# Patient Record
Sex: MALE | Race: WHITE | Employment: FULL TIME | URBAN - NONMETROPOLITAN AREA
[De-identification: names, ages, dates, MRNs, and addresses within clinical notes are randomized per-mention and may not be internally consistent; named-entity substitution may affect disease eponyms.]

---

## 2017-07-24 ENCOUNTER — HOSPITAL ENCOUNTER (OUTPATIENT)
Dept: GENERAL RADIOLOGY | Age: 47
Discharge: HOME | End: 2017-07-24
Payer: COMMERCIAL

## 2017-07-24 ENCOUNTER — HOSPITAL ENCOUNTER (OUTPATIENT)
Age: 47
End: 2017-07-24
Payer: COMMERCIAL

## 2017-07-24 DIAGNOSIS — M25.552 BILATERAL HIP PAIN: ICD-10-CM

## 2017-07-24 DIAGNOSIS — M25.551 BILATERAL HIP PAIN: ICD-10-CM

## 2017-07-24 PROCEDURE — 73521 X-RAY EXAM HIPS BI 2 VIEWS: CPT

## 2017-09-26 RX ORDER — POTASSIUM CITRATE 10 MEQ/1
TABLET, EXTENDED RELEASE ORAL
Qty: 90 TABLET | Refills: 3 | Status: SHIPPED | OUTPATIENT
Start: 2017-09-26 | End: 2018-07-27 | Stop reason: SDUPTHER

## 2018-01-24 ENCOUNTER — OFFICE VISIT (OUTPATIENT)
Dept: PHYSICAL MEDICINE AND REHAB | Age: 48
End: 2018-01-24
Payer: COMMERCIAL

## 2018-01-24 VITALS
HEART RATE: 72 BPM | HEIGHT: 70 IN | DIASTOLIC BLOOD PRESSURE: 82 MMHG | SYSTOLIC BLOOD PRESSURE: 116 MMHG | BODY MASS INDEX: 33.13 KG/M2 | WEIGHT: 231.4 LBS

## 2018-01-24 DIAGNOSIS — G89.4 CHRONIC PAIN SYNDROME: ICD-10-CM

## 2018-01-24 DIAGNOSIS — M47.814 SPONDYLOSIS OF THORACIC REGION WITHOUT MYELOPATHY OR RADICULOPATHY: ICD-10-CM

## 2018-01-24 DIAGNOSIS — M47.816 LUMBAR SPONDYLOSIS: Primary | ICD-10-CM

## 2018-01-24 DIAGNOSIS — M51.24 PROTRUSION OF INTERVERTEBRAL DISC OF THORACIC REGION: ICD-10-CM

## 2018-01-24 DIAGNOSIS — R07.81 RIB PAIN ON LEFT SIDE: ICD-10-CM

## 2018-01-24 PROCEDURE — 99244 OFF/OP CNSLTJ NEW/EST MOD 40: CPT | Performed by: PAIN MEDICINE

## 2018-01-24 RX ORDER — TRAMADOL HYDROCHLORIDE 50 MG/1
TABLET ORAL
Qty: 180 TABLET | Refills: 0 | Status: SHIPPED | OUTPATIENT
Start: 2018-01-24 | End: 2018-02-19 | Stop reason: SDUPTHER

## 2018-01-24 ASSESSMENT — ENCOUNTER SYMPTOMS
PHOTOPHOBIA: 0
SINUS PRESSURE: 0
EYE PAIN: 0
CONSTIPATION: 0
DIARRHEA: 0
CHEST TIGHTNESS: 0
SHORTNESS OF BREATH: 0
VOMITING: 0
RHINORRHEA: 0
SORE THROAT: 0
WHEEZING: 0
BACK PAIN: 1
ABDOMINAL PAIN: 0
COLOR CHANGE: 0
COUGH: 0
NAUSEA: 0

## 2018-01-24 NOTE — PROGRESS NOTES
[facility]  [address]  Dept: [phone]  Dept Fax: [phone]: [phone]    Visit Date: 1/24/2018    Russ Leon is a 49 y.o. male who is referred for pain management evaluation and treatment per Dr. Jayjay Blanc. CAGE and CAGE-AID Questions   1. In the last three months, have you felt you should cut down or stop drinking or using drugs? Yes []        No [x]     2. In the last three months, has anyone annoyed you or gotten on your nerves by telling you to cut down or stop drinking or using drugs? Yes []        No [x]     3. In the last three months, have you felt guilty or bad about how much you drink or use drugs? Yes []        No [x]     4. In the last three months, have you been waking up wanting to have an alcoholic drink or use drugs? Yes []        No [x]        Opioid Risk Tool:  Clinician Form       1. Family History of Substance Abuse: Female Male    Alcohol   []1   []3    Illegal drugs   []2   []3    Prescription drugs     []4   []4   2. Personal History of Substance Abuse:          Alcohol   []3   []3    Illegal drugs   []4   []4    Prescription drugs     []5   []5   3. Age (madelyn box if between 12 and 39):     []1   []1   4. History of Preadolescent Sexual Abuse:     []3   []0   5. Psychological Disease:      Attention deficit disorder, obsessive-compulsive disorder, bipolar, schizophrenia   []2   []2      Depression     []1   [x]1    Scoring Totals  1     Total Score  Low Risk  Moderate Risk  High Risk   Risk Category   0 - 3   4 - 7   8 or Above      Patient states symptoms interfere with:  A.  General Activity:  yes   B. Mood: yes    C. Walking Ability:   yes   D. Normal Work (Includes both work outside the home and housework):   yes    E.  Relations with Other People:  yes   F. Sleep:   yes   G.  Enjoyment of Life:  yes       HPI:     Chief Complaint:    Back pain  Family present    HPI    Patient is a 48 y/o male with back pain for last 8 years. Patient denies any injuries in the past. Patient works at [employer]. Patient states has some left side rib cage pain describes as tightness and pressure. Patient denies any numbness or burning bilateral upper extremities. States lower back area pain stabbing,achy and stabbing. Patient denies any radicular symptoms. Patient denies any bladder or bowel dysfunction. Patient states pain scale at worse is a 8/10 and at best is a 3/10. Patient states pain is aggravated by change of positions. States better with rest.  States prior treatments have been steroid injection, tramadol helps. States had physical therapy 2 X weeks for 6 weeks minimal benefit. MRI THORACIC SPINE from 10/11/2017:  1- Mild anterior wedge superior endplates of E4,Y1,N1,P01,Y34 and L1 vertebral bodies. 2- Small posterior mildline disc protrusion at T9-10.  3- Mild disc height narrowing at T10-11 and T11-12. MRI LUMBAR SPINE from 10/11/2017:  1- L4-5 annular bulging and tears superimposed upon facet arthrosis. 2- L5-S1 mild facet arthrosis    The patient is allergic to latex; nickel; tylenol with codeine #3 [acetaminophen-codeine]; and oxybutynin chloride [oxybutynin chloride]. Past Medical History  Meryle Promise  has a past medical history of GERD (gastroesophageal reflux disease); History of kidney stones; Hx of blood clots; and Kidney stone. Past Surgical History  The patient  has a past surgical history that includes Refractive surgery (2004); Ureter stent placement (2006); Lithotripsy (2006); hernia repair (2007); Cystocopy (6/14/2013); eye surgery; Cystocopy (07/29/2013); and Cystoscopy (12/23/13). Family History  This patient's family history includes Cancer in his mother; Heart Disease in his father. Social History  Meryle Promise  reports that he quit smoking about 12 years ago.  He has never used smokeless tobacco. He reports that he does not drink light-headedness, numbness and headaches. Hematological: Does not bruise/bleed easily. Psychiatric/Behavioral: Positive for sleep disturbance. Negative for agitation, behavioral problems, confusion, decreased concentration, dysphoric mood, hallucinations, self-injury and suicidal ideas. The patient is not nervous/anxious and is not hyperactive. Objective:     Vitals:    01/24/18 0910   BP: 116/82   Site: Left Arm   Position: Sitting   Cuff Size: Large Adult   Pulse: 72   Weight: 231 lb 6.4 oz (105 kg)   Height: 5' 10\" (1.778 m)       Physical Exam   Constitutional: He is oriented to person, place, and time. He appears well-developed and well-nourished. No distress. HENT:   Head: Normocephalic and atraumatic. Right Ear: External ear normal.   Left Ear: External ear normal.   Nose: Nose normal.   Mouth/Throat: Oropharynx is clear and moist. No oropharyngeal exudate. Eyes: Conjunctivae and EOM are normal. Pupils are equal, round, and reactive to light. Right eye exhibits no discharge. Left eye exhibits no discharge. No scleral icterus. Neck: Normal range of motion and full passive range of motion without pain. Neck supple. No muscular tenderness present. No neck rigidity. No edema, no erythema and normal range of motion present. No thyromegaly present. Cardiovascular: Normal rate, regular rhythm, normal heart sounds and intact distal pulses. Exam reveals no gallop and no friction rub. No murmur heard. Pulmonary/Chest: Effort normal and breath sounds normal. No respiratory distress. He has no wheezes. He has no rales. He exhibits no tenderness. Abdominal: Soft. Bowel sounds are normal. He exhibits no distension. There is no tenderness. There is no rebound and no guarding. Musculoskeletal:        Right hip: He exhibits normal range of motion and no tenderness. Left hip: He exhibits normal range of motion and no tenderness. Right knee: He exhibits normal range of motion.  No 4. Protrusion of intervertebral disc of thoracic region    5. Chronic pain syndrome            Plan:      · Patient read and signed orientation and opioid agreement. · OARRS reviewed. · Discussed long term side effects of medications. · Discussed tolerance, dependency and addiction. .UDS preformed today. · Patient told can not receive any pain medications from any other source. · Discussed with pt.may not be pain free. · No evidence of abuse, diversion or aberrant behavior. · Medications and/or procedures improve function and quality of life. · Needs clearance off coumadin weeks prior to procedure. · Reviewed MRI Thoracic and Lumbar spine in detail with patient. Model used to discuss pathology. · Discussed L-facet MBB for diagnostic and therapeutic purpose. Risks and procedure reviewed. Model used to discuss procedure. Questions answered. · Told patient will need clearance, on coumadin. Needs stopped 5-7 days and draw INR day of procedure. Verbalizes understanding. · In good forrest will continue Tramadol until procedures preformed. Goal is to decrease Tramadol needs. Previous Treatments tried:  · PT: Yes,  any benefit? No, how many weeks? 6, last date done: last yr  · NSAIDs: Yes, On coumadin  · Chiropractic: Yes,  any benefit? No  · Muscle relaxants: Yes,  any benefit? No  · Narcotics: Yes,  any benefit? Yes  · Spine surgeon consult: No  · Any Implants: No    Meds. Prescribed:   Orders Placed This Encounter   Medications    traMADol (ULTRAM) 50 MG tablet     Sig: Take one-two every 8hrs PRN. Dispense:  180 tablet     Refill:  0       Return for BILATERAL L-FACET MBB @ L4-5 and L5-S1, F/U after procedure. .     Time spent with patient was 60 minutes more than 50% was spent  Counseling/coordinated the patient's care.     Electronically signed by Romulo Obregon MD on 2/10/2018 at 2:05 PM

## 2018-02-19 DIAGNOSIS — R07.81 RIB PAIN ON LEFT SIDE: ICD-10-CM

## 2018-02-19 DIAGNOSIS — M47.816 LUMBAR SPONDYLOSIS: ICD-10-CM

## 2018-02-19 DIAGNOSIS — G89.4 CHRONIC PAIN SYNDROME: ICD-10-CM

## 2018-02-19 RX ORDER — TRAMADOL HYDROCHLORIDE 50 MG/1
TABLET ORAL
Qty: 180 TABLET | Refills: 0 | Status: SHIPPED | OUTPATIENT
Start: 2018-02-19 | End: 2018-03-19 | Stop reason: SDUPTHER

## 2018-02-21 ENCOUNTER — TELEPHONE (OUTPATIENT)
Dept: PHYSICAL MEDICINE AND REHAB | Age: 48
End: 2018-02-21

## 2018-02-28 ENCOUNTER — TELEPHONE (OUTPATIENT)
Dept: PHYSICAL MEDICINE AND REHAB | Age: 48
End: 2018-02-28

## 2018-03-07 ENCOUNTER — TELEPHONE (OUTPATIENT)
Dept: PHYSICAL MEDICINE AND REHAB | Age: 48
End: 2018-03-07

## 2018-03-19 DIAGNOSIS — M47.816 LUMBAR SPONDYLOSIS: ICD-10-CM

## 2018-03-19 DIAGNOSIS — R07.81 RIB PAIN ON LEFT SIDE: ICD-10-CM

## 2018-03-19 DIAGNOSIS — G89.4 CHRONIC PAIN SYNDROME: ICD-10-CM

## 2018-03-19 RX ORDER — TRAMADOL HYDROCHLORIDE 50 MG/1
TABLET ORAL
Qty: 180 TABLET | Refills: 0 | Status: SHIPPED | OUTPATIENT
Start: 2018-03-22 | End: 2018-04-18 | Stop reason: SDUPTHER

## 2018-03-19 NOTE — TELEPHONE ENCOUNTER
OARRS reviewed. UDS: + tramadol, o-desmethyltramadol. Last seen: 1/24/2018.  Follow-up: injections 3/26/18

## 2018-03-26 ENCOUNTER — ANESTHESIA EVENT (OUTPATIENT)
Dept: OPERATING ROOM | Age: 48
End: 2018-03-26
Payer: COMMERCIAL

## 2018-03-26 ENCOUNTER — ANESTHESIA (OUTPATIENT)
Dept: OPERATING ROOM | Age: 48
End: 2018-03-26
Payer: COMMERCIAL

## 2018-03-26 ENCOUNTER — HOSPITAL ENCOUNTER (OUTPATIENT)
Age: 48
Setting detail: OUTPATIENT SURGERY
Discharge: HOME | End: 2018-03-26
Attending: PAIN MEDICINE | Admitting: PAIN MEDICINE
Payer: COMMERCIAL

## 2018-03-26 ENCOUNTER — APPOINTMENT (OUTPATIENT)
Dept: GENERAL RADIOLOGY | Age: 48
End: 2018-03-26
Attending: PAIN MEDICINE
Payer: COMMERCIAL

## 2018-03-26 VITALS
WEIGHT: 229 LBS | HEIGHT: 70 IN | DIASTOLIC BLOOD PRESSURE: 73 MMHG | BODY MASS INDEX: 32.78 KG/M2 | OXYGEN SATURATION: 94 % | TEMPERATURE: 98.1 F | RESPIRATION RATE: 12 BRPM | HEART RATE: 78 BPM | SYSTOLIC BLOOD PRESSURE: 109 MMHG

## 2018-03-26 VITALS
OXYGEN SATURATION: 92 % | SYSTOLIC BLOOD PRESSURE: 105 MMHG | RESPIRATION RATE: 19 BRPM | DIASTOLIC BLOOD PRESSURE: 71 MMHG

## 2018-03-26 LAB — INR BLD: 0.98 (ref 0.85–1.13)

## 2018-03-26 PROCEDURE — 3209999900 FLUORO FOR SURGICAL PROCEDURES

## 2018-03-26 PROCEDURE — 2500000003 HC RX 250 WO HCPCS: Performed by: NURSE ANESTHETIST, CERTIFIED REGISTERED

## 2018-03-26 PROCEDURE — 3700000000 HC ANESTHESIA ATTENDED CARE: Performed by: PAIN MEDICINE

## 2018-03-26 PROCEDURE — 7100000010 HC PHASE II RECOVERY - FIRST 15 MIN: Performed by: PAIN MEDICINE

## 2018-03-26 PROCEDURE — 7100000011 HC PHASE II RECOVERY - ADDTL 15 MIN: Performed by: PAIN MEDICINE

## 2018-03-26 PROCEDURE — 64494 INJ PARAVERT F JNT L/S 2 LEV: CPT | Performed by: PAIN MEDICINE

## 2018-03-26 PROCEDURE — 64493 INJ PARAVERT F JNT L/S 1 LEV: CPT | Performed by: PAIN MEDICINE

## 2018-03-26 PROCEDURE — 6360000002 HC RX W HCPCS: Performed by: PAIN MEDICINE

## 2018-03-26 PROCEDURE — 6360000002 HC RX W HCPCS: Performed by: NURSE ANESTHETIST, CERTIFIED REGISTERED

## 2018-03-26 PROCEDURE — 3600000056 HC PAIN LEVEL 4 BASE: Performed by: PAIN MEDICINE

## 2018-03-26 PROCEDURE — 2500000003 HC RX 250 WO HCPCS: Performed by: PAIN MEDICINE

## 2018-03-26 PROCEDURE — 85610 PROTHROMBIN TIME: CPT

## 2018-03-26 PROCEDURE — 36415 COLL VENOUS BLD VENIPUNCTURE: CPT

## 2018-03-26 RX ORDER — LIDOCAINE HYDROCHLORIDE 10 MG/ML
INJECTION, SOLUTION INFILTRATION; PERINEURAL PRN
Status: DISCONTINUED | OUTPATIENT
Start: 2018-03-26 | End: 2018-03-26 | Stop reason: HOSPADM

## 2018-03-26 RX ORDER — BUPIVACAINE HYDROCHLORIDE 2.5 MG/ML
INJECTION, SOLUTION EPIDURAL; INFILTRATION; INTRACAUDAL PRN
Status: DISCONTINUED | OUTPATIENT
Start: 2018-03-26 | End: 2018-03-26 | Stop reason: HOSPADM

## 2018-03-26 RX ORDER — BETAMETHASONE SODIUM PHOSPHATE AND BETAMETHASONE ACETATE 3; 3 MG/ML; MG/ML
INJECTION, SUSPENSION INTRA-ARTICULAR; INTRALESIONAL; INTRAMUSCULAR; SOFT TISSUE PRN
Status: DISCONTINUED | OUTPATIENT
Start: 2018-03-26 | End: 2018-03-26 | Stop reason: HOSPADM

## 2018-03-26 RX ORDER — PROPOFOL 10 MG/ML
INJECTION, EMULSION INTRAVENOUS PRN
Status: DISCONTINUED | OUTPATIENT
Start: 2018-03-26 | End: 2018-03-26 | Stop reason: SDUPTHER

## 2018-03-26 RX ORDER — LIDOCAINE HYDROCHLORIDE 20 MG/ML
INJECTION, SOLUTION INFILTRATION; PERINEURAL PRN
Status: DISCONTINUED | OUTPATIENT
Start: 2018-03-26 | End: 2018-03-26 | Stop reason: SDUPTHER

## 2018-03-26 RX ADMIN — LIDOCAINE HYDROCHLORIDE 50 MG: 20 INJECTION, SOLUTION INFILTRATION; PERINEURAL at 11:48

## 2018-03-26 RX ADMIN — PROPOFOL 150 MG: 10 INJECTION, EMULSION INTRAVENOUS at 11:49

## 2018-03-26 ASSESSMENT — PULMONARY FUNCTION TESTS
PIF_VALUE: 0

## 2018-03-26 ASSESSMENT — PAIN DESCRIPTION - DESCRIPTORS: DESCRIPTORS: CONSTANT

## 2018-03-26 ASSESSMENT — PAIN - FUNCTIONAL ASSESSMENT: PAIN_FUNCTIONAL_ASSESSMENT: 0-10

## 2018-03-26 ASSESSMENT — PAIN SCALES - GENERAL: PAINLEVEL_OUTOF10: 0

## 2018-03-26 NOTE — ANESTHESIA PRE PROCEDURE
disease) K21.9    Anxiety F41.9    Panic disorder F41.0    Obesity (BMI 30.0-34. 9) E66.9       Past Medical History:        Diagnosis Date    Chronic back pain     GERD (gastroesophageal reflux disease)     History of kidney stones     Hx of blood clots 3/13    MUNA LUNGS    Kidney stone        Past Surgical History:        Procedure Laterality Date    CYSTOSCOPY  6/14/2013    URETEROSCOPY STONE REMOVAL    CYSTOSCOPY  07/29/2013    Left ureteroscopy, Left ureteral stone removal and stent exchange    CYSTOSCOPY  12/23/13    Cystoscopy, Left Optical Internal Ureterotomy, Left Ureteroscopy and Dilated UPJ Oscar Ojeda  2007    LITHOTRIPSY  2006    [address]  2004    URETER STENT PLACEMENT  2006       Social History:    Social History   Substance Use Topics    Smoking status: Former Smoker     Quit date: 6/6/2005    Smokeless tobacco: Never Used    Alcohol use No                                Counseling given: Not Answered      Vital Signs (Current):   Vitals:    03/21/18 1018   Weight: 232 lb (105.2 kg)   Height: 5' 10\" (1.778 m)                                              BP Readings from Last 3 Encounters:   01/24/18 116/82   05/30/17 114/78   10/11/16 110/78       NPO Status:                                                                                 BMI:   Wt Readings from Last 3 Encounters:   03/21/18 232 lb (105.2 kg)   01/24/18 231 lb 6.4 oz (105 kg)   05/30/17 224 lb (101.6 kg)     Body mass index is 33.29 kg/m².     CBC:   Lab Results   Component Value Date    WBC 9.2 05/30/2017    RBC 5.41 05/30/2017    HGB 16.3 05/30/2017    HCT 48.8 05/30/2017    MCV 90.2 05/30/2017    RDW 13.6 05/30/2017     05/30/2017       CMP:   Lab Results   Component Value Date     05/30/2017    K 4.4 05/30/2017     05/30/2017    CO2 24 05/30/2017    BUN 11 05/30/2017    CREATININE 0.9 05/30/2017    LABGLOM >90 05/30/2017    GLUCOSE 96 05/30/2017    PROT 6.6 01/23/2015    CALCIUM 9.5 05/30/2017    BILITOT 0.3 01/23/2015    ALKPHOS 90 01/23/2015    AST 34 01/23/2015    ALT 44 01/23/2015       POC Tests: No results for input(s): POCGLU, POCNA, POCK, POCCL, POCBUN, POCHEMO, POCHCT in the last 72 hours. Coags:   Lab Results   Component Value Date    INR 2.26 05/30/2017    APTT 32.1 06/14/2013       HCG (If Applicable): No results found for: PREGTESTUR, PREGSERUM, HCG, HCGQUANT     ABGs: No results found for: PHART, PO2ART, KLI2THB, PNM9GJF, BEART, N7AMMMMA     Type & Screen (If Applicable):  No results found for: LABABO, LABRH    Anesthesia Evaluation   no history of anesthetic complications:   Airway: Mallampati: II  TM distance: >3 FB   Neck ROM: full  Mouth opening: > = 3 FB Dental:          Pulmonary:normal exam    (+) sleep apnea: on CPAP,            Patient did not smoke on day of surgery. Cardiovascular:  Exercise tolerance: good (>4 METS),                     Neuro/Psych:   (+) headaches:, psychiatric history:            GI/Hepatic/Renal:   (+) GERD:,           Endo/Other: Negative Endo/Other ROS             Pt had no PAT visit       Abdominal:   (+) obese,         Vascular:                                        Anesthesia Plan      MAC     ASA 2       Induction: intravenous. Anesthetic plan and risks discussed with patient. Plan discussed with CRNA.                   Dioni Manzano MD   3/26/2018

## 2018-04-18 ENCOUNTER — OFFICE VISIT (OUTPATIENT)
Dept: PHYSICAL MEDICINE AND REHAB | Age: 48
End: 2018-04-18
Payer: COMMERCIAL

## 2018-04-18 VITALS
WEIGHT: 273.2 LBS | HEIGHT: 70 IN | HEART RATE: 84 BPM | BODY MASS INDEX: 39.11 KG/M2 | DIASTOLIC BLOOD PRESSURE: 85 MMHG | SYSTOLIC BLOOD PRESSURE: 134 MMHG

## 2018-04-18 DIAGNOSIS — M47.816 LUMBAR SPONDYLOSIS: Primary | ICD-10-CM

## 2018-04-18 DIAGNOSIS — G89.4 CHRONIC PAIN SYNDROME: ICD-10-CM

## 2018-04-18 DIAGNOSIS — M51.24 PROTRUSION OF INTERVERTEBRAL DISC OF THORACIC REGION: ICD-10-CM

## 2018-04-18 DIAGNOSIS — M47.814 SPONDYLOSIS OF THORACIC REGION WITHOUT MYELOPATHY OR RADICULOPATHY: ICD-10-CM

## 2018-04-18 DIAGNOSIS — R07.81 RIB PAIN ON LEFT SIDE: ICD-10-CM

## 2018-04-18 PROCEDURE — 99213 OFFICE O/P EST LOW 20 MIN: CPT | Performed by: NURSE PRACTITIONER

## 2018-04-18 RX ORDER — TRAMADOL HYDROCHLORIDE 50 MG/1
TABLET ORAL
Qty: 180 TABLET | Refills: 0 | Status: SHIPPED | OUTPATIENT
Start: 2018-04-21 | End: 2018-05-14

## 2018-04-18 ASSESSMENT — ENCOUNTER SYMPTOMS: BACK PAIN: 1

## 2018-04-20 ENCOUNTER — TELEPHONE (OUTPATIENT)
Dept: PHYSICAL MEDICINE AND REHAB | Age: 48
End: 2018-04-20

## 2018-04-23 ENCOUNTER — TELEPHONE (OUTPATIENT)
Dept: PHYSICAL MEDICINE AND REHAB | Age: 48
End: 2018-04-23

## 2018-04-30 ENCOUNTER — TELEPHONE (OUTPATIENT)
Dept: OPERATING ROOM | Age: 48
End: 2018-04-30

## 2018-05-07 DIAGNOSIS — G89.4 CHRONIC PAIN SYNDROME: Primary | ICD-10-CM

## 2018-05-09 RX ORDER — HYDROCODONE BITARTRATE AND ACETAMINOPHEN 5; 325 MG/1; MG/1
1 TABLET ORAL 3 TIMES DAILY PRN
Qty: 42 TABLET | Refills: 0 | Status: SHIPPED | OUTPATIENT
Start: 2018-05-09 | End: 2018-05-17 | Stop reason: SDUPTHER

## 2018-05-17 DIAGNOSIS — G89.4 CHRONIC PAIN SYNDROME: ICD-10-CM

## 2018-05-17 RX ORDER — HYDROCODONE BITARTRATE AND ACETAMINOPHEN 5; 325 MG/1; MG/1
1 TABLET ORAL 3 TIMES DAILY PRN
Qty: 90 TABLET | Refills: 0 | Status: SHIPPED | OUTPATIENT
Start: 2018-05-23 | End: 2018-06-21 | Stop reason: SDUPTHER

## 2018-05-21 ENCOUNTER — APPOINTMENT (OUTPATIENT)
Dept: GENERAL RADIOLOGY | Age: 48
End: 2018-05-21
Attending: PAIN MEDICINE
Payer: COMMERCIAL

## 2018-05-21 ENCOUNTER — ANESTHESIA (OUTPATIENT)
Dept: OPERATING ROOM | Age: 48
End: 2018-05-21
Payer: COMMERCIAL

## 2018-05-21 ENCOUNTER — HOSPITAL ENCOUNTER (OUTPATIENT)
Age: 48
Setting detail: OUTPATIENT SURGERY
Discharge: HOME | End: 2018-05-21
Attending: PAIN MEDICINE | Admitting: PAIN MEDICINE
Payer: COMMERCIAL

## 2018-05-21 ENCOUNTER — ANESTHESIA EVENT (OUTPATIENT)
Dept: OPERATING ROOM | Age: 48
End: 2018-05-21
Payer: COMMERCIAL

## 2018-05-21 VITALS
RESPIRATION RATE: 16 BRPM | HEIGHT: 70 IN | DIASTOLIC BLOOD PRESSURE: 74 MMHG | OXYGEN SATURATION: 94 % | SYSTOLIC BLOOD PRESSURE: 118 MMHG | HEART RATE: 75 BPM | BODY MASS INDEX: 32.64 KG/M2 | WEIGHT: 228 LBS | TEMPERATURE: 97.7 F

## 2018-05-21 VITALS
SYSTOLIC BLOOD PRESSURE: 134 MMHG | RESPIRATION RATE: 10 BRPM | OXYGEN SATURATION: 95 % | DIASTOLIC BLOOD PRESSURE: 89 MMHG

## 2018-05-21 LAB — POC INR: 1.1 (ref 0.8–1.2)

## 2018-05-21 PROCEDURE — 2500000003 HC RX 250 WO HCPCS: Performed by: NURSE ANESTHETIST, CERTIFIED REGISTERED

## 2018-05-21 PROCEDURE — 6360000002 HC RX W HCPCS: Performed by: PAIN MEDICINE

## 2018-05-21 PROCEDURE — 64494 INJ PARAVERT F JNT L/S 2 LEV: CPT | Performed by: PAIN MEDICINE

## 2018-05-21 PROCEDURE — 3600000056 HC PAIN LEVEL 4 BASE: Performed by: PAIN MEDICINE

## 2018-05-21 PROCEDURE — 2500000003 HC RX 250 WO HCPCS: Performed by: PAIN MEDICINE

## 2018-05-21 PROCEDURE — 3209999900 FLUORO FOR SURGICAL PROCEDURES

## 2018-05-21 PROCEDURE — 64493 INJ PARAVERT F JNT L/S 1 LEV: CPT | Performed by: PAIN MEDICINE

## 2018-05-21 PROCEDURE — 6360000002 HC RX W HCPCS: Performed by: NURSE ANESTHETIST, CERTIFIED REGISTERED

## 2018-05-21 PROCEDURE — 7100000011 HC PHASE II RECOVERY - ADDTL 15 MIN: Performed by: PAIN MEDICINE

## 2018-05-21 PROCEDURE — 3700000000 HC ANESTHESIA ATTENDED CARE: Performed by: PAIN MEDICINE

## 2018-05-21 PROCEDURE — 7100000010 HC PHASE II RECOVERY - FIRST 15 MIN: Performed by: PAIN MEDICINE

## 2018-05-21 PROCEDURE — 2580000003 HC RX 258: Performed by: NURSE ANESTHETIST, CERTIFIED REGISTERED

## 2018-05-21 RX ORDER — LIDOCAINE HYDROCHLORIDE 10 MG/ML
INJECTION, SOLUTION INFILTRATION; PERINEURAL PRN
Status: DISCONTINUED | OUTPATIENT
Start: 2018-05-21 | End: 2018-05-21 | Stop reason: SDUPTHER

## 2018-05-21 RX ORDER — BETAMETHASONE SODIUM PHOSPHATE AND BETAMETHASONE ACETATE 3; 3 MG/ML; MG/ML
INJECTION, SUSPENSION INTRA-ARTICULAR; INTRALESIONAL; INTRAMUSCULAR; SOFT TISSUE PRN
Status: DISCONTINUED | OUTPATIENT
Start: 2018-05-21 | End: 2018-05-21 | Stop reason: HOSPADM

## 2018-05-21 RX ORDER — 0.9 % SODIUM CHLORIDE 0.9 %
VIAL (ML) INJECTION PRN
Status: DISCONTINUED | OUTPATIENT
Start: 2018-05-21 | End: 2018-05-21 | Stop reason: SDUPTHER

## 2018-05-21 RX ORDER — PROPOFOL 10 MG/ML
INJECTION, EMULSION INTRAVENOUS PRN
Status: DISCONTINUED | OUTPATIENT
Start: 2018-05-21 | End: 2018-05-21 | Stop reason: SDUPTHER

## 2018-05-21 RX ORDER — LIDOCAINE HYDROCHLORIDE 10 MG/ML
INJECTION, SOLUTION INFILTRATION; PERINEURAL PRN
Status: DISCONTINUED | OUTPATIENT
Start: 2018-05-21 | End: 2018-05-21 | Stop reason: HOSPADM

## 2018-05-21 RX ORDER — BUPIVACAINE HYDROCHLORIDE 2.5 MG/ML
INJECTION, SOLUTION EPIDURAL; INFILTRATION; INTRACAUDAL PRN
Status: DISCONTINUED | OUTPATIENT
Start: 2018-05-21 | End: 2018-05-21 | Stop reason: HOSPADM

## 2018-05-21 RX ADMIN — SODIUM CHLORIDE 5 ML: 9 INJECTION, SOLUTION INTRAMUSCULAR; INTRAVENOUS; SUBCUTANEOUS at 10:17

## 2018-05-21 RX ADMIN — SODIUM CHLORIDE 5 ML: 9 INJECTION, SOLUTION INTRAMUSCULAR; INTRAVENOUS; SUBCUTANEOUS at 10:15

## 2018-05-21 RX ADMIN — PROPOFOL 50 MG: 10 INJECTION, EMULSION INTRAVENOUS at 10:17

## 2018-05-21 RX ADMIN — PROPOFOL 50 MG: 10 INJECTION, EMULSION INTRAVENOUS at 10:15

## 2018-05-21 RX ADMIN — LIDOCAINE HYDROCHLORIDE 20 MG: 10 INJECTION, SOLUTION INFILTRATION; PERINEURAL at 10:15

## 2018-05-21 ASSESSMENT — PAIN - FUNCTIONAL ASSESSMENT: PAIN_FUNCTIONAL_ASSESSMENT: 0-10

## 2018-05-21 ASSESSMENT — PAIN SCALES - GENERAL: PAINLEVEL_OUTOF10: 0

## 2018-05-21 ASSESSMENT — PULMONARY FUNCTION TESTS
PIF_VALUE: 0

## 2018-06-18 ENCOUNTER — TELEPHONE (OUTPATIENT)
Dept: UROLOGY | Age: 48
End: 2018-06-18

## 2018-06-21 ENCOUNTER — OFFICE VISIT (OUTPATIENT)
Dept: PHYSICAL MEDICINE AND REHAB | Age: 48
End: 2018-06-21
Payer: COMMERCIAL

## 2018-06-21 VITALS
SYSTOLIC BLOOD PRESSURE: 123 MMHG | DIASTOLIC BLOOD PRESSURE: 85 MMHG | HEIGHT: 70 IN | HEART RATE: 82 BPM | BODY MASS INDEX: 32.63 KG/M2 | WEIGHT: 227.96 LBS

## 2018-06-21 DIAGNOSIS — M51.24 PROTRUSION OF INTERVERTEBRAL DISC OF THORACIC REGION: ICD-10-CM

## 2018-06-21 DIAGNOSIS — G89.4 CHRONIC PAIN SYNDROME: ICD-10-CM

## 2018-06-21 DIAGNOSIS — M47.814 SPONDYLOSIS OF THORACIC REGION WITHOUT MYELOPATHY OR RADICULOPATHY: ICD-10-CM

## 2018-06-21 DIAGNOSIS — M47.816 SPONDYLOSIS OF LUMBAR REGION WITHOUT MYELOPATHY OR RADICULOPATHY: Primary | ICD-10-CM

## 2018-06-21 PROCEDURE — 99213 OFFICE O/P EST LOW 20 MIN: CPT | Performed by: NURSE PRACTITIONER

## 2018-06-21 RX ORDER — HYDROCODONE BITARTRATE AND ACETAMINOPHEN 5; 325 MG/1; MG/1
1 TABLET ORAL 3 TIMES DAILY PRN
Qty: 90 TABLET | Refills: 0 | Status: SHIPPED | OUTPATIENT
Start: 2018-06-22 | End: 2018-07-17 | Stop reason: SDUPTHER

## 2018-06-21 ASSESSMENT — ENCOUNTER SYMPTOMS: BACK PAIN: 1

## 2018-07-03 ENCOUNTER — TELEPHONE (OUTPATIENT)
Dept: PHYSICAL MEDICINE AND REHAB | Age: 48
End: 2018-07-03

## 2018-07-07 LAB
ALBUMIN SERPL-MCNC: 4.7 G/DL
ALP BLD-CCNC: 100 U/L
ALT SERPL-CCNC: 41 U/L
ANION GAP SERPL CALCULATED.3IONS-SCNC: 13 MMOL/L
AST SERPL-CCNC: 30 U/L
AVERAGE GLUCOSE: 117
BASOPHILS ABSOLUTE: 0.1 /ΜL
BASOPHILS RELATIVE PERCENT: 1.3 %
BILIRUB SERPL-MCNC: 0.5 MG/DL (ref 0.1–1.4)
BUN BLDV-MCNC: 12 MG/DL
CALCIUM SERPL-MCNC: 9.2 MG/DL
CHLORIDE BLD-SCNC: 105 MMOL/L
CHOLESTEROL, TOTAL: 258 MG/DL
CHOLESTEROL/HDL RATIO: 8.9
CO2: 25 MMOL/L
CREAT SERPL-MCNC: 0.9 MG/DL
EOSINOPHILS ABSOLUTE: 0.3 /ΜL
EOSINOPHILS RELATIVE PERCENT: 5.3 %
GFR CALCULATED: NORMAL
GLUCOSE BLD-MCNC: 95 MG/DL
HBA1C MFR BLD: 5.7 %
HCT VFR BLD CALC: 47.6 % (ref 41–53)
HDLC SERPL-MCNC: 29 MG/DL (ref 35–70)
HEMOGLOBIN: 15.9 G/DL (ref 13.5–17.5)
LDL CHOLESTEROL CALCULATED: ABNORMAL MG/DL (ref 0–160)
LYMPHOCYTES ABSOLUTE: 1 /ΜL
LYMPHOCYTES RELATIVE PERCENT: 20.5 %
MCH RBC QN AUTO: 29.6 PG
MCHC RBC AUTO-ENTMCNC: 33.4 G/DL
MCV RBC AUTO: 88.6 FL
MONOCYTES ABSOLUTE: 0.6 /ΜL
MONOCYTES RELATIVE PERCENT: 12.9 %
NEUTROPHILS ABSOLUTE: 2.8 /ΜL
NEUTROPHILS RELATIVE PERCENT: 59.4 %
PDW BLD-RTO: 12.8 %
PLATELET # BLD: 167 K/ΜL
PMV BLD AUTO: NORMAL FL
POTASSIUM SERPL-SCNC: 4.3 MMOL/L
RBC # BLD: 5.37 10^6/ΜL
SODIUM BLD-SCNC: 143 MMOL/L
TOTAL PROTEIN: 6.8
TRIGL SERPL-MCNC: 436 MG/DL
VLDLC SERPL CALC-MCNC: ABNORMAL MG/DL
WBC # BLD: 4.7 10^3/ML

## 2018-07-13 ENCOUNTER — TELEPHONE (OUTPATIENT)
Dept: PHYSICAL MEDICINE AND REHAB | Age: 48
End: 2018-07-13

## 2018-07-13 NOTE — TELEPHONE ENCOUNTER
Patient called office, had tooth extractions. Got started on ATB and was prescribed Norco by the DDS. Spoke with Dr Dora Padron, advised patient to not fill script, ok to increase Norco 5-325 we have him on from TID to QID for 2 days, will be out early. Patient stated he was taking more than we prescribed, due to tooth pain. Advised patient we can not fill any earlier than the ok'd dose, and that he needs to take only as ordered.      Asking about procedures and authorization, ave name to Anjel Mitchell to check with insurance

## 2018-07-17 DIAGNOSIS — G89.4 CHRONIC PAIN SYNDROME: ICD-10-CM

## 2018-07-17 RX ORDER — HYDROCODONE BITARTRATE AND ACETAMINOPHEN 5; 325 MG/1; MG/1
1 TABLET ORAL 3 TIMES DAILY PRN
Qty: 90 TABLET | Refills: 0 | Status: SHIPPED | OUTPATIENT
Start: 2018-07-21 | End: 2018-08-16 | Stop reason: SDUPTHER

## 2018-07-17 NOTE — TELEPHONE ENCOUNTER
OARRS reviewed. UDS: consistent. Last seen: 6/21/2018. Follow-up: procedures    Increased dose for two days to QID. (see my last telephone note) patient is out a day early by one day.

## 2018-07-27 ENCOUNTER — OFFICE VISIT (OUTPATIENT)
Dept: UROLOGY | Age: 48
End: 2018-07-27
Payer: COMMERCIAL

## 2018-07-27 VITALS
WEIGHT: 237.4 LBS | BODY MASS INDEX: 33.99 KG/M2 | HEIGHT: 70 IN | DIASTOLIC BLOOD PRESSURE: 82 MMHG | SYSTOLIC BLOOD PRESSURE: 110 MMHG

## 2018-07-27 DIAGNOSIS — Z51.81 THERAPEUTIC DRUG MONITORING: ICD-10-CM

## 2018-07-27 DIAGNOSIS — N20.0 KIDNEY STONE: Primary | ICD-10-CM

## 2018-07-27 LAB
BILIRUBIN URINE: NEGATIVE
BLOOD URINE, POC: NEGATIVE
CHARACTER, URINE: CLEAR
COLOR, URINE: YELLOW
GLUCOSE URINE: NEGATIVE MG/DL
KETONES, URINE: NEGATIVE
LEUKOCYTE CLUMPS, URINE: NEGATIVE
NITRITE, URINE: NEGATIVE
PH, URINE: 6.5
PROTEIN, URINE: NEGATIVE MG/DL
SPECIFIC GRAVITY, URINE: 1.02 (ref 1–1.03)
UROBILINOGEN, URINE: 0.2 EU/DL

## 2018-07-27 PROCEDURE — 99213 OFFICE O/P EST LOW 20 MIN: CPT | Performed by: NURSE PRACTITIONER

## 2018-07-27 PROCEDURE — 81003 URINALYSIS AUTO W/O SCOPE: CPT | Performed by: NURSE PRACTITIONER

## 2018-07-27 RX ORDER — POTASSIUM CITRATE 10 MEQ/1
TABLET, EXTENDED RELEASE ORAL
Qty: 90 TABLET | Refills: 3 | Status: SHIPPED | OUTPATIENT
Start: 2018-07-27 | End: 2019-09-04 | Stop reason: SDUPTHER

## 2018-07-27 RX ORDER — ALLOPURINOL 300 MG/1
300 TABLET ORAL DAILY
Qty: 90 TABLET | Refills: 3 | Status: SHIPPED | OUTPATIENT
Start: 2018-07-27 | End: 2019-07-22 | Stop reason: SDUPTHER

## 2018-07-27 NOTE — PROGRESS NOTES
or edema present. Neurological:        Alert and oriented. No cranial nerve deficit. There are no focalizing motor or sensory deficits. CN II-XII are grossly intact. Psychiatric:        Normal mood and affect. Labs   Urine dip demonstrates   Results for POC orders placed in visit on 07/27/18   POCT Urinalysis No Micro (Auto)   Result Value Ref Range    Glucose, Ur Negative NEGATIVE mg/dl    Bilirubin Urine Negative     Ketones, Urine Negative NEGATIVE    Specific Gravity, Urine 1.025 1.002 - 1.03    Blood, UA POC Negative NEGATIVE    pH, Urine 6.50 5.0 - 9.0    Protein, Urine Negative NEGATIVE mg/dl    Urobilinogen, Urine 0.20 0.0 - 1.0 eu/dl    Nitrite, Urine Negative NEGATIVE    Leukocyte Clumps, Urine Negative NEGATIVE    Color, Urine Yellow YELLOW-STR    Character, Urine Clear [id number]       Patients recent PSA values are as follows  Lab Results   Component Value Date    PSA 0.30 08/17/2012        Recent BUN/Creatinine:  Lab Results   Component Value Date    BUN 11 05/30/2017    CREATININE 0.9 05/30/2017       Radiology  The patient has had a CT abd/pelvis 5/2017 which I have reviewed along with its accompanying report. The study demonstrates no renal calculi or acute urology findings. Assessment & Plan  Hx kidney stones    Pt denies symptoms. CT 5/2017 negative for stones. Check BMP--call results. Refills sent for potassium citrate and allopurinol. Pt would like to continue to follow yearly. F/u in 1 year with BMP prior.

## 2018-08-16 DIAGNOSIS — G89.4 CHRONIC PAIN SYNDROME: ICD-10-CM

## 2018-08-16 RX ORDER — HYDROCODONE BITARTRATE AND ACETAMINOPHEN 5; 325 MG/1; MG/1
1 TABLET ORAL 3 TIMES DAILY PRN
Qty: 90 TABLET | Refills: 0 | Status: SHIPPED | OUTPATIENT
Start: 2018-08-20 | End: 2018-09-12 | Stop reason: SDUPTHER

## 2018-08-16 NOTE — TELEPHONE ENCOUNTER
OARRS reviewed. UDS:Consisent. Last seen: 6/21/2018.  Follow-up: Future Appointments  Date Time Provider Saul Maria G   9/4/2019 8:00 AM [facility] - MARY SANTIAGO [address]

## 2018-09-12 DIAGNOSIS — G89.4 CHRONIC PAIN SYNDROME: ICD-10-CM

## 2018-09-12 RX ORDER — HYDROCODONE BITARTRATE AND ACETAMINOPHEN 5; 325 MG/1; MG/1
1 TABLET ORAL 3 TIMES DAILY PRN
Qty: 90 TABLET | Refills: 0 | Status: SHIPPED | OUTPATIENT
Start: 2018-09-19 | End: 2018-10-16 | Stop reason: SDUPTHER

## 2018-09-21 NOTE — PROGRESS NOTES
In preparation for their surgical procedure above patient was screened for Obstructive Sleep Apnea (SANDRA) using the STOP-Bang Questionnaire by the Pre-Admission Testing department. This is a pre-surgical screening tool for patient safety and serves as a recommendation, this WILL NOT cause cancellation of surgery. STOP-Bang Questionnaire  * Do you currently see a pulmonologist?  No     If yes STOP, do not complete. Patient follows with Dr. James Sessions (Optional):    1. Do you snore loudly (able to be heard in the next room)? No    2. Do you often feel tired or sleepy during the daytime? No       3. Has anyone ever told you that you stop breathing during your sleep? No    4. Do you have or are you being treated for high blood pressure? No      5. BMI more than 35? BMI (Calculated): 33.5        No    6. Age over 48 years? 49 y.o. No    7. Neck Circumference greater than 17 inches for male or 16 inches for female? Measured           (visits only)            Not Applicable    8. Gender Male? Yes      TOTAL SCORE: 1    SANDRA - Low Risk : Yes to 0 - 2 questions  SANDRA - Intermediate Risk : Yes to 3 - 4 questions  SANDRA - High Risk : Yes to 5 - 8 questions    Adapted from:   STOP Questionnaire: A Tool to Screen Patients for Obstructive Sleep Apnea   ANGELO GandaraC.P.C., WINIFRED Askew.B.B.S., Radha Correa M.D., [facility]. Paco Stubbs, Ph.D., WINIFRED Patricia.B.B.S., Elina Neri, M.Sc., Alpa Ojeda M.D., [facility]. GEOVANNY Bagley.R.C.P.C.    Anesthesiology 2008; [id number] Copyright 2008, the [address] [employer], [place].   ----------------------------------------------------------------------------------------------------------------

## 2018-09-28 ENCOUNTER — ANESTHESIA EVENT (OUTPATIENT)
Dept: OPERATING ROOM | Age: 48
End: 2018-09-28
Payer: COMMERCIAL

## 2018-09-28 ENCOUNTER — APPOINTMENT (OUTPATIENT)
Dept: GENERAL RADIOLOGY | Age: 48
End: 2018-09-28
Attending: PAIN MEDICINE
Payer: COMMERCIAL

## 2018-09-28 ENCOUNTER — HOSPITAL ENCOUNTER (OUTPATIENT)
Age: 48
Setting detail: OUTPATIENT SURGERY
Discharge: HOME | End: 2018-09-28
Attending: PAIN MEDICINE | Admitting: PAIN MEDICINE
Payer: COMMERCIAL

## 2018-09-28 ENCOUNTER — ANESTHESIA (OUTPATIENT)
Dept: OPERATING ROOM | Age: 48
End: 2018-09-28
Payer: COMMERCIAL

## 2018-09-28 VITALS
RESPIRATION RATE: 16 BRPM | HEIGHT: 70 IN | TEMPERATURE: 98.3 F | SYSTOLIC BLOOD PRESSURE: 108 MMHG | DIASTOLIC BLOOD PRESSURE: 57 MMHG | BODY MASS INDEX: 34.22 KG/M2 | WEIGHT: 239 LBS | HEART RATE: 71 BPM | OXYGEN SATURATION: 93 %

## 2018-09-28 VITALS
DIASTOLIC BLOOD PRESSURE: 76 MMHG | RESPIRATION RATE: 16 BRPM | SYSTOLIC BLOOD PRESSURE: 123 MMHG | OXYGEN SATURATION: 92 %

## 2018-09-28 LAB — POC INR: 1 (ref 0.8–1.2)

## 2018-09-28 PROCEDURE — 3700000001 HC ADD 15 MINUTES (ANESTHESIA): Performed by: PAIN MEDICINE

## 2018-09-28 PROCEDURE — 64635 DESTROY LUMB/SAC FACET JNT: CPT | Performed by: PAIN MEDICINE

## 2018-09-28 PROCEDURE — 6360000002 HC RX W HCPCS: Performed by: SPECIALIST

## 2018-09-28 PROCEDURE — 2500000003 HC RX 250 WO HCPCS: Performed by: SPECIALIST

## 2018-09-28 PROCEDURE — 7100000010 HC PHASE II RECOVERY - FIRST 15 MIN: Performed by: PAIN MEDICINE

## 2018-09-28 PROCEDURE — 3700000000 HC ANESTHESIA ATTENDED CARE: Performed by: PAIN MEDICINE

## 2018-09-28 PROCEDURE — 3209999900 FLUORO FOR SURGICAL PROCEDURES

## 2018-09-28 PROCEDURE — 6360000002 HC RX W HCPCS: Performed by: PAIN MEDICINE

## 2018-09-28 PROCEDURE — 3600000056 HC PAIN LEVEL 4 BASE: Performed by: PAIN MEDICINE

## 2018-09-28 PROCEDURE — 3600000057 HC PAIN LEVEL 4 ADDL 15 MIN: Performed by: PAIN MEDICINE

## 2018-09-28 PROCEDURE — 2709999900 HC NON-CHARGEABLE SUPPLY: Performed by: PAIN MEDICINE

## 2018-09-28 PROCEDURE — 64636 DESTROY L/S FACET JNT ADDL: CPT | Performed by: PAIN MEDICINE

## 2018-09-28 PROCEDURE — 2580000003 HC RX 258: Performed by: SPECIALIST

## 2018-09-28 PROCEDURE — 7100000011 HC PHASE II RECOVERY - ADDTL 15 MIN: Performed by: PAIN MEDICINE

## 2018-09-28 PROCEDURE — 2500000003 HC RX 250 WO HCPCS: Performed by: PAIN MEDICINE

## 2018-09-28 RX ORDER — BUPIVACAINE HYDROCHLORIDE 2.5 MG/ML
INJECTION, SOLUTION EPIDURAL; INFILTRATION; INTRACAUDAL PRN
Status: DISCONTINUED | OUTPATIENT
Start: 2018-09-28 | End: 2018-09-28 | Stop reason: HOSPADM

## 2018-09-28 RX ORDER — LIDOCAINE HYDROCHLORIDE 10 MG/ML
INJECTION, SOLUTION EPIDURAL; INFILTRATION; INTRACAUDAL; PERINEURAL PRN
Status: DISCONTINUED | OUTPATIENT
Start: 2018-09-28 | End: 2018-09-28 | Stop reason: HOSPADM

## 2018-09-28 RX ORDER — PROPOFOL 10 MG/ML
INJECTION, EMULSION INTRAVENOUS PRN
Status: DISCONTINUED | OUTPATIENT
Start: 2018-09-28 | End: 2018-09-28 | Stop reason: SDUPTHER

## 2018-09-28 RX ORDER — FENTANYL CITRATE 50 UG/ML
INJECTION, SOLUTION INTRAMUSCULAR; INTRAVENOUS PRN
Status: DISCONTINUED | OUTPATIENT
Start: 2018-09-28 | End: 2018-09-28 | Stop reason: SDUPTHER

## 2018-09-28 RX ORDER — SODIUM CHLORIDE 9 MG/ML
INJECTION, SOLUTION INTRAVENOUS CONTINUOUS PRN
Status: DISCONTINUED | OUTPATIENT
Start: 2018-09-28 | End: 2018-09-28 | Stop reason: SDUPTHER

## 2018-09-28 RX ORDER — BETAMETHASONE SODIUM PHOSPHATE AND BETAMETHASONE ACETATE 3; 3 MG/ML; MG/ML
INJECTION, SUSPENSION INTRA-ARTICULAR; INTRALESIONAL; INTRAMUSCULAR; SOFT TISSUE PRN
Status: DISCONTINUED | OUTPATIENT
Start: 2018-09-28 | End: 2018-09-28 | Stop reason: HOSPADM

## 2018-09-28 RX ORDER — LIDOCAINE HYDROCHLORIDE 10 MG/ML
INJECTION, SOLUTION INFILTRATION; PERINEURAL PRN
Status: DISCONTINUED | OUTPATIENT
Start: 2018-09-28 | End: 2018-09-28 | Stop reason: SDUPTHER

## 2018-09-28 RX ADMIN — FENTANYL CITRATE 100 MCG: 50 INJECTION INTRAMUSCULAR; INTRAVENOUS at 11:50

## 2018-09-28 RX ADMIN — LIDOCAINE HYDROCHLORIDE 50 MG: 10 INJECTION, SOLUTION INFILTRATION; PERINEURAL at 11:58

## 2018-09-28 RX ADMIN — PROPOFOL 100 MG: 10 INJECTION, EMULSION INTRAVENOUS at 11:58

## 2018-09-28 RX ADMIN — SODIUM CHLORIDE: 9 INJECTION, SOLUTION INTRAVENOUS at 11:50

## 2018-09-28 ASSESSMENT — PULMONARY FUNCTION TESTS
PIF_VALUE: 0

## 2018-09-28 ASSESSMENT — PAIN SCALES - GENERAL: PAINLEVEL_OUTOF10: 0

## 2018-09-28 ASSESSMENT — PAIN DESCRIPTION - DESCRIPTORS: DESCRIPTORS: CONSTANT

## 2018-09-28 ASSESSMENT — PAIN - FUNCTIONAL ASSESSMENT: PAIN_FUNCTIONAL_ASSESSMENT: 0-10

## 2018-09-28 NOTE — ANESTHESIA POSTPROCEDURE EVALUATION
Department of Anesthesiology  Postprocedure Note    Patient: Babak Rose  MRN: [id number]  YOB: 1970  Date of evaluation: 9/28/2018  Time:  12:21 PM     Procedure Summary     Date:  09/28/18 Room / Location:  [facility] OR 03 / [address] [facility]    Anesthesia Start:  9924 Anesthesia Stop:  5171    Procedure:  LUMBAR RFA @ L4-5, and L5-S1 LEFT SIDE FIRST. . (Left ) Diagnosis:  (lumbar spondylosis)    Surgeon:  Henny Poe MD Responsible Provider:  Maricarmen Cohen DO    Anesthesia Type:  MAC ASA Status:  3          Anesthesia Type: MAC    Angelina Phase I:      Angelina Phase II: Angelina Score: 10    Last vitals: Reviewed and per EMR flowsheets.        Anesthesia Post Evaluation    Patient location during evaluation: bedside  Patient participation: complete - patient participated  Level of consciousness: awake  Pain score: 0  Airway patency: patent  Nausea & Vomiting: no vomiting and no nausea  Complications: no  Cardiovascular status: hemodynamically stable  Respiratory status: spontaneous ventilation  Hydration status: stable

## 2018-09-28 NOTE — OP NOTE
median branches for long term pain releif. The procedure and risks  were discussed with the patient and an informed consent was obtained. Procedure:  Left side   A meaningful communication was kept up with the patient throughout the procedure. The patient is placed in prone position and skin over the back was prepped and draped in sterile manner. Then using fluoroscopy the junction of the transverse process of the vertebra with the superior process of the facet joint was observed and the view was optimized. The skin and deep tissues posterior were infiltrated with 6 ml of  1% xylocaine. The RF canula with the 15 mm active tip was introduced through the skin wheal under fluoroscopy guidance such that the tip of the needle lies in the groove of the transverse process with the superior processes of the facet joint. Then a lateral view of the lumbar spine was obtained to make sure the tip of needle is not in the neural foramen. Then electric impedence was checked to make sure it is acceptable. Then a sensory stimulus was applied at 50 Hz up to 1 volt and concordant pain symptoms were reproduced. Then a motor stimulus was applied at 2 Hz up to 2 volts and no motor stimulation was seen in lower extremities. Some multifidus stimulus was seen. Then after negative aspiration a mixture of Celestone 12 mg and 0.25%  Marcaine 1 cc  was injected through the needle. Then a initial lesion was done at 80 degrees centigrade for 90 seconds. For L5 median branch block the junction of the ala of  the sacrum with the superior articular process of the facet joint was taken as a reference point. For the L4 median branch the junction of the transverse process of L5 with the superolateral possible facet joint was taken as a reference point and for S1 median branch the most lateral and superior aspect of S1 foramina was taken as a reference point,.   For L3 median branch the junction of L4 transverse process and superior

## 2018-09-28 NOTE — H&P
[address] [address]  History and Physical Update    Pt Name: Conchis Sheldon  MRN: [id number]  YOB: 1970  Date of evaluation: 9/28/2018      I have examined the patient and reviewed the H&P/Consult and there are no changes to the patient or plans.         Electronically signed by Patrice Sadler MD on 9/28/2018 at 11:51 AM

## 2018-09-28 NOTE — ANESTHESIA PRE PROCEDURE
 Morning headache R51    Bruxism F45.8    Restless sleeper G47.9    Poor concentration R41.840    Claustrophobia F40.240    Vivid dream F51.8    GERD (gastroesophageal reflux disease) K21.9    Anxiety F41.9    Panic disorder F41.0    Obesity (BMI 30.0-34. 9) E66.9    Lumbar spondylosis M47.816       Past Medical History:        Diagnosis Date    Chronic back pain     GERD (gastroesophageal reflux disease)     History of kidney stones     Hx of blood clots early 2000's & 2013    MUNA LUNGS    Kidney stone     Lumbar spondylosis        Past Surgical History:        Procedure Laterality Date    COLONOSCOPY      last one 2007???    CYSTOSCOPY  6/14/2013    URETEROSCOPY STONE REMOVAL    CYSTOSCOPY  07/29/2013    Left ureteroscopy, Left ureteral stone removal and stent exchange    CYSTOSCOPY  12/23/13    Cystoscopy, Left Optical Internal Ureterotomy, Left Ureteroscopy and Dilated UPJ Oscar Lamar  2007    [facility]  2007    left groin    LITHOTRIPSY  2006    OTHER SURGICAL HISTORY Bilateral 03/26/2018    Lumbar Facet MBB at L4-5, L5-S1    HI INJ DX/THER AGNT PARAVERT FACET JOINT, LUMBAR/SAC, 2ND LEVEL Bilateral 3/26/2018    BILATERAL L-FACET MBB @ L4-5 and L5-S1, performed by Jessica Ramos MD at [address] [place] [address] DX/THER AGNT PARAVERT FACET JOINT, LUMBAR/SAC, 2ND LEVEL Bilateral 5/21/2018    Bilateral L-facet MBB @ L4-5, L5-S1. performed by Jessica Ramos MD at [address] [place] [address]  2006       Social History:    Social History   Substance Use Topics    Smoking status: Former Smoker     Packs/day: 1.00     Years: 12.00     Quit date: 6/6/2005    Smokeless tobacco: Never Used    Alcohol use No                                Counseling given: Not Answered      Vital Signs (Current):   Vitals:    09/21/18 1537 09/28/18 1124   BP:  134/87   Pulse:  76   Resp:  16   Temp:  98.1 °F (36.7 °C)   TempSrc: Temporal   SpO2:  96%   Weight: 233 lb (105.7 kg) 239 lb (108.4 kg)   Height: 5' 10\" (1.778 m) 5' 10\" (1.778 m)                                              BP Readings from Last 3 Encounters:   09/28/18 134/87   07/27/18 110/82   06/21/18 123/85       NPO Status: Time of last liquid consumption: 2200                        Time of last solid consumption: 1900                        Date of last liquid consumption: 09/27/18                        Date of last solid food consumption: 09/27/18    BMI:   Wt Readings from Last 3 Encounters:   09/28/18 239 lb (108.4 kg)   07/27/18 237 lb 6.4 oz (107.7 kg)   06/21/18 227 lb 15.3 oz (103.4 kg)     Body mass index is 34.29 kg/m². CBC:   Lab Results   Component Value Date    WBC 4.7 07/07/2018    RBC 5.37 07/07/2018    HGB 15.9 07/07/2018    HCT 47.6 07/07/2018    MCV 88.6 07/07/2018    RDW 12.8 07/07/2018     07/07/2018       CMP:   Lab Results   Component Value Date     07/07/2018    K 4.3 07/07/2018     07/07/2018    CO2 25 07/07/2018    BUN 12 07/07/2018    CREATININE 0.9 07/07/2018    LABGLOM >90 05/30/2017    GLUCOSE 95 07/07/2018    PROT 6.6 01/23/2015    CALCIUM 9.2 07/07/2018    BILITOT 0.5 07/07/2018    ALKPHOS 100 07/07/2018    AST 30 07/07/2018    ALT 41 07/07/2018       POC Tests: No results for input(s): POCGLU, POCNA, POCK, POCCL, POCBUN, POCHEMO, POCHCT in the last 72 hours.     Coags:   Lab Results   Component Value Date    INR 1.10 05/21/2018    INR 0.98 03/26/2018    APTT 32.1 06/14/2013       HCG (If Applicable): No results found for: PREGTESTUR, PREGSERUM, HCG, HCGQUANT     ABGs: No results found for: PHART, PO2ART, OYK9PQC, ZKO3TAZ, BEART, [id number]     Type & Screen (If Applicable):  No results found for: LABABO, [address]    Anesthesia Evaluation  Patient summary reviewed and Nursing notes reviewed no history of anesthetic complications:   Airway: Mallampati: II  TM distance: >3 FB   Neck ROM: full  Mouth opening: > = 3 FB Dental:

## 2018-10-16 DIAGNOSIS — G89.4 CHRONIC PAIN SYNDROME: ICD-10-CM

## 2018-10-17 RX ORDER — HYDROCODONE BITARTRATE AND ACETAMINOPHEN 5; 325 MG/1; MG/1
1 TABLET ORAL 3 TIMES DAILY PRN
Qty: 90 TABLET | Refills: 0 | Status: SHIPPED | OUTPATIENT
Start: 2018-10-19 | End: 2018-11-13 | Stop reason: SDUPTHER

## 2018-10-25 ENCOUNTER — TELEPHONE (OUTPATIENT)
Dept: PHYSICAL MEDICINE AND REHAB | Age: 48
End: 2018-10-25

## 2018-10-25 ENCOUNTER — OFFICE VISIT (OUTPATIENT)
Dept: PHYSICAL MEDICINE AND REHAB | Age: 48
End: 2018-10-25
Payer: COMMERCIAL

## 2018-10-25 VITALS
DIASTOLIC BLOOD PRESSURE: 80 MMHG | SYSTOLIC BLOOD PRESSURE: 120 MMHG | BODY MASS INDEX: 34.13 KG/M2 | WEIGHT: 238.41 LBS | HEIGHT: 70 IN | HEART RATE: 78 BPM

## 2018-10-25 DIAGNOSIS — M51.24 PROTRUSION OF INTERVERTEBRAL DISC OF THORACIC REGION: ICD-10-CM

## 2018-10-25 DIAGNOSIS — M47.816 SPONDYLOSIS OF LUMBAR REGION WITHOUT MYELOPATHY OR RADICULOPATHY: Primary | ICD-10-CM

## 2018-10-25 DIAGNOSIS — M47.814 SPONDYLOSIS OF THORACIC REGION WITHOUT MYELOPATHY OR RADICULOPATHY: ICD-10-CM

## 2018-10-25 DIAGNOSIS — G89.4 CHRONIC PAIN SYNDROME: ICD-10-CM

## 2018-10-25 PROCEDURE — 99213 OFFICE O/P EST LOW 20 MIN: CPT | Performed by: NURSE PRACTITIONER

## 2018-10-25 ASSESSMENT — ENCOUNTER SYMPTOMS: BACK PAIN: 1

## 2018-10-25 NOTE — PROGRESS NOTES
instructed to bring at next visit     The patientis allergic to latex; tylenol with codeine #3 [acetaminophen-codeine]; nickel; and oxybutynin chloride [oxybutynin chloride]. Past Medical History  Jess Bonds  has a past medical history of Chronic back pain; GERD (gastroesophageal reflux disease); History of kidney stones; Hx of blood clots; Kidney stone; and Lumbar spondylosis. Past Surgical History  The patient  has a past surgical history that includes Ureter stent placement (2006); Lithotripsy (2006); Cystocopy (6/14/2013); Cystocopy (07/29/2013); Cystoscopy (12/23/13); other surgical history (Bilateral, 03/26/2018); pr inj dx/ther agnt paravert facet joint, lumbar/sac, 2nd level (Bilateral, 3/26/2018); pr inj dx/ther agnt paravert facet joint, lumbar/sac, 2nd level (Bilateral, 5/21/2018); Colonoscopy; eye surgery (2007); hernia repair (2007); and pr inject rx other periph nerve (Left, 9/28/2018). Family History  This patient's family history includes Cancer in his mother; Heart Disease in his father. Social History  Jess Bonds  reports that he quit smoking about 13 years ago. He has a 12.00 pack-year smoking history. He has never used smokeless tobacco. He reports that he does not drink alcohol or use drugs. Medications    Current Outpatient Prescriptions:     HYDROcodone-acetaminophen (NORCO) 5-325 MG per tablet, Take 1 tablet by mouth 3 times daily as needed for Pain for up to 30 days. ., Disp: 90 tablet, Rfl: 0    allopurinol (ZYLOPRIM) 300 MG tablet, Take 1 tablet by mouth daily, Disp: 90 tablet, Rfl: 3    potassium citrate (UROCIT-K) 10 MEQ (1080 MG) extended release tablet, TAKE 1 TABLET BY MOUTH EVERY MORNING WITH BREAKFAST, Disp: 90 tablet, Rfl: 3    citalopram (CELEXA) 40 MG tablet, Take 40 mg by mouth daily. , Disp: , Rfl:     warfarin (COUMADIN) 2.5 MG tablet, Take 2.5 mg by mouth daily at 1800 Takes 5mg 3 days per week & 2.5mg 4 days per week, Disp: , Rfl:     busPIRone (BUSPAR) 10 MG tablet, Take 10 mg by mouth 2 times daily. , Disp: , Rfl:     omeprazole (PRILOSEC) 20 MG capsule, Take 20 mg by mouth daily. , Disp: , Rfl:     Subjective:      Review of Systems   Musculoskeletal: Positive for arthralgias, back pain and myalgias. Negative for gait problem. Neurological: Positive for weakness. Objective:     Vitals:    10/25/18 0904   BP: 120/80   Site: Left Upper Arm   Position: Sitting   Pulse: 78   Weight: 238 lb 6.5 oz (108.1 kg)   Height: 5' 10\" (1.778 m)       Physical Exam   Constitutional: He is oriented to person, place, and time. He appears well-developed and well-nourished. No distress. HENT:   Head: Normocephalic and atraumatic. Right Ear: External ear normal.   Left Ear: External ear normal.   Nose: Nose normal.   Mouth/Throat: Oropharynx is clear and moist. No oropharyngeal exudate. Eyes: Pupils are equal, round, and reactive to light. Conjunctivae and EOM are normal. Right eye exhibits no discharge. Left eye exhibits no discharge. No scleral icterus. Neck: Normal range of motion and full passive range of motion without pain. Neck supple. No muscular tenderness present. No neck rigidity. No edema, no erythema and normal range of motion present. No thyromegaly present. Cardiovascular: Normal rate, regular rhythm, normal heart sounds and intact distal pulses. Exam reveals no gallop and no friction rub. No murmur heard. Pulmonary/Chest: Effort normal and breath sounds normal. No respiratory distress. He has no wheezes. He has no rales. He exhibits no tenderness. Abdominal: Soft. Bowel sounds are normal. He exhibits no distension. There is no tenderness. There is no rebound and no guarding. Musculoskeletal:        Right hip: He exhibits normal range of motion and no tenderness. Left hip: He exhibits normal range of motion and no tenderness. Right knee: He exhibits normal range of motion. No tenderness found.         Left knee: He exhibits normal 4. Chronic pain syndrome            Plan:      · OARRSreviewed. · Discussed long term side effects of medications. · Discussed tolerance, dependency and addiction. · Previous UDS reviewed  · UDS preformed today for compliance. · Patient told can not receive any pain medications from any other source. · Discussed with patient that they may not be pain free. · No evidence of abuse, diversion or aberrant behavior. · Medications and/or procedures improve function and quality of life. · Procedure notes reviewed in detail. · Receiving relief in left lower back from L-RFA, still having pain from right side. · Plan Right L-RFA @ L4-5, and L5-S1. For longer term pain relief. Procedure and risks discussed in detail with patient. · Medications remain effective, patient is compliant. Continue Norco 5/325 TID prn. Filled 10/19/2018. · Will need to be off Coumadin 5-7 days prior to procedure. Meds. Prescribed:   No orders of the defined types were placed in this encounter. Return for  Right L-RFA @ L4-5, and L5-S1. , follow up after procedure.          Electronically signed by WALT Alvarado CNP on10/25/2018 at 9:19 AM

## 2018-11-13 DIAGNOSIS — G89.4 CHRONIC PAIN SYNDROME: ICD-10-CM

## 2018-11-13 RX ORDER — HYDROCODONE BITARTRATE AND ACETAMINOPHEN 5; 325 MG/1; MG/1
1 TABLET ORAL 3 TIMES DAILY PRN
Qty: 90 TABLET | Refills: 0 | Status: SHIPPED | OUTPATIENT
Start: 2018-11-18 | End: 2018-12-13 | Stop reason: SDUPTHER

## 2018-11-21 ENCOUNTER — PREP FOR PROCEDURE (OUTPATIENT)
Dept: PHYSICAL MEDICINE AND REHAB | Age: 48
End: 2018-11-21

## 2018-11-21 NOTE — H&P
Gio Naqvi is an 51 y.o. male. Chief Complaint: Lower back pain         The patient is allergic to latex; nickel; tylenol with codeine #3 [acetaminophen-codeine]; and oxybutynin chloride [oxybutynin chloride]. Past Medical History  Jess Bonds  has a past medical history of Chronic back pain; GERD (gastroesophageal reflux disease); History of kidney stones; Hx of blood clots; Kidney stone; and Lumbar spondylosis. Past Surgical History  The patient  has a past surgical history that includes Refractive surgery (2004); Ureter stent placement (2006); Lithotripsy (2006); hernia repair (2007); Cystocopy (6/14/2013); eye surgery; Cystocopy (07/29/2013); Cystoscopy (12/23/13); other surgical history (Bilateral, 03/26/2018); pr inj dx/ther agnt paravert facet joint, lumbar/sac, 2nd level (Bilateral, 3/26/2018); and pr inj dx/ther agnt paravert facet joint, lumbar/sac, 2nd level (Bilateral, 5/21/2018). Family History  This patient's family history includes Cancer in his mother; Heart Disease in his father. Social History  Jess Bonds  reports that he quit smoking about 13 years ago. He has never used smokeless tobacco. He reports that he does not drink alcohol or use drugs. Medications     Current Medication      Current Outpatient Prescriptions:     [START ON 6/22/2018] HYDROcodone-acetaminophen (NORCO) 5-325 MG per tablet, Take 1 tablet by mouth 3 times daily as needed for Pain for up to 30 days. ., Disp: 90 tablet, Rfl: 0    potassium citrate (UROCIT-K) 10 MEQ (1080 MG) extended release tablet, TAKE 1 TABLET BY MOUTH EVERY MORNING WITH BREAKFAST, Disp: 90 tablet, Rfl: 3    allopurinol (ZYLOPRIM) 300 MG tablet, Take 1 tablet by mouth 2 times daily, Disp: 60 tablet, Rfl: 0    citalopram (CELEXA) 40 MG tablet, Take 40 mg by mouth daily. , Disp: , Rfl:     warfarin (COUMADIN) 2.5 MG tablet, Take 2.5 mg by mouth Daily. , Disp: , Rfl:     busPIRone (BUSPAR) 10 MG tablet, Take 10 mg by mouth 2 times

## 2018-11-30 ENCOUNTER — ANESTHESIA (OUTPATIENT)
Dept: OPERATING ROOM | Age: 48
End: 2018-11-30
Payer: COMMERCIAL

## 2018-11-30 ENCOUNTER — HOSPITAL ENCOUNTER (OUTPATIENT)
Age: 48
Setting detail: OUTPATIENT SURGERY
Discharge: HOME | End: 2018-11-30
Attending: PAIN MEDICINE | Admitting: PAIN MEDICINE
Payer: COMMERCIAL

## 2018-11-30 ENCOUNTER — APPOINTMENT (OUTPATIENT)
Dept: GENERAL RADIOLOGY | Age: 48
End: 2018-11-30
Attending: PAIN MEDICINE
Payer: COMMERCIAL

## 2018-11-30 ENCOUNTER — ANESTHESIA EVENT (OUTPATIENT)
Dept: OPERATING ROOM | Age: 48
End: 2018-11-30
Payer: COMMERCIAL

## 2018-11-30 VITALS
TEMPERATURE: 98.3 F | RESPIRATION RATE: 12 BRPM | WEIGHT: 237 LBS | DIASTOLIC BLOOD PRESSURE: 62 MMHG | SYSTOLIC BLOOD PRESSURE: 115 MMHG | HEART RATE: 72 BPM | OXYGEN SATURATION: 94 % | BODY MASS INDEX: 33.93 KG/M2 | HEIGHT: 70 IN

## 2018-11-30 VITALS
RESPIRATION RATE: 17 BRPM | SYSTOLIC BLOOD PRESSURE: 129 MMHG | DIASTOLIC BLOOD PRESSURE: 84 MMHG | OXYGEN SATURATION: 91 %

## 2018-11-30 LAB — POC INR: 1 (ref 0.8–1.2)

## 2018-11-30 PROCEDURE — 3700000001 HC ADD 15 MINUTES (ANESTHESIA): Performed by: PAIN MEDICINE

## 2018-11-30 PROCEDURE — 3700000000 HC ANESTHESIA ATTENDED CARE: Performed by: PAIN MEDICINE

## 2018-11-30 PROCEDURE — 2500000003 HC RX 250 WO HCPCS: Performed by: PAIN MEDICINE

## 2018-11-30 PROCEDURE — 7100000010 HC PHASE II RECOVERY - FIRST 15 MIN: Performed by: PAIN MEDICINE

## 2018-11-30 PROCEDURE — 7100000011 HC PHASE II RECOVERY - ADDTL 15 MIN: Performed by: PAIN MEDICINE

## 2018-11-30 PROCEDURE — 64636 DESTROY L/S FACET JNT ADDL: CPT | Performed by: PAIN MEDICINE

## 2018-11-30 PROCEDURE — 3209999900 FLUORO FOR SURGICAL PROCEDURES

## 2018-11-30 PROCEDURE — 3600000057 HC PAIN LEVEL 4 ADDL 15 MIN: Performed by: PAIN MEDICINE

## 2018-11-30 PROCEDURE — 64635 DESTROY LUMB/SAC FACET JNT: CPT | Performed by: PAIN MEDICINE

## 2018-11-30 PROCEDURE — 2709999900 HC NON-CHARGEABLE SUPPLY: Performed by: PAIN MEDICINE

## 2018-11-30 PROCEDURE — 3600000056 HC PAIN LEVEL 4 BASE: Performed by: PAIN MEDICINE

## 2018-11-30 PROCEDURE — 6360000002 HC RX W HCPCS: Performed by: ANESTHESIOLOGY

## 2018-11-30 PROCEDURE — 2580000003 HC RX 258: Performed by: ANESTHESIOLOGY

## 2018-11-30 PROCEDURE — 6360000002 HC RX W HCPCS: Performed by: PAIN MEDICINE

## 2018-11-30 RX ORDER — ROPIVACAINE HYDROCHLORIDE 2 MG/ML
INJECTION, SOLUTION EPIDURAL; INFILTRATION; PERINEURAL PRN
Status: DISCONTINUED | OUTPATIENT
Start: 2018-11-30 | End: 2018-11-30 | Stop reason: HOSPADM

## 2018-11-30 RX ORDER — METHYLPREDNISOLONE ACETATE 80 MG/ML
INJECTION, SUSPENSION INTRA-ARTICULAR; INTRALESIONAL; INTRAMUSCULAR; SOFT TISSUE PRN
Status: DISCONTINUED | OUTPATIENT
Start: 2018-11-30 | End: 2018-11-30 | Stop reason: HOSPADM

## 2018-11-30 RX ORDER — 0.9 % SODIUM CHLORIDE 0.9 %
VIAL (ML) INJECTION PRN
Status: DISCONTINUED | OUTPATIENT
Start: 2018-11-30 | End: 2018-11-30 | Stop reason: SDUPTHER

## 2018-11-30 RX ORDER — PROPOFOL 10 MG/ML
INJECTION, EMULSION INTRAVENOUS PRN
Status: DISCONTINUED | OUTPATIENT
Start: 2018-11-30 | End: 2018-11-30 | Stop reason: SDUPTHER

## 2018-11-30 RX ORDER — LIDOCAINE HYDROCHLORIDE 10 MG/ML
INJECTION, SOLUTION EPIDURAL; INFILTRATION; INTRACAUDAL; PERINEURAL PRN
Status: DISCONTINUED | OUTPATIENT
Start: 2018-11-30 | End: 2018-11-30 | Stop reason: HOSPADM

## 2018-11-30 RX ORDER — FENTANYL CITRATE 50 UG/ML
INJECTION, SOLUTION INTRAMUSCULAR; INTRAVENOUS PRN
Status: DISCONTINUED | OUTPATIENT
Start: 2018-11-30 | End: 2018-11-30 | Stop reason: SDUPTHER

## 2018-11-30 RX ADMIN — PROPOFOL 50 MG: 10 INJECTION, EMULSION INTRAVENOUS at 11:06

## 2018-11-30 RX ADMIN — SODIUM CHLORIDE 10 ML: 9 INJECTION, SOLUTION INTRAMUSCULAR; INTRAVENOUS; SUBCUTANEOUS at 11:08

## 2018-11-30 RX ADMIN — FENTANYL CITRATE 100 MCG: 50 INJECTION INTRAMUSCULAR; INTRAVENOUS at 10:59

## 2018-11-30 RX ADMIN — PROPOFOL 50 MG: 10 INJECTION, EMULSION INTRAVENOUS at 11:05

## 2018-11-30 ASSESSMENT — PULMONARY FUNCTION TESTS
PIF_VALUE: 0

## 2018-11-30 ASSESSMENT — PAIN DESCRIPTION - DESCRIPTORS: DESCRIPTORS: ACHING

## 2018-11-30 ASSESSMENT — PAIN SCALES - GENERAL: PAINLEVEL_OUTOF10: 0

## 2018-11-30 ASSESSMENT — PAIN - FUNCTIONAL ASSESSMENT: PAIN_FUNCTIONAL_ASSESSMENT: 0-10

## 2018-11-30 NOTE — ANESTHESIA PRE PROCEDURE
Answered      Vital Signs (Current):   Vitals:    11/30/18 1008   BP: (!) 132/91   Pulse: 67   Resp: 15   Temp: 97.9 °F (36.6 °C)   TempSrc: Temporal   SpO2: 97%   Weight: 237 lb (107.5 kg)   Height: 5' 10\" (1.778 m)                                              BP Readings from Last 3 Encounters:   11/30/18 (!) 132/91   10/25/18 120/80   09/28/18 (!) 108/57       NPO Status: Time of last liquid consumption: 2200                        Time of last solid consumption: 1730                        Date of last liquid consumption: 11/29/18                        Date of last solid food consumption: 11/29/18    BMI:   Wt Readings from Last 3 Encounters:   11/30/18 237 lb (107.5 kg)   10/25/18 238 lb 6.5 oz (108.1 kg)   09/28/18 239 lb (108.4 kg)     Body mass index is 34.01 kg/m². CBC:   Lab Results   Component Value Date    WBC 4.7 07/07/2018    RBC 5.37 07/07/2018    HGB 15.9 07/07/2018    HCT 47.6 07/07/2018    MCV 88.6 07/07/2018    RDW 12.8 07/07/2018     07/07/2018       CMP:   Lab Results   Component Value Date     07/07/2018    K 4.3 07/07/2018     07/07/2018    CO2 25 07/07/2018    BUN 12 07/07/2018    CREATININE 0.9 07/07/2018    LABGLOM >90 05/30/2017    GLUCOSE 95 07/07/2018    PROT 6.6 01/23/2015    CALCIUM 9.2 07/07/2018    BILITOT 0.5 07/07/2018    ALKPHOS 100 07/07/2018    AST 30 07/07/2018    ALT 41 07/07/2018       POC Tests: No results for input(s): POCGLU, POCNA, POCK, POCCL, POCBUN, POCHEMO, POCHCT in the last 72 hours.     Coags:   Lab Results   Component Value Date    INR 1.00 09/28/2018    INR 0.98 03/26/2018    APTT 32.1 06/14/2013       HCG (If Applicable): No results found for: PREGTESTUR, PREGSERUM, HCG, HCGQUANT     ABGs: No results found for: PHART, PO2ART, JEU5SLB, HJE3PMO, BEART, Z2WUEOGC     Type & Screen (If Applicable):  No results found for: LABABO, LABRH    Anesthesia Evaluation    Airway: Mallampati: II  TM distance: >3 FB     Mouth opening: > = 3 FB Dental: Pulmonary: breath sounds clear to auscultation  (+) sleep apnea:                             Cardiovascular:            Rhythm: regular                      Neuro/Psych:   (+) psychiatric history:            GI/Hepatic/Renal:   (+) GERD:,           Endo/Other:                     Abdominal:           Vascular:                                        Anesthesia Plan      MAC     ASA 2       Induction: intravenous. Anesthetic plan and risks discussed with patient. Plan discussed with CRNA.                   Becca Wells MD   11/30/2018

## 2018-12-05 ENCOUNTER — TELEPHONE (OUTPATIENT)
Dept: PHYSICAL MEDICINE AND REHAB | Age: 48
End: 2018-12-05

## 2018-12-05 RX ORDER — METHYLPREDNISOLONE 4 MG/1
TABLET ORAL
Qty: 1 KIT | Refills: 0 | Status: SHIPPED | OUTPATIENT
Start: 2018-12-05 | End: 2018-12-11

## 2018-12-13 DIAGNOSIS — G89.4 CHRONIC PAIN SYNDROME: ICD-10-CM

## 2018-12-13 RX ORDER — HYDROCODONE BITARTRATE AND ACETAMINOPHEN 5; 325 MG/1; MG/1
1 TABLET ORAL 3 TIMES DAILY PRN
Qty: 90 TABLET | Refills: 0 | Status: SHIPPED | OUTPATIENT
Start: 2018-12-18 | End: 2019-01-15 | Stop reason: SDUPTHER

## 2018-12-21 ENCOUNTER — HOSPITAL ENCOUNTER (OUTPATIENT)
Dept: CT IMAGING | Age: 48
Discharge: HOME | End: 2018-12-21
Payer: COMMERCIAL

## 2018-12-21 DIAGNOSIS — R10.9 LEFT FLANK PAIN: ICD-10-CM

## 2018-12-21 PROCEDURE — 6360000004 HC RX CONTRAST MEDICATION: Performed by: FAMILY MEDICINE

## 2018-12-21 PROCEDURE — 74177 CT ABD & PELVIS W/CONTRAST: CPT

## 2018-12-21 RX ADMIN — IOPAMIDOL 85 ML: 755 INJECTION, SOLUTION INTRAVENOUS at 08:35

## 2018-12-31 ENCOUNTER — HOSPITAL ENCOUNTER (OUTPATIENT)
Age: 48
Discharge: HOME | End: 2018-12-31
Payer: COMMERCIAL

## 2018-12-31 ENCOUNTER — OFFICE VISIT (OUTPATIENT)
Dept: PHYSICAL MEDICINE AND REHAB | Age: 48
End: 2018-12-31
Payer: COMMERCIAL

## 2018-12-31 ENCOUNTER — HOSPITAL ENCOUNTER (OUTPATIENT)
Dept: GENERAL RADIOLOGY | Age: 48
Discharge: HOME | End: 2018-12-31
Payer: COMMERCIAL

## 2018-12-31 VITALS
HEIGHT: 70 IN | DIASTOLIC BLOOD PRESSURE: 88 MMHG | WEIGHT: 236.99 LBS | BODY MASS INDEX: 33.93 KG/M2 | HEART RATE: 77 BPM | SYSTOLIC BLOOD PRESSURE: 125 MMHG

## 2018-12-31 DIAGNOSIS — M47.814 SPONDYLOSIS OF THORACIC REGION WITHOUT MYELOPATHY OR RADICULOPATHY: ICD-10-CM

## 2018-12-31 DIAGNOSIS — M51.24 PROTRUSION OF INTERVERTEBRAL DISC OF THORACIC REGION: ICD-10-CM

## 2018-12-31 DIAGNOSIS — M47.816 LUMBAR SPONDYLOSIS: ICD-10-CM

## 2018-12-31 DIAGNOSIS — G89.4 CHRONIC PAIN SYNDROME: ICD-10-CM

## 2018-12-31 DIAGNOSIS — M47.816 SPONDYLOSIS OF LUMBAR REGION WITHOUT MYELOPATHY OR RADICULOPATHY: ICD-10-CM

## 2018-12-31 DIAGNOSIS — M47.816 SPONDYLOSIS OF LUMBAR REGION WITHOUT MYELOPATHY OR RADICULOPATHY: Primary | ICD-10-CM

## 2018-12-31 PROCEDURE — 99213 OFFICE O/P EST LOW 20 MIN: CPT | Performed by: NURSE PRACTITIONER

## 2018-12-31 PROCEDURE — 72100 X-RAY EXAM L-S SPINE 2/3 VWS: CPT

## 2018-12-31 NOTE — PROGRESS NOTES
yesterday  Drug screen reviewed from 10/25/2018 and was appropriate  Pill count was completed today and was appropriate      The patientis allergic to latex; tylenol with codeine #3 [acetaminophen-codeine]; nickel; and oxybutynin chloride [oxybutynin chloride]. Past Medical History  Serina Stark  has a past medical history of Chronic back pain; GERD (gastroesophageal reflux disease); History of kidney stones; Hx of blood clots; Kidney stone; and Lumbar spondylosis. Past Surgical History  The patient  has a past surgical history that includes Ureter stent placement (2006); Lithotripsy (2006); Cystocopy (6/14/2013); Cystocopy (07/29/2013); Cystoscopy (12/23/13); other surgical history (Bilateral, 03/26/2018); pr inj dx/ther agnt paravert facet joint, lumbar/sac, 2nd level (Bilateral, 3/26/2018); pr inj dx/ther agnt paravert facet joint, lumbar/sac, 2nd level (Bilateral, 5/21/2018); Colonoscopy; eye surgery (2007); hernia repair (2007); pr inject rx other periph nerve (Left, 9/28/2018); and pr inject rx other periph nerve (Right, 11/30/2018). Family History  This patient's family history includes Cancer in his mother; Heart Disease in his father. Social History  Serina Stark  reports that he quit smoking about 13 years ago. He has a 12.00 pack-year smoking history. He has never used smokeless tobacco. He reports that he does not drink alcohol or use drugs. Medications    Current Outpatient Prescriptions:     HYDROcodone-acetaminophen (NORCO) 5-325 MG per tablet, Take 1 tablet by mouth 3 times daily as needed for Pain for up to 30 days. ., Disp: 90 tablet, Rfl: 0    allopurinol (ZYLOPRIM) 300 MG tablet, Take 1 tablet by mouth daily, Disp: 90 tablet, Rfl: 3    potassium citrate (UROCIT-K) 10 MEQ (1080 MG) extended release tablet, TAKE 1 TABLET BY MOUTH EVERY MORNING WITH BREAKFAST, Disp: 90 tablet, Rfl: 3    citalopram (CELEXA) 40 MG tablet, Take 40 mg by mouth daily. , Disp: , Rfl:     warfarin (COUMADIN) 2.5

## 2019-01-15 DIAGNOSIS — G89.4 CHRONIC PAIN SYNDROME: ICD-10-CM

## 2019-01-15 RX ORDER — HYDROCODONE BITARTRATE AND ACETAMINOPHEN 5; 325 MG/1; MG/1
1 TABLET ORAL 3 TIMES DAILY PRN
Qty: 90 TABLET | Refills: 0 | Status: SHIPPED | OUTPATIENT
Start: 2019-01-17 | End: 2019-02-13 | Stop reason: SDUPTHER

## 2019-01-22 ENCOUNTER — OFFICE VISIT (OUTPATIENT)
Dept: PHYSICAL MEDICINE AND REHAB | Age: 49
End: 2019-01-22
Payer: COMMERCIAL

## 2019-01-22 VITALS
HEIGHT: 70 IN | HEART RATE: 73 BPM | DIASTOLIC BLOOD PRESSURE: 82 MMHG | BODY MASS INDEX: 33.79 KG/M2 | WEIGHT: 236 LBS | SYSTOLIC BLOOD PRESSURE: 121 MMHG

## 2019-01-22 DIAGNOSIS — G89.4 CHRONIC PAIN SYNDROME: ICD-10-CM

## 2019-01-22 DIAGNOSIS — M47.814 SPONDYLOSIS OF THORACIC REGION WITHOUT MYELOPATHY OR RADICULOPATHY: ICD-10-CM

## 2019-01-22 DIAGNOSIS — M47.816 SPONDYLOSIS OF LUMBAR REGION WITHOUT MYELOPATHY OR RADICULOPATHY: Primary | ICD-10-CM

## 2019-01-22 DIAGNOSIS — M51.24 PROTRUSION OF INTERVERTEBRAL DISC OF THORACIC REGION: ICD-10-CM

## 2019-01-22 PROCEDURE — 99213 OFFICE O/P EST LOW 20 MIN: CPT | Performed by: NURSE PRACTITIONER

## 2019-01-22 ASSESSMENT — ENCOUNTER SYMPTOMS: BACK PAIN: 1

## 2019-01-28 ENCOUNTER — TELEPHONE (OUTPATIENT)
Dept: PHYSICAL MEDICINE AND REHAB | Age: 49
End: 2019-01-28

## 2019-01-29 RX ORDER — METHYLPREDNISOLONE 4 MG/1
TABLET ORAL
Qty: 1 KIT | Refills: 0 | Status: SHIPPED | OUTPATIENT
Start: 2019-01-29 | End: 2019-02-04

## 2019-02-13 ENCOUNTER — OFFICE VISIT (OUTPATIENT)
Dept: PHYSICAL MEDICINE AND REHAB | Age: 49
End: 2019-02-13
Payer: COMMERCIAL

## 2019-02-13 VITALS
SYSTOLIC BLOOD PRESSURE: 132 MMHG | DIASTOLIC BLOOD PRESSURE: 88 MMHG | BODY MASS INDEX: 36.08 KG/M2 | WEIGHT: 252 LBS | HEART RATE: 78 BPM | HEIGHT: 70 IN

## 2019-02-13 DIAGNOSIS — M47.816 SPONDYLOSIS OF LUMBAR REGION WITHOUT MYELOPATHY OR RADICULOPATHY: ICD-10-CM

## 2019-02-13 DIAGNOSIS — G89.4 CHRONIC PAIN SYNDROME: ICD-10-CM

## 2019-02-13 DIAGNOSIS — R06.83 SNORING: ICD-10-CM

## 2019-02-13 DIAGNOSIS — M51.36 BULGE OF LUMBAR DISC WITHOUT MYELOPATHY: Primary | ICD-10-CM

## 2019-02-13 DIAGNOSIS — M47.814 SPONDYLOSIS OF THORACIC REGION WITHOUT MYELOPATHY OR RADICULOPATHY: ICD-10-CM

## 2019-02-13 DIAGNOSIS — M51.24 PROTRUSION OF INTERVERTEBRAL DISC OF THORACIC REGION: ICD-10-CM

## 2019-02-13 PROCEDURE — 99213 OFFICE O/P EST LOW 20 MIN: CPT | Performed by: NURSE PRACTITIONER

## 2019-02-13 RX ORDER — HYDROCODONE BITARTRATE AND ACETAMINOPHEN 5; 325 MG/1; MG/1
1 TABLET ORAL 3 TIMES DAILY PRN
Qty: 90 TABLET | Refills: 0 | Status: SHIPPED | OUTPATIENT
Start: 2019-02-16 | End: 2019-03-12 | Stop reason: SDUPTHER

## 2019-02-13 ASSESSMENT — ENCOUNTER SYMPTOMS: BACK PAIN: 1

## 2019-03-12 ENCOUNTER — TELEPHONE (OUTPATIENT)
Dept: PHYSICAL MEDICINE AND REHAB | Age: 49
End: 2019-03-12

## 2019-03-12 DIAGNOSIS — G89.4 CHRONIC PAIN SYNDROME: ICD-10-CM

## 2019-03-13 RX ORDER — HYDROCODONE BITARTRATE AND ACETAMINOPHEN 5; 325 MG/1; MG/1
1 TABLET ORAL 3 TIMES DAILY PRN
Qty: 90 TABLET | Refills: 0 | Status: SHIPPED | OUTPATIENT
Start: 2019-03-18 | End: 2019-04-17

## 2019-03-29 ENCOUNTER — PREP FOR PROCEDURE (OUTPATIENT)
Dept: PHYSICAL MEDICINE AND REHAB | Age: 49
End: 2019-03-29

## 2019-04-04 ENCOUNTER — HOSPITAL ENCOUNTER (EMERGENCY)
Age: 49
Discharge: HOME | End: 2019-04-04
Payer: COMMERCIAL

## 2019-04-04 ENCOUNTER — APPOINTMENT (OUTPATIENT)
Dept: CT IMAGING | Age: 49
End: 2019-04-04
Payer: COMMERCIAL

## 2019-04-04 ENCOUNTER — TELEPHONE (OUTPATIENT)
Dept: PHYSICAL MEDICINE AND REHAB | Age: 49
End: 2019-04-04

## 2019-04-04 VITALS
SYSTOLIC BLOOD PRESSURE: 135 MMHG | TEMPERATURE: 97.7 F | OXYGEN SATURATION: 95 % | HEART RATE: 80 BPM | WEIGHT: 251 LBS | RESPIRATION RATE: 16 BRPM | BODY MASS INDEX: 35.93 KG/M2 | DIASTOLIC BLOOD PRESSURE: 92 MMHG | HEIGHT: 70 IN

## 2019-04-04 DIAGNOSIS — R10.9 LEFT FLANK PAIN: Primary | ICD-10-CM

## 2019-04-04 LAB
ANION GAP SERPL CALCULATED.3IONS-SCNC: 13 MEQ/L (ref 8–16)
BASOPHILS # BLD: 0.7 %
BASOPHILS ABSOLUTE: 0 THOU/MM3 (ref 0–0.1)
BILIRUBIN URINE: NEGATIVE
BLOOD, URINE: NEGATIVE
BUN BLDV-MCNC: 9 MG/DL (ref 7–22)
CALCIUM SERPL-MCNC: 8.9 MG/DL (ref 8.5–10.5)
CHARACTER, URINE: CLEAR
CHLORIDE BLD-SCNC: 107 MEQ/L (ref 98–111)
CO2: 23 MEQ/L (ref 23–33)
COLOR: YELLOW
CREAT SERPL-MCNC: 0.8 MG/DL (ref 0.4–1.2)
EOSINOPHIL # BLD: 7 %
EOSINOPHILS ABSOLUTE: 0.4 THOU/MM3 (ref 0–0.4)
ERYTHROCYTE [DISTWIDTH] IN BLOOD BY AUTOMATED COUNT: 13 % (ref 11.5–14.5)
ERYTHROCYTE [DISTWIDTH] IN BLOOD BY AUTOMATED COUNT: 44.9 FL (ref 35–45)
GFR SERPL CREATININE-BSD FRML MDRD: > 90 ML/MIN/1.73M2
GLUCOSE BLD-MCNC: 108 MG/DL (ref 70–108)
GLUCOSE URINE: NEGATIVE MG/DL
HCT VFR BLD CALC: 46.1 % (ref 42–52)
HEMOGLOBIN: 14.8 GM/DL (ref 14–18)
IMMATURE GRANS (ABS): 0.01 THOU/MM3 (ref 0–0.07)
IMMATURE GRANULOCYTES: 0.2 %
KETONES, URINE: NEGATIVE
LEUKOCYTE ESTERASE, URINE: NEGATIVE
LYMPHOCYTES # BLD: 20.3 %
LYMPHOCYTES ABSOLUTE: 1.1 THOU/MM3 (ref 1–4.8)
MCH RBC QN AUTO: 30.4 PG (ref 26–33)
MCHC RBC AUTO-ENTMCNC: 32.1 GM/DL (ref 32.2–35.5)
MCV RBC AUTO: 94.7 FL (ref 80–94)
MONOCYTES # BLD: 9.6 %
MONOCYTES ABSOLUTE: 0.5 THOU/MM3 (ref 0.4–1.3)
NITRITE, URINE: NEGATIVE
NUCLEATED RED BLOOD CELLS: 0 /100 WBC
OSMOLALITY CALCULATION: 284.2 MOSMOL/KG (ref 275–300)
PH UA: 7 (ref 5–9)
PLATELET # BLD: 169 THOU/MM3 (ref 130–400)
PMV BLD AUTO: 8.7 FL (ref 9.4–12.4)
POTASSIUM SERPL-SCNC: 4.2 MEQ/L (ref 3.5–5.2)
PROTEIN UA: NEGATIVE
RBC # BLD: 4.87 MILL/MM3 (ref 4.7–6.1)
SEG NEUTROPHILS: 62.2 %
SEGMENTED NEUTROPHILS ABSOLUTE COUNT: 3.4 THOU/MM3 (ref 1.8–7.7)
SODIUM BLD-SCNC: 143 MEQ/L (ref 135–145)
SPECIFIC GRAVITY, URINE: 1.02 (ref 1–1.03)
UROBILINOGEN, URINE: 1 EU/DL (ref 0–1)
WBC # BLD: 5.4 THOU/MM3 (ref 4.8–10.8)

## 2019-04-04 PROCEDURE — 36415 COLL VENOUS BLD VENIPUNCTURE: CPT

## 2019-04-04 PROCEDURE — 85025 COMPLETE CBC W/AUTO DIFF WBC: CPT

## 2019-04-04 PROCEDURE — 74150 CT ABDOMEN W/O CONTRAST: CPT

## 2019-04-04 PROCEDURE — 80048 BASIC METABOLIC PNL TOTAL CA: CPT

## 2019-04-04 PROCEDURE — 99284 EMERGENCY DEPT VISIT MOD MDM: CPT

## 2019-04-04 PROCEDURE — 96375 TX/PRO/DX INJ NEW DRUG ADDON: CPT

## 2019-04-04 PROCEDURE — 81003 URINALYSIS AUTO W/O SCOPE: CPT

## 2019-04-04 PROCEDURE — 2580000003 HC RX 258: Performed by: PHYSICIAN ASSISTANT

## 2019-04-04 PROCEDURE — 6360000002 HC RX W HCPCS: Performed by: PHYSICIAN ASSISTANT

## 2019-04-04 PROCEDURE — 96374 THER/PROPH/DIAG INJ IV PUSH: CPT

## 2019-04-04 RX ORDER — ONDANSETRON 4 MG/1
4 TABLET, ORALLY DISINTEGRATING ORAL EVERY 8 HOURS PRN
Qty: 20 TABLET | Refills: 0 | Status: ON HOLD | OUTPATIENT
Start: 2019-04-04 | End: 2019-10-31

## 2019-04-04 RX ORDER — ONDANSETRON 2 MG/ML
4 INJECTION INTRAMUSCULAR; INTRAVENOUS ONCE
Status: COMPLETED | OUTPATIENT
Start: 2019-04-04 | End: 2019-04-04

## 2019-04-04 RX ORDER — 0.9 % SODIUM CHLORIDE 0.9 %
500 INTRAVENOUS SOLUTION INTRAVENOUS ONCE
Status: COMPLETED | OUTPATIENT
Start: 2019-04-04 | End: 2019-04-04

## 2019-04-04 RX ORDER — KETOROLAC TROMETHAMINE 30 MG/ML
30 INJECTION, SOLUTION INTRAMUSCULAR; INTRAVENOUS ONCE
Status: COMPLETED | OUTPATIENT
Start: 2019-04-04 | End: 2019-04-04

## 2019-04-04 RX ORDER — METHYLPREDNISOLONE 4 MG/1
TABLET ORAL
Qty: 1 KIT | Refills: 0 | Status: ON HOLD | OUTPATIENT
Start: 2019-04-04 | End: 2019-04-05 | Stop reason: ALTCHOICE

## 2019-04-04 RX ADMIN — KETOROLAC TROMETHAMINE 30 MG: 30 INJECTION, SOLUTION INTRAMUSCULAR; INTRAVENOUS at 10:00

## 2019-04-04 RX ADMIN — ONDANSETRON 4 MG: 2 INJECTION INTRAMUSCULAR; INTRAVENOUS at 10:00

## 2019-04-04 RX ADMIN — SODIUM CHLORIDE 500 ML: 9 INJECTION, SOLUTION INTRAVENOUS at 09:59

## 2019-04-04 ASSESSMENT — PAIN DESCRIPTION - DESCRIPTORS
DESCRIPTORS: SHARP;STABBING
DESCRIPTORS: STABBING;SHARP

## 2019-04-04 ASSESSMENT — PAIN DESCRIPTION - ORIENTATION
ORIENTATION: LEFT
ORIENTATION: LEFT

## 2019-04-04 ASSESSMENT — ENCOUNTER SYMPTOMS
ABDOMINAL PAIN: 0
SHORTNESS OF BREATH: 0
RHINORRHEA: 0
NAUSEA: 0
BACK PAIN: 0
EYE DISCHARGE: 0
VOMITING: 0
EYE REDNESS: 0
SORE THROAT: 0
COUGH: 0
WHEEZING: 0
DIARRHEA: 0

## 2019-04-04 ASSESSMENT — PAIN DESCRIPTION - PAIN TYPE
TYPE: ACUTE PAIN
TYPE: ACUTE PAIN

## 2019-04-04 ASSESSMENT — PAIN DESCRIPTION - PROGRESSION: CLINICAL_PROGRESSION: NOT CHANGED

## 2019-04-04 ASSESSMENT — PAIN DESCRIPTION - LOCATION
LOCATION: FLANK
LOCATION: FLANK

## 2019-04-04 ASSESSMENT — PAIN SCALES - GENERAL
PAINLEVEL_OUTOF10: 6

## 2019-04-04 ASSESSMENT — PAIN DESCRIPTION - FREQUENCY
FREQUENCY: CONTINUOUS
FREQUENCY: CONTINUOUS

## 2019-04-04 NOTE — TELEPHONE ENCOUNTER
Pt stopped in today. He is asking for a refill of his norco. Last filled 3/18/19, not due till 04/17/19. He says that hes been in a lot of pain, work has bothered him. He was taking additional mediations. he is scheduled for LESI tomorrow. Pt did not call in to discuss increased pain or asking about increase. Please advise.

## 2019-04-04 NOTE — ED TRIAGE NOTES
Patient presents to ER with complaints of left flank pain that radiates towards left lower abdomen that started approximately 6 to 7 days ago. Patient reports he has chronic back pain, but this feels different. Patient states he has had kidney stones before. Patient reports having slight nausea.

## 2019-04-04 NOTE — ED PROVIDER NOTES
[facility]  eMERGENCY dEPARTMENT eNCOUnter          [address] [facility]       Chief Complaint   Patient presents with    Flank Pain     left       Nurses Notes reviewed and I agree except as noted in the HPI. HISTORY OF PRESENT ILLNESS    Jesusita Ramírez is a 51 y.o. male who presents to the Emergency Department for the evaluation of left flank pain that has lasted for 6-7 days. The patient states it is constant and he has a history of kidney stones and this feels like his previous experiences. He also notes he has taken Norco (for his chronic back pain) and not experienced relief from his flank pain. He claims to experience intermittent nausea, dark urine, and chronic interrupted urine stream (approximately a year or so). He denies experienced fever and vomiting. He also denies a history of prostate enlargement or testicular pain. The patient did not state any other complaints or symptoms during my initial encounter. The HPI was provided by the patient. REVIEW OF SYSTEMS     Review of Systems   Constitutional: Negative for appetite change, chills, fatigue and fever. HENT: Negative for congestion, ear pain, rhinorrhea and sore throat. Eyes: Negative for discharge, redness and visual disturbance. Respiratory: Negative for cough, shortness of breath and wheezing. Cardiovascular: Negative for chest pain, palpitations and leg swelling. Gastrointestinal: Negative for abdominal pain, diarrhea, nausea and vomiting. Genitourinary: Positive for flank pain (left side, 6-7 days). Negative for decreased urine volume, difficulty urinating, dysuria and hematuria. Dark urine  Interrupted urine streams for a year   Musculoskeletal: Negative for arthralgias, back pain, joint swelling and neck pain. Skin: Negative for pallor and rash. Allergic/Immunologic: Negative for environmental allergies.    Neurological: Negative for dizziness, syncope, weakness, light-headedness, numbness and headaches. Hematological: Negative for adenopathy. Psychiatric/Behavioral: Negative for confusion and suicidal ideas. The patient is not nervous/anxious. PAST MEDICAL HISTORY    has a past medical history of Chronic back pain, GERD (gastroesophageal reflux disease), History of kidney stones, Hx of blood clots, Kidney stone, and Lumbar spondylosis. SURGICAL HISTORY      has a past surgical history that includes Ureter stent placement (2006); Lithotripsy (2006); Cystocopy (6/14/2013); Cystocopy (07/29/2013); Cystoscopy (12/23/13); other surgical history (Bilateral, 03/26/2018); pr inj dx/ther agnt paravert facet joint, lumbar/sac, 2nd level (Bilateral, 3/26/2018); pr inj dx/ther agnt paravert facet joint, lumbar/sac, 2nd level (Bilateral, 5/21/2018); Colonoscopy; eye surgery (2007); hernia repair (2007); pr inject rx other periph nerve (Left, 9/28/2018); and pr inject rx other periph nerve (Right, 11/30/2018). CURRENT MEDICATIONS       Discharge Medication List as of 4/4/2019 10:44 AM      CONTINUE these medications which have NOT CHANGED    Details   HYDROcodone-acetaminophen (NORCO) 5-325 MG per tablet Take 1 tablet by mouth 3 times daily as needed for Pain for up to 30 days. , Disp-90 tablet, R-0Normal      allopurinol (ZYLOPRIM) 300 MG tablet Take 1 tablet by mouth daily, Disp-90 tablet, R-3Normal      potassium citrate (UROCIT-K) 10 MEQ (1080 MG) extended release tablet TAKE 1 TABLET BY MOUTH EVERY MORNING WITH BREAKFAST, Disp-90 tablet, R-3Normal      citalopram (CELEXA) 40 MG tablet Take 40 mg by mouth daily. Historical Med      warfarin (COUMADIN) 2.5 MG tablet Take 2.5 mg by mouth daily at 1800 Takes 5mg 3 days per week & 2.5mg 4 days per weekHistorical Med      busPIRone (BUSPAR) 10 MG tablet Take 30 mg by mouth 2 times daily Historical Med      omeprazole (PRILOSEC) 20 MG capsule Take 20 mg by mouth daily.   Historical Med             ALLERGIES     is allergic to latex; tylenol with codeine #3 [acetaminophen-codeine]; nickel; and oxybutynin chloride [oxybutynin chloride]. FAMILY HISTORY     indicated that his mother is . He indicated that his father is alive. He indicated that his sister is alive. He indicated that his brother is alive. family history includes Cancer in his mother; Heart Disease in his father. SOCIAL HISTORY      reports that he quit smoking about 13 years ago. He has a 12.00 pack-year smoking history. He has never used smokeless tobacco. He reports that he does not drink alcohol or use drugs. PHYSICAL EXAM     INITIAL VITALS:  height is 5' 10\" (1.778 m) and weight is 251 lb (113.9 kg). His oral temperature is 97.7 °F (36.5 °C). His blood pressure is 135/92 (abnormal) and his pulse is 80. His respiration is 16 and oxygen saturation is 95%. Physical Exam   Constitutional: He is oriented to person, place, and time. He appears well-developed and well-nourished. HENT:   Head: Normocephalic and atraumatic. Right Ear: External ear normal.   Left Ear: External ear normal.   Eyes: Conjunctivae are normal. Right eye exhibits no discharge. Left eye exhibits no discharge. No scleral icterus. Neck: Normal range of motion. Neck supple. No JVD present. Pulmonary/Chest: Effort normal. No stridor. No respiratory distress. Abdominal: Soft. He exhibits no distension. There is tenderness (left flank). There is CVA tenderness (mild left). Genitourinary:   Genitourinary Comments: The patient denies testicular pain. Musculoskeletal: Normal range of motion. He exhibits no edema. Neurological: He is alert and oriented to person, place, and time. He exhibits normal muscle tone. GCS eye subscore is 4. GCS verbal subscore is 5. GCS motor subscore is 6. Skin: Skin is warm and dry. He is not diaphoretic. No erythema. Psychiatric: He has a normal mood and affect. His behavior is normal.   Nursing note and vitals reviewed.        DIFFERENTIAL DIAGNOSIS: Discussed with patient    DIAGNOSTIC RESULTS     EKG: All EKG's are interpreted by the Emergency Department Physician who either signs or Co-signs this chart in the absence of a cardiologist.    None    RADIOLOGY: non-plainfilm images(s) such as CT, Ultrasound and MRI are read by the radiologist.    Cathy 23   Final Result       1. No evidence of an acute process in the abdomen or pelvis. 2. There is diffuse low-attenuation throughout the liver which is suggestive of hepatic steatosis. **This report has been created using voice recognition software. It may contain minor errors which are inherent in voice recognition technology. **      Final report electronically signed by Dr. Judy Rodriges on 4/4/2019 10:12 AM          LABS:     Labs Reviewed   CBC WITH AUTO DIFFERENTIAL - Abnormal; Notable for the following components:       Result Value    MCV 94.7 (*)     MCHC 32.1 (*)     MPV 8.7 (*)     All other components within normal limits   URINE RT REFLEX TO CULTURE   BASIC METABOLIC PANEL   ANION GAP   GLOMERULAR FILTRATION RATE, ESTIMATED   OSMOLALITY       EMERGENCY DEPARTMENT COURSE:   Vitals:    Vitals:    04/04/19 0913 04/04/19 1022   BP: (!) 141/98 (!) 135/92   Pulse: 83 80   Resp: 16 16   Temp: 97.7 °F (36.5 °C)    TempSrc: Oral    SpO2: 98% 95%   Weight: 251 lb (113.9 kg)    Height: 5' 10\" (1.778 m)        9:29 AM: The patient was seen and evaluated. MDM:  The patient was seen within the ED today for the evaluation of left flank pain that has lasted for 6-7 days. The patient arrived in no acute distress and in stable condition. Within the department, I observed the patient's vital signs to be within acceptable range, with the exception of an elevated BP. On exam, I appreciated left flank tenderness and mild left CVA tenderness, and the patient denied testicular pain.  Radiological studies within the department revealed via CT Kidney WO Contrast: No evidence of an acute process in the abdomen or pelvis; There is diffuse low-attenuation throughout the liver which is suggestive of hepatic steatosis. Laboratory work was reassuring. Within the department, the patient was treated with Toradol, Zofran, and IV fluids. I observed the patient's condition to mild improve during the duration of his stay. I explained possibility of musculoskeletal source, will treat with prednisone rather than NSAIDs given upcoming procedure. Patient was amenable to my decision, and I answered all questions that were asked. He was discharged home in stable condition with prescriptions for Medrol and Zofran, and the patient will return to the ED if his symptoms become more severe in nature or otherwise change. I advised the patient to follow-up with Dr. Vivian Rivera as scheduled, and with his PCP. I also discussed return to ED precautions with the patient who verbalized understanding. CRITICAL CARE:   None     CONSULTS:  None    PROCEDURES:  None    FINAL IMPRESSION      1.  Left flank pain          DISPOSITION/PLAN   Discharge    PATIENT REFERRED TO:  Lisa Mendez MD  [address] [facility] [address] [place] [address]      as scheduled    Thomas Cotter MD  [address] [place] [address]  [place] [address] EMERGENCY DEPT  [address]  [phone]    If symptoms worsen      DISCHARGE MEDICATIONS:  Discharge Medication List as of 4/4/2019 10:44 AM      START taking these medications    Details   methylPREDNISolone (MEDROL, RACHEL,) 4 MG tablet Take by mouth., Disp-1 kit, R-0Print      ondansetron (ZOFRAN ODT) 4 MG disintegrating tablet Take 1 tablet by mouth every 8 hours as needed for Nausea or Vomiting, Disp-20 tablet, R-0Print             (Please note that portions of this note were completed with a voice recognition program.  Efforts were made to edit the dictations but occasionally words are mis-transcribed.)    The patient was given an opportunity to see the Emergency Attending. The patient voiced understanding that I was a Mid-LevelProvider and was in agreement with being seen independently by myself. Scribe:  Rosas Dominguez 4/4/19 9:29 AM Scribing for and in the presence of Jose Enrique Thapa PA-C. Signed by: Tanisha Cool, 04/04/19 11:36 AM    Provider:  I personally performed the services described in the documentation, reviewed and edited the documentation which was dictated to the scribe in my presence, and it accurately records my words and actions.     Jose Enrique Thapa PA-C 4/4/19 11:36 AM        Giovani Baker PA-C  04/05/19 0857

## 2019-04-05 ENCOUNTER — ANESTHESIA EVENT (OUTPATIENT)
Dept: OPERATING ROOM | Age: 49
End: 2019-04-05
Payer: COMMERCIAL

## 2019-04-05 ENCOUNTER — ANESTHESIA (OUTPATIENT)
Dept: OPERATING ROOM | Age: 49
End: 2019-04-05
Payer: COMMERCIAL

## 2019-04-05 ENCOUNTER — HOSPITAL ENCOUNTER (OUTPATIENT)
Age: 49
Setting detail: OUTPATIENT SURGERY
Discharge: HOME | End: 2019-04-05
Attending: PAIN MEDICINE | Admitting: PAIN MEDICINE
Payer: COMMERCIAL

## 2019-04-05 ENCOUNTER — APPOINTMENT (OUTPATIENT)
Dept: GENERAL RADIOLOGY | Age: 49
End: 2019-04-05
Attending: PAIN MEDICINE
Payer: COMMERCIAL

## 2019-04-05 VITALS
TEMPERATURE: 98.6 F | HEIGHT: 70 IN | DIASTOLIC BLOOD PRESSURE: 79 MMHG | OXYGEN SATURATION: 93 % | WEIGHT: 250.6 LBS | RESPIRATION RATE: 16 BRPM | SYSTOLIC BLOOD PRESSURE: 134 MMHG | BODY MASS INDEX: 35.88 KG/M2 | HEART RATE: 79 BPM

## 2019-04-05 VITALS
RESPIRATION RATE: 22 BRPM | OXYGEN SATURATION: 94 % | SYSTOLIC BLOOD PRESSURE: 139 MMHG | DIASTOLIC BLOOD PRESSURE: 76 MMHG

## 2019-04-05 LAB — POC INR: 1 (ref 0.8–1.2)

## 2019-04-05 PROCEDURE — 2500000003 HC RX 250 WO HCPCS: Performed by: NURSE ANESTHETIST, CERTIFIED REGISTERED

## 2019-04-05 PROCEDURE — 6360000002 HC RX W HCPCS: Performed by: NURSE ANESTHETIST, CERTIFIED REGISTERED

## 2019-04-05 PROCEDURE — 2709999900 HC NON-CHARGEABLE SUPPLY: Performed by: PAIN MEDICINE

## 2019-04-05 PROCEDURE — 3209999900 FLUORO FOR SURGICAL PROCEDURES

## 2019-04-05 PROCEDURE — 3600000054 HC PAIN LEVEL 3 BASE: Performed by: PAIN MEDICINE

## 2019-04-05 PROCEDURE — 6360000004 HC RX CONTRAST MEDICATION: Performed by: PAIN MEDICINE

## 2019-04-05 PROCEDURE — 6360000002 HC RX W HCPCS: Performed by: PAIN MEDICINE

## 2019-04-05 PROCEDURE — 7100000011 HC PHASE II RECOVERY - ADDTL 15 MIN: Performed by: PAIN MEDICINE

## 2019-04-05 PROCEDURE — 62323 NJX INTERLAMINAR LMBR/SAC: CPT | Performed by: PAIN MEDICINE

## 2019-04-05 PROCEDURE — 3700000000 HC ANESTHESIA ATTENDED CARE: Performed by: PAIN MEDICINE

## 2019-04-05 PROCEDURE — 2580000003 HC RX 258: Performed by: PAIN MEDICINE

## 2019-04-05 PROCEDURE — 2500000003 HC RX 250 WO HCPCS: Performed by: PAIN MEDICINE

## 2019-04-05 PROCEDURE — 7100000010 HC PHASE II RECOVERY - FIRST 15 MIN: Performed by: PAIN MEDICINE

## 2019-04-05 RX ORDER — LIDOCAINE HYDROCHLORIDE 10 MG/ML
INJECTION, SOLUTION INFILTRATION; PERINEURAL PRN
Status: DISCONTINUED | OUTPATIENT
Start: 2019-04-05 | End: 2019-04-05 | Stop reason: ALTCHOICE

## 2019-04-05 RX ORDER — PROPOFOL 10 MG/ML
INJECTION, EMULSION INTRAVENOUS PRN
Status: DISCONTINUED | OUTPATIENT
Start: 2019-04-05 | End: 2019-04-05 | Stop reason: SDUPTHER

## 2019-04-05 RX ORDER — LIDOCAINE HYDROCHLORIDE 10 MG/ML
INJECTION, SOLUTION INFILTRATION; PERINEURAL PRN
Status: DISCONTINUED | OUTPATIENT
Start: 2019-04-05 | End: 2019-04-05 | Stop reason: SDUPTHER

## 2019-04-05 RX ORDER — 0.9 % SODIUM CHLORIDE 0.9 %
VIAL (ML) INJECTION PRN
Status: DISCONTINUED | OUTPATIENT
Start: 2019-04-05 | End: 2019-04-05 | Stop reason: ALTCHOICE

## 2019-04-05 RX ORDER — DEXAMETHASONE SODIUM PHOSPHATE 4 MG/ML
INJECTION, SOLUTION INTRA-ARTICULAR; INTRALESIONAL; INTRAMUSCULAR; INTRAVENOUS; SOFT TISSUE PRN
Status: DISCONTINUED | OUTPATIENT
Start: 2019-04-05 | End: 2019-04-05 | Stop reason: ALTCHOICE

## 2019-04-05 RX ADMIN — PROPOFOL 50 MG: 10 INJECTION, EMULSION INTRAVENOUS at 10:28

## 2019-04-05 RX ADMIN — LIDOCAINE HYDROCHLORIDE 20 MG: 10 INJECTION, SOLUTION INFILTRATION; PERINEURAL at 10:26

## 2019-04-05 RX ADMIN — PROPOFOL 50 MG: 10 INJECTION, EMULSION INTRAVENOUS at 10:26

## 2019-04-05 ASSESSMENT — PULMONARY FUNCTION TESTS
PIF_VALUE: 0

## 2019-04-05 ASSESSMENT — PAIN SCALES - GENERAL: PAINLEVEL_OUTOF10: 0

## 2019-04-05 ASSESSMENT — PAIN - FUNCTIONAL ASSESSMENT: PAIN_FUNCTIONAL_ASSESSMENT: 0-10

## 2019-04-05 NOTE — PROGRESS NOTES
1036 To recovery via cart. Spont resp. VSS. Skin warm and dry. Oriented times three. Report received from Surgical rn. Pt denies pain, nausea, dizziness, numbness or tingling. Pedal push/pull equal and strong. Bilat  strength equal and strong. IV capped. Injection site covered by bandaid clean dry and intact. SR up times two. Call bell in reach. Snack and drink given. Family to room. [address] comfortable. 1055 Continues to deny pain or nausea. IV discontinued. Up to dress self with family at side  1100 Discharge instructions given to pt and family with each voicing understanding.    1110 Discharge to home in stable ambulatory condition with family

## 2019-04-05 NOTE — ANESTHESIA POSTPROCEDURE EVALUATION
Department of Anesthesiology  Postprocedure Note    Patient: Sean Contreras  MRN: [id number]  YOB: 1970  Date of evaluation: 4/5/2019  Time:  10:44 AM     Procedure Summary     Date:  04/05/19 Room / Location:  [facility] OR 03 [address]    Anesthesia Start:  1025 Anesthesia Stop:  1033    Procedure:  LUMBAR INTER LAMINAR GUNNAR @ L4 (N/A ) Diagnosis:  (lumbar spondylosis)    Surgeon:  Aubrie Ramirez MD Responsible Provider:  Ynes Freeman MD    Anesthesia Type:  MAC ASA Status:  2          Anesthesia Type: No value filed. Angelina Phase I:      Angelina Phase II: Angelina Score: 10    Last vitals: Reviewed and per EMR flowsheets. Anesthesia Post Evaluation    Patient location during evaluation: PACU  Patient participation: complete - patient participated  Level of consciousness: awake and alert  Airway patency: patent  Nausea & Vomiting: no nausea and no vomiting  Complications: no  Cardiovascular status: hemodynamically stable  Respiratory status: acceptable  Hydration status: euvolemic      [address]. [address]  POST-ANESTHESIA NOTE       Name:  Sean Contreras                                         Age:  51 y.o.   MRN:  [id number]      Last Vitals:  /79   Pulse 79   Temp 98.6 °F (37 °C) (Temporal)   Resp 16   Ht 5' 10\" (1.778 m)   Wt 250 lb 9.6 oz (113.7 kg)   SpO2 93%   BMI 35.96 kg/m²   Patient Vitals for the past 4 hrs:   BP Temp Temp src Pulse Resp SpO2 Height Weight   04/05/19 1036 134/79 98.6 °F (37 °C) Temporal 79 16 93 % -- --   04/05/19 0946 134/88 98.3 °F (36.8 °C) Temporal 74 16 97 % 5' 10\" (1.778 m) 250 lb 9.6 oz (113.7 kg)       Level of Consciousness:  Awake    Respiratory:  Stable    Oxygen Saturation:  Stable    Cardiovascular:  Stable    Hydration:  Adequate    PONV:  Stable    Post-op Pain:  Adequate analgesia    Post-op Assessment:  No apparent anesthetic complications    Additional Follow-Up / Treatment / Comment:  None    ROSI Emilie Granados MD  April 5, 2019   10:44 AM

## 2019-04-05 NOTE — OP NOTE
Pre-Procedure Note    Patient Name: Sean Contreras   YOB: 1970  Medical Record Number: [id number]  Date: 3/13/2019       Indication:  Lower back and leg pain  Consent: On file. Vital Signs:   Vitals:    04/05/19 0946   BP: 134/88   Pulse: 74   Resp: 16   Temp: 98.3 °F (36.8 °C)   SpO2: 97%       Past Medical History:   has a past medical history of Chronic back pain, GERD (gastroesophageal reflux disease), History of kidney stones, Hx of blood clots, Kidney stone, and Lumbar spondylosis. Past Surgical History:   has a past surgical history that includes Ureter stent placement (2006); Lithotripsy (2006); Cystocopy (6/14/2013); Cystocopy (07/29/2013); Cystoscopy (12/23/13); other surgical history (Bilateral, 03/26/2018); pr inj dx/ther agnt paravert facet joint, lumbar/sac, 2nd level (Bilateral, 3/26/2018); pr inj dx/ther agnt paravert facet joint, lumbar/sac, 2nd level (Bilateral, 5/21/2018); Colonoscopy; eye surgery (2007); hernia repair (2007); pr inject rx other periph nerve (Left, 9/28/2018); and pr inject rx other periph nerve (Right, 11/30/2018). Pre-Sedation Documentation and Exam:   Vital signs have been reviewed (see flow sheet for vitals). Sedation/Anesthesia Plan:   MAC    Medications Planned:   Per Anesthesia. Patient is an appropriate candidate for plan of sedation: yes    Preoperative Diagnosis:  Bulge of lumbar disc    Posrt-Op Dx: as above    Procedure Performed:  Lumbar epidural steroid injection under fluoroscopy guidance # 1. Indication for the Procedure: The patient failed conservative management  for pain in the low back radiating to lower extremities. The patient is undergoing lumbar epidural steroid injection. As the patient is not responding to conservative management and it is interfering with activities of daily living we decided to proceed with lumbar epidural steroid injection.  The procedure and risks were discussed with the patient and an informed consent was obtained    Procedure: The patient is placed in prone position. Skin over the back was prepped and draped in sterile manner. Then using fluoroscopy the L4 interspace was observed and the skin and deep tissues in the  paramedian area were infiltrated with 3 ml of 1% lidocaine. The #20-gauge, 3-1/2 inch Tuohy needle was inserted through the skin wheal and the epidural space was identified using loss of resistance technique . This was confirmed with AP and lateral views using fluoroscopy after injecting about 2 ml of Omnipaque-180 and observing the spread of the contrast in the epidural space. Then after negative aspiration a total of 6 mg of dexamethasone  with 3 ml of normal saline was injected into the epidural space. The needle is removed and a Band-Aid was placed over the needle insertion site. No paresthesia. Patient's vital signs remained stable and the patient tolerated the procedure well. The patient will be discharged home in stable condition and will be followed in the office in the next few weeks for further planning.     Electronically signed by Jasmyne Evans MD on 3/13/2019 at 10:33 AM

## 2019-04-05 NOTE — ANESTHESIA PRE PROCEDURE
Department of Anesthesiology  Preprocedure Note       Name:  Jacquelyn Kelly   Age:  51 y.o.  :  1970                                          MRN:  [id number]         Date:  2019      Surgeon: Vanessa Lowery):  Nighat Bowden MD    Procedure: LUMBAR INTER LAMINAR GUNNAR @ L4 (N/A )    Medications prior to admission:   Prior to Admission medications    Medication Sig Start Date End Date Taking? Authorizing Provider   ondansetron (ZOFRAN ODT) 4 MG disintegrating tablet Take 1 tablet by mouth every 8 hours as needed for Nausea or Vomiting 19   Anam Jarquin PA-C   HYDROcodone-acetaminophen (NORCO) 5-325 MG per tablet Take 1 tablet by mouth 3 times daily as needed for Pain for up to 30 days. 3/18/19 4/17/19  Abdoulaye Shelby, APRN - CNP   allopurinol (ZYLOPRIM) 300 MG tablet Take 1 tablet by mouth daily 18   [facility], APRN - CNP   potassium citrate (UROCIT-K) 10 MEQ (1080 MG) extended release tablet TAKE 1 TABLET BY MOUTH EVERY MORNING WITH BREAKFAST 18   [facility], APRN - CNP   citalopram (CELEXA) 40 MG tablet Take 40 mg by mouth daily. Historical Provider, MD   warfarin (COUMADIN) 2.5 MG tablet Take 2.5 mg by mouth daily at 1800 Takes 5mg 3 days per week & 2.5mg 4 days per week    Historical Provider, MD   busPIRone (BUSPAR) 10 MG tablet Take 30 mg by mouth 2 times daily     Historical Provider, MD   omeprazole (PRILOSEC) 20 MG capsule Take 20 mg by mouth daily. Historical Provider, MD       Current medications:    No current facility-administered medications for this visit. No current outpatient medications on file.      Facility-Administered Medications Ordered in Other Visits   Medication Dose Route Frequency Provider Last Rate Last Dose    sodium chloride (PF) 0.9 % injection    PRN Nighat Bowden MD   2 mL at 19 1035    iohexol (OMNIPAQUE) injection    PRN Nighat Bowden MD   2 mL at 19 1035    lidocaine 1 % injection    PRN Wendi Stevenson MD   3 mL at 04/05/19 1035    Dexamethasone Sodium Phosphate injection    PRN Wendi Stevenson MD   6 mg at 04/05/19 1036       Allergies: Allergies   Allergen Reactions    Latex Rash    Codeine Hives     TYLENOL WITH CODEINE    Nickel Rash    Oxybutynin Chloride [Oxybutynin Chloride] Other (See Comments)     Warm feeling and extreme dry mouth       Problem List:    Patient Active Problem List   Diagnosis Code    Weak urinary stream R39.12    Obstructive sleep apnea G47.33    Snoring R06.83    Sleep disturbance G47.9    Insomnia G47.00    Sleep talking G47.8    Morning headache R51    Bruxism F45.8    Restless sleeper G47.9    Poor concentration R41.840    Claustrophobia F40.240    Vivid dream R68.89    GERD (gastroesophageal reflux disease) K21.9    Anxiety F41.9    Panic disorder F41.0    Obesity (BMI 30.0-34. 9) E66.9    Lumbar spondylosis M47.816    Bulging of lumbar intervertebral disc without myelopathy M51.26       Past Medical History:        Diagnosis Date    Chronic back pain     GERD (gastroesophageal reflux disease)     History of kidney stones     Hx of blood clots early 2000's & 2013    MUNA LUNGS    Kidney stone     Lumbar spondylosis        Past Surgical History:        Procedure Laterality Date    COLONOSCOPY      last one 2007???    CYSTOSCOPY  6/14/2013    URETEROSCOPY STONE REMOVAL    CYSTOSCOPY  07/29/2013    Left ureteroscopy, Left ureteral stone removal and stent exchange    CYSTOSCOPY  12/23/13    Cystoscopy, Left Optical Internal Ureterotomy, Left Ureteroscopy and Dilated UPJ Oscar Estrella  2007    left groin    LITHOTRIPSY  2006    OTHER SURGICAL HISTORY Bilateral 03/26/2018    Lumbar Facet MBB at L4-5, L5-S1    OH INJ DX/THER AGNT PARAVERT FACET JOINT, LUMBAR/SAC, 2ND LEVEL Bilateral 3/26/2018    BILATERAL L-FACET MBB @ L4-5 and L5-S1, performed by Ann Lopez MD at [address] [place] [address] DX/THER AGNT PARAVERT FACET JOINT, LUMBAR/SAC, 2ND LEVEL Bilateral 2018    Bilateral L-facet MBB @ L4-5, L5-S1. performed by Ann Lopez MD at [address] Left 2018    LUMBAR RFA @ L4-5, and L5-S1 LEFT SIDE FIRST. Malcolm Anne performed by Ann Lopez MD at [address] Right 2018    Right L-RFA @ L4-5, and L5-S1 performed by Ann Lopez MD at [address] [place], [place] [address]         Social History:    Social History     Tobacco Use    Smoking status: Former Smoker     Packs/day: 1.00     Years: 12.00     Pack years: 12.00     Last attempt to quit: 2005     Years since quittin.8    Smokeless tobacco: Never Used   Substance Use Topics    Alcohol use: No                                Counseling given: Not Answered      Vital Signs (Current): There were no vitals filed for this visit.                                            BP Readings from Last 3 Encounters:   19 134/88   19 139/76   19 (!) 135/92       NPO Status:                                                                                 BMI:   Wt Readings from Last 3 Encounters:   19 250 lb 9.6 oz (113.7 kg)   19 251 lb (113.9 kg)   19 252 lb (114.3 kg)     There is no height or weight on file to calculate BMI.    CBC:   Lab Results   Component Value Date    WBC 5.4 2019    RBC 4.87 2019    HGB 14.8 2019    HCT 46.1 2019    MCV 94.7 2019    RDW 12.8 2018     2019       CMP:   Lab Results   Component Value Date     2019    K 4.2 2019     2019    CO2 23 2019    BUN 9 2019    CREATININE 0.8 2019    LABGLOM >90 2019    GLUCOSE 108 2019    PROT 6.6 2015    CALCIUM 8.9 2019 BILITOT 0.5 07/07/2018    ALKPHOS 100 07/07/2018    AST 30 07/07/2018    ALT 41 07/07/2018       POC Tests: No results for input(s): POCGLU, POCNA, POCK, POCCL, POCBUN, POCHEMO, POCHCT in the last 72 hours. Coags:   Lab Results   Component Value Date    INR 1.00 04/05/2019    INR 0.98 03/26/2018    APTT 32.1 06/14/2013       HCG (If Applicable): No results found for: PREGTESTUR, PREGSERUM, HCG, HCGQUANT     ABGs: No results found for: PHART, PO2ART, ROT8PBD, CHO4MVX, BEART, N8GBWCCS     Type & Screen (If Applicable):  No results found for: [facility]    Anesthesia Evaluation  Patient summary reviewed  Airway: Mallampati: II  TM distance: >3 FB     Mouth opening: > = 3 FB Dental:          Pulmonary: breath sounds clear to auscultation  (+) sleep apnea:                             Cardiovascular:            Rhythm: regular                      Neuro/Psych:   (+) psychiatric history:            GI/Hepatic/Renal:   (+) GERD:,           Endo/Other:                     Abdominal:           Vascular:                                          Anesthesia Plan      MAC     ASA 2       Induction: intravenous. Anesthetic plan and risks discussed with patient.       Plan discussed with CRNA and surgical team.                  Dickson Billy MD   4/5/2019

## 2019-04-15 DIAGNOSIS — M47.816 LUMBAR SPONDYLOSIS: ICD-10-CM

## 2019-04-15 DIAGNOSIS — R07.81 RIB PAIN ON LEFT SIDE: ICD-10-CM

## 2019-04-15 DIAGNOSIS — G89.4 CHRONIC PAIN SYNDROME: ICD-10-CM

## 2019-04-15 RX ORDER — TRAMADOL HYDROCHLORIDE 50 MG/1
TABLET ORAL
Qty: 180 TABLET | Refills: 0 | OUTPATIENT
Start: 2019-04-15 | End: 2019-05-08

## 2019-04-15 NOTE — TELEPHONE ENCOUNTER
Juan Tyler called requesting a refill on the following medications:  Requested Prescriptions     Pending Prescriptions Disp Refills    traMADol (ULTRAM) 50 MG tablet 180 tablet 0     Sig: Take one-two every 8hrs PRN     Pharmacy verified: [facility] [address] [facility]  . pv      Date of last visit: 2/13/19  Date of next visit (if applicable): 1/90/3463

## 2019-04-18 DIAGNOSIS — G89.4 CHRONIC PAIN SYNDROME: Primary | ICD-10-CM

## 2019-04-18 RX ORDER — HYDROCODONE BITARTRATE AND ACETAMINOPHEN 5; 325 MG/1; MG/1
1 TABLET ORAL EVERY 8 HOURS PRN
Qty: 90 TABLET | Refills: 0 | Status: SHIPPED | OUTPATIENT
Start: 2019-04-18 | End: 2019-05-15 | Stop reason: SDUPTHER

## 2019-04-18 RX ORDER — OXYCODONE HYDROCHLORIDE AND ACETAMINOPHEN 5; 325 MG/1; MG/1
1 TABLET ORAL EVERY 4 HOURS PRN
Qty: 10 TABLET | Refills: 0 | Status: CANCELLED | OUTPATIENT
Start: 2019-04-18

## 2019-04-23 ENCOUNTER — OFFICE VISIT (OUTPATIENT)
Dept: PHYSICAL MEDICINE AND REHAB | Age: 49
End: 2019-04-23
Payer: COMMERCIAL

## 2019-04-23 VITALS
HEART RATE: 83 BPM | SYSTOLIC BLOOD PRESSURE: 148 MMHG | HEIGHT: 70 IN | BODY MASS INDEX: 35.79 KG/M2 | WEIGHT: 250 LBS | DIASTOLIC BLOOD PRESSURE: 104 MMHG

## 2019-04-23 DIAGNOSIS — M47.816 SPONDYLOSIS OF LUMBAR REGION WITHOUT MYELOPATHY OR RADICULOPATHY: ICD-10-CM

## 2019-04-23 DIAGNOSIS — M47.814 SPONDYLOSIS OF THORACIC REGION WITHOUT MYELOPATHY OR RADICULOPATHY: ICD-10-CM

## 2019-04-23 DIAGNOSIS — G89.4 CHRONIC PAIN SYNDROME: ICD-10-CM

## 2019-04-23 DIAGNOSIS — M51.36 BULGE OF LUMBAR DISC WITHOUT MYELOPATHY: ICD-10-CM

## 2019-04-23 DIAGNOSIS — M53.3 SI (SACROILIAC) PAIN: Primary | ICD-10-CM

## 2019-04-23 PROCEDURE — 99213 OFFICE O/P EST LOW 20 MIN: CPT | Performed by: NURSE PRACTITIONER

## 2019-04-23 RX ORDER — ARIPIPRAZOLE 2 MG/1
2 TABLET ORAL 2 TIMES DAILY
COMMUNITY

## 2019-04-23 ASSESSMENT — ENCOUNTER SYMPTOMS: BACK PAIN: 1

## 2019-04-23 NOTE — PROGRESS NOTES
[facility]  [address]  Dept: [phone]  Dept Fax: [phone]: [phone]    Visit Date: 4/23/2019    Functionality Assessment/Goals Worksheet     On a scale of 0 (Does not Interfere) to 10 (Completely Interferes)     1. Which number describes how during the past week pain has interfered with       the following:  A. General Activity:  8  B. Mood: 9  C. Walking Ability:  3  D. Normal Work (Includes both work outside the home and housework):  9  E. Relations with Other People:   9  F. Sleep:   8  G. Enjoyment of Life:   8    2. Patient Prefers to Take their Pain Medications:     []  On a regular basis   [x]  Only when necessary    []  Does not take pain medications    3. What are the Patient's Goals/Expectations for Visiting Pain Management? [x]  Learn about my pain    []  Receive Medication   []  Physical Therapy     []  Treat Depression   []  Receive Injections    []  Treat Sleep   []  Deal with Anxiety and Stress   []  Treat Opoid Dependence/Addiction   []  Other:      HPI:   Consuelo Coyle is a 51 y.o. male is here today for    Chief Complaint: Lower back pain     HPI   FU LESI # 1 from 4/5/2019. Received 95% relief of lower of lower back pain for 3-4 days and then pain gradually increased to what it was prior after 1 week. Pain is mainly in left lower back and left SI area. Effects from left L-RFA from 9/28/2018 have worn off. Norco TID prn is helping     Medications reviewed. Patient denies side effects with medications. Patient states he is taking medications as prescribed. Hedenies receiving pain medications from other sources. He had 1 ER visit since last visit  any ER visits since last visit. Pain scale with out pain medications or at its worst is 8/10. Pain scale with pain medications or at its best is 4/10.   Last dose of Norco was yesterday  Drug screen reviewed from 1/22/2019 and was appropriate  Pill count was completed today and was appropriate  Patient does not have naloxone available at home. Patient has not required use of naloxone at home since last office visit. L-xray reviewed       1. There is redemonstration of degenerative changes within the lumbar spine which are most notable at L4-5 and L5-S1 with disc space narrowing and degenerative facet arthropathy. This is stable to slightly worse compared to the prior exam.       2. Chronic loss of height is noted of the T12 vertebral body. The patientis allergic to latex; codeine; nickel; and oxybutynin chloride [oxybutynin chloride]. Past Medical History  Rhoda Leon  has a past medical history of Chronic back pain, GERD (gastroesophageal reflux disease), History of kidney stones, Hx of blood clots, Kidney stone, and Lumbar spondylosis. Past Surgical History  The patient  has a past surgical history that includes Ureter stent placement (2006); Lithotripsy (2006); Cystocopy (6/14/2013); Cystocopy (07/29/2013); Cystoscopy (12/23/13); other surgical history (Bilateral, 03/26/2018); pr inj dx/ther agnt paravert facet joint, lumbar/sac, 2nd level (Bilateral, 3/26/2018); pr inj dx/ther agnt paravert facet joint, lumbar/sac, 2nd level (Bilateral, 5/21/2018); Colonoscopy; eye surgery (2007); hernia repair (2007); pr inject rx other periph nerve (Left, 9/28/2018); pr inject rx other periph nerve (Right, 11/30/2018); and lumbar nerve block (N/A, 4/5/2019). Family History  This patient's family history includes Cancer in his mother; Heart Disease in his father. Social History  Rhoda Leon  reports that he quit smoking about 13 years ago. He has a 12.00 pack-year smoking history. He has never used smokeless tobacco. He reports that he does not drink alcohol or use drugs.     Medications    Current Outpatient Medications:     ARIPiprazole (ABILIFY) 2 MG tablet, Take 2 mg by mouth 2 supple. No muscular tenderness present. No neck rigidity. No edema, no erythema and normal range of motion present. No thyromegaly present. Cardiovascular: Normal rate, regular rhythm, normal heart sounds and intact distal pulses. Exam reveals no gallop and no friction rub. No murmur heard. Pulmonary/Chest: Effort normal and breath sounds normal. No respiratory distress. He has no wheezes. He has no rales. He exhibits no tenderness. Abdominal: Soft. Bowel sounds are normal. He exhibits no distension. There is no tenderness. There is no rebound and no guarding. Musculoskeletal:        Right hip: He exhibits normal range of motion and no tenderness. Left hip: He exhibits normal range of motion and no tenderness. Right knee: He exhibits normal range of motion. No tenderness found. Left knee: He exhibits normal range of motion. No tenderness found. Right ankle: He exhibits normal range of motion and no swelling. Left ankle: He exhibits normal range of motion and no swelling. Cervical back: He exhibits normal range of motion and no tenderness. Lumbar back: He exhibits decreased range of motion, tenderness and pain. Back:         Right foot: There is normal range of motion and no tenderness. Left foot: There is normal range of motion and no tenderness. Neurological: He is alert and oriented to person, place, and time. He has normal strength and normal reflexes. He is not disoriented. He displays no atrophy. No cranial nerve deficit or sensory deficit. He exhibits normal muscle tone. He displays a negative Romberg sign. Coordination and gait normal.   slr NEG   Skin: Skin is warm. No rash noted. He is not diaphoretic. No erythema. No pallor. Psychiatric: His mood appears not anxious. His affect is not angry, not blunt, not labile and not inappropriate. His speech is not rapid and/or pressured, not delayed, not tangential and not slurred.  He is not agitated, not aggressive, not hyperactive, not slowed, not withdrawn, not actively hallucinating and not combative. Thought content is not paranoid and not delusional. Cognition and memory are not impaired. He does not express impulsivity or inappropriate judgment. He does not exhibit a depressed mood. He expresses no homicidal and no suicidal ideation. He expresses no suicidal plans and no homicidal plans. He is communicative. He exhibits normal recent memory and normal remote memory. He is attentive. Nursing note and vitals reviewed. CORETTA test: + left side   Yeomans test: + left side   Gaenslen test: + left side      Assessment:     1. SI (sacroiliac) pain    2. Spondylosis of lumbar region without myelopathy or radiculopathy    3. Spondylosis of thoracic region without myelopathy or radiculopathy    4. Bulge of lumbar disc without myelopathy    5. Chronic pain syndrome            Plan:      · OARRS reviewed. Current MED: 15.00  · Patient was not offered naloxone for home. · Discussed long term side effects of medications, tolerance, dependency and addiction. · Previous UDS reviewed  · UDS preformed today for compliance. · Patient told can not receive any pain medications from any other source. · No evidence of abuse, diversion or aberrant behavior.  Medications and/or procedures to improve function and quality of life- patient understanding with this and that may not be pain free   Discussed with patient about safe storage of medications at home   Discussed possible weaning of medication dosing dependent on treatment/procedure results.  Discussed with patient about risks with procedure including infection, reaction to medication, increased pain, or bleeding.  Procedure notes reviewed in detail.  Received 95% relief of pain from LESI for 1 full week. Discussed repeating in future    Discussed repeating L-facet RFA in future.     Patient evaluated by Ortho today Dr. David King- recommended Left SI MBB    Plan Left SI MBB # 1 for diagnostic and therapeutic relief. Procedure and risks discussed in detail with patient. · Medications remain effective, patient is compliant. Continue Norco 5/325 TID prn- recently filled 4/18/2109  · Will need to be cleared to be off Coumadin for 5 days        Meds. Prescribed:   No orders of the defined types were placed in this encounter. Return for  Left SI MBB # 1. , follow up after procedure.          Electronically signed by WALT Hou CNP on4/23/2019 at 11:28 AM

## 2019-05-03 ENCOUNTER — TELEPHONE (OUTPATIENT)
Dept: PHYSICAL MEDICINE AND REHAB | Age: 49
End: 2019-05-03

## 2019-05-13 ENCOUNTER — PREP FOR PROCEDURE (OUTPATIENT)
Dept: PHYSICAL MEDICINE AND REHAB | Age: 49
End: 2019-05-13

## 2019-05-13 NOTE — H&P (VIEW-ONLY)
Cuba Raman is an 51 y.o.  male. Chief Complaint: Lower back pain        L-xray reviewed       1. There is redemonstration of degenerative changes within the lumbar spine which are most notable at L4-5 and L5-S1 with disc space narrowing and degenerative facet arthropathy. This is stable to slightly worse compared to the prior exam.       2. Chronic loss of height is noted of the T12 vertebral body.            The patientis allergic to latex; codeine; nickel; and oxybutynin chloride [oxybutynin chloride].     Past Medical History  Alla Smith  has a past medical history of Chronic back pain, GERD (gastroesophageal reflux disease), History of kidney stones, Hx of blood clots, Kidney stone, and Lumbar spondylosis.     Past Surgical History  The patient  has a past surgical history that includes Ureter stent placement (2006); Lithotripsy (2006); Cystocopy (6/14/2013); Cystocopy (07/29/2013); Cystoscopy (12/23/13); other surgical history (Bilateral, 03/26/2018); pr inj dx/ther agnt paravert facet joint, lumbar/sac, 2nd level (Bilateral, 3/26/2018); pr inj dx/ther agnt paravert facet joint, lumbar/sac, 2nd level (Bilateral, 5/21/2018); Colonoscopy; eye surgery (2007); hernia repair (2007); pr inject rx other periph nerve (Left, 9/28/2018); pr inject rx other periph nerve (Right, 11/30/2018); and lumbar nerve block (N/A, 4/5/2019).    Family History  This patient's family history includes Cancer in his mother; Heart Disease in his father.  Domonique Nunez  reports that he quit smoking about 13 years ago. He has a 12.00 pack-year smoking history.  He has never used smokeless tobacco. He reports that he does not drink alcohol or use drugs.     Medications    Current Medication      Current Outpatient Medications:     ARIPiprazole (ABILIFY) 2 MG tablet, Take 2 mg by mouth 2 times daily, Disp: , Rfl:     HYDROcodone-acetaminophen (NORCO) 5-325 MG per tablet, Take 1 tablet by mouth every 8 hours as pulses. Exam reveals no gallop and no friction rub. No murmur heard. Pulmonary/Chest: Effort normal and breath sounds normal. No respiratory distress. He has no wheezes. He has no rales. He exhibits no tenderness. Abdominal: Soft. Bowel sounds are normal. He exhibits no distension. There is no tenderness. There is no rebound and no guarding. Musculoskeletal:        Right hip: He exhibits normal range of motion and no tenderness. Left hip: He exhibits normal range of motion and no tenderness. Right knee: He exhibits normal range of motion. No tenderness found. Left knee: He exhibits normal range of motion. No tenderness found. Right ankle: He exhibits normal range of motion and no swelling. Left ankle: He exhibits normal range of motion and no swelling. Cervical back: He exhibits normal range of motion and no tenderness. Lumbar back: He exhibits decreased range of motion, tenderness and pain. Back:         Right foot: There is normal range of motion and no tenderness. Left foot: There is normal range of motion and no tenderness. Neurological: He is alert and oriented to person, place, and time. He has normal strength and normal reflexes. He is not disoriented. He displays no atrophy. No cranial nerve deficit or sensory deficit. He exhibits normal muscle tone. He displays a negative Romberg sign. Coordination and gait normal.   slr NEG   Skin: Skin is warm. No rash noted. He is not diaphoretic. No erythema. No pallor. Psychiatric: His mood appears not anxious. His affect is not angry, not blunt, not labile and not inappropriate. His speech is not rapid and/or pressured, not delayed, not tangential and not slurred. He is not agitated, not aggressive, not hyperactive, not slowed, not withdrawn, not actively hallucinating and not combative. Thought content is not paranoid and not delusional. Cognition and memory are not impaired.  He does not express impulsivity or inappropriate judgment. He does not exhibit a depressed mood. He expresses no homicidal and no suicidal ideation. He expresses no suicidal plans and no homicidal plans. He is communicative. He exhibits normal recent memory and normal remote memory. He is attentive. Nursing note and vitals reviewed.     CORETTA test: + left side   Yeomans test: + left side   Gaenslen test: + left side   Assessment:      1. SI (sacroiliac) pain    2. Spondylosis of lumbar region without myelopathy or radiculopathy    3. Spondylosis of thoracic region without myelopathy or radiculopathy    4. Bulge of lumbar disc without myelopathy    5. Chronic pain syndrome            Review of Systems    Physical Exam        Plan:  Left SI MBB # 1.          WALT Lindsey - CNP  5/13/2019

## 2019-05-15 DIAGNOSIS — G89.4 CHRONIC PAIN SYNDROME: ICD-10-CM

## 2019-05-15 RX ORDER — HYDROCODONE BITARTRATE AND ACETAMINOPHEN 5; 325 MG/1; MG/1
1 TABLET ORAL EVERY 8 HOURS PRN
Qty: 90 TABLET | Refills: 0 | Status: SHIPPED | OUTPATIENT
Start: 2019-05-18 | End: 2019-06-12 | Stop reason: SDUPTHER

## 2019-05-15 NOTE — TELEPHONE ENCOUNTER
05/15/2019   [facility] called requesting a refill on the following medications:  Requested Prescriptions     Pending Prescriptions Disp Refills    HYDROcodone-acetaminophen (NORCO) 5-325 MG per tablet 90 tablet 0     Sig: Take 1 tablet by mouth every 8 hours as needed for Pain for up to 30 days.      Pharmacy verified:  [email or web address]      Date of last visit: 04/23/2019  Date of next visit (if applicable): 5/56/1991

## 2019-05-15 NOTE — TELEPHONE ENCOUNTER
OARRS reviewed. UDS: + for  Hydrocodone POSITIVE CONSISTENT  Hydromorphone POSITIVE CONSISTENT  Norhydrocodone POSITIVE CONSISTENT. Narcan offered: not offered   Last seen: 4/23/2019.  Follow-up:   Future Appointments   Date Time Provider Saul Maria G   5/30/2019  9:50 AM WALT Londono - CNP [facility] - [address] [place] [address]   9/4/2019  8:00 AM [facility] - [address] [place] [address]

## 2019-05-17 ENCOUNTER — HOSPITAL ENCOUNTER (OUTPATIENT)
Age: 49
Setting detail: OUTPATIENT SURGERY
Discharge: HOME | End: 2019-05-17
Attending: PAIN MEDICINE | Admitting: PAIN MEDICINE
Payer: COMMERCIAL

## 2019-05-17 ENCOUNTER — APPOINTMENT (OUTPATIENT)
Dept: GENERAL RADIOLOGY | Age: 49
End: 2019-05-17
Attending: PAIN MEDICINE
Payer: COMMERCIAL

## 2019-05-17 ENCOUNTER — ANESTHESIA EVENT (OUTPATIENT)
Dept: OPERATING ROOM | Age: 49
End: 2019-05-17
Payer: COMMERCIAL

## 2019-05-17 ENCOUNTER — ANESTHESIA (OUTPATIENT)
Dept: OPERATING ROOM | Age: 49
End: 2019-05-17
Payer: COMMERCIAL

## 2019-05-17 VITALS
HEIGHT: 70 IN | DIASTOLIC BLOOD PRESSURE: 75 MMHG | RESPIRATION RATE: 16 BRPM | OXYGEN SATURATION: 96 % | TEMPERATURE: 98.2 F | BODY MASS INDEX: 36.08 KG/M2 | WEIGHT: 252 LBS | HEART RATE: 79 BPM | SYSTOLIC BLOOD PRESSURE: 125 MMHG

## 2019-05-17 VITALS
RESPIRATION RATE: 18 BRPM | DIASTOLIC BLOOD PRESSURE: 92 MMHG | OXYGEN SATURATION: 95 % | SYSTOLIC BLOOD PRESSURE: 143 MMHG

## 2019-05-17 LAB — POC INR: 1 (ref 0.8–1.2)

## 2019-05-17 PROCEDURE — 6360000002 HC RX W HCPCS: Performed by: PAIN MEDICINE

## 2019-05-17 PROCEDURE — 6360000002 HC RX W HCPCS: Performed by: NURSE ANESTHETIST, CERTIFIED REGISTERED

## 2019-05-17 PROCEDURE — 3600000054 HC PAIN LEVEL 3 BASE: Performed by: PAIN MEDICINE

## 2019-05-17 PROCEDURE — 7100000011 HC PHASE II RECOVERY - ADDTL 15 MIN: Performed by: PAIN MEDICINE

## 2019-05-17 PROCEDURE — 27096 INJECT SACROILIAC JOINT: CPT | Performed by: PAIN MEDICINE

## 2019-05-17 PROCEDURE — 3209999900 FLUORO FOR SURGICAL PROCEDURES

## 2019-05-17 PROCEDURE — 7100000010 HC PHASE II RECOVERY - FIRST 15 MIN: Performed by: PAIN MEDICINE

## 2019-05-17 PROCEDURE — 2500000003 HC RX 250 WO HCPCS: Performed by: NURSE ANESTHETIST, CERTIFIED REGISTERED

## 2019-05-17 PROCEDURE — 3700000000 HC ANESTHESIA ATTENDED CARE: Performed by: PAIN MEDICINE

## 2019-05-17 PROCEDURE — 6360000004 HC RX CONTRAST MEDICATION: Performed by: PAIN MEDICINE

## 2019-05-17 PROCEDURE — 2500000003 HC RX 250 WO HCPCS: Performed by: PAIN MEDICINE

## 2019-05-17 RX ORDER — ROPIVACAINE HYDROCHLORIDE 2 MG/ML
INJECTION, SOLUTION EPIDURAL; INFILTRATION; PERINEURAL PRN
Status: DISCONTINUED | OUTPATIENT
Start: 2019-05-17 | End: 2019-05-17 | Stop reason: ALTCHOICE

## 2019-05-17 RX ORDER — LIDOCAINE HYDROCHLORIDE 20 MG/ML
INJECTION, SOLUTION INFILTRATION; PERINEURAL PRN
Status: DISCONTINUED | OUTPATIENT
Start: 2019-05-17 | End: 2019-05-17 | Stop reason: SDUPTHER

## 2019-05-17 RX ORDER — LIDOCAINE HYDROCHLORIDE 10 MG/ML
INJECTION, SOLUTION INFILTRATION; PERINEURAL PRN
Status: DISCONTINUED | OUTPATIENT
Start: 2019-05-17 | End: 2019-05-17 | Stop reason: ALTCHOICE

## 2019-05-17 RX ORDER — METHYLPREDNISOLONE ACETATE 80 MG/ML
INJECTION, SUSPENSION INTRA-ARTICULAR; INTRALESIONAL; INTRAMUSCULAR; SOFT TISSUE PRN
Status: DISCONTINUED | OUTPATIENT
Start: 2019-05-17 | End: 2019-05-17 | Stop reason: ALTCHOICE

## 2019-05-17 RX ADMIN — PROPOFOL 80 MG: 10 INJECTION, EMULSION INTRAVENOUS at 13:40

## 2019-05-17 RX ADMIN — LIDOCAINE HYDROCHLORIDE 40 MG: 20 INJECTION, SOLUTION INFILTRATION; PERINEURAL at 13:40

## 2019-05-17 ASSESSMENT — PAIN SCALES - GENERAL
PAINLEVEL_OUTOF10: 0
PAINLEVEL_OUTOF10: 0

## 2019-05-17 ASSESSMENT — PAIN - FUNCTIONAL ASSESSMENT: PAIN_FUNCTIONAL_ASSESSMENT: 0-10

## 2019-05-17 ASSESSMENT — PAIN DESCRIPTION - DESCRIPTORS: DESCRIPTORS: CONSTANT

## 2019-05-17 NOTE — ANESTHESIA POSTPROCEDURE EVALUATION
Department of Anesthesiology  Postprocedure Note    Patient: Sean Contreras  MRN: [id number]  YOB: 1970  Date of evaluation: 5/17/2019  Time:  1:55 PM     Procedure Summary     Date:  05/17/19 Room / Location:  [facility] 03 / [address]    Anesthesia Start:  1335 Anesthesia Stop:  1344    Procedure:  SI MBB #1 left (Left ) Diagnosis:  (SI pain)    Surgeon:  Aubrie Ramirez MD Responsible Provider:  Phil Gibbons DO    Anesthesia Type:  MAC ASA Status:  3          Anesthesia Type: MAC    Angelina Phase I:      Angelina Phase II:      Last vitals: Reviewed and per EMR flowsheets. Anesthesia Post Evaluation    Comments: Gabriellehaussusan Mo 60  POST-ANESTHESIA NOTE       Name:  Sean Contreras                                         Age:  51 y.o. MRN:  [id number]      Last Vitals:  /73   Pulse 79   Temp 98.2 °F (36.8 °C) (Temporal)   Resp 16   Ht 5' 10\" (1.778 m)   Wt 252 lb (114.3 kg)   SpO2 94%   BMI 36.16 kg/m²   Patient Vitals in the past 4 hrs:  05/17/19 1347, BP:121/73, Temp:98.2 °F (36.8 °C), Temp src:Temporal, Pulse:79, Resp:16, SpO2:94 %  05/17/19 1307, BP:(!) 131/91, Temp:98 °F (36.7 °C), Temp src:Temporal, Pulse:76, Resp:16, SpO2:96 %, Height:5' 10\" (1.778 m), Weight:252 lb (114.3 kg)    Level of Consciousness:  Awake    Respiratory:  Stable    Oxygen Saturation:  Stable    Cardiovascular:  Stable    Hydration:  Adequate    PONV:  Stable    Post-op Pain:  Adequate analgesia    Post-op Assessment:  No apparent anesthetic complications    Additional Follow-Up / Treatment / Comment:  None    Vlad Crocker DO  May 17, 2019   1:55 PM

## 2019-05-17 NOTE — OP NOTE
Pre-Procedure Note    Patient Name: Millie Wilder   YOB: 1970  Medical Record Number: [id number]  Date: 05/17/2019    Indication:  Left SI pain  Consent: On file. Vital Signs:   Vitals:    05/17/19 1307   BP: (!) 131/91   Pulse: 76   Resp: 16   Temp: 98 °F (36.7 °C)   SpO2: 96%       Past Medical History:   has a past medical history of Chronic back pain, GERD (gastroesophageal reflux disease), History of kidney stones, Hx of blood clots, Kidney stone, and Lumbar spondylosis. Past Surgical History:   has a past surgical history that includes Ureter stent placement (2006); Lithotripsy (2006); Cystocopy (6/14/2013); Cystocopy (07/29/2013); Cystoscopy (12/23/13); other surgical history (Bilateral, 03/26/2018); pr inj dx/ther agnt paravert facet joint, lumbar/sac, 2nd level (Bilateral, 3/26/2018); pr inj dx/ther agnt paravert facet joint, lumbar/sac, 2nd level (Bilateral, 5/21/2018); Colonoscopy; eye surgery (2007); hernia repair (2007); pr inject rx other periph nerve (Left, 9/28/2018); pr inject rx other periph nerve (Right, 11/30/2018); and lumbar nerve block (N/A, 4/5/2019). Pre-Sedation Documentation and Exam:   Vital signs have been reviewed (see flow sheet for vitals). Sedation/ Anesthesia Plan: MAC    Patient is an appropriate candidate for plan of sedation: yes    Preoperative Diagnosis:  Left sacroiliitis. Postoperative Diagnosis: Left  Sacroiliitis. Procedure Performed:  Left sacroiliac joint injection under fluoroscopy guidance # 1. Indication for the Procedure: The patient failed conservative management  for pain in low back. The patient is tender over the Left SI joint. Venkatesh's test is positive on the Left side. As patient is not responding to conservative management and pain is interfering with activities of daily living we decided to proceed with SI joint injection.  The procedure and risks were discussed with the patient and an informed consent was obtained. Procedure: Left    A meaningful communication was kept up with the patient throughout the procedure. The patient is placed in prone position. Skin over the back was prepped and draped in sterile manner. Then using fluoroscopy the Left sacroiliac joint was identified. Then the angle of the fluoroscopy was adjusted such that the view of the caudal aspect of the joint space was optimized. Then skin and deep tissues over the caudal aspect of the joint were infiltrated with 2 ml of 1% lidocaine. The #22-gauge, 3-1/2 inch spinal needle was introduced through the skin wheal and directed such that the tip of the needle lies in the joint space. This was confirmed by injecting 0.5 ml of Omnipaque-180 through the needle and observing the spread of the contrast along the joint space. Then after negative aspiration a total of 80 mg of depomedrol  with 1 ml of  0.25% Marcaine was injected through the needle. The needle is removed and a Band-Aid was placed over the needle insertion site. The patient tolerated the procedure well and vital signs remained stable. The patient was discharged home in stable condition and will be followed in the pain clinic in the next few weeks or further planning.     Electronically signed by Trell Salcido MD

## 2019-05-17 NOTE — ANESTHESIA PRE PROCEDURE
Department of Anesthesiology  Preprocedure Note       Name:  Juan Tyler   Age:  51 y.o.  :  1970                                          MRN:  [id number]         Date:  2019      Surgeon: Cher George):  Anderson Miller MD    Procedure: SI MBB #1 left (Left )    Medications prior to admission:   Prior to Admission medications    Medication Sig Start Date End Date Taking? Authorizing Provider   HYDROcodone-acetaminophen (NORCO) 5-325 MG per tablet Take 1 tablet by mouth every 8 hours as needed for Pain for up to 30 days. 19 Yes Dallas Hernandez APRN - CNP   ARIPiprazole (ABILIFY) 2 MG tablet Take 2 mg by mouth 2 times daily   Yes Historical Provider, MD   allopurinol (ZYLOPRIM) 300 MG tablet Take 1 tablet by mouth daily 18  Yes Tejinder Wall APRN - CNP   potassium citrate (UROCIT-K) 10 MEQ (1080 MG) extended release tablet TAKE 1 TABLET BY MOUTH EVERY MORNING WITH BREAKFAST 18  Yes Diana Boyd APRN - CNP   citalopram (CELEXA) 40 MG tablet Take 40 mg by mouth daily. Yes Historical Provider, MD   busPIRone (BUSPAR) 10 MG tablet Take 30 mg by mouth 2 times daily    Yes Historical Provider, MD   omeprazole (PRILOSEC) 20 MG capsule Take 20 mg by mouth daily. Yes Historical Provider, MD   ondansetron (ZOFRAN ODT) 4 MG disintegrating tablet Take 1 tablet by mouth every 8 hours as needed for Nausea or Vomiting 19   Agustin Loredo PA-C   warfarin (COUMADIN) 2.5 MG tablet Take 2.5 mg by mouth daily at 1800 Takes 5mg 3 days per week & 2.5mg 4 days per week    Historical Provider, MD       Current medications:    No current facility-administered medications for this encounter. Allergies:     Allergies   Allergen Reactions    Latex Rash    Codeine Hives     TYLENOL WITH CODEINE    Nickel Rash    Oxybutynin Chloride [Oxybutynin Chloride] Other (See Comments)     Warm feeling and extreme dry mouth       Problem List:    Patient Active Problem List   Diagnosis Code    Weak urinary stream R39.12    Obstructive sleep apnea G47.33    Snoring R06.83    Sleep disturbance G47.9    Insomnia G47.00    Sleep talking G47.8    Morning headache R51    Bruxism F45.8    Restless sleeper G47.9    Poor concentration R41.840    Claustrophobia F40.240    Vivid dream R68.89    GERD (gastroesophageal reflux disease) K21.9    Anxiety F41.9    Panic disorder F41.0    Obesity (BMI 30.0-34. 9) E66.9    Lumbar spondylosis M47.816    Bulging of lumbar intervertebral disc without myelopathy M51.26       Past Medical History:        Diagnosis Date    Chronic back pain     GERD (gastroesophageal reflux disease)     History of kidney stones     Hx of blood clots early 2000's & 2013    MUNA LUNGS    Kidney stone     Lumbar spondylosis        Past Surgical History:        Procedure Laterality Date    COLONOSCOPY      last one 2007???    CYSTOSCOPY  6/14/2013    URETEROSCOPY STONE REMOVAL    CYSTOSCOPY  07/29/2013    Left ureteroscopy, Left ureteral stone removal and stent exchange    CYSTOSCOPY  12/23/13    Cystoscopy, Left Optical Internal Ureterotomy, Left Ureteroscopy and Dilated [facility]ccauley  2007    [facility]  2007    left groin    LITHOTRIPSY  2006    LUMBAR NERVE BLOCK N/A 4/5/2019    LUMBAR INTER LAMINAR GUNNAR @ L4 performed by Marion Giordano MD at [place] [address] Bilateral 03/26/2018    Lumbar Facet MBB at L4-5, L5-S1    OR INJ DX/THER AGNT PARAVERT FACET JOINT, LUMBAR/SAC, 2ND LEVEL Bilateral 3/26/2018    BILATERAL L-FACET MBB @ L4-5 and L5-S1, performed by Marion Giordano MD at [address] [place] [address] DX/THER AGNT PARAVERT FACET JOINT, LUMBAR/SAC, 2ND LEVEL Bilateral 5/21/2018    Bilateral L-facet MBB @ L4-5, L5-S1. performed by Marion Giordano MD at [address] [place] [address] Left 9/28/2018 07/07/2018    AST 30 07/07/2018    ALT 41 07/07/2018       POC Tests: No results for input(s): POCGLU, POCNA, POCK, POCCL, POCBUN, POCHEMO, POCHCT in the last 72 hours. Coags:   Lab Results   Component Value Date    INR 1.00 04/05/2019    INR 0.98 03/26/2018    APTT 32.1 06/14/2013       HCG (If Applicable): No results found for: PREGTESTUR, PREGSERUM, HCG, HCGQUANT     ABGs: No results found for: PHART, PO2ART, TXV5BUB, DKO7NZA, BEART, I3BZXAXH     Type & Screen (If Applicable):  No results found for: LABABO, [address]    Anesthesia Evaluation  Patient summary reviewed  Airway: Mallampati: III  TM distance: >3 FB   Neck ROM: full  Mouth opening: > = 3 FB Dental:          Pulmonary:   (+) sleep apnea:                             Cardiovascular:          ECG reviewed                        Neuro/Psych:   (+) headaches:, psychiatric history:            GI/Hepatic/Renal:             Endo/Other:                     Abdominal:           Vascular:   + PE. Anesthesia Plan      MAC     ASA 3       Induction: intravenous. Anesthetic plan and risks discussed with patient and spouse. Plan discussed with CASA. Octavio Asher.  DO Obi   5/17/2019

## 2019-05-17 NOTE — PROGRESS NOTES
1347 ARRIVED TO PHASE 2 FROM  O R. SEE FLOW SHEET .  1352 TAKING SODA AND CRACKERS . WIFE AT BEDSIDE.  1400 TOLERATED PO WELL. [address] VERBALIZE UNDERSTANDING OF.   1430 RELEASED TO HOME WITH WIFE BY FAMILY CAR IN GOOD CONDITION .

## 2019-05-17 NOTE — INTERVAL H&P NOTE
H&P Update    Patient's History and Physical from May 13, 2019 was reviewed. Patient examined. There has been no change.     Siddharth Shirley

## 2019-05-21 ENCOUNTER — TELEPHONE (OUTPATIENT)
Dept: PHYSICAL MEDICINE AND REHAB | Age: 49
End: 2019-05-21

## 2019-05-21 RX ORDER — IBUPROFEN 800 MG/1
800 TABLET ORAL EVERY 8 HOURS PRN
Qty: 90 TABLET | Refills: 0 | Status: SHIPPED | OUTPATIENT
Start: 2019-05-21 | End: 2019-05-30 | Stop reason: ALTCHOICE

## 2019-05-21 NOTE — TELEPHONE ENCOUNTER
The procedure was for diagnostic purpose only. Will help short term only.   May take Motrin 800 mg one tid PRN  No change in dose

## 2019-05-21 NOTE — TELEPHONE ENCOUNTER
The procedure was for diagnostic purpose, short term benefit. Possible RFA for longer term benefit.     No change of Norco

## 2019-05-21 NOTE — TELEPHONE ENCOUNTER
Patient had a SI MBB on Friday 5/17/2019. He resting and felt fine over the weekend. Pt. Returned to work yesterday and started having increased pain in his lower back. He is currently taking  Norco 5-325 mg q8h PRN. Please advise.

## 2019-05-30 ENCOUNTER — OFFICE VISIT (OUTPATIENT)
Dept: PHYSICAL MEDICINE AND REHAB | Age: 49
End: 2019-05-30
Payer: COMMERCIAL

## 2019-05-30 VITALS
BODY MASS INDEX: 36.08 KG/M2 | HEIGHT: 70 IN | DIASTOLIC BLOOD PRESSURE: 70 MMHG | SYSTOLIC BLOOD PRESSURE: 130 MMHG | HEART RATE: 70 BPM | WEIGHT: 252 LBS

## 2019-05-30 DIAGNOSIS — M47.816 SPONDYLOSIS OF LUMBAR REGION WITHOUT MYELOPATHY OR RADICULOPATHY: ICD-10-CM

## 2019-05-30 DIAGNOSIS — M47.816 LUMBAR SPONDYLOSIS: ICD-10-CM

## 2019-05-30 DIAGNOSIS — M47.814 SPONDYLOSIS OF THORACIC REGION WITHOUT MYELOPATHY OR RADICULOPATHY: ICD-10-CM

## 2019-05-30 DIAGNOSIS — G89.4 CHRONIC PAIN SYNDROME: ICD-10-CM

## 2019-05-30 DIAGNOSIS — M53.3 SI (SACROILIAC) PAIN: Primary | ICD-10-CM

## 2019-05-30 DIAGNOSIS — M51.36 BULGE OF LUMBAR DISC WITHOUT MYELOPATHY: ICD-10-CM

## 2019-05-30 PROCEDURE — 99213 OFFICE O/P EST LOW 20 MIN: CPT | Performed by: NURSE PRACTITIONER

## 2019-05-30 ASSESSMENT — ENCOUNTER SYMPTOMS: BACK PAIN: 1

## 2019-05-30 NOTE — PROGRESS NOTES
[facility]  [address] [facility] [address]  Dept: [phone]  Dept Fax: [phone]  Loc: [phone]    Visit Date: 5/30/2019    Functionality Assessment/Goals Worksheet     On a scale of 0 (Does not Interfere) to 10 (Completely Interferes)     1. Which number describes how during the past week pain has interfered with       the following:  A. General Activity:  6  B. Mood: 7  C. Walking Ability:  4  D. Normal Work (Includes both work outside the home and housework):  9  E. Relations with Other People:   5  F. Sleep:   4  G. Enjoyment of Life:   7    2. Patient Prefers to Take their Pain Medications:     []  On a regular basis   [x]  Only when necessary    []  Does not take pain medications    3. What are the Patient's Goals/Expectations for Visiting Pain Management? [x]  Learn about my pain    [x]  Receive Medication   []  Physical Therapy     []  Treat Depression   []  Receive Injections    []  Treat Sleep   []  Deal with Anxiety and Stress   []  Treat Opoid Dependence/Addiction   []  Other:      HPI:   Nanette De La Cruz is a 51 y.o. male is here today for    Chief Complaint: Lower back and left Si pain    HPI   FU left SI MBB # 1 from 5/17/2019. Received 80%-90% relief of left lower back pain and left SI pain for 3-4 days. Activity was increased \"I dd not want to sit around at all\" was able to bend over, pick things up, get work done around the house, walk longer. Since then pain has returned to what it was prior. Norco TID prn is helping       Medications reviewed. Patient denies side effects with medications. Patient states he is taking medications as prescribed. Hedenies receiving pain medications from other sources. He denies any ER visits since last visit. Pain scale with out pain medications or at its worst is 8-9/10.   Pain scale with pain medications or at its best is disintegrating tablet, Take 1 tablet by mouth every 8 hours as needed for Nausea or Vomiting, Disp: 20 tablet, Rfl: 0    allopurinol (ZYLOPRIM) 300 MG tablet, Take 1 tablet by mouth daily, Disp: 90 tablet, Rfl: 3    potassium citrate (UROCIT-K) 10 MEQ (1080 MG) extended release tablet, TAKE 1 TABLET BY MOUTH EVERY MORNING WITH BREAKFAST, Disp: 90 tablet, Rfl: 3    citalopram (CELEXA) 40 MG tablet, Take 40 mg by mouth daily. , Disp: , Rfl:     warfarin (COUMADIN) 2.5 MG tablet, Take 2.5 mg by mouth daily at 1800 Takes 5mg 3 days per week & 2.5mg 4 days per week, Disp: , Rfl:     busPIRone (BUSPAR) 10 MG tablet, Take 30 mg by mouth 2 times daily , Disp: , Rfl:     omeprazole (PRILOSEC) 20 MG capsule, Take 20 mg by mouth daily. , Disp: , Rfl:     Subjective:      Review of Systems   Musculoskeletal: Positive for arthralgias, back pain and gait problem. Neurological: Negative for weakness and numbness. Objective:     Vitals:    05/30/19 0941   BP: 130/70   Site: Right Upper Arm   Position: Sitting   Cuff Size: Large Adult   Pulse: 70   Weight: 252 lb (114.3 kg)   Height: 5' 10\" (1.778 m)       Physical Exam   Constitutional: He is oriented to person, place, and time. He appears well-developed and well-nourished. No distress. HENT:   Head: Normocephalic and atraumatic. Right Ear: External ear normal.   Left Ear: External ear normal.   Nose: Nose normal.   Mouth/Throat: Oropharynx is clear and moist. No oropharyngeal exudate. Eyes: Pupils are equal, round, and reactive to light. Conjunctivae and EOM are normal. Right eye exhibits no discharge. Left eye exhibits no discharge. No scleral icterus. Neck: Normal range of motion and full passive range of motion without pain. Neck supple. No muscular tenderness present. No neck rigidity. No edema, no erythema and normal range of motion present. No thyromegaly present.    Cardiovascular: Normal rate, regular rhythm, normal heart sounds and intact distal pulses. Exam reveals no gallop and no friction rub. No murmur heard. Pulmonary/Chest: Effort normal and breath sounds normal. No respiratory distress. He has no wheezes. He has no rales. He exhibits no tenderness. Abdominal: Soft. Bowel sounds are normal. He exhibits no distension. There is no tenderness. There is no rebound and no guarding. Musculoskeletal:        Right hip: He exhibits normal range of motion and no tenderness. Left hip: He exhibits normal range of motion and no tenderness. Right knee: He exhibits normal range of motion. No tenderness found. Left knee: He exhibits normal range of motion. No tenderness found. Right ankle: He exhibits normal range of motion and no swelling. Left ankle: He exhibits normal range of motion and no swelling. Cervical back: He exhibits normal range of motion and no tenderness. Lumbar back: He exhibits decreased range of motion, tenderness and pain. Back:         Right foot: There is normal range of motion and no tenderness. Left foot: There is normal range of motion and no tenderness. Neurological: He is alert and oriented to person, place, and time. He has normal strength and normal reflexes. He is not disoriented. He displays no atrophy. No cranial nerve deficit or sensory deficit. He exhibits normal muscle tone. He displays a negative Romberg sign. Coordination and gait normal.   slr NEG   Skin: Skin is warm. No rash noted. He is not diaphoretic. No erythema. No pallor. Psychiatric: His mood appears not anxious. His affect is not angry, not blunt, not labile and not inappropriate. His speech is not rapid and/or pressured, not delayed, not tangential and not slurred. He is not agitated, not aggressive, not hyperactive, not slowed, not withdrawn, not actively hallucinating and not combative. Thought content is not paranoid and not delusional. Cognition and memory are not impaired.  He does not express impulsivity or inappropriate judgment. He does not exhibit a depressed mood. He expresses no homicidal and no suicidal ideation. He expresses no suicidal plans and no homicidal plans. He is communicative. He exhibits normal recent memory and normal remote memory. He is attentive. Nursing note and vitals reviewed. CORETTA test: + left side   Yeomans test: + left side   Gaenslen test: + left side      Assessment:     1. SI (sacroiliac) pain    2. Spondylosis of lumbar region without myelopathy or radiculopathy    3. Spondylosis of thoracic region without myelopathy or radiculopathy    4. Bulge of lumbar disc without myelopathy    5. Lumbar spondylosis    6. Chronic pain syndrome            Plan:      · OARRS reviewed. Current MED: 15.00  · Patient was offered naloxone for home. Prescribed. · Discussed long term side effects of medications, tolerance, dependency and addiction. · Previous UDS reviewed  · UDS preformed today for compliance. · Patient told can not receive any pain medications from any other source. · No evidence of abuse, diversion or aberrant behavior.  Medications and/or procedures to improve function and quality of life- patient understanding with this and that may not be pain free   Discussed with patient about safe storage of medications at home   Discussed possible weaning of medication dosing dependent on treatment/procedure results.  Discussed with patient about risks with procedure including infection, reaction to medication, increased pain, or bleeding.  Procedure notes reviewed in detail    Received 80%-90% relief of left lower back pain and left SI pain from left SI MBB for 3-4 days. Activity was increased \"I dd not want to sit around at all\" was able to bend over, pick things up, get work done around the house, walk longer. · Plan to repeat Left SI MBB # 2 for diagnostic and therapeutic relief. Procedure and risks discussed in detail with patient.   · Received 95% relief of pain from LESI for 1 full week. Discussed repeating in future   · Discussed repeating L-facet RFA in future. · Medications remain effective, patient is compliant. Continue Norco 5/325 TID prn- filled 5/18/2019  · Will need to be cleared to be off Coumadin for 5 days    Meds. Prescribed:   No orders of the defined types were placed in this encounter. Return for Left SI MBB # 2. , follow up after procedure.          Electronically signed by WALT Barnes CNP on5/30/2019 at 10:01 AM

## 2019-06-12 DIAGNOSIS — G89.4 CHRONIC PAIN SYNDROME: ICD-10-CM

## 2019-06-12 RX ORDER — HYDROCODONE BITARTRATE AND ACETAMINOPHEN 5; 325 MG/1; MG/1
1 TABLET ORAL EVERY 8 HOURS PRN
Qty: 90 TABLET | Refills: 0 | OUTPATIENT
Start: 2019-06-17 | End: 2019-07-17

## 2019-06-12 NOTE — TELEPHONE ENCOUNTER
Jesusita Ramírez called requesting a refill on the following medications:  Requested Prescriptions     Pending Prescriptions Disp Refills    HYDROcodone-acetaminophen (NORCO) 5-325 MG per tablet 90 tablet 0     Sig: Take 1 tablet by mouth every 8 hours as needed for Pain for up to 30 days.      Pharmacy verified:  .pv    [facility] [address] [place] [address]    Date of last visit: 5/30/19  Date of next visit (if applicable): 0/85/2892

## 2019-06-13 RX ORDER — HYDROCODONE BITARTRATE AND ACETAMINOPHEN 5; 325 MG/1; MG/1
1 TABLET ORAL EVERY 8 HOURS PRN
Qty: 90 TABLET | Refills: 0 | Status: SHIPPED | OUTPATIENT
Start: 2019-06-17 | End: 2019-07-15 | Stop reason: SDUPTHER

## 2019-06-17 RX ORDER — IBUPROFEN 800 MG/1
800 TABLET ORAL EVERY 8 HOURS PRN
Qty: 90 TABLET | Refills: 0 | Status: SHIPPED | OUTPATIENT
Start: 2019-06-17 | End: 2019-07-23 | Stop reason: SDUPTHER

## 2019-06-20 ENCOUNTER — PREP FOR PROCEDURE (OUTPATIENT)
Dept: PHYSICAL MEDICINE AND REHAB | Age: 49
End: 2019-06-20

## 2019-06-20 NOTE — H&P (VIEW-ONLY)
Sheri Banda is an 51 y.o.  male. Chief Complaint: Lower back and left Si pain          The patientis allergic to latex; codeine; nickel; and oxybutynin chloride [oxybutynin chloride].     Past Medical History  Sumi Lind  has a past medical history of Chronic back pain, GERD (gastroesophageal reflux disease), History of kidney stones, Hx of blood clots, Kidney stone, and Lumbar spondylosis.     Past Surgical History  The patient  has a past surgical history that includes Ureter stent placement (2006); Lithotripsy (2006); Cystocopy (6/14/2013); Cystocopy (07/29/2013); Cystoscopy (12/23/13); other surgical history (Bilateral, 03/26/2018); pr inj dx/ther agnt paravert facet joint, lumbar/sac, 2nd level (Bilateral, 3/26/2018); pr inj dx/ther agnt paravert facet joint, lumbar/sac, 2nd level (Bilateral, 5/21/2018); Colonoscopy; eye surgery (2007); hernia repair (2007); pr inject rx other periph nerve (Left, 9/28/2018); pr inject rx other periph nerve (Right, 11/30/2018); lumbar nerve block (N/A, 4/5/2019); and Injection Procedure For Sacroiliac Joint (Left, 5/17/2019).    Family History  This patient's family history includes Cancer in his mother; Heart Disease in his father.  Svetlana Quiros  reports that he quit smoking about 13 years ago. He has a 12.00 pack-year smoking history. He has never used smokeless tobacco. He reports that he does not drink alcohol or use drugs.     Medications    Current Medication      Current Outpatient Medications:     HYDROcodone-acetaminophen (NORCO) 5-325 MG per tablet, Take 1 tablet by mouth every 8 hours as needed for Pain for up to 30 days. , Disp: 90 tablet, Rfl: 0    ARIPiprazole (ABILIFY) 2 MG tablet, Take 2 mg by mouth 2 times daily, Disp: , Rfl:     ondansetron (ZOFRAN ODT) 4 MG disintegrating tablet, Take 1 tablet by mouth every 8 hours as needed for Nausea or Vomiting, Disp: 20 tablet, Rfl: 0    allopurinol (ZYLOPRIM) 300 MG tablet, Take 1 tablet by distension. There is no tenderness. There is no rebound and no guarding. Musculoskeletal:        Right hip: He exhibits normal range of motion and no tenderness. Left hip: He exhibits normal range of motion and no tenderness. Right knee: He exhibits normal range of motion. No tenderness found. Left knee: He exhibits normal range of motion. No tenderness found. Right ankle: He exhibits normal range of motion and no swelling. Left ankle: He exhibits normal range of motion and no swelling. Cervical back: He exhibits normal range of motion and no tenderness. Lumbar back: He exhibits decreased range of motion, tenderness and pain. Back:         Right foot: There is normal range of motion and no tenderness. Left foot: There is normal range of motion and no tenderness. Neurological: He is alert and oriented to person, place, and time. He has normal strength and normal reflexes. He is not disoriented. He displays no atrophy. No cranial nerve deficit or sensory deficit. He exhibits normal muscle tone. He displays a negative Romberg sign. Coordination and gait normal.   slr NEG   Skin: Skin is warm. No rash noted. He is not diaphoretic. No erythema. No pallor. Psychiatric: His mood appears not anxious. His affect is not angry, not blunt, not labile and not inappropriate. His speech is not rapid and/or pressured, not delayed, not tangential and not slurred. He is not agitated, not aggressive, not hyperactive, not slowed, not withdrawn, not actively hallucinating and not combative. Thought content is not paranoid and not delusional. Cognition and memory are not impaired. He does not express impulsivity or inappropriate judgment. He does not exhibit a depressed mood. He expresses no homicidal and no suicidal ideation. He expresses no suicidal plans and no homicidal plans. He is communicative. He exhibits normal recent memory and normal remote memory.  He is attentive. Nursing note and vitals reviewed.     CORETTA test: + left side   Yeomans test: + left side   Gaenslen test: + left side   Assessment:      1. SI (sacroiliac) pain    2. Spondylosis of lumbar region without myelopathy or radiculopathy    3. Spondylosis of thoracic region without myelopathy or radiculopathy    4. Bulge of lumbar disc without myelopathy    5. Lumbar spondylosis    6. Chronic pain syndrome          Review of Systems    Physical Exam        Plan:  Left SI MBB # 2.         WALT Garnica - CNP  6/20/2019

## 2019-06-27 ENCOUNTER — ANESTHESIA (OUTPATIENT)
Dept: OPERATING ROOM | Age: 49
End: 2019-06-27
Payer: COMMERCIAL

## 2019-06-27 ENCOUNTER — HOSPITAL ENCOUNTER (OUTPATIENT)
Age: 49
Setting detail: OUTPATIENT SURGERY
Discharge: HOME | End: 2019-06-27
Attending: PAIN MEDICINE | Admitting: PAIN MEDICINE
Payer: COMMERCIAL

## 2019-06-27 ENCOUNTER — ANESTHESIA EVENT (OUTPATIENT)
Dept: OPERATING ROOM | Age: 49
End: 2019-06-27
Payer: COMMERCIAL

## 2019-06-27 ENCOUNTER — APPOINTMENT (OUTPATIENT)
Dept: GENERAL RADIOLOGY | Age: 49
End: 2019-06-27
Attending: PAIN MEDICINE
Payer: COMMERCIAL

## 2019-06-27 VITALS
DIASTOLIC BLOOD PRESSURE: 71 MMHG | SYSTOLIC BLOOD PRESSURE: 128 MMHG | TEMPERATURE: 98 F | OXYGEN SATURATION: 93 % | HEIGHT: 70 IN | BODY MASS INDEX: 36.79 KG/M2 | WEIGHT: 257 LBS | RESPIRATION RATE: 16 BRPM | HEART RATE: 86 BPM

## 2019-06-27 VITALS
DIASTOLIC BLOOD PRESSURE: 80 MMHG | RESPIRATION RATE: 13 BRPM | OXYGEN SATURATION: 94 % | SYSTOLIC BLOOD PRESSURE: 138 MMHG

## 2019-06-27 LAB — POC INR: 1 (ref 0.8–1.2)

## 2019-06-27 PROCEDURE — 3209999900 FLUORO FOR SURGICAL PROCEDURES

## 2019-06-27 PROCEDURE — 2500000003 HC RX 250 WO HCPCS: Performed by: PAIN MEDICINE

## 2019-06-27 PROCEDURE — 2500000003 HC RX 250 WO HCPCS: Performed by: NURSE ANESTHETIST, CERTIFIED REGISTERED

## 2019-06-27 PROCEDURE — 6360000004 HC RX CONTRAST MEDICATION: Performed by: PAIN MEDICINE

## 2019-06-27 PROCEDURE — 6360000002 HC RX W HCPCS: Performed by: NURSE ANESTHETIST, CERTIFIED REGISTERED

## 2019-06-27 PROCEDURE — 3700000000 HC ANESTHESIA ATTENDED CARE: Performed by: PAIN MEDICINE

## 2019-06-27 PROCEDURE — 27096 INJECT SACROILIAC JOINT: CPT | Performed by: PAIN MEDICINE

## 2019-06-27 PROCEDURE — 7100000010 HC PHASE II RECOVERY - FIRST 15 MIN: Performed by: PAIN MEDICINE

## 2019-06-27 PROCEDURE — 3600000054 HC PAIN LEVEL 3 BASE: Performed by: PAIN MEDICINE

## 2019-06-27 PROCEDURE — 7100000011 HC PHASE II RECOVERY - ADDTL 15 MIN: Performed by: PAIN MEDICINE

## 2019-06-27 PROCEDURE — 2709999900 HC NON-CHARGEABLE SUPPLY: Performed by: PAIN MEDICINE

## 2019-06-27 PROCEDURE — 6360000002 HC RX W HCPCS: Performed by: PAIN MEDICINE

## 2019-06-27 RX ORDER — METHYLPREDNISOLONE ACETATE 80 MG/ML
INJECTION, SUSPENSION INTRA-ARTICULAR; INTRALESIONAL; INTRAMUSCULAR; SOFT TISSUE PRN
Status: DISCONTINUED | OUTPATIENT
Start: 2019-06-27 | End: 2019-06-27 | Stop reason: ALTCHOICE

## 2019-06-27 RX ORDER — ROPIVACAINE HYDROCHLORIDE 2 MG/ML
INJECTION, SOLUTION EPIDURAL; INFILTRATION; PERINEURAL PRN
Status: DISCONTINUED | OUTPATIENT
Start: 2019-06-27 | End: 2019-06-27 | Stop reason: ALTCHOICE

## 2019-06-27 RX ORDER — LIDOCAINE HYDROCHLORIDE 10 MG/ML
INJECTION, SOLUTION INFILTRATION; PERINEURAL PRN
Status: DISCONTINUED | OUTPATIENT
Start: 2019-06-27 | End: 2019-06-27 | Stop reason: ALTCHOICE

## 2019-06-27 RX ORDER — LIDOCAINE HYDROCHLORIDE 10 MG/ML
INJECTION, SOLUTION INFILTRATION; PERINEURAL PRN
Status: DISCONTINUED | OUTPATIENT
Start: 2019-06-27 | End: 2019-06-27 | Stop reason: SDUPTHER

## 2019-06-27 RX ORDER — PROPOFOL 10 MG/ML
INJECTION, EMULSION INTRAVENOUS PRN
Status: DISCONTINUED | OUTPATIENT
Start: 2019-06-27 | End: 2019-06-27 | Stop reason: SDUPTHER

## 2019-06-27 RX ADMIN — PROPOFOL 80 MG: 10 INJECTION, EMULSION INTRAVENOUS at 08:46

## 2019-06-27 RX ADMIN — PROPOFOL 50 MG: 10 INJECTION, EMULSION INTRAVENOUS at 08:49

## 2019-06-27 RX ADMIN — LIDOCAINE HYDROCHLORIDE 3 ML: 10 INJECTION, SOLUTION INFILTRATION; PERINEURAL at 08:46

## 2019-06-27 ASSESSMENT — PULMONARY FUNCTION TESTS
PIF_VALUE: 0
PIF_VALUE: 1

## 2019-06-27 ASSESSMENT — PAIN DESCRIPTION - DESCRIPTORS: DESCRIPTORS: ACHING;CONSTANT

## 2019-06-27 ASSESSMENT — PAIN SCALES - GENERAL: PAINLEVEL_OUTOF10: 0

## 2019-06-27 ASSESSMENT — PAIN - FUNCTIONAL ASSESSMENT: PAIN_FUNCTIONAL_ASSESSMENT: 0-10

## 2019-06-27 NOTE — PROGRESS NOTES
5538: Patient to phase 2 recovery room via cart. Patient is awake and alert. Report received from surgical RN, Yuniel Fall. Patient's vitals obtained, see charting. 4500: Patient is denying pain and nausea at this time. Patient is also denying numbness/tingling in lower legs. 9420: Offered drink and snack provided to the patient. Bed is in the lowest position, call light is within reach and patient's wife brought to the room. 8387: Patient is more awake at this time and is resting in bed.   0915: Discharge instructions given and explained to the patient and the patient's wife, both verbalized understanding. 4577: IV removed at this time, no complications and dressing applied. 8397: Patient ambulated to the car and discharged home in stable condition with his wife.

## 2019-06-27 NOTE — ANESTHESIA PRE PROCEDURE
TYLENOL WITH CODEINE    Nickel Rash    Oxybutynin Chloride [Oxybutynin Chloride] Other (See Comments)     Warm feeling and extreme dry mouth       Problem List:    Patient Active Problem List   Diagnosis Code    Weak urinary stream R39.12    Obstructive sleep apnea G47.33    Snoring R06.83    Sleep disturbance G47.9    Insomnia G47.00    Sleep talking G47.8    Morning headache R51    Bruxism F45.8    Restless sleeper G47.9    Poor concentration R41.840    Claustrophobia F40.240    Vivid dream R68.89    GERD (gastroesophageal reflux disease) K21.9    Anxiety F41.9    Panic disorder F41.0    Obesity (BMI 30.0-34. 9) E66.9    Lumbar spondylosis M47.816    Bulging of lumbar intervertebral disc without myelopathy M51.26    Sacroiliac inflammation (HCC) M46.1       Past Medical History:        Diagnosis Date    Chronic back pain     GERD (gastroesophageal reflux disease)     History of kidney stones     Hx of blood clots early 2000's & 2013    MUNA LUNGS    Kidney stone     Lumbar spondylosis        Past Surgical History:        Procedure Laterality Date    COLONOSCOPY      last one 2007???    CYSTOSCOPY  6/14/2013    URETEROSCOPY STONE REMOVAL    CYSTOSCOPY  07/29/2013    Left ureteroscopy, Left ureteral stone removal and stent exchange    CYSTOSCOPY  12/23/13    Cystoscopy, Left Optical Internal Ureterotomy, Left Ureteroscopy and Dilated [facility] [place]  2007    [facility] INJECTION PROCEDURE FOR SACROILIAC JOINT Left 5/17/2019    SI MBB #1 left performed by Isabela Correia MD at [address]   2007    left groin    LITHOTRIPSY  2006    LUMBAR NERVE BLOCK N/A 4/5/2019    LUMBAR INTER LAMINAR GUNNAR @ L4 performed by Isabela Correia MD at [facility] Bilateral 03/26/2018    Lumbar Facet MBB at L4-5, L5-S1    LA INJ DX/THER AGNT PARAVERT FACET JOINT, LUMBAR/SAC, 2ND LEVEL Bilateral 3/26/2018    BILATERAL L-FACET MBB @ L4-5 and L5-S1, performed by Trell Salcido MD at [facility] DX/THER AGNT PARAVERT FACET JOINT, LUMBAR/SAC, 2ND LEVEL Bilateral 2018    Bilateral L-facet MBB @ L4-5, L5-S1. performed by Trell Salcido MD at [address] Left 2018    LUMBAR RFA @ L4-5, and L5-S1 LEFT SIDE FIRST. Tasha Hoffmann performed by Trell Salcido MD at [address] Right 2018    Right L-RFA @ L4-5, and L5-S1 performed by Trell Salcido MD at [address] [facility]         Social History:    Social History     Tobacco Use    Smoking status: Former Smoker     Packs/day: 1.00     Years: 12.00     Pack years: 12.00     Last attempt to quit: 2005     Years since quittin.0    Smokeless tobacco: Never Used   Substance Use Topics    Alcohol use: No                                Counseling given: Not Answered      Vital Signs (Current): There were no vitals filed for this visit.                                            BP Readings from Last 3 Encounters:   19 (!) 141/95   19 130/70   19 125/75       NPO Status:                                                                                 BMI:   Wt Readings from Last 3 Encounters:   19 257 lb (116.6 kg)   19 252 lb (114.3 kg)   19 252 lb (114.3 kg)     There is no height or weight on file to calculate BMI.    CBC:   Lab Results   Component Value Date    WBC 5.4 2019    RBC 4.87 2019    HGB 14.8 2019    HCT 46.1 2019    MCV 94.7 2019    RDW 12.8 2018     2019       CMP:   Lab Results   Component Value Date     2019    K 4.2 2019     2019    CO2 23 2019    BUN 9 2019    CREATININE 0.8 2019    LABGLOM >90 2019    GLUCOSE 108 2019 PROT 6.6 01/23/2015    CALCIUM 8.9 04/04/2019    BILITOT 0.5 07/07/2018    ALKPHOS 100 07/07/2018    AST 30 07/07/2018    ALT 41 07/07/2018       POC Tests: No results for input(s): POCGLU, POCNA, POCK, POCCL, POCBUN, POCHEMO, POCHCT in the last 72 hours. Coags:   Lab Results   Component Value Date    PROTIME 31.0 05/30/2019    INR 1.00 06/27/2019    INR 2.60 05/30/2019    APTT 32.1 06/14/2013       HCG (If Applicable): No results found for: PREGTESTUR, PREGSERUM, HCG, HCGQUANT     ABGs: No results found for: PHART, PO2ART, XBN8RAU, IID2MLM, BEART, Y7MZYFEJ     Type & Screen (If Applicable):  No results found for: [facility]    Anesthesia Evaluation  Patient summary reviewed  Airway: Mallampati: II  TM distance: >3 FB     Mouth opening: > = 3 FB Dental:          Pulmonary: breath sounds clear to auscultation  (+) sleep apnea:                             Cardiovascular:            Rhythm: regular                      Neuro/Psych:   (+) psychiatric history:            GI/Hepatic/Renal:   (+) GERD:,           Endo/Other:                     Abdominal:           Vascular:                                          Anesthesia Plan      MAC     ASA 2       Induction: intravenous. Anesthetic plan and risks discussed with patient.       Plan discussed with CRNA and surgical team.                  Nikolai Daniels MD   6/27/2019

## 2019-06-27 NOTE — INTERVAL H&P NOTE
H&P Update    Patient's History and Physical from June 20, 2019 was reviewed. Patient examined. There has been no change.     Ivonne Martinez

## 2019-06-27 NOTE — ANESTHESIA POSTPROCEDURE EVALUATION
Department of Anesthesiology  Postprocedure Note    Patient: Devera Moritz  MRN: [id number]  YOB: 1970  Date of evaluation: 6/27/2019  Time:  9:38 AM     Procedure Summary     Date:  06/27/19 Room / Location:  [address] [place]    Anesthesia Start:  0844 Anesthesia Stop:  1668    Procedure:  Left SI MBB # 2. (Left Back) Diagnosis:  (SI pain)    Surgeon:  Wendi Stevenson MD Responsible Provider:  Scar Keith MD    Anesthesia Type:  MAC ASA Status:  2          Anesthesia Type: MAC    Angelina Phase I:      Angelina Phase II: Angelina Score: 10    Last vitals: Reviewed and per EMR flowsheets. Anesthesia Post Evaluation    Patient location during evaluation: PACU  Patient participation: complete - patient participated  Level of consciousness: awake and alert  Airway patency: patent  Nausea & Vomiting: no nausea and no vomiting  Complications: no  Cardiovascular status: hemodynamically stable  Respiratory status: acceptable  Hydration status: euvolemic      [address]. [address]  POST-ANESTHESIA NOTE       Name:  Devera Moritz                                         Age:  51 y.o.   MRN:  [id number]      Last Vitals:  /71   Pulse 86   Temp 98 °F (36.7 °C) (Temporal)   Resp 16   Ht 5' 10\" (1.778 m)   Wt 257 lb (116.6 kg)   SpO2 93%   BMI 36.88 kg/m²   Patient Vitals for the past 4 hrs:   BP Temp Temp src Pulse Resp SpO2 Height Weight   06/27/19 0854 128/71 98 °F (36.7 °C) Temporal 86 16 93 % -- --   06/27/19 0809 (!) 141/95 98.1 °F (36.7 °C) Temporal 83 16 95 % 5' 10\" (1.778 m) 257 lb (116.6 kg)       Level of Consciousness:  Awake    Respiratory:  Stable    Oxygen Saturation:  Stable    Cardiovascular:  Stable    Hydration:  Adequate    PONV:  Stable    Post-op Pain:  Adequate analgesia    Post-op Assessment:  No apparent anesthetic complications    Additional Follow-Up / Treatment / Comment:  None    Sylvia Taylor MD  June 27, 2019   9:38 AM

## 2019-06-27 NOTE — OP NOTE
Pre-Procedure Note    Patient Name: Millie Wilder   YOB: 1970  Medical Record Number: [id number]  Date: 6/27/19     Indication:  Left SI pain  Consent: On file. Vital Signs:   Vitals:    06/27/19 0809   BP: (!) 141/95   Pulse: 83   Resp: 16   Temp: 98.1 °F (36.7 °C)   SpO2: 95%       Past Medical History:   has a past medical history of Chronic back pain, GERD (gastroesophageal reflux disease), History of kidney stones, Hx of blood clots, Kidney stone, and Lumbar spondylosis. Past Surgical History:   has a past surgical history that includes Ureter stent placement (2006); Lithotripsy (2006); Cystocopy (6/14/2013); Cystocopy (07/29/2013); Cystoscopy (12/23/13); other surgical history (Bilateral, 03/26/2018); pr inj dx/ther agnt paravert facet joint, lumbar/sac, 2nd level (Bilateral, 3/26/2018); pr inj dx/ther agnt paravert facet joint, lumbar/sac, 2nd level (Bilateral, 5/21/2018); Colonoscopy; eye surgery (2007); hernia repair (2007); pr inject rx other periph nerve (Left, 9/28/2018); pr inject rx other periph nerve (Right, 11/30/2018); lumbar nerve block (N/A, 4/5/2019); and Injection Procedure For Sacroiliac Joint (Left, 5/17/2019). Pre-Sedation Documentation and Exam:   Vital signs have been reviewed (see flow sheet for vitals). Sedation/ Anesthesia Plan: MAC    Patient is an appropriate candidate for plan of sedation: yes    Preoperative Diagnosis:  Left sacroiliitis. Postoperative Diagnosis: Left  Sacroiliitis. Procedure Performed:  Left sacroiliac joint injection under fluoroscopy guidance  # 2. Indication for the Procedure: The patient failed conservative management  for pain in low back. The patient is tender over the Left SI joint. Venkatesh's test is positive on the Left side. As patient is not responding to conservative management and pain is interfering with activities of daily living we decided to proceed with SI joint injection.  The procedure and risks were

## 2019-07-15 DIAGNOSIS — G89.4 CHRONIC PAIN SYNDROME: ICD-10-CM

## 2019-07-15 RX ORDER — HYDROCODONE BITARTRATE AND ACETAMINOPHEN 5; 325 MG/1; MG/1
1 TABLET ORAL EVERY 8 HOURS PRN
Qty: 90 TABLET | Refills: 0 | Status: SHIPPED | OUTPATIENT
Start: 2019-07-16 | End: 2019-08-14 | Stop reason: SDUPTHER

## 2019-07-15 NOTE — TELEPHONE ENCOUNTER
OARRS reviewed. UDS: + for  Hydrocodone consistent and tramadol inconsistent. Tramadol has been previously addressed. Narcan offered: yes  Last seen: 5/30/2019.  Follow-up:   Future Appointments   Date Time Provider Saul Cummins   7/16/2019  8:50 AM WALT Torres - CNP [facility] - [place]   9/4/2019  8:00 AM Brown [facility] - [place]

## 2019-07-16 ENCOUNTER — OFFICE VISIT (OUTPATIENT)
Dept: PHYSICAL MEDICINE AND REHAB | Age: 49
End: 2019-07-16
Payer: COMMERCIAL

## 2019-07-16 VITALS
WEIGHT: 255.73 LBS | HEIGHT: 70 IN | DIASTOLIC BLOOD PRESSURE: 78 MMHG | SYSTOLIC BLOOD PRESSURE: 138 MMHG | HEART RATE: 70 BPM | BODY MASS INDEX: 36.61 KG/M2

## 2019-07-16 DIAGNOSIS — M47.816 SPONDYLOSIS OF LUMBAR REGION WITHOUT MYELOPATHY OR RADICULOPATHY: ICD-10-CM

## 2019-07-16 DIAGNOSIS — M51.36 BULGE OF LUMBAR DISC WITHOUT MYELOPATHY: ICD-10-CM

## 2019-07-16 DIAGNOSIS — M47.816 LUMBAR SPONDYLOSIS: ICD-10-CM

## 2019-07-16 DIAGNOSIS — G89.4 CHRONIC PAIN SYNDROME: ICD-10-CM

## 2019-07-16 DIAGNOSIS — M53.3 SI (SACROILIAC) PAIN: Primary | ICD-10-CM

## 2019-07-16 DIAGNOSIS — M47.814 SPONDYLOSIS OF THORACIC REGION WITHOUT MYELOPATHY OR RADICULOPATHY: ICD-10-CM

## 2019-07-16 PROCEDURE — 99213 OFFICE O/P EST LOW 20 MIN: CPT | Performed by: NURSE PRACTITIONER

## 2019-07-16 ASSESSMENT — ENCOUNTER SYMPTOMS: BACK PAIN: 1

## 2019-07-16 NOTE — PROGRESS NOTES
(ABILIFY) 2 MG tablet, Take 2 mg by mouth 2 times daily, Disp: , Rfl:     ondansetron (ZOFRAN ODT) 4 MG disintegrating tablet, Take 1 tablet by mouth every 8 hours as needed for Nausea or Vomiting, Disp: 20 tablet, Rfl: 0    allopurinol (ZYLOPRIM) 300 MG tablet, Take 1 tablet by mouth daily, Disp: 90 tablet, Rfl: 3    potassium citrate (UROCIT-K) 10 MEQ (1080 MG) extended release tablet, TAKE 1 TABLET BY MOUTH EVERY MORNING WITH BREAKFAST, Disp: 90 tablet, Rfl: 3    citalopram (CELEXA) 40 MG tablet, Take 40 mg by mouth daily. , Disp: , Rfl:     warfarin (COUMADIN) 2.5 MG tablet, Take 2.5 mg by mouth daily at 1800 Takes 5mg 3 days per week & 2.5mg 4 days per week, Disp: , Rfl:     busPIRone (BUSPAR) 10 MG tablet, Take 30 mg by mouth 2 times daily , Disp: , Rfl:     omeprazole (PRILOSEC) 20 MG capsule, Take 20 mg by mouth daily. , Disp: , Rfl:     Subjective:      Review of Systems   Musculoskeletal: Positive for arthralgias, back pain and myalgias. Neurological: Negative for weakness and numbness. Objective:     Vitals:    07/16/19 0830   BP: 138/78   Site: Left Upper Arm   Position: Sitting   Cuff Size: Medium Adult   Pulse: 70   Weight: 255 lb 11.7 oz (116 kg)   Height: 5' 10\" (1.778 m)       Physical Exam   Constitutional: He is oriented to person, place, and time. He appears well-developed and well-nourished. No distress. HENT:   Head: Normocephalic and atraumatic. Right Ear: External ear normal.   Left Ear: External ear normal.   Nose: Nose normal.   Mouth/Throat: Oropharynx is clear and moist. No oropharyngeal exudate. Eyes: Pupils are equal, round, and reactive to light. Conjunctivae and EOM are normal. Right eye exhibits no discharge. Left eye exhibits no discharge. No scleral icterus. Neck: Normal range of motion and full passive range of motion without pain. Neck supple. No muscular tenderness present. No neck rigidity. No edema, no erythema and normal range of motion present.  No

## 2019-07-22 RX ORDER — ALLOPURINOL 300 MG/1
TABLET ORAL
Qty: 90 TABLET | Refills: 3 | Status: SHIPPED | OUTPATIENT
Start: 2019-07-22 | End: 2019-09-04 | Stop reason: SDUPTHER

## 2019-07-22 NOTE — TELEPHONE ENCOUNTER
Requested Prescriptions     Pending Prescriptions Disp Refills    allopurinol (ZYLOPRIM) 300 MG tablet [Pharmacy Med Name: ALLOPURINOL 300 MG TABLET] 90 tablet 3     Sig: TAKE 1 TABLET BY MOUTH EVERY DAY     Last refilled:  07/27/18 #90 with 3 refills  Provider last seen:   Minerva Ko CNP  Date of last visit:   07/27/18  Date of follow up:   09/04/19 with [address]:   [id number] [facility]

## 2019-07-23 RX ORDER — IBUPROFEN 800 MG/1
800 TABLET ORAL EVERY 8 HOURS PRN
Qty: 90 TABLET | Refills: 0 | Status: SHIPPED | OUTPATIENT
Start: 2019-07-23 | End: 2019-09-04 | Stop reason: ALTCHOICE

## 2019-07-24 ENCOUNTER — HOSPITAL ENCOUNTER (OUTPATIENT)
Dept: GENERAL RADIOLOGY | Age: 49
Discharge: HOME | End: 2019-07-24
Payer: COMMERCIAL

## 2019-07-24 ENCOUNTER — HOSPITAL ENCOUNTER (OUTPATIENT)
Age: 49
Discharge: HOME | End: 2019-07-24
Payer: COMMERCIAL

## 2019-07-24 DIAGNOSIS — R06.2 WHEEZING: ICD-10-CM

## 2019-07-24 PROCEDURE — 71046 X-RAY EXAM CHEST 2 VIEWS: CPT

## 2019-08-02 ENCOUNTER — PREP FOR PROCEDURE (OUTPATIENT)
Dept: PHYSICAL MEDICINE AND REHAB | Age: 49
End: 2019-08-02

## 2019-08-02 NOTE — H&P (VIEW-ONLY)
does not exhibit a depressed mood. He expresses no homicidal and no suicidal ideation. He expresses no suicidal plans and no homicidal plans. He is communicative. He exhibits normal recent memory and normal remote memory. He is attentive. Nursing note and vitals reviewed. CORETTA test: + left Side    Yeomans test:+ left side   Gaenslen test: + left side   Assessment:      1. SI (sacroiliac) pain    2. Spondylosis of lumbar region without myelopathy or radiculopathy    3. Spondylosis of thoracic region without myelopathy or radiculopathy    4. Bulge of lumbar disc without myelopathy    5. Lumbar spondylosis    6.  Chronic pain syndrome           Plan:  Left SI [facility], APRN - CNP  8/2/2019

## 2019-08-13 DIAGNOSIS — G89.4 CHRONIC PAIN SYNDROME: ICD-10-CM

## 2019-08-13 NOTE — TELEPHONE ENCOUNTER
Mohan Templeton called requesting a refill on the following medications:  Requested Prescriptions     Pending Prescriptions Disp Refills    HYDROcodone-acetaminophen (NORCO) 5-325 MG per tablet 90 tablet 0     Sig: Take 1 tablet by mouth every 8 hours as needed for Pain for up to 30 days.      Pharmacy verified:  [facility] [id number] [facility]       Date of last visit: 07-16-19  Date of next visit (if applicable): 9/20-16

## 2019-08-14 RX ORDER — HYDROCODONE BITARTRATE AND ACETAMINOPHEN 5; 325 MG/1; MG/1
1 TABLET ORAL EVERY 8 HOURS PRN
Qty: 90 TABLET | Refills: 0 | Status: SHIPPED | OUTPATIENT
Start: 2019-08-15 | End: 2019-09-11 | Stop reason: SDUPTHER

## 2019-08-15 ENCOUNTER — APPOINTMENT (OUTPATIENT)
Dept: GENERAL RADIOLOGY | Age: 49
End: 2019-08-15
Attending: PAIN MEDICINE
Payer: COMMERCIAL

## 2019-08-15 ENCOUNTER — ANESTHESIA EVENT (OUTPATIENT)
Dept: OPERATING ROOM | Age: 49
End: 2019-08-15
Payer: COMMERCIAL

## 2019-08-15 ENCOUNTER — ANESTHESIA (OUTPATIENT)
Dept: OPERATING ROOM | Age: 49
End: 2019-08-15
Payer: COMMERCIAL

## 2019-08-15 ENCOUNTER — HOSPITAL ENCOUNTER (OUTPATIENT)
Age: 49
Setting detail: OUTPATIENT SURGERY
Discharge: HOME | End: 2019-08-15
Attending: PAIN MEDICINE | Admitting: PAIN MEDICINE
Payer: COMMERCIAL

## 2019-08-15 VITALS
OXYGEN SATURATION: 96 % | RESPIRATION RATE: 19 BRPM | DIASTOLIC BLOOD PRESSURE: 60 MMHG | SYSTOLIC BLOOD PRESSURE: 131 MMHG

## 2019-08-15 VITALS
SYSTOLIC BLOOD PRESSURE: 113 MMHG | WEIGHT: 258.2 LBS | TEMPERATURE: 98.1 F | HEIGHT: 70 IN | HEART RATE: 76 BPM | BODY MASS INDEX: 36.97 KG/M2 | RESPIRATION RATE: 16 BRPM | DIASTOLIC BLOOD PRESSURE: 59 MMHG | OXYGEN SATURATION: 92 %

## 2019-08-15 LAB — POC INR: 0.9 (ref 0.8–1.2)

## 2019-08-15 PROCEDURE — 3600000057 HC PAIN LEVEL 4 ADDL 15 MIN: Performed by: PAIN MEDICINE

## 2019-08-15 PROCEDURE — 3209999900 FLUORO FOR SURGICAL PROCEDURES

## 2019-08-15 PROCEDURE — 3700000000 HC ANESTHESIA ATTENDED CARE: Performed by: PAIN MEDICINE

## 2019-08-15 PROCEDURE — 6360000002 HC RX W HCPCS: Performed by: PAIN MEDICINE

## 2019-08-15 PROCEDURE — 2709999900 HC NON-CHARGEABLE SUPPLY: Performed by: PAIN MEDICINE

## 2019-08-15 PROCEDURE — 6360000002 HC RX W HCPCS: Performed by: NURSE ANESTHETIST, CERTIFIED REGISTERED

## 2019-08-15 PROCEDURE — 2500000003 HC RX 250 WO HCPCS: Performed by: PAIN MEDICINE

## 2019-08-15 PROCEDURE — 2720000010 HC SURG SUPPLY STERILE: Performed by: PAIN MEDICINE

## 2019-08-15 PROCEDURE — 64640 INJECTION TREATMENT OF NERVE: CPT | Performed by: PAIN MEDICINE

## 2019-08-15 PROCEDURE — 7100000010 HC PHASE II RECOVERY - FIRST 15 MIN: Performed by: PAIN MEDICINE

## 2019-08-15 PROCEDURE — 7100000011 HC PHASE II RECOVERY - ADDTL 15 MIN: Performed by: PAIN MEDICINE

## 2019-08-15 PROCEDURE — 3700000001 HC ADD 15 MINUTES (ANESTHESIA): Performed by: PAIN MEDICINE

## 2019-08-15 PROCEDURE — 3600000056 HC PAIN LEVEL 4 BASE: Performed by: PAIN MEDICINE

## 2019-08-15 RX ORDER — PROPOFOL 10 MG/ML
INJECTION, EMULSION INTRAVENOUS PRN
Status: DISCONTINUED | OUTPATIENT
Start: 2019-08-15 | End: 2019-08-15 | Stop reason: SDUPTHER

## 2019-08-15 RX ORDER — KETOROLAC TROMETHAMINE 30 MG/ML
INJECTION, SOLUTION INTRAMUSCULAR; INTRAVENOUS PRN
Status: DISCONTINUED | OUTPATIENT
Start: 2019-08-15 | End: 2019-08-15 | Stop reason: SDUPTHER

## 2019-08-15 RX ORDER — LIDOCAINE HYDROCHLORIDE 10 MG/ML
INJECTION, SOLUTION EPIDURAL; INFILTRATION; INTRACAUDAL; PERINEURAL PRN
Status: DISCONTINUED | OUTPATIENT
Start: 2019-08-15 | End: 2019-08-15 | Stop reason: ALTCHOICE

## 2019-08-15 RX ORDER — ROPIVACAINE HYDROCHLORIDE 2 MG/ML
INJECTION, SOLUTION EPIDURAL; INFILTRATION; PERINEURAL PRN
Status: DISCONTINUED | OUTPATIENT
Start: 2019-08-15 | End: 2019-08-15 | Stop reason: ALTCHOICE

## 2019-08-15 RX ORDER — IBUPROFEN 800 MG/1
800 TABLET ORAL EVERY 8 HOURS PRN
Qty: 90 TABLET | Refills: 0 | OUTPATIENT
Start: 2019-08-15 | End: 2019-09-14

## 2019-08-15 RX ADMIN — KETOROLAC TROMETHAMINE 30 MG: 30 INJECTION, SOLUTION INTRAMUSCULAR; INTRAVENOUS at 13:18

## 2019-08-15 RX ADMIN — PROPOFOL 420 MG: 10 INJECTION, EMULSION INTRAVENOUS at 12:55

## 2019-08-15 ASSESSMENT — PAIN - FUNCTIONAL ASSESSMENT: PAIN_FUNCTIONAL_ASSESSMENT: 0-10

## 2019-08-15 ASSESSMENT — PAIN DESCRIPTION - DESCRIPTORS: DESCRIPTORS: STABBING

## 2019-08-15 NOTE — ANESTHESIA PRE PROCEDURE
Component Value Date    WBC 4.8 07/27/2019    RBC 4.73 07/27/2019    HGB 14.4 07/27/2019    HCT 44.2 07/27/2019    MCV 93.4 07/27/2019    RDW 14.7 07/27/2019     07/27/2019       CMP:   Lab Results   Component Value Date     07/27/2019    K 3.9 07/27/2019     07/27/2019    CO2 24 07/27/2019    BUN 13 07/27/2019    CREATININE 0.94 07/27/2019    AGRATIO 2.1 07/27/2019    LABGLOM >90 04/04/2019    GLUCOSE 78 07/27/2019    PROT 6.1 07/27/2019    CALCIUM 8.80 07/27/2019    BILITOT 0.6 07/27/2019    ALKPHOS 75 07/27/2019    AST 38 07/27/2019    ALT 47 07/27/2019       POC Tests: No results for input(s): POCGLU, POCNA, POCK, POCCL, POCBUN, POCHEMO, POCHCT in the last 72 hours. Coags:   Lab Results   Component Value Date    PROTIME 21.4 07/27/2019    INR 0.90 08/15/2019    INR 1.84 07/27/2019    APTT 32.1 06/14/2013       HCG (If Applicable): No results found for: PREGTESTUR, PREGSERUM, HCG, HCGQUANT     ABGs: No results found for: PHART, PO2ART, EMM4ZUU, AXH7POU, BEART, J7JRGNOT     Type & Screen (If Applicable):  No results found for: LABABO, LABRH    Anesthesia Evaluation    Airway: Mallampati: II       Mouth opening: > = 3 FB Dental:          Pulmonary:   (+) sleep apnea:                             Cardiovascular:            Rhythm: regular                      Neuro/Psych:   (+) headaches:,             GI/Hepatic/Renal:   (+) GERD:,           Endo/Other:    (+) : arthritis:., .                 Abdominal:   (+) obese,         Vascular:                                        Anesthesia Plan      MAC     ASA 2             Anesthetic plan and risks discussed with patient. Plan discussed with CRNA.                   Quiana Jessica MD   8/15/2019

## 2019-08-30 ENCOUNTER — TELEPHONE (OUTPATIENT)
Dept: UROLOGY | Age: 49
End: 2019-08-30

## 2019-09-04 ENCOUNTER — OFFICE VISIT (OUTPATIENT)
Dept: UROLOGY | Age: 49
End: 2019-09-04
Payer: COMMERCIAL

## 2019-09-04 VITALS
WEIGHT: 266 LBS | HEIGHT: 70 IN | BODY MASS INDEX: 38.08 KG/M2 | DIASTOLIC BLOOD PRESSURE: 78 MMHG | SYSTOLIC BLOOD PRESSURE: 112 MMHG

## 2019-09-04 DIAGNOSIS — N20.0 KIDNEY STONE: Primary | ICD-10-CM

## 2019-09-04 LAB
BILIRUBIN URINE: NEGATIVE
BLOOD URINE, POC: NEGATIVE
CHARACTER, URINE: CLEAR
COLOR, URINE: YELLOW
GLUCOSE URINE: NEGATIVE MG/DL
KETONES, URINE: NEGATIVE
LEUKOCYTE CLUMPS, URINE: NEGATIVE
NITRITE, URINE: NEGATIVE
PH, URINE: 5.5 (ref 5–9)
PROTEIN, URINE: NEGATIVE MG/DL
SPECIFIC GRAVITY, URINE: >= 1.03 (ref 1–1.03)
UROBILINOGEN, URINE: 0.2 EU/DL (ref 0–1)

## 2019-09-04 PROCEDURE — 99213 OFFICE O/P EST LOW 20 MIN: CPT | Performed by: NURSE PRACTITIONER

## 2019-09-04 PROCEDURE — 81003 URINALYSIS AUTO W/O SCOPE: CPT | Performed by: NURSE PRACTITIONER

## 2019-09-04 RX ORDER — FLUTICASONE PROPIONATE 220 UG/1
AEROSOL, METERED RESPIRATORY (INHALATION)
COMMUNITY
Start: 2019-08-21

## 2019-09-04 RX ORDER — ALLOPURINOL 300 MG/1
TABLET ORAL
Qty: 90 TABLET | Refills: 3 | Status: SHIPPED | OUTPATIENT
Start: 2019-09-04 | End: 2020-06-30 | Stop reason: SDUPTHER

## 2019-09-04 RX ORDER — POTASSIUM CITRATE 10 MEQ/1
TABLET, EXTENDED RELEASE ORAL
Qty: 90 TABLET | Refills: 3 | Status: SHIPPED | OUTPATIENT
Start: 2019-09-04 | End: 2020-06-30 | Stop reason: SDUPTHER

## 2019-09-11 ENCOUNTER — OFFICE VISIT (OUTPATIENT)
Dept: PHYSICAL MEDICINE AND REHAB | Age: 49
End: 2019-09-11
Payer: COMMERCIAL

## 2019-09-11 VITALS
BODY MASS INDEX: 38.09 KG/M2 | DIASTOLIC BLOOD PRESSURE: 80 MMHG | WEIGHT: 266.1 LBS | HEIGHT: 70 IN | SYSTOLIC BLOOD PRESSURE: 124 MMHG | HEART RATE: 78 BPM

## 2019-09-11 DIAGNOSIS — M53.3 SI (SACROILIAC) PAIN: ICD-10-CM

## 2019-09-11 DIAGNOSIS — M47.816 SPONDYLOSIS OF LUMBAR REGION WITHOUT MYELOPATHY OR RADICULOPATHY: ICD-10-CM

## 2019-09-11 DIAGNOSIS — M51.36 BULGE OF LUMBAR DISC WITHOUT MYELOPATHY: ICD-10-CM

## 2019-09-11 DIAGNOSIS — G89.4 CHRONIC PAIN SYNDROME: ICD-10-CM

## 2019-09-11 DIAGNOSIS — M54.17 LUMBOSACRAL RADICULITIS: Primary | ICD-10-CM

## 2019-09-11 PROCEDURE — 99213 OFFICE O/P EST LOW 20 MIN: CPT | Performed by: NURSE PRACTITIONER

## 2019-09-11 RX ORDER — TRIAMCINOLONE ACETONIDE 1 MG/G
CREAM TOPICAL
COMMUNITY
Start: 2019-09-09

## 2019-09-11 RX ORDER — HYDROCODONE BITARTRATE AND ACETAMINOPHEN 5; 325 MG/1; MG/1
1 TABLET ORAL EVERY 8 HOURS PRN
Qty: 90 TABLET | Refills: 0 | Status: SHIPPED | OUTPATIENT
Start: 2019-09-14 | End: 2019-10-10 | Stop reason: SDUPTHER

## 2019-09-11 ASSESSMENT — ENCOUNTER SYMPTOMS: BACK PAIN: 1

## 2019-09-11 NOTE — PROGRESS NOTES
[facility]  [address]  Dept: [phone]  Dept Fax: [phone]: [phone]    Visit Date: 9/11/2019    Functionality Assessment/Goals Worksheet     On a scale of 0 (Does not Interfere) to 10 (Completely Interferes)     1. Which number describes how during the past week pain has interfered with       the following:  A. General Activity:  4  B. Mood: 5  C. Walking Ability:  4  D. Normal Work (Includes both work outside the home and housework):  3  E. Relations with Other People:   6  F. Sleep:   3  G. Enjoyment of Life:   4    2. Patient Prefers to Take their Pain Medications:     []  On a regular basis   [x]  Only when necessary    []  Does not take pain medications    3. What are the Patient's Goals/Expectations for Visiting Pain Management? [x]  Learn about my pain    []  Receive Medication   []  Physical Therapy     []  Treat Depression   []  Receive Injections    []  Treat Sleep   []  Deal with Anxiety and Stress   []  Treat Opoid Dependence/Addiction   []  Other:      HPI:   Johanna Younger is a 51 y.o. male is here today for    Chief Complaint: Lower back pain, SI pain, right leg pain    HPI   FU Left SI RFA from 8/15/2019. Receiving 90% relief of left SI pain From left SI RFA. Pain is now mainly noticeable in lower back on right side as he feels effects from Right L-facet RFA have worn off. Sharp and aching pain with tingling in right thigh. Patient feels burning and tingling in right thigh is his main complaint. Norco prn remains effective   Medications reviewed. Patient denies side effects with medications. Patient states he is taking medications as prescribed. Hedenies receiving pain medications from other sources. He denies any ER visits since last visit. Pain scale with out pain medications or at its worst is 7/10.   Pain scale with pain tablet by mouth every 8 hours as needed for Pain for up to 30 days. , Disp: 90 tablet, Rfl: 0    FLOVENT  MCG/ACT inhaler, , Disp: , Rfl:     potassium citrate (UROCIT-K) 10 MEQ (1080 MG) extended release tablet, TAKE 1 TABLET BY MOUTH EVERY MORNING WITH BREAKFAST, Disp: 90 tablet, Rfl: 3    allopurinol (ZYLOPRIM) 300 MG tablet, TAKE 1 TABLET BY MOUTH EVERY DAY, Disp: 90 tablet, Rfl: 3    ARIPiprazole (ABILIFY) 2 MG tablet, Take 2 mg by mouth 2 times daily, Disp: , Rfl:     ondansetron (ZOFRAN ODT) 4 MG disintegrating tablet, Take 1 tablet by mouth every 8 hours as needed for Nausea or Vomiting, Disp: 20 tablet, Rfl: 0    citalopram (CELEXA) 40 MG tablet, Take 40 mg by mouth daily. , Disp: , Rfl:     warfarin (COUMADIN) 2.5 MG tablet, Take 2.5 mg by mouth daily at 1800 Takes 5mg 3 days per week & 2.5mg 4 days per week, Disp: , Rfl:     busPIRone (BUSPAR) 10 MG tablet, Take 30 mg by mouth 2 times daily , Disp: , Rfl:     omeprazole (PRILOSEC) 20 MG capsule, Take 20 mg by mouth daily. , Disp: , Rfl:     Subjective:      Review of Systems   Musculoskeletal: Positive for arthralgias, back pain and myalgias. Neurological: Positive for weakness and numbness. Objective:     Vitals:    09/11/19 0847   BP: 124/80   Site: Left Upper Arm   Position: Sitting   Cuff Size: Medium Adult   Pulse: 78   Weight: 266 lb 1.5 oz (120.7 kg)   Height: 5' 10\" (1.778 m)       Physical Exam   Constitutional: He is oriented to person, place, and time. He appears well-developed and well-nourished. No distress. HENT:   Head: Normocephalic and atraumatic. Right Ear: External ear normal.   Left Ear: External ear normal.   Nose: Nose normal.   Mouth/Throat: Oropharynx is clear and moist. No oropharyngeal exudate. Eyes: Pupils are equal, round, and reactive to light. Conjunctivae and EOM are normal. Right eye exhibits no discharge. Left eye exhibits no discharge. No scleral icterus.    Neck: Normal range of motion and

## 2019-10-03 ENCOUNTER — PREP FOR PROCEDURE (OUTPATIENT)
Dept: PHYSICAL MEDICINE AND REHAB | Age: 49
End: 2019-10-03

## 2019-10-10 DIAGNOSIS — G89.4 CHRONIC PAIN SYNDROME: ICD-10-CM

## 2019-10-10 RX ORDER — HYDROCODONE BITARTRATE AND ACETAMINOPHEN 5; 325 MG/1; MG/1
1 TABLET ORAL EVERY 8 HOURS PRN
Qty: 90 TABLET | Refills: 0 | Status: SHIPPED | OUTPATIENT
Start: 2019-10-14 | End: 2019-11-12 | Stop reason: SDUPTHER

## 2019-10-15 ENCOUNTER — TELEPHONE (OUTPATIENT)
Dept: PHYSICAL MEDICINE AND REHAB | Age: 49
End: 2019-10-15

## 2019-10-31 ENCOUNTER — APPOINTMENT (OUTPATIENT)
Dept: GENERAL RADIOLOGY | Age: 49
End: 2019-10-31
Attending: PAIN MEDICINE
Payer: COMMERCIAL

## 2019-10-31 ENCOUNTER — ANESTHESIA EVENT (OUTPATIENT)
Dept: OPERATING ROOM | Age: 49
End: 2019-10-31
Payer: COMMERCIAL

## 2019-10-31 ENCOUNTER — HOSPITAL ENCOUNTER (OUTPATIENT)
Age: 49
Setting detail: OUTPATIENT SURGERY
Discharge: HOME | End: 2019-10-31
Attending: PAIN MEDICINE | Admitting: PAIN MEDICINE
Payer: COMMERCIAL

## 2019-10-31 ENCOUNTER — ANESTHESIA (OUTPATIENT)
Dept: OPERATING ROOM | Age: 49
End: 2019-10-31
Payer: COMMERCIAL

## 2019-10-31 VITALS
BODY MASS INDEX: 37.45 KG/M2 | HEIGHT: 70 IN | HEART RATE: 77 BPM | OXYGEN SATURATION: 93 % | TEMPERATURE: 98.2 F | WEIGHT: 261.6 LBS | SYSTOLIC BLOOD PRESSURE: 112 MMHG | DIASTOLIC BLOOD PRESSURE: 78 MMHG | RESPIRATION RATE: 20 BRPM

## 2019-10-31 VITALS
OXYGEN SATURATION: 94 % | SYSTOLIC BLOOD PRESSURE: 132 MMHG | RESPIRATION RATE: 25 BRPM | DIASTOLIC BLOOD PRESSURE: 84 MMHG

## 2019-10-31 LAB — POC INR: 1 (ref 0.8–1.2)

## 2019-10-31 PROCEDURE — 2709999900 HC NON-CHARGEABLE SUPPLY: Performed by: PAIN MEDICINE

## 2019-10-31 PROCEDURE — 2500000003 HC RX 250 WO HCPCS: Performed by: PAIN MEDICINE

## 2019-10-31 PROCEDURE — 3700000000 HC ANESTHESIA ATTENDED CARE: Performed by: PAIN MEDICINE

## 2019-10-31 PROCEDURE — 6360000002 HC RX W HCPCS: Performed by: REGISTERED NURSE

## 2019-10-31 PROCEDURE — 7100000011 HC PHASE II RECOVERY - ADDTL 15 MIN: Performed by: PAIN MEDICINE

## 2019-10-31 PROCEDURE — 6360000004 HC RX CONTRAST MEDICATION: Performed by: PAIN MEDICINE

## 2019-10-31 PROCEDURE — 62323 NJX INTERLAMINAR LMBR/SAC: CPT | Performed by: PAIN MEDICINE

## 2019-10-31 PROCEDURE — 7100000010 HC PHASE II RECOVERY - FIRST 15 MIN: Performed by: PAIN MEDICINE

## 2019-10-31 PROCEDURE — 6360000002 HC RX W HCPCS: Performed by: PAIN MEDICINE

## 2019-10-31 PROCEDURE — 3209999900 FLUORO FOR SURGICAL PROCEDURES

## 2019-10-31 PROCEDURE — 3600000054 HC PAIN LEVEL 3 BASE: Performed by: PAIN MEDICINE

## 2019-10-31 RX ORDER — DEXAMETHASONE SODIUM PHOSPHATE 4 MG/ML
INJECTION, SOLUTION INTRA-ARTICULAR; INTRALESIONAL; INTRAMUSCULAR; INTRAVENOUS; SOFT TISSUE PRN
Status: DISCONTINUED | OUTPATIENT
Start: 2019-10-31 | End: 2019-10-31 | Stop reason: ALTCHOICE

## 2019-10-31 RX ORDER — 0.9 % SODIUM CHLORIDE 0.9 %
VIAL (ML) INJECTION PRN
Status: DISCONTINUED | OUTPATIENT
Start: 2019-10-31 | End: 2019-10-31 | Stop reason: ALTCHOICE

## 2019-10-31 RX ORDER — PROPOFOL 10 MG/ML
INJECTION, EMULSION INTRAVENOUS PRN
Status: DISCONTINUED | OUTPATIENT
Start: 2019-10-31 | End: 2019-10-31 | Stop reason: SDUPTHER

## 2019-10-31 RX ORDER — LIDOCAINE HYDROCHLORIDE 10 MG/ML
INJECTION, SOLUTION INFILTRATION; PERINEURAL PRN
Status: DISCONTINUED | OUTPATIENT
Start: 2019-10-31 | End: 2019-10-31 | Stop reason: ALTCHOICE

## 2019-10-31 RX ADMIN — PROPOFOL 60 MG: 10 INJECTION, EMULSION INTRAVENOUS at 13:46

## 2019-10-31 RX ADMIN — PROPOFOL 30 MG: 10 INJECTION, EMULSION INTRAVENOUS at 13:47

## 2019-10-31 ASSESSMENT — PULMONARY FUNCTION TESTS
PIF_VALUE: 0

## 2019-10-31 ASSESSMENT — PAIN - FUNCTIONAL ASSESSMENT: PAIN_FUNCTIONAL_ASSESSMENT: 0-10

## 2019-11-12 DIAGNOSIS — G89.4 CHRONIC PAIN SYNDROME: ICD-10-CM

## 2019-11-12 RX ORDER — HYDROCODONE BITARTRATE AND ACETAMINOPHEN 5; 325 MG/1; MG/1
1 TABLET ORAL EVERY 8 HOURS PRN
Qty: 90 TABLET | Refills: 0 | Status: SHIPPED | OUTPATIENT
Start: 2019-11-13 | End: 2019-12-12 | Stop reason: SDUPTHER

## 2019-11-19 ENCOUNTER — OFFICE VISIT (OUTPATIENT)
Dept: PHYSICAL MEDICINE AND REHAB | Age: 49
End: 2019-11-19
Payer: COMMERCIAL

## 2019-11-19 VITALS
HEART RATE: 64 BPM | DIASTOLIC BLOOD PRESSURE: 82 MMHG | HEIGHT: 70 IN | SYSTOLIC BLOOD PRESSURE: 134 MMHG | WEIGHT: 260.14 LBS | BODY MASS INDEX: 37.24 KG/M2

## 2019-11-19 DIAGNOSIS — M54.17 LUMBOSACRAL RADICULITIS: ICD-10-CM

## 2019-11-19 DIAGNOSIS — M53.3 SI (SACROILIAC) PAIN: ICD-10-CM

## 2019-11-19 DIAGNOSIS — M47.814 SPONDYLOSIS OF THORACIC REGION WITHOUT MYELOPATHY OR RADICULOPATHY: ICD-10-CM

## 2019-11-19 DIAGNOSIS — M47.816 SPONDYLOSIS OF LUMBAR REGION WITHOUT MYELOPATHY OR RADICULOPATHY: Primary | ICD-10-CM

## 2019-11-19 DIAGNOSIS — M47.816 LUMBAR SPONDYLOSIS: ICD-10-CM

## 2019-11-19 PROCEDURE — 99213 OFFICE O/P EST LOW 20 MIN: CPT | Performed by: NURSE PRACTITIONER

## 2019-11-19 RX ORDER — NALOXONE HYDROCHLORIDE 4 MG/.1ML
1 SPRAY NASAL PRN
Qty: 1 EACH | Refills: 0 | Status: SHIPPED | OUTPATIENT
Start: 2019-11-19 | End: 2019-11-20

## 2019-11-19 ASSESSMENT — ENCOUNTER SYMPTOMS
WHEEZING: 0
COLOR CHANGE: 0
ABDOMINAL PAIN: 0
CONSTIPATION: 0
NAUSEA: 0
CHEST TIGHTNESS: 0
VOMITING: 0
COUGH: 0
SINUS PRESSURE: 0
BACK PAIN: 1
EYE PAIN: 0
SHORTNESS OF BREATH: 0
DIARRHEA: 0
PHOTOPHOBIA: 0
SORE THROAT: 0
RHINORRHEA: 0

## 2019-12-11 ENCOUNTER — TELEPHONE (OUTPATIENT)
Dept: PHYSICAL MEDICINE AND REHAB | Age: 49
End: 2019-12-11

## 2019-12-11 DIAGNOSIS — G89.4 CHRONIC PAIN SYNDROME: ICD-10-CM

## 2019-12-12 RX ORDER — HYDROCODONE BITARTRATE AND ACETAMINOPHEN 5; 325 MG/1; MG/1
1 TABLET ORAL EVERY 8 HOURS PRN
Qty: 90 TABLET | Refills: 0 | Status: SHIPPED | OUTPATIENT
Start: 2019-12-13 | End: 2020-01-13 | Stop reason: SDUPTHER

## 2020-01-06 ENCOUNTER — PREP FOR PROCEDURE (OUTPATIENT)
Dept: PHYSICAL MEDICINE AND REHAB | Age: 50
End: 2020-01-06

## 2020-01-06 NOTE — H&P (VIEW-ONLY)
iCndy Cherry is a 47 y.o. male is here today for     Chief Complaint: Low back pain          The patientis allergic to latex; codeine; nickel; and oxybutynin chloride [oxybutynin chloride].     Past Medical History  Hermilo Belle  has a past medical history of Chronic back pain, GERD (gastroesophageal reflux disease), History of kidney stones, Hx of blood clots, Kidney stone, and Lumbar spondylosis.     Past Surgical History  The patient  has a past surgical history that includes Ureter stent placement (2006); Lithotripsy (2006); Cystocopy (6/14/2013); Cystocopy (07/29/2013); Cystoscopy (12/23/13); other surgical history (Bilateral, 03/26/2018); pr inj dx/ther agnt paravert facet joint, lumbar/sac, 2nd level (Bilateral, 3/26/2018); pr inj dx/ther agnt paravert facet joint, lumbar/sac, 2nd level (Bilateral, 5/21/2018); Colonoscopy; eye surgery (2007); hernia repair (2007); pr inject rx other periph nerve (Left, 9/28/2018); pr inject rx other periph nerve (Right, 11/30/2018); lumbar nerve block (N/A, 4/5/2019); Injection Procedure For Sacroiliac Joint (Left, 5/17/2019); Injection Procedure For Sacroiliac Joint (Left, 6/27/2019); Lumbar spine surgery (Left, 8/15/2019); and Nerve Surgery (Right, 10/31/2019).    Family History  This patient's family history includes Cancer in his mother; Heart Disease in his father.  Garett Jefferson  reports that he quit smoking about 14 years ago. He has a 12.00 pack-year smoking history. He has never used smokeless tobacco. He reports that he does not drink alcohol or use drugs.     Medications    Current Medication      Current Outpatient Medications:     naloxone 4 MG/0.1ML LIQD nasal spray, 1 spray by Nasal route as needed for Opioid Reversal, Disp: 1 each, Rfl: 0    HYDROcodone-acetaminophen (NORCO) 5-325 MG per tablet, Take 1 tablet by mouth every 8 hours as needed for Pain for up to 30 days. , Disp: 90 tablet, Rfl: 0    triamcinolone (KENALOG) 0.1 % cream, , dizziness, tremors, seizures, syncope, facial asymmetry, speech difficulty, light-headedness and headaches. Hematological: Does not bruise/bleed easily. Psychiatric/Behavioral: Negative for agitation, behavioral problems, confusion, decreased concentration, dysphoric mood, hallucinations, self-injury, sleep disturbance and suicidal ideas. The patient is not nervous/anxious and is not hyperactive.          Objective:      Vitals                                  Weight: 260 lb 2.3 oz (118 kg)   Height: 5' 10\" (1.778 m)            Physical Exam  Vitals signs and nursing note reviewed. Constitutional:       General: He is not in acute distress. Appearance: He is well-developed. He is not diaphoretic. HENT:      Head: Normocephalic and atraumatic. Right Ear: External ear normal.      Left Ear: External ear normal.      Nose: Nose normal.      Mouth/Throat:      Pharynx: No oropharyngeal exudate. Eyes:      General: No scleral icterus. Right eye: No discharge. Left eye: No discharge. Conjunctiva/sclera: Conjunctivae normal.      Pupils: Pupils are equal, round, and reactive to light. Neck:      Musculoskeletal: Full passive range of motion without pain, normal range of motion and neck supple. Normal range of motion. No edema, erythema, neck rigidity or muscular tenderness. Thyroid: No thyromegaly. Cardiovascular:      Rate and Rhythm: Normal rate and regular rhythm. Heart sounds: Normal heart sounds. No murmur. No friction rub. No gallop. Pulmonary:      Effort: Pulmonary effort is normal. No respiratory distress. Breath sounds: Normal breath sounds. No wheezing or rales. Chest:      Chest wall: No tenderness. Abdominal:      General: Bowel sounds are normal. There is no distension. Palpations: Abdomen is soft. Tenderness: There is no tenderness. There is no guarding or rebound. Musculoskeletal:         General: Tenderness present.       Right shoulder: Normal.      Left shoulder: Normal.      Right hip: He exhibits tenderness and bony tenderness. Left hip: He exhibits tenderness and bony tenderness. Right knee: Normal.      Left knee: Normal.      Cervical back: Normal.      Lumbar back: He exhibits decreased range of motion, tenderness, bony tenderness, pain and spasm. Back:       Right upper leg: He exhibits tenderness. Left upper leg: He exhibits tenderness. Skin:     General: Skin is warm. Coloration: Skin is not pale. Findings: No erythema or rash. Neurological:      Mental Status: He is alert and oriented to person, place, and time. He is not disoriented. Cranial Nerves: No cranial nerve deficit. Sensory: No sensory deficit. Motor: No atrophy or abnormal muscle tone. Coordination: Coordination normal.      Gait: Gait normal.      Deep Tendon Reflexes: Reflexes are normal and symmetric. Babinski sign absent on the right side. Reflex Scores:       Tricep reflexes are 2+ on the right side and 2+ on the left side. Bicep reflexes are 2+ on the right side and 2+ on the left side. Brachioradialis reflexes are 2+ on the right side and 2+ on the left side. Patellar reflexes are 2+ on the right side and 2+ on the left side. Achilles reflexes are 2+ on the right side and 2+ on the left side. Psychiatric:         Attention and Perception: Attention normal. He is attentive. Mood and Affect: Mood normal. Mood is not anxious or depressed. Affect is not labile, blunt, angry or inappropriate. Speech: Speech normal. He is communicative. Speech is not rapid and pressured, delayed, slurred or tangential.         Behavior: Behavior normal. Behavior is not agitated, slowed, aggressive, withdrawn, hyperactive or combative. Thought Content:  Thought content normal. Thought content is not paranoid or delusional. Thought content does not include homicidal or suicidal

## 2020-01-06 NOTE — H&P
Janise Canavan is a 47 y.o. male is here today for     Chief Complaint: Low back pain          The patientis allergic to latex; codeine; nickel; and oxybutynin chloride [oxybutynin chloride].     Past Medical History  Mannie Gomez  has a past medical history of Chronic back pain, GERD (gastroesophageal reflux disease), History of kidney stones, Hx of blood clots, Kidney stone, and Lumbar spondylosis.     Past Surgical History  The patient  has a past surgical history that includes Ureter stent placement (2006); Lithotripsy (2006); Cystocopy (6/14/2013); Cystocopy (07/29/2013); Cystoscopy (12/23/13); other surgical history (Bilateral, 03/26/2018); pr inj dx/ther agnt paravert facet joint, lumbar/sac, 2nd level (Bilateral, 3/26/2018); pr inj dx/ther agnt paravert facet joint, lumbar/sac, 2nd level (Bilateral, 5/21/2018); Colonoscopy; eye surgery (2007); hernia repair (2007); pr inject rx other periph nerve (Left, 9/28/2018); pr inject rx other periph nerve (Right, 11/30/2018); lumbar nerve block (N/A, 4/5/2019); Injection Procedure For Sacroiliac Joint (Left, 5/17/2019); Injection Procedure For Sacroiliac Joint (Left, 6/27/2019); Lumbar spine surgery (Left, 8/15/2019); and Nerve Surgery (Right, 10/31/2019).    Family History  This patient's family history includes Cancer in his mother; Heart Disease in his father.  Blaine Coleman  reports that he quit smoking about 14 years ago. He has a 12.00 pack-year smoking history. He has never used smokeless tobacco. He reports that he does not drink alcohol or use drugs.     Medications    Current Medication      Current Outpatient Medications:     naloxone 4 MG/0.1ML LIQD nasal spray, 1 spray by Nasal route as needed for Opioid Reversal, Disp: 1 each, Rfl: 0    HYDROcodone-acetaminophen (NORCO) 5-325 MG per tablet, Take 1 tablet by mouth every 8 hours as needed for Pain for up to 30 days. , Disp: 90 tablet, Rfl: 0    triamcinolone (KENALOG) 0.1 % cream, , dizziness, tremors, seizures, syncope, facial asymmetry, speech difficulty, light-headedness and headaches. Hematological: Does not bruise/bleed easily. Psychiatric/Behavioral: Negative for agitation, behavioral problems, confusion, decreased concentration, dysphoric mood, hallucinations, self-injury, sleep disturbance and suicidal ideas. The patient is not nervous/anxious and is not hyperactive.          Objective:      Vitals                                  Weight: 260 lb 2.3 oz (118 kg)   Height: 5' 10\" (1.778 m)            Physical Exam  Vitals signs and nursing note reviewed. Constitutional:       General: He is not in acute distress. Appearance: He is well-developed. He is not diaphoretic. HENT:      Head: Normocephalic and atraumatic. Right Ear: External ear normal.      Left Ear: External ear normal.      Nose: Nose normal.      Mouth/Throat:      Pharynx: No oropharyngeal exudate. Eyes:      General: No scleral icterus. Right eye: No discharge. Left eye: No discharge. Conjunctiva/sclera: Conjunctivae normal.      Pupils: Pupils are equal, round, and reactive to light. Neck:      Musculoskeletal: Full passive range of motion without pain, normal range of motion and neck supple. Normal range of motion. No edema, erythema, neck rigidity or muscular tenderness. Thyroid: No thyromegaly. Cardiovascular:      Rate and Rhythm: Normal rate and regular rhythm. Heart sounds: Normal heart sounds. No murmur. No friction rub. No gallop. Pulmonary:      Effort: Pulmonary effort is normal. No respiratory distress. Breath sounds: Normal breath sounds. No wheezing or rales. Chest:      Chest wall: No tenderness. Abdominal:      General: Bowel sounds are normal. There is no distension. Palpations: Abdomen is soft. Tenderness: There is no tenderness. There is no guarding or rebound. Musculoskeletal:         General: Tenderness present.       Right

## 2020-01-10 NOTE — TELEPHONE ENCOUNTER
Pee Jansen called requesting a refill on the following medications:  Requested Prescriptions     Pending Prescriptions Disp Refills    HYDROcodone-acetaminophen (NORCO) 5-325 MG per tablet 90 tablet 0     Sig: Take 1 tablet by mouth every 8 hours as needed for Pain for up to 30 days.      Pharmacy verified: [facility]    Date of last visit: 11/19/19  Date of next visit (if applicable): 5/83/4730

## 2020-01-13 RX ORDER — HYDROCODONE BITARTRATE AND ACETAMINOPHEN 5; 325 MG/1; MG/1
1 TABLET ORAL EVERY 8 HOURS PRN
Qty: 90 TABLET | Refills: 0 | Status: SHIPPED | OUTPATIENT
Start: 2020-01-13 | End: 2020-02-04 | Stop reason: SDUPTHER

## 2020-01-16 ENCOUNTER — HOSPITAL ENCOUNTER (OUTPATIENT)
Age: 50
Setting detail: OUTPATIENT SURGERY
Discharge: HOME | End: 2020-01-16
Attending: PAIN MEDICINE | Admitting: PAIN MEDICINE
Payer: COMMERCIAL

## 2020-01-16 ENCOUNTER — ANESTHESIA (OUTPATIENT)
Dept: OPERATING ROOM | Age: 50
End: 2020-01-16
Payer: COMMERCIAL

## 2020-01-16 ENCOUNTER — APPOINTMENT (OUTPATIENT)
Dept: GENERAL RADIOLOGY | Age: 50
End: 2020-01-16
Attending: PAIN MEDICINE
Payer: COMMERCIAL

## 2020-01-16 ENCOUNTER — ANESTHESIA EVENT (OUTPATIENT)
Dept: OPERATING ROOM | Age: 50
End: 2020-01-16
Payer: COMMERCIAL

## 2020-01-16 VITALS
OXYGEN SATURATION: 93 % | HEART RATE: 70 BPM | TEMPERATURE: 97.8 F | BODY MASS INDEX: 37.8 KG/M2 | WEIGHT: 264 LBS | SYSTOLIC BLOOD PRESSURE: 126 MMHG | HEIGHT: 70 IN | DIASTOLIC BLOOD PRESSURE: 64 MMHG | RESPIRATION RATE: 16 BRPM

## 2020-01-16 VITALS
SYSTOLIC BLOOD PRESSURE: 150 MMHG | RESPIRATION RATE: 15 BRPM | OXYGEN SATURATION: 89 % | DIASTOLIC BLOOD PRESSURE: 88 MMHG

## 2020-01-16 LAB — POC INR: 1.1 (ref 0.8–1.2)

## 2020-01-16 PROCEDURE — 2500000003 HC RX 250 WO HCPCS: Performed by: NURSE ANESTHETIST, CERTIFIED REGISTERED

## 2020-01-16 PROCEDURE — 3209999900 FLUORO FOR SURGICAL PROCEDURES

## 2020-01-16 PROCEDURE — 3600000056 HC PAIN LEVEL 4 BASE: Performed by: PAIN MEDICINE

## 2020-01-16 PROCEDURE — 3700000000 HC ANESTHESIA ATTENDED CARE: Performed by: PAIN MEDICINE

## 2020-01-16 PROCEDURE — 6360000002 HC RX W HCPCS: Performed by: NURSE ANESTHETIST, CERTIFIED REGISTERED

## 2020-01-16 PROCEDURE — 3700000001 HC ADD 15 MINUTES (ANESTHESIA): Performed by: PAIN MEDICINE

## 2020-01-16 PROCEDURE — 6360000002 HC RX W HCPCS: Performed by: PAIN MEDICINE

## 2020-01-16 PROCEDURE — 64635 DESTROY LUMB/SAC FACET JNT: CPT | Performed by: PAIN MEDICINE

## 2020-01-16 PROCEDURE — 7100000011 HC PHASE II RECOVERY - ADDTL 15 MIN: Performed by: PAIN MEDICINE

## 2020-01-16 PROCEDURE — 2500000003 HC RX 250 WO HCPCS: Performed by: PAIN MEDICINE

## 2020-01-16 PROCEDURE — 2709999900 HC NON-CHARGEABLE SUPPLY: Performed by: PAIN MEDICINE

## 2020-01-16 PROCEDURE — 7100000010 HC PHASE II RECOVERY - FIRST 15 MIN: Performed by: PAIN MEDICINE

## 2020-01-16 PROCEDURE — 3600000057 HC PAIN LEVEL 4 ADDL 15 MIN: Performed by: PAIN MEDICINE

## 2020-01-16 PROCEDURE — 64636 DESTROY L/S FACET JNT ADDL: CPT | Performed by: PAIN MEDICINE

## 2020-01-16 RX ORDER — PROPOFOL 10 MG/ML
INJECTION, EMULSION INTRAVENOUS PRN
Status: DISCONTINUED | OUTPATIENT
Start: 2020-01-16 | End: 2020-01-16 | Stop reason: SDUPTHER

## 2020-01-16 RX ORDER — ROPIVACAINE HYDROCHLORIDE 2 MG/ML
INJECTION, SOLUTION EPIDURAL; INFILTRATION; PERINEURAL PRN
Status: DISCONTINUED | OUTPATIENT
Start: 2020-01-16 | End: 2020-01-16 | Stop reason: ALTCHOICE

## 2020-01-16 RX ORDER — METHYLPREDNISOLONE ACETATE 80 MG/ML
INJECTION, SUSPENSION INTRA-ARTICULAR; INTRALESIONAL; INTRAMUSCULAR; SOFT TISSUE PRN
Status: DISCONTINUED | OUTPATIENT
Start: 2020-01-16 | End: 2020-01-16 | Stop reason: ALTCHOICE

## 2020-01-16 RX ORDER — LIDOCAINE HYDROCHLORIDE 10 MG/ML
INJECTION, SOLUTION EPIDURAL; INFILTRATION; INTRACAUDAL; PERINEURAL PRN
Status: DISCONTINUED | OUTPATIENT
Start: 2020-01-16 | End: 2020-01-16 | Stop reason: ALTCHOICE

## 2020-01-16 RX ORDER — FENTANYL CITRATE 50 UG/ML
INJECTION, SOLUTION INTRAMUSCULAR; INTRAVENOUS PRN
Status: DISCONTINUED | OUTPATIENT
Start: 2020-01-16 | End: 2020-01-16 | Stop reason: SDUPTHER

## 2020-01-16 RX ORDER — LIDOCAINE HYDROCHLORIDE 20 MG/ML
INJECTION, SOLUTION INFILTRATION; PERINEURAL PRN
Status: DISCONTINUED | OUTPATIENT
Start: 2020-01-16 | End: 2020-01-16 | Stop reason: SDUPTHER

## 2020-01-16 RX ADMIN — PROPOFOL 100 MG: 10 INJECTION, EMULSION INTRAVENOUS at 08:04

## 2020-01-16 RX ADMIN — LIDOCAINE HYDROCHLORIDE 50 MG: 20 INJECTION, SOLUTION INFILTRATION; PERINEURAL at 07:56

## 2020-01-16 RX ADMIN — FENTANYL CITRATE 100 MCG: 50 INJECTION, SOLUTION INTRAMUSCULAR; INTRAVENOUS at 07:56

## 2020-01-16 ASSESSMENT — PAIN DESCRIPTION - DESCRIPTORS: DESCRIPTORS: CONSTANT

## 2020-01-16 ASSESSMENT — PULMONARY FUNCTION TESTS
PIF_VALUE: 0

## 2020-01-16 ASSESSMENT — PAIN SCALES - GENERAL: PAINLEVEL_OUTOF10: 0

## 2020-01-16 ASSESSMENT — PAIN - FUNCTIONAL ASSESSMENT: PAIN_FUNCTIONAL_ASSESSMENT: 0-10

## 2020-01-16 NOTE — OP NOTE
of daily living. Physical examination revealed facet tenderness and facet loading is positive. Patient had undergone lumbar facet joint injections with pain relief that lasted for only a short period of time and had greater than 70% pain relief with confirmatory median branch blocks. Hence we decided to do radiofrequency abalation of median branches for long term pain releif. The procedure and risks  were discussed with the patient and an informed consent was obtained.     Procedure:  Left side   A meaningful communication was kept up with the patient throughout the procedure. The patient is placed in prone position and skin over the back was prepped and draped in sterile manner. Then using fluoroscopy the junction of the transverse process of the vertebra with the superior process of the facet joint was observed and the view was optimized. The skin and deep tissues posterior were infiltrated with 6 ml of  1% xylocaine. The RF canula with the 15 mm active tip was introduced through the skin wheal under fluoroscopy guidance such that the tip of the needle lies in the groove of the transverse process with the superior processes of the facet joint. Then a lateral view of the lumbar spine was obtained to make sure the tip of needle is not in the neural foramen. Then electric impedence was checked to make sure it is acceptable. Then a sensory stimulus was applied at 50 Hz up to 1 volt and concordant pain symptoms were reproduced. Then a motor stimulus was applied at 2 Hz up to 2 volts and no motor stimulation was seen in lower extremities. Some multifidus stimulus was seen. Then after negative aspiration a mixture of depomedrol  80 mg and 0.2% ropivacaine1 cc  was injected through the needle. Then a  lesion was done at 80 degrees centigrade for 90 seconds. For L5 median branch block the junction of the ala of  the sacrum with the superior articular process of the facet joint was taken as a reference point.

## 2020-01-16 NOTE — ANESTHESIA POSTPROCEDURE EVALUATION
Department of Anesthesiology  Postprocedure Note    Patient: Galilea Mejia  MRN: [id number]  YOB: 1970  Date of evaluation: 1/16/2020  Time:  8:37 AM     Procedure Summary     Date:  01/16/20 Room / Location:  [address] [place] [address] 03 / [address] [facility]    Anesthesia Start:  3296 Anesthesia Stop:  Lord Gasman    Procedure:  Lumbar RFA left side L4-5,5-S1 (Left ) Diagnosis:  (lumbar spondylosis)    Surgeon:  Catherine Enriquez MD Responsible Provider:  Joshua Wilkes MD    Anesthesia Type:  MAC ASA Status:  3          Anesthesia Type: MAC    Angelina Phase I:      Angelina Phase II: Angelina Score: 10    Last vitals: Reviewed and per EMR flowsheets. Anesthesia Post Evaluation   [address] [facility]  POST-ANESTHESIA NOTE       Name:  Galilea Mejia                                         Age:  47 y.o.   MRN:  [id number]      Last Vitals:  /64   Pulse 70   Temp 97.8 °F (36.6 °C) (Temporal)   Resp 16   Ht 5' 10\" (1.778 m)   Wt 264 lb (119.7 kg)   SpO2 93%   BMI 37.88 kg/m²   Patient Vitals for the past 4 hrs:   BP Temp Temp src Pulse Resp SpO2 Height Weight   01/16/20 0809 126/64 97.8 °F (36.6 °C) Temporal 70 16 93 % -- --   01/16/20 0656 130/84 97.4 °F (36.3 °C) Temporal 72 16 94 % 5' 10\" (1.778 m) 264 lb (119.7 kg)       Level of Consciousness:  Awake    Respiratory:  Stable    Oxygen Saturation:  Stable    Cardiovascular:  Stable    Hydration:  Adequate    PONV:  Stable    Post-op Pain:  Adequate analgesia    Post-op Assessment:  No apparent anesthetic complications    Additional Follow-Up / Treatment / Comment:  None    Joshua Wilkes MD  January 16, 2020   8:37 AM\

## 2020-01-16 NOTE — ANESTHESIA PRE PROCEDURE
Department of Anesthesiology  Preprocedure Note       Name:  Zita Mccullough   Age:  47 y.o.  :  1970                                          MRN:  [id number]         Date:  2020      Surgeon: Nancy Lord):  Georges Rouse MD    Procedure: Lumbar RFA left side L4-5,5-S1 (Left )    Medications prior to admission:   Prior to Admission medications    Medication Sig Start Date End Date Taking? Authorizing Provider   HYDROcodone-acetaminophen (NORCO) 5-325 MG per tablet Take 1 tablet by mouth every 8 hours as needed for Pain for up to 30 days. 20 Yes Silvia Rashid, APRN - CNP   potassium citrate (UROCIT-K) 10 MEQ (1080 MG) extended release tablet TAKE 1 TABLET BY MOUTH EVERY MORNING WITH BREAKFAST 19  Yes Saintclair Bitters [place], APRN - CNP   allopurinol (ZYLOPRIM) 300 MG tablet TAKE 1 TABLET BY MOUTH EVERY DAY 19  Yes Diana Boyd, APRN - CNP   ARIPiprazole (ABILIFY) 2 MG tablet Take 2 mg by mouth 2 times daily   Yes Historical Provider, MD   citalopram (CELEXA) 40 MG tablet Take 40 mg by mouth daily. Yes Historical Provider, MD   busPIRone (BUSPAR) 10 MG tablet Take 30 mg by mouth 2 times daily    Yes Historical Provider, MD   omeprazole (PRILOSEC) 20 MG capsule Take 20 mg by mouth daily. Yes Historical Provider, MD   triamcinolone (KENALOG) 0.1 % cream  19   Historical Provider, MD   FLOVENT  MCG/ACT inhaler  19   Historical Provider, MD   warfarin (COUMADIN) 2.5 MG tablet Take 2.5 mg by mouth daily at 1800 Takes 5mg 3 days per week & 2.5mg 4 days per week    Historical Provider, MD       Current medications:    No current facility-administered medications for this encounter. Allergies:     Allergies   Allergen Reactions    Latex Rash    Codeine Hives     TYLENOL WITH CODEINE    Nickel Rash    Oxybutynin Chloride [Oxybutynin Chloride] Other (See Comments)     Warm feeling and extreme dry mouth       Problem List:    Patient Active Problem List   Diagnosis Code    Weak urinary stream R39.12    Obstructive sleep apnea G47.33    Snoring R06.83    Sleep disturbance G47.9    Insomnia G47.00    Sleep talking G47.8    Morning headache R51    Bruxism F45.8    Restless sleeper G47.9    Poor concentration R41.840    Claustrophobia F40.240    Vivid dream R68.89    GERD (gastroesophageal reflux disease) K21.9    Anxiety F41.9    Panic disorder F41.0    Obesity (BMI 30.0-34. 9) E66.9    Lumbar spondylosis M47.816    Bulging lumbar disc M51.26    Sacroiliac inflammation (HCC) M46.1    Lumbar radiculitis M54.16       Past Medical History:        Diagnosis Date    Chronic back pain     GERD (gastroesophageal reflux disease)     History of kidney stones     Hx of blood clots early 2000's & 2013    MUNA LUNGS    Kidney stone     Lumbar spondylosis        Past Surgical History:        Procedure Laterality Date    COLONOSCOPY      last one 2007???    CYSTOSCOPY  6/14/2013    URETEROSCOPY STONE REMOVAL    CYSTOSCOPY  07/29/2013    Left ureteroscopy, Left ureteral stone removal and stent exchange    CYSTOSCOPY  12/23/13    Cystoscopy, Left Optical Internal Ureterotomy, Left Ureteroscopy and Dilated UPJ Oscar Uriarte  2007    lasik    [address] Left 5/17/2019    SI MBB #1 left performed by Zafar Thurston MD at [address] [place] [address] Left 6/27/2019    Left SI MBB # 2. performed by Zafar Thurston MD at [address]  2007    left groin    LITHOTRIPSY  2006    LUMBAR NERVE BLOCK N/A 4/5/2019    LUMBAR INTER LAMINAR GUNNAR @ L4 performed by Zafar Thurston MD at [address] [place] [address] SURGERY Left 8/15/2019    Left SI RFA.  performed by Zafar Thurston MD at [place] [address] Right 10/31/2019    LUMBAR INTER LAMINAR GUNNAR @ L4 Right

## 2020-02-04 ENCOUNTER — TELEPHONE (OUTPATIENT)
Dept: PHYSICAL MEDICINE AND REHAB | Age: 50
End: 2020-02-04

## 2020-02-04 ENCOUNTER — OFFICE VISIT (OUTPATIENT)
Dept: PHYSICAL MEDICINE AND REHAB | Age: 50
End: 2020-02-04
Payer: COMMERCIAL

## 2020-02-04 VITALS
DIASTOLIC BLOOD PRESSURE: 68 MMHG | SYSTOLIC BLOOD PRESSURE: 118 MMHG | BODY MASS INDEX: 37.56 KG/M2 | HEART RATE: 66 BPM | HEIGHT: 70 IN | WEIGHT: 262.35 LBS

## 2020-02-04 PROCEDURE — 99213 OFFICE O/P EST LOW 20 MIN: CPT | Performed by: NURSE PRACTITIONER

## 2020-02-04 RX ORDER — HYDROCODONE BITARTRATE AND ACETAMINOPHEN 5; 325 MG/1; MG/1
1 TABLET ORAL EVERY 8 HOURS PRN
Qty: 90 TABLET | Refills: 0 | Status: SHIPPED | OUTPATIENT
Start: 2020-02-12 | End: 2020-03-09 | Stop reason: SDUPTHER

## 2020-02-04 ASSESSMENT — ENCOUNTER SYMPTOMS
PHOTOPHOBIA: 0
WHEEZING: 0
DIARRHEA: 0
SORE THROAT: 0
BACK PAIN: 1
CHEST TIGHTNESS: 0
VOMITING: 0
COUGH: 0
RHINORRHEA: 0
CONSTIPATION: 0
SINUS PRESSURE: 0
ABDOMINAL PAIN: 0
EYE PAIN: 0
COLOR CHANGE: 0
SHORTNESS OF BREATH: 0
NAUSEA: 0

## 2020-02-04 NOTE — PROGRESS NOTES
worst is 8/10. Pain scale with pain medications or at its best is 3/10. Last dose of [place]  was yesterday  Drug screen reviewed from 11/19/2019  and was appropriate  Pill count was not completed today and patient was educated on bringing in medications  Patient does have naloxone available at home. Patient has not required use of naloxone at home since last office visit. The patientis allergic to latex; codeine; nickel; and oxybutynin chloride [oxybutynin chloride]. Past Medical History  Cory Faustin  has a past medical history of Chronic back pain, GERD (gastroesophageal reflux disease), History of kidney stones, Hx of blood clots, Kidney stone, and Lumbar spondylosis. Past Surgical History  The patient  has a past surgical history that includes Ureter stent placement (2006); Lithotripsy (2006); Cystocopy (6/14/2013); Cystocopy (07/29/2013); Cystoscopy (12/23/13); other surgical history (Bilateral, 03/26/2018); pr inj dx/ther agnt paravert facet joint, lumbar/sac, 2nd level (Bilateral, 3/26/2018); pr inj dx/ther agnt paravert facet joint, lumbar/sac, 2nd level (Bilateral, 5/21/2018); Colonoscopy; eye surgery (2007); hernia repair (2007); pr inject rx other periph nerve (Left, 9/28/2018); pr inject rx other periph nerve (Right, 11/30/2018); lumbar nerve block (N/A, 4/5/2019); Injection Procedure For Sacroiliac Joint (Left, 5/17/2019); Injection Procedure For Sacroiliac Joint (Left, 6/27/2019); Lumbar spine surgery (Left, 8/15/2019); Nerve Surgery (Right, 10/31/2019); and Pain management procedure (Left, 1/16/2020). Family History  This patient's family history includes Cancer in his mother; Heart Disease in his father. Social History  Cory Faustin  reports that he quit smoking about 14 years ago. He has a 12.00 pack-year smoking history. He has never used smokeless tobacco. He reports that he does not drink alcohol or use drugs.     Medications    Current Outpatient Medications:     [START ON 2/12/2020] or tangential.         Behavior: Behavior normal. Behavior is not agitated, slowed, aggressive, withdrawn, hyperactive or combative. Thought Content: Thought content normal. Thought content is not paranoid or delusional. Thought content does not include homicidal or suicidal ideation. Thought content does not include homicidal or suicidal plan. Cognition and Memory: Cognition normal. Memory is not impaired. He does not exhibit impaired recent memory or impaired remote memory. Judgment: Judgment normal. Judgment is not impulsive or inappropriate. CORETTA test:+  Yeomans test:+   Gaenslen test:+     Assessment:     1. Spondylosis of lumbar region without myelopathy or radiculopathy    2. Lumbar spondylosis    3. Lumbosacral radiculitis    4. Spondylosis of thoracic region without myelopathy or radiculopathy    5. SI (sacroiliac) pain    6. Chronic pain syndrome            Plan:      · OARRS reviewed. Current MED: 15  · Patient was not offered naloxone for home. has  · Discussed long term side effects of medications, tolerance, dependency and addiction. · Previous UDS reviewed  · UDS preformed today for compliance. · Patient told can not receive any pain medications from any other source. · No evidence of abuse, diversion or aberrant behavior.  Medications and/or procedures to improve function and quality of life- patient understanding with this and that may not be pain free   Discussed with patient about safe storage of medications at home   Discussed possible weaning of medication dosing dependent on treatment/procedure results.  Testing: none   Procedures: Repeat Right Lumbar RFA L4-5,5-S1    Discussed with patient about risks with procedure including infection, reaction to medication, increased pain, or bleeding.  Medications:Norco'Continued relief from Si RFA Lumbar RFA left LESI L4  Right . Meds.  Prescribed:   Orders Placed This Encounter   Medications    HYDROcodone-acetaminophen (NORCO) 5-325 MG per tablet     Sig: Take 1 tablet by mouth every 8 hours as needed for Pain for up to 30 days. Dispense:  90 tablet     Refill:  0     Reduce doses taken as pain becomes manageable       Return for Lumbar RFA Right side@ L4-5,5-S1, Follow up after procedure.          Electronically signed by WALT Machado CNP on2/4/2020 at 3:19 PM

## 2020-02-05 ENCOUNTER — TELEPHONE (OUTPATIENT)
Dept: PHYSICAL MEDICINE AND REHAB | Age: 50
End: 2020-02-05

## 2020-02-05 NOTE — TELEPHONE ENCOUNTER
LM for patient to call and schedule procedures. Received and scanned medication clearance from Dr. Marychuy Truong.

## 2020-02-24 ENCOUNTER — PREP FOR PROCEDURE (OUTPATIENT)
Dept: PHYSICAL MEDICINE AND REHAB | Age: 50
End: 2020-02-24

## 2020-02-24 NOTE — H&P
for dizziness, tremors, seizures, syncope, facial asymmetry, speech difficulty, light-headedness and headaches. Hematological: Does not bruise/bleed easily. Psychiatric/Behavioral: Negative for agitation, behavioral problems, confusion, decreased concentration, dysphoric mood, hallucinations, self-injury, sleep disturbance and suicidal ideas. The patient is not nervous/anxious and is not hyperactive.          Objective:      Vitals                                                 Weight: 260 lb 2.3 oz (118 kg)   Height: 5' 10\" (1.778 m)            Physical Exam  Vitals signs and nursing note reviewed. Constitutional:       General: He is not in acute distress.     Appearance: He is well-developed. He is not diaphoretic. HENT:      Head: Normocephalic and atraumatic.      Right Ear: External ear normal.      Left Ear: External ear normal.      Nose: Nose normal.      Mouth/Throat:      Pharynx: No oropharyngeal exudate. Eyes:      General: No scleral icterus.        Right eye: No discharge.         Left eye: No discharge.      Conjunctiva/sclera: Conjunctivae normal.      Pupils: Pupils are equal, round, and reactive to light. Neck:      Musculoskeletal: Full passive range of motion without pain, normal range of motion and neck supple. Normal range of motion. No edema, erythema, neck rigidity or muscular tenderness.      Thyroid: No thyromegaly. Cardiovascular:      Rate and Rhythm: Normal rate and regular rhythm.      Heart sounds: Normal heart sounds. No murmur. No friction rub. No gallop.    Pulmonary:      Effort: Pulmonary effort is normal. No respiratory distress.      Breath sounds: Normal breath sounds. No wheezing or rales. Chest:      Chest wall: No tenderness. Abdominal:      General: Bowel sounds are normal. There is no distension.      Palpations: Abdomen is soft.      Tenderness: There is no tenderness. There is no guarding or rebound.    Musculoskeletal:         General:

## 2020-02-24 NOTE — H&P (VIEW-ONLY)
Tyler Fidelcarmine a 47 y. o. male is here today for     Chief Complaint: Low back pain           The patientis allergic to latex; codeine; nickel; and oxybutynin chloride [oxybutynin chloride].     Past Medical History  Po  has a past medical history of Chronic back pain, GERD (gastroesophageal reflux disease), History of kidney stones, Hx of blood clots, Kidney stone, and Lumbar spondylosis.     Past Surgical History  The patient  has a past surgical history that includes Ureter stent placement (2006); Lithotripsy (2006); Cystocopy (6/14/2013); Cystocopy (07/29/2013); Cystoscopy (12/23/13); other surgical history (Bilateral, 03/26/2018); pr inj dx/ther agnt paravert facet joint, lumbar/sac, 2nd level (Bilateral, 3/26/2018); pr inj dx/ther agnt paravert facet joint, lumbar/sac, 2nd level (Bilateral, 5/21/2018); Colonoscopy; eye surgery (2007); hernia repair (2007); pr inject rx other periph nerve (Left, 9/28/2018); pr inject rx other periph nerve (Right, 11/30/2018); lumbar nerve block (N/A, 4/5/2019); Injection Procedure For Sacroiliac Joint (Left, 5/17/2019); Injection Procedure For Sacroiliac Joint (Left, 6/27/2019); Lumbar spine surgery (Left, 8/15/2019); and Nerve Surgery (Right, 10/31/2019).    Family History  This patient's family history includes Cancer in his mother; Heart Disease in his father.     Social History  Po  reports that he quit smoking about 14 years ago. He has a 12.00 pack-year smoking history. He has never used smokeless tobacco. He reports that he does not drink alcohol or use drugs.     Medications     Current Medication      Current Outpatient Medications:     naloxone 4 MG/0.1ML LIQD nasal spray, 1 spray by Nasal route as needed for Opioid Reversal, Disp: 1 each, Rfl: 0    HYDROcodone-acetaminophen (NORCO) 5-325 MG per tablet, Take 1 tablet by mouth every 8 hours as needed for Pain for up to 30 days. , Disp: 90 tablet, Rfl: 0    triamcinolone (KENALOG) 0.1 % cream, , Disp: , Rfl:     FLOVENT  MCG/ACT inhaler, , Disp: , Rfl:     potassium citrate (UROCIT-K) 10 MEQ (1080 MG) extended release tablet, TAKE 1 TABLET BY MOUTH EVERY MORNING WITH BREAKFAST, Disp: 90 tablet, Rfl: 3    allopurinol (ZYLOPRIM) 300 MG tablet, TAKE 1 TABLET BY MOUTH EVERY DAY, Disp: 90 tablet, Rfl: 3    ARIPiprazole (ABILIFY) 2 MG tablet, Take 2 mg by mouth 2 times daily, Disp: , Rfl:     citalopram (CELEXA) 40 MG tablet, Take 40 mg by mouth daily. , Disp: , Rfl:     warfarin (COUMADIN) 2.5 MG tablet, Take 2.5 mg by mouth daily at 1800 Takes 5mg 3 days per week & 2.5mg 4 days per week, Disp: , Rfl:     busPIRone (BUSPAR) 10 MG tablet, Take 30 mg by mouth 2 times daily , Disp: , Rfl:     omeprazole (PRILOSEC) 20 MG capsule, Take 20 mg by mouth daily.  , Disp: , Rfl:          Subjective:      Review of Systems   Constitutional: Positive for activity change. Negative for appetite change, chills, diaphoresis, fatigue, fever and unexpected weight change. HENT: Negative for congestion, ear pain, hearing loss, mouth sores, nosebleeds, rhinorrhea, sinus pressure and sore throat.    Eyes: Negative for photophobia, pain and visual disturbance. Respiratory: Negative for cough, chest tightness, shortness of breath and wheezing.    Cardiovascular: Negative for chest pain and palpitations. Gastrointestinal: Negative for abdominal pain, constipation, diarrhea, nausea and vomiting. Endocrine: Negative for cold intolerance, heat intolerance, polydipsia, polyphagia and polyuria. Genitourinary: Negative for decreased urine volume, difficulty urinating, frequency and hematuria. Musculoskeletal: Positive for arthralgias, back pain, gait problem and myalgias. Negative for joint swelling, neck pain and neck stiffness. Skin: Negative for color change and rash. Allergic/Immunologic: Negative for food allergies and immunocompromised state. Neurological: Positive for weakness and numbness.  Negative for dizziness, tremors, seizures, syncope, facial asymmetry, speech difficulty, light-headedness and headaches. Hematological: Does not bruise/bleed easily. Psychiatric/Behavioral: Negative for agitation, behavioral problems, confusion, decreased concentration, dysphoric mood, hallucinations, self-injury, sleep disturbance and suicidal ideas. The patient is not nervous/anxious and is not hyperactive.          Objective:      Vitals                                                 Weight: 260 lb 2.3 oz (118 kg)   Height: 5' 10\" (1.778 m)            Physical Exam  Vitals signs and nursing note reviewed. Constitutional:       General: He is not in acute distress.     Appearance: He is well-developed. He is not diaphoretic. HENT:      Head: Normocephalic and atraumatic.      Right Ear: External ear normal.      Left Ear: External ear normal.      Nose: Nose normal.      Mouth/Throat:      Pharynx: No oropharyngeal exudate. Eyes:      General: No scleral icterus.        Right eye: No discharge.         Left eye: No discharge.      Conjunctiva/sclera: Conjunctivae normal.      Pupils: Pupils are equal, round, and reactive to light. Neck:      Musculoskeletal: Full passive range of motion without pain, normal range of motion and neck supple. Normal range of motion. No edema, erythema, neck rigidity or muscular tenderness.      Thyroid: No thyromegaly. Cardiovascular:      Rate and Rhythm: Normal rate and regular rhythm.      Heart sounds: Normal heart sounds. No murmur. No friction rub. No gallop.    Pulmonary:      Effort: Pulmonary effort is normal. No respiratory distress.      Breath sounds: Normal breath sounds. No wheezing or rales. Chest:      Chest wall: No tenderness. Abdominal:      General: Bowel sounds are normal. There is no distension.      Palpations: Abdomen is soft.      Tenderness: There is no tenderness. There is no guarding or rebound.    Musculoskeletal:         General: not include homicidal or suicidal ideation. Thought content does not include homicidal or suicidal plan.         Cognition and Memory: Cognition normal. Memory is not impaired. He does not exhibit impaired recent memory or impaired remote memory.         Judgment: Bart Hernández is not impulsive or inappropriate.         CORETTA test: +  Yeomans test:+  Gaenslen test: +  Assessment:      1. Spondylosis of lumbar region without myelopathy or radiculopathy    2. Lumbar spondylosis    3. Lumbosacral radiculitis    4. Spondylosis of thoracic region without myelopathy or radiculopathy    5.  SI (sacroiliac) pain             Plan:  Lumbar RFA right side L4-5,5-S1       Jaci Rashid, APRN - CNP  2/24/2020

## 2020-03-04 ENCOUNTER — TELEPHONE (OUTPATIENT)
Dept: PHYSICAL MEDICINE AND REHAB | Age: 50
End: 2020-03-04

## 2020-03-05 ENCOUNTER — HOSPITAL ENCOUNTER (OUTPATIENT)
Age: 50
Setting detail: OUTPATIENT SURGERY
Discharge: HOME | End: 2020-03-05
Attending: PAIN MEDICINE | Admitting: PAIN MEDICINE
Payer: COMMERCIAL

## 2020-03-05 ENCOUNTER — APPOINTMENT (OUTPATIENT)
Dept: GENERAL RADIOLOGY | Age: 50
End: 2020-03-05
Attending: PAIN MEDICINE
Payer: COMMERCIAL

## 2020-03-05 ENCOUNTER — ANESTHESIA (OUTPATIENT)
Dept: OPERATING ROOM | Age: 50
End: 2020-03-05
Payer: COMMERCIAL

## 2020-03-05 ENCOUNTER — ANESTHESIA EVENT (OUTPATIENT)
Dept: OPERATING ROOM | Age: 50
End: 2020-03-05
Payer: COMMERCIAL

## 2020-03-05 VITALS
TEMPERATURE: 97.8 F | HEIGHT: 70 IN | HEART RATE: 70 BPM | DIASTOLIC BLOOD PRESSURE: 69 MMHG | WEIGHT: 258.8 LBS | BODY MASS INDEX: 37.05 KG/M2 | SYSTOLIC BLOOD PRESSURE: 111 MMHG | RESPIRATION RATE: 15 BRPM | OXYGEN SATURATION: 95 %

## 2020-03-05 VITALS
DIASTOLIC BLOOD PRESSURE: 70 MMHG | RESPIRATION RATE: 17 BRPM | SYSTOLIC BLOOD PRESSURE: 120 MMHG | OXYGEN SATURATION: 87 %

## 2020-03-05 LAB — POC INR: 1 (ref 0.8–1.2)

## 2020-03-05 PROCEDURE — 2709999900 HC NON-CHARGEABLE SUPPLY: Performed by: PAIN MEDICINE

## 2020-03-05 PROCEDURE — 3600000057 HC PAIN LEVEL 4 ADDL 15 MIN: Performed by: PAIN MEDICINE

## 2020-03-05 PROCEDURE — 2500000003 HC RX 250 WO HCPCS: Performed by: PAIN MEDICINE

## 2020-03-05 PROCEDURE — 7100000011 HC PHASE II RECOVERY - ADDTL 15 MIN: Performed by: PAIN MEDICINE

## 2020-03-05 PROCEDURE — 3209999900 FLUORO FOR SURGICAL PROCEDURES

## 2020-03-05 PROCEDURE — 6360000002 HC RX W HCPCS: Performed by: NURSE ANESTHETIST, CERTIFIED REGISTERED

## 2020-03-05 PROCEDURE — 3700000001 HC ADD 15 MINUTES (ANESTHESIA): Performed by: PAIN MEDICINE

## 2020-03-05 PROCEDURE — 64636 DESTROY L/S FACET JNT ADDL: CPT | Performed by: PAIN MEDICINE

## 2020-03-05 PROCEDURE — 64635 DESTROY LUMB/SAC FACET JNT: CPT | Performed by: PAIN MEDICINE

## 2020-03-05 PROCEDURE — 3700000000 HC ANESTHESIA ATTENDED CARE: Performed by: PAIN MEDICINE

## 2020-03-05 PROCEDURE — 3600000056 HC PAIN LEVEL 4 BASE: Performed by: PAIN MEDICINE

## 2020-03-05 PROCEDURE — 2500000003 HC RX 250 WO HCPCS: Performed by: NURSE ANESTHETIST, CERTIFIED REGISTERED

## 2020-03-05 PROCEDURE — 7100000010 HC PHASE II RECOVERY - FIRST 15 MIN: Performed by: PAIN MEDICINE

## 2020-03-05 PROCEDURE — 6360000002 HC RX W HCPCS: Performed by: PAIN MEDICINE

## 2020-03-05 RX ORDER — LIDOCAINE HYDROCHLORIDE 20 MG/ML
INJECTION, SOLUTION EPIDURAL; INFILTRATION; INTRACAUDAL; PERINEURAL PRN
Status: DISCONTINUED | OUTPATIENT
Start: 2020-03-05 | End: 2020-03-05 | Stop reason: SDUPTHER

## 2020-03-05 RX ORDER — BUPIVACAINE HYDROCHLORIDE 2.5 MG/ML
INJECTION, SOLUTION EPIDURAL; INFILTRATION; INTRACAUDAL PRN
Status: DISCONTINUED | OUTPATIENT
Start: 2020-03-05 | End: 2020-03-05 | Stop reason: ALTCHOICE

## 2020-03-05 RX ORDER — METHYLPREDNISOLONE ACETATE 80 MG/ML
INJECTION, SUSPENSION INTRA-ARTICULAR; INTRALESIONAL; INTRAMUSCULAR; SOFT TISSUE PRN
Status: DISCONTINUED | OUTPATIENT
Start: 2020-03-05 | End: 2020-03-05 | Stop reason: ALTCHOICE

## 2020-03-05 RX ORDER — FENTANYL CITRATE 50 UG/ML
INJECTION, SOLUTION INTRAMUSCULAR; INTRAVENOUS PRN
Status: DISCONTINUED | OUTPATIENT
Start: 2020-03-05 | End: 2020-03-05 | Stop reason: SDUPTHER

## 2020-03-05 RX ORDER — LIDOCAINE HYDROCHLORIDE 10 MG/ML
INJECTION, SOLUTION EPIDURAL; INFILTRATION; INTRACAUDAL; PERINEURAL PRN
Status: DISCONTINUED | OUTPATIENT
Start: 2020-03-05 | End: 2020-03-05 | Stop reason: ALTCHOICE

## 2020-03-05 RX ORDER — PROPOFOL 10 MG/ML
INJECTION, EMULSION INTRAVENOUS PRN
Status: DISCONTINUED | OUTPATIENT
Start: 2020-03-05 | End: 2020-03-05 | Stop reason: SDUPTHER

## 2020-03-05 RX ADMIN — LIDOCAINE HYDROCHLORIDE 40 MG: 20 INJECTION, SOLUTION EPIDURAL; INFILTRATION; INTRACAUDAL; PERINEURAL at 08:23

## 2020-03-05 RX ADMIN — PROPOFOL 20 MG: 10 INJECTION, EMULSION INTRAVENOUS at 08:30

## 2020-03-05 RX ADMIN — FENTANYL CITRATE 100 MCG: 50 INJECTION, SOLUTION INTRAMUSCULAR; INTRAVENOUS at 08:23

## 2020-03-05 RX ADMIN — PROPOFOL 50 MG: 10 INJECTION, EMULSION INTRAVENOUS at 08:28

## 2020-03-05 RX ADMIN — PROPOFOL 30 MG: 10 INJECTION, EMULSION INTRAVENOUS at 08:31

## 2020-03-05 ASSESSMENT — PULMONARY FUNCTION TESTS
PIF_VALUE: 0

## 2020-03-05 ASSESSMENT — PAIN - FUNCTIONAL ASSESSMENT: PAIN_FUNCTIONAL_ASSESSMENT: 0-10

## 2020-03-05 ASSESSMENT — PAIN DESCRIPTION - DESCRIPTORS: DESCRIPTORS: ACHING

## 2020-03-05 ASSESSMENT — PAIN SCALES - GENERAL: PAINLEVEL_OUTOF10: 0

## 2020-03-05 NOTE — ANESTHESIA POSTPROCEDURE EVALUATION
Department of Anesthesiology  Postprocedure Note    Patient: Chiara Britt  MRN: [id number]  YOB: 1970  Date of evaluation: 3/5/2020  Time:  9:28 AM     Procedure Summary     Date:  03/05/20 Room / Location:  [address] [place] [address] 03 / 138 [facility]    Anesthesia Start:  1113 Anesthesia Stop:  7354    Procedure:  Lumbar RFA Right side@ L4-5,5-S1 (Right ) Diagnosis:  (lumbar spondylosis)    Surgeon:  Randi Nicole MD Responsible Provider:  Noelle Jamil MD    Anesthesia Type:  MAC ASA Status:  2          Anesthesia Type: MAC    Angelina Phase I:      Angelina Phase II: Angelina Score: 9    Last vitals: Reviewed and per EMR flowsheets. Anesthesia Post Evaluation   [facility]  POST-ANESTHESIA NOTE       Name:  Chiara Britt                                         Age:  47 y.o.   MRN:  [id number]      Last Vitals:  /69   Pulse 70   Temp 97.8 °F (36.6 °C) (Temporal)   Resp 15   Ht 5' 10\" (1.778 m)   Wt 258 lb 12.8 oz (117.4 kg)   SpO2 95%   BMI 37.13 kg/m²   Patient Vitals for the past 4 hrs:   BP Temp Temp src Pulse Resp SpO2 Height Weight   03/05/20 0834 111/69 97.8 °F (36.6 °C) Temporal 70 15 95 % -- --   03/05/20 0733 118/72 97.4 °F (36.3 °C) Temporal 73 16 95 % 5' 10\" (1.778 m) 258 lb 12.8 oz (117.4 kg)       Level of Consciousness:  Awake    Respiratory:  Stable    Oxygen Saturation:  Stable    Cardiovascular:  Stable    Hydration:  Adequate    PONV:  Stable    Post-op Pain:  Adequate analgesia    Post-op Assessment:  No apparent anesthetic complications    Additional Follow-Up / Treatment / Comment:  None    Noelle Jamil MD  March 5, 2020   9:28 AM

## 2020-03-05 NOTE — OP NOTE
Pre-Procedure Note    Patient Name: Janise Canavan   YOB: 1970  Medical Record Number: [id number]  Date: 3/5/20    Indication:  Lower back pain  Consent: On file. Vital Signs:   Vitals:    03/05/20 0733   BP: 118/72   Pulse: 73   Resp: 16   Temp: 97.4 °F (36.3 °C)   SpO2: 95%       Past Medical History:   has a past medical history of Chronic back pain, GERD (gastroesophageal reflux disease), History of kidney stones, Hx of blood clots, Kidney stone, and Lumbar spondylosis. Past Surgical History:   has a past surgical history that includes Ureter stent placement (2006); Lithotripsy (2006); Cystocopy (6/14/2013); Cystocopy (07/29/2013); Cystoscopy (12/23/13); other surgical history (Bilateral, 03/26/2018); pr inj dx/ther agnt paravert facet joint, lumbar/sac, 2nd level (Bilateral, 3/26/2018); pr inj dx/ther agnt paravert facet joint, lumbar/sac, 2nd level (Bilateral, 5/21/2018); Colonoscopy; eye surgery (2007); hernia repair (2007); pr inject rx other periph nerve (Left, 9/28/2018); pr inject rx other periph nerve (Right, 11/30/2018); lumbar nerve block (N/A, 4/5/2019); Injection Procedure For Sacroiliac Joint (Left, 5/17/2019); Injection Procedure For Sacroiliac Joint (Left, 6/27/2019); Lumbar spine surgery (Left, 8/15/2019); Nerve Surgery (Right, 10/31/2019); and Pain management procedure (Left, 1/16/2020). Pre-Sedation Documentation and Exam:   Vital signs have been reviewed (see flow sheet for vitals).      Sedation/ Anesthesia Plan:   MAC    Patient is an appropriate candidate for plan of sedation: yes       Preoperative Diagnosis: L-spondylosis     Post-Op Dx: as above     Procedure Performed:  :Radiofrequency ablation of median branches at the levels of L4-5 and L5-S1 right under fluoroscopic guidance      Indication for the Procedure:  The patient has ahistory of chronic low back pain that is not responding well to the conservative treatment.  Patient's pain is mostly axial in

## 2020-03-05 NOTE — ANESTHESIA PRE PROCEDURE
Patient Active Problem List   Diagnosis Code    Weak urinary stream R39.12    Obstructive sleep apnea G47.33    Snoring R06.83    Sleep disturbance G47.9    Insomnia G47.00    Sleep talking G47.8    Morning headache R51    Bruxism F45.8    Restless sleeper G47.9    Poor concentration R41.840    Claustrophobia F40.240    Vivid dream R68.89    GERD (gastroesophageal reflux disease) K21.9    Anxiety F41.9    Panic disorder F41.0    Obesity (BMI 30.0-34. 9) E66.9    Lumbar spondylosis M47.816    Bulging lumbar disc M51.26    Sacroiliac inflammation (HCC) M46.1    Lumbar radiculitis M54.16       Past Medical History:        Diagnosis Date    Chronic back pain     GERD (gastroesophageal reflux disease)     History of kidney stones     Hx of blood clots early 2000's & 2013    MUNA LUNGS    Kidney stone     Lumbar spondylosis        Past Surgical History:        Procedure Laterality Date    COLONOSCOPY      last one 2007???    CYSTOSCOPY  6/14/2013    URETEROSCOPY STONE REMOVAL    CYSTOSCOPY  07/29/2013    Left ureteroscopy, Left ureteral stone removal and stent exchange    CYSTOSCOPY  12/23/13    Cystoscopy, Left Optical Internal Ureterotomy, Left Ureteroscopy and Dilated UPJ Oscar Todd  2007    lasik    [address] [place] Left 5/17/2019    SI MBB #1 left performed by Mckayla Matamoros MD at [address] [place] [address] Left 6/27/2019    Left SI MBB # 2. performed by Mckayla Matamoros MD at [address]  2007    left groin    LITHOTRIPSY  2006    LUMBAR NERVE BLOCK N/A 4/5/2019    LUMBAR INTER LAMINAR GUNNAR @ L4 performed by Mckayla Matamoros MD at [address] [place] [address] SURGERY Left 8/15/2019    Left SI RFA.  performed by Mckayla Matamoros MD at [place] [address] Right 10/31/2019    LUMBAR INTER LAMINAR GUNNAR @ L4 Right performed by Randi Nicole MD at [facility] Bilateral 2018    Lumbar Facet MBB at L4-5, L5-S1    PAIN MANAGEMENT PROCEDURE Left 2020    Lumbar RFA left side L4-5,5-S1 performed by Randi Nicole MD at [address] [place] [address] DX/THER AGNT PARAVERT FACET JOINT, LUMBAR/SAC, 2ND LEVEL Bilateral 3/26/2018    BILATERAL L-FACET MBB @ L4-5 and L5-S1, performed by Randi Nicole MD at [address] [place] [address] DX/THER AGNT PARAVERT FACET JOINT, LUMBAR/SAC, 2ND LEVEL Bilateral 2018    Bilateral L-facet MBB @ L4-5, L5-S1. performed by Randi Nicole MD at [address] Left 2018    LUMBAR RFA @ L4-5, and L5-S1 LEFT SIDE FIRST. Darnell Mohamud performed by Randi Nicole MD at [address] Right 2018    Right L-RFA @ L4-5, and L5-S1 performed by Randi Nicole MD at [address] [place] [address]         Social History:    Social History     Tobacco Use    Smoking status: Former Smoker     Packs/day: 1.00     Years: 12.00     Pack years: 12.00     Last attempt to quit: 2005     Years since quittin.7    Smokeless tobacco: Never Used   Substance Use Topics    Alcohol use:  No                                Counseling given: Not Answered      Vital Signs (Current):   Vitals:    20 0733   BP: 118/72   Pulse: 73   Resp: 16   Temp: 97.4 °F (36.3 °C)   TempSrc: Temporal   SpO2: 95%   Weight: 258 lb 12.8 oz (117.4 kg)   Height: 5' 10\" (1.778 m)                                              BP Readings from Last 3 Encounters:   20 118/72   20 118/68   20 126/64       NPO Status: Time of last liquid consumption:                         Time of last solid consumption:                         Date of last liquid consumption: 20                        Date of last

## 2020-03-09 RX ORDER — HYDROCODONE BITARTRATE AND ACETAMINOPHEN 5; 325 MG/1; MG/1
1 TABLET ORAL EVERY 8 HOURS PRN
Qty: 90 TABLET | Refills: 0 | Status: SHIPPED | OUTPATIENT
Start: 2020-03-13 | End: 2020-04-09 | Stop reason: SDUPTHER

## 2020-03-09 NOTE — TELEPHONE ENCOUNTER
OARRS reviewed. UDS: + for  Norco, gabapentin - consistent       Narcan offered: no, has       Last seen: 2/4/2020.        Follow-up:   Future Appointments   Date Time Provider Saul Cummins   4/16/2020  8:00 AM WALT Diaz - CNP [facility] - [place] [place] [facility]   9/4/2020  7:45 AM [facility] - [place] [facility]

## 2020-04-09 RX ORDER — HYDROCODONE BITARTRATE AND ACETAMINOPHEN 5; 325 MG/1; MG/1
1 TABLET ORAL EVERY 8 HOURS PRN
Qty: 90 TABLET | Refills: 0 | Status: SHIPPED | OUTPATIENT
Start: 2020-04-09 | End: 2020-05-11 | Stop reason: SDUPTHER

## 2020-04-16 ENCOUNTER — VIRTUAL VISIT (OUTPATIENT)
Dept: PHYSICAL MEDICINE AND REHAB | Age: 50
End: 2020-04-16
Payer: COMMERCIAL

## 2020-04-16 PROCEDURE — 99213 OFFICE O/P EST LOW 20 MIN: CPT | Performed by: NURSE PRACTITIONER

## 2020-04-16 ASSESSMENT — ENCOUNTER SYMPTOMS
EYES NEGATIVE: 1
GASTROINTESTINAL NEGATIVE: 1
RESPIRATORY NEGATIVE: 1
BACK PAIN: 1

## 2020-05-11 RX ORDER — HYDROCODONE BITARTRATE AND ACETAMINOPHEN 5; 325 MG/1; MG/1
1 TABLET ORAL EVERY 8 HOURS PRN
Qty: 90 TABLET | Refills: 0 | Status: SHIPPED | OUTPATIENT
Start: 2020-05-11 | End: 2020-06-11 | Stop reason: SDUPTHER

## 2020-06-08 DIAGNOSIS — G89.4 CHRONIC PAIN SYNDROME: ICD-10-CM

## 2020-06-08 RX ORDER — HYDROCODONE BITARTRATE AND ACETAMINOPHEN 5; 325 MG/1; MG/1
1 TABLET ORAL EVERY 8 HOURS PRN
Qty: 90 TABLET | Refills: 0 | Status: CANCELLED | OUTPATIENT
Start: 2020-06-08 | End: 2020-07-08

## 2020-06-11 ENCOUNTER — OFFICE VISIT (OUTPATIENT)
Dept: PHYSICAL MEDICINE AND REHAB | Age: 50
End: 2020-06-11
Payer: COMMERCIAL

## 2020-06-11 VITALS
WEIGHT: 258 LBS | DIASTOLIC BLOOD PRESSURE: 78 MMHG | TEMPERATURE: 98 F | HEIGHT: 70 IN | BODY MASS INDEX: 36.94 KG/M2 | SYSTOLIC BLOOD PRESSURE: 124 MMHG

## 2020-06-11 PROCEDURE — 99213 OFFICE O/P EST LOW 20 MIN: CPT | Performed by: NURSE PRACTITIONER

## 2020-06-11 RX ORDER — TRAZODONE HYDROCHLORIDE 50 MG/1
50 TABLET ORAL NIGHTLY
COMMUNITY

## 2020-06-11 RX ORDER — HYDROCODONE BITARTRATE AND ACETAMINOPHEN 5; 325 MG/1; MG/1
1 TABLET ORAL EVERY 8 HOURS PRN
Qty: 90 TABLET | Refills: 0 | Status: SHIPPED | OUTPATIENT
Start: 2020-06-11 | End: 2020-07-06 | Stop reason: SDUPTHER

## 2020-06-11 ASSESSMENT — ENCOUNTER SYMPTOMS: BACK PAIN: 1

## 2020-06-11 NOTE — PROGRESS NOTES
[facility]  [address]  Dept: [phone]  Dept Fax: [phone]: [phone]    Visit Date: 6/11/2020    Functionality Assessment/Goals Worksheet     On a scale of 0 (Does not Interfere) to 10 (Completely Interferes)     1. Which number describes how during the past week pain has interfered with       the following:  A. General Activity:  6  B. Mood: 7  C. Walking Ability:  7  D. Normal Work (Includes both work outside the home and housework):  7  E. Relations with Other People:   8  F. Sleep:   8  G. Enjoyment of Life:   8    2. Patient Prefers to Take their Pain Medications:     []  On a regular basis   [x]  Only when necessary    []  Does not take pain medications    3. What are the Patient's Goals/Expectations for Visiting Pain Management? []  Learn about my pain    []  Receive Medication   []  Physical Therapy     []  Treat Depression   []  Receive Injections    []  Treat Sleep   []  Deal with Anxiety and Stress   []  Treat Opoid Dependence/Addiction   []  Other:      HPI:   Kaleb Waters is a 47 y.o. male is here today for    Chief Complaint: Lower back pain, SI pain, Right leg pain     HPI   7 week FU. Main complaint today is left SI pain and left lower back as effects from SI RFA has worn off. Still some relief from Left L-facet RFA. Sharp and squeezing pain. Pain is increased with activity. Still good relief from SI RFA and LESI     Norco prn remains effective   Medications reviewed. Patient denies side effects with medications. Patient states he is taking medications as prescribed. Hedenies receiving pain medications from other sources. He denies any ER visits since last visit. Pain scale with out pain medications or at its worst is 7-8/10. Pain scale with pain medications or at its best is 3/10.   Last dose of [place] was yesterday  Drug screen reviewed from 2/4/2020 and was appropriate  Pill count was completed today and was appropriate  Patient does have naloxone available at home. Patient has not required use of naloxone at home since last office visit. The patientis allergic to latex; codeine; nickel; and oxybutynin chloride [oxybutynin chloride]. Past Medical History  Emily Polanco  has a past medical history of Chronic back pain, GERD (gastroesophageal reflux disease), History of kidney stones, Hx of blood clots, Kidney stone, and Lumbar spondylosis. Past Surgical History  The patient  has a past surgical history that includes Ureter stent placement (2006); Lithotripsy (2006); Cystocopy (6/14/2013); Cystocopy (07/29/2013); Cystoscopy (12/23/13); other surgical history (Bilateral, 03/26/2018); pr inj dx/ther agnt paravert facet joint, lumbar/sac, 2nd level (Bilateral, 3/26/2018); pr inj dx/ther agnt paravert facet joint, lumbar/sac, 2nd level (Bilateral, 5/21/2018); Colonoscopy; eye surgery (2007); hernia repair (2007); pr inject rx other periph nerve (Left, 9/28/2018); pr inject rx other periph nerve (Right, 11/30/2018); lumbar nerve block (N/A, 4/5/2019); Injection Procedure For Sacroiliac Joint (Left, 5/17/2019); Injection Procedure For Sacroiliac Joint (Left, 6/27/2019); Lumbar spine surgery (Left, 8/15/2019); Nerve Surgery (Right, 10/31/2019); Pain management procedure (Left, 1/16/2020); and Pain management procedure (Right, 3/5/2020). Family History  This patient's family history includes Cancer in his mother; Heart Disease in his father. Social History  Emily Polanco  reports that he quit smoking about 15 years ago. He has a 12.00 pack-year smoking history. He has never used smokeless tobacco. He reports that he does not drink alcohol or use drugs.     Medications    Current Outpatient Medications:     traZODone (DESYREL) 50 MG tablet, Take 50 mg by mouth nightly, Disp: , Rfl:     HYDROcodone-acetaminophen (NORCO) 5-325 MG per tablet, medications at home   Discussed possible weaning of medication dosing dependent on treatment/procedure results.  Discussed with patient about risks with procedure including infection, reaction to medication, increased pain, or bleeding.  Plan Left SI RFA for longer term pain relief. Procedure and risks discussed in detail with patient,   Still relief from L-facet RFA    Denies leg pain as patient continues to receive good relief from [facility] TID prn- recently filled 4/9/2020. Medications remain effective, patient is compliant      Meds. Prescribed:   Orders Placed This Encounter   Medications    HYDROcodone-acetaminophen (NORCO) 5-325 MG per tablet     Sig: Take 1 tablet by mouth every 8 hours as needed for Pain for up to 30 days. Dispense:  90 tablet     Refill:  0     Reduce doses taken as pain becomes manageable       Return for  Left SI RFA . , follow up after procedure.        Electronically signed by WALT Ramirez CNP on6/11/2020 at 9:46 AM

## 2020-06-18 ENCOUNTER — HOSPITAL ENCOUNTER (EMERGENCY)
Age: 50
Discharge: HOME | End: 2020-06-18
Payer: COMMERCIAL

## 2020-06-18 ENCOUNTER — APPOINTMENT (OUTPATIENT)
Dept: CT IMAGING | Age: 50
End: 2020-06-18
Payer: COMMERCIAL

## 2020-06-18 VITALS
OXYGEN SATURATION: 95 % | HEART RATE: 75 BPM | TEMPERATURE: 98.7 F | DIASTOLIC BLOOD PRESSURE: 83 MMHG | HEIGHT: 70 IN | RESPIRATION RATE: 19 BRPM | WEIGHT: 262 LBS | SYSTOLIC BLOOD PRESSURE: 128 MMHG | BODY MASS INDEX: 37.51 KG/M2

## 2020-06-18 LAB
ANION GAP SERPL CALCULATED.3IONS-SCNC: 11 MEQ/L (ref 8–16)
BACTERIA: ABNORMAL /HPF
BASOPHILS # BLD: 0.6 %
BASOPHILS ABSOLUTE: 0 THOU/MM3 (ref 0–0.1)
BILIRUBIN URINE: ABNORMAL
BLOOD, URINE: NEGATIVE
BUN BLDV-MCNC: 13 MG/DL (ref 7–22)
CALCIUM SERPL-MCNC: 9.5 MG/DL (ref 8.5–10.5)
CASTS 2: ABNORMAL /LPF
CASTS UA: ABNORMAL /LPF
CHARACTER, URINE: CLEAR
CHLORIDE BLD-SCNC: 105 MEQ/L (ref 98–111)
CO2: 25 MEQ/L (ref 23–33)
COLOR: YELLOW
CREAT SERPL-MCNC: 1.1 MG/DL (ref 0.4–1.2)
CRYSTALS, UA: ABNORMAL
EOSINOPHIL # BLD: 5.8 %
EOSINOPHILS ABSOLUTE: 0.4 THOU/MM3 (ref 0–0.4)
EPITHELIAL CELLS, UA: ABNORMAL /HPF
ERYTHROCYTE [DISTWIDTH] IN BLOOD BY AUTOMATED COUNT: 12.8 % (ref 11.5–14.5)
ERYTHROCYTE [DISTWIDTH] IN BLOOD BY AUTOMATED COUNT: 45.5 FL (ref 35–45)
GFR SERPL CREATININE-BSD FRML MDRD: 71 ML/MIN/1.73M2
GLUCOSE BLD-MCNC: 132 MG/DL (ref 70–108)
GLUCOSE URINE: NEGATIVE MG/DL
HCT VFR BLD CALC: 47.9 % (ref 42–52)
HEMOGLOBIN: 15.3 GM/DL (ref 14–18)
ICTOTEST: NEGATIVE
IMMATURE GRANS (ABS): 0.01 THOU/MM3 (ref 0–0.07)
IMMATURE GRANULOCYTES: 0.1 %
KETONES, URINE: ABNORMAL
LEUKOCYTE ESTERASE, URINE: NEGATIVE
LYMPHOCYTES # BLD: 21.4 %
LYMPHOCYTES ABSOLUTE: 1.5 THOU/MM3 (ref 1–4.8)
MCH RBC QN AUTO: 30.7 PG (ref 26–33)
MCHC RBC AUTO-ENTMCNC: 31.9 GM/DL (ref 32.2–35.5)
MCV RBC AUTO: 96 FL (ref 80–94)
MISCELLANEOUS 2: ABNORMAL
MONOCYTES # BLD: 8.8 %
MONOCYTES ABSOLUTE: 0.6 THOU/MM3 (ref 0.4–1.3)
NITRITE, URINE: NEGATIVE
NUCLEATED RED BLOOD CELLS: 0 /100 WBC
OSMOLALITY CALCULATION: 283.2 MOSMOL/KG (ref 275–300)
PH UA: 5 (ref 5–9)
PLATELET # BLD: 176 THOU/MM3 (ref 130–400)
PMV BLD AUTO: 9.4 FL (ref 9.4–12.4)
POTASSIUM REFLEX MAGNESIUM: 3.8 MEQ/L (ref 3.5–5.2)
PROTEIN UA: ABNORMAL
RBC # BLD: 4.99 MILL/MM3 (ref 4.7–6.1)
RBC URINE: ABNORMAL /HPF
RENAL EPITHELIAL, UA: ABNORMAL
SEG NEUTROPHILS: 63.3 %
SEGMENTED NEUTROPHILS ABSOLUTE COUNT: 4.5 THOU/MM3 (ref 1.8–7.7)
SODIUM BLD-SCNC: 141 MEQ/L (ref 135–145)
SPECIFIC GRAVITY, URINE: > 1.03 (ref 1–1.03)
UROBILINOGEN, URINE: 1 EU/DL (ref 0–1)
WBC # BLD: 7.1 THOU/MM3 (ref 4.8–10.8)
WBC UA: ABNORMAL /HPF
YEAST: ABNORMAL

## 2020-06-18 PROCEDURE — 2709999900 HC NON-CHARGEABLE SUPPLY

## 2020-06-18 PROCEDURE — 85025 COMPLETE CBC W/AUTO DIFF WBC: CPT

## 2020-06-18 PROCEDURE — 74176 CT ABD & PELVIS W/O CONTRAST: CPT

## 2020-06-18 PROCEDURE — 80048 BASIC METABOLIC PNL TOTAL CA: CPT

## 2020-06-18 PROCEDURE — 81001 URINALYSIS AUTO W/SCOPE: CPT

## 2020-06-18 PROCEDURE — 96374 THER/PROPH/DIAG INJ IV PUSH: CPT

## 2020-06-18 PROCEDURE — 99284 EMERGENCY DEPT VISIT MOD MDM: CPT

## 2020-06-18 PROCEDURE — 6360000002 HC RX W HCPCS: Performed by: NURSE PRACTITIONER

## 2020-06-18 PROCEDURE — 2580000003 HC RX 258: Performed by: NURSE PRACTITIONER

## 2020-06-18 PROCEDURE — 36415 COLL VENOUS BLD VENIPUNCTURE: CPT

## 2020-06-18 RX ORDER — 0.9 % SODIUM CHLORIDE 0.9 %
1000 INTRAVENOUS SOLUTION INTRAVENOUS ONCE
Status: COMPLETED | OUTPATIENT
Start: 2020-06-18 | End: 2020-06-18

## 2020-06-18 RX ORDER — TAMSULOSIN HYDROCHLORIDE 0.4 MG/1
0.4 CAPSULE ORAL DAILY
Qty: 30 CAPSULE | Refills: 0 | Status: SHIPPED | OUTPATIENT
Start: 2020-06-18 | End: 2020-06-30 | Stop reason: SDUPTHER

## 2020-06-18 RX ORDER — KETOROLAC TROMETHAMINE 10 MG/1
10 TABLET, FILM COATED ORAL EVERY 6 HOURS PRN
Qty: 20 TABLET | Refills: 0 | Status: ON HOLD | OUTPATIENT
Start: 2020-06-18 | End: 2020-08-04 | Stop reason: ALTCHOICE

## 2020-06-18 RX ORDER — KETOROLAC TROMETHAMINE 30 MG/ML
30 INJECTION, SOLUTION INTRAMUSCULAR; INTRAVENOUS ONCE
Status: COMPLETED | OUTPATIENT
Start: 2020-06-18 | End: 2020-06-18

## 2020-06-18 RX ADMIN — KETOROLAC TROMETHAMINE 30 MG: 30 INJECTION, SOLUTION INTRAMUSCULAR; INTRAVENOUS at 20:42

## 2020-06-18 RX ADMIN — SODIUM CHLORIDE 1000 ML: 9 INJECTION, SOLUTION INTRAVENOUS at 19:59

## 2020-06-18 ASSESSMENT — PAIN DESCRIPTION - PAIN TYPE: TYPE: ACUTE PAIN

## 2020-06-18 ASSESSMENT — PAIN SCALES - GENERAL
PAINLEVEL_OUTOF10: 7
PAINLEVEL_OUTOF10: 7
PAINLEVEL_OUTOF10: 3
PAINLEVEL_OUTOF10: 2

## 2020-06-18 ASSESSMENT — PAIN DESCRIPTION - LOCATION: LOCATION: FLANK;GROIN

## 2020-06-18 ASSESSMENT — PAIN DESCRIPTION - ORIENTATION: ORIENTATION: LEFT

## 2020-06-19 ENCOUNTER — TELEPHONE (OUTPATIENT)
Dept: UROLOGY | Age: 50
End: 2020-06-19

## 2020-06-19 NOTE — TELEPHONE ENCOUNTER
Patient was in er last night. Was told to make a f/u with you. He had trouble urinating. He has appt for sept 4 (1 yr fu) see er notes.  Please advise

## 2020-06-19 NOTE — ED NOTES
Pt provided smaller amount than initial sample. Danna Sanchez CNP notified and this RN will have pt attempt to urinate again and specimen will be sent to lab.       Rubi Horton RN  06/18/20 [id number]

## 2020-06-23 ASSESSMENT — ENCOUNTER SYMPTOMS
WHEEZING: 0
FACIAL SWELLING: 0
ABDOMINAL PAIN: 0
CHEST TIGHTNESS: 0
SORE THROAT: 0
BACK PAIN: 1
CONSTIPATION: 0
VOMITING: 0
SINUS PAIN: 0
SINUS PRESSURE: 0
COLOR CHANGE: 0
COUGH: 0
NAUSEA: 0
RHINORRHEA: 0
SHORTNESS OF BREATH: 0
DIARRHEA: 0
APNEA: 0
TROUBLE SWALLOWING: 0
PHOTOPHOBIA: 0
ABDOMINAL DISTENTION: 0

## 2020-06-24 NOTE — ED PROVIDER NOTES
numbness and headaches. PAST MEDICAL HISTORY    has a past medical history of Chronic back pain, GERD (gastroesophageal reflux disease), History of kidney stones, Hx of blood clots, Kidney stone, and Lumbar spondylosis. SURGICAL HISTORY      has a past surgical history that includes Ureter stent placement (2006); Lithotripsy (2006); Cystocopy (6/14/2013); Cystocopy (07/29/2013); Cystoscopy (12/23/13); other surgical history (Bilateral, 03/26/2018); pr inj dx/ther agnt paravert facet joint, lumbar/sac, 2nd level (Bilateral, 3/26/2018); pr inj dx/ther agnt paravert facet joint, lumbar/sac, 2nd level (Bilateral, 5/21/2018); Colonoscopy; eye surgery (2007); hernia repair (2007); pr inject rx other periph nerve (Left, 9/28/2018); pr inject rx other periph nerve (Right, 11/30/2018); lumbar nerve block (N/A, 4/5/2019); Injection Procedure For Sacroiliac Joint (Left, 5/17/2019); Injection Procedure For Sacroiliac Joint (Left, 6/27/2019); Lumbar spine surgery (Left, 8/15/2019); Nerve Surgery (Right, 10/31/2019); Pain management procedure (Left, 1/16/2020); and Pain management procedure (Right, 3/5/2020). CURRENT MEDICATIONS       Discharge Medication List as of 6/18/2020 11:40 PM      CONTINUE these medications which have NOT CHANGED    Details   traZODone (DESYREL) 50 MG tablet Take 50 mg by mouth nightlyHistorical Med      HYDROcodone-acetaminophen (NORCO) 5-325 MG per tablet Take 1 tablet by mouth every 8 hours as needed for Pain for up to 30 days. , Disp-90 tablet, R-0Normal      potassium citrate (UROCIT-K) 10 MEQ (1080 MG) extended release tablet TAKE 1 TABLET BY MOUTH EVERY MORNING WITH BREAKFAST, Disp-90 tablet, R-3Normal      allopurinol (ZYLOPRIM) 300 MG tablet TAKE 1 TABLET BY MOUTH EVERY DAY, Disp-90 tablet, R-3Normal      ARIPiprazole (ABILIFY) 2 MG tablet Take 2 mg by mouth 2 times dailyHistorical Med      citalopram (CELEXA) 40 MG tablet Take 40 mg by mouth daily. Historical Med      warfarin regular rhythm. No extrasystoles are present. Pulses: Normal pulses. Heart sounds: Normal heart sounds, S1 normal and S2 normal. Heart sounds not distant. No murmur. No friction rub. No gallop. Pulmonary:      Effort: Pulmonary effort is normal. No tachypnea, bradypnea, accessory muscle usage, prolonged expiration, respiratory distress or retractions. Breath sounds: Normal breath sounds. No stridor. No wheezing, rhonchi or rales. Chest:      Chest wall: No tenderness. Abdominal:      General: Abdomen is flat. Bowel sounds are normal. There is no distension. Palpations: Abdomen is soft. There is no shifting dullness, hepatomegaly, splenomegaly or mass. Tenderness: There is no abdominal tenderness. There is right CVA tenderness and left CVA tenderness. Hernia: No hernia is present. Musculoskeletal: Normal range of motion. General: No swelling, tenderness, deformity or signs of injury. Right lower leg: No edema. Left lower leg: No edema. Skin:     General: Skin is warm and dry. Capillary Refill: Capillary refill takes less than 2 seconds. Coloration: Skin is not jaundiced or pale. Neurological:      General: No focal deficit present. Mental Status: He is alert and oriented to person, place, and time. Mental status is at baseline. GCS: GCS eye subscore is 4. GCS verbal subscore is 5. GCS motor subscore is 6. Cranial Nerves: Cranial nerves are intact. Sensory: Sensation is intact. Psychiatric:         Mood and Affect: Mood normal.         Behavior: Behavior normal. Behavior is cooperative.           DIFFERENTIAL DIAGNOSIS:   Chronic low back pain, obstructing stone, renal calculi, renal colic    DIAGNOSTIC RESULTS     EKG: All EKG's are interpreted by the Emergency Department Physician who either signs or Co-signs this chart in the absence of a cardiologist.    None    RADIOLOGY: non-plainfilm images(s) such as CT, Ultrasound and

## 2020-06-25 ENCOUNTER — TELEPHONE (OUTPATIENT)
Dept: PHYSICAL MEDICINE AND REHAB | Age: 50
End: 2020-06-25

## 2020-06-25 NOTE — TELEPHONE ENCOUNTER
Patient called stating that he went to the ED for back pain and they found a kidney stone but the ED says that the kidney stone should not be causing the patient all that pain. So patient is calling to see what we can do for the back pain. ED visit 06/18/2020. Please advise.

## 2020-06-26 ENCOUNTER — HOSPITAL ENCOUNTER (OUTPATIENT)
Age: 50
Discharge: HOME | End: 2020-06-26
Payer: COMMERCIAL

## 2020-06-26 PROCEDURE — U0002 COVID-19 LAB TEST NON-CDC: HCPCS

## 2020-06-28 LAB
PERFORMING LAB: NORMAL
REPORT: NORMAL
SARS-COV-2: NOT DETECTED

## 2020-06-30 ENCOUNTER — OFFICE VISIT (OUTPATIENT)
Dept: UROLOGY | Age: 50
End: 2020-06-30
Payer: COMMERCIAL

## 2020-06-30 ENCOUNTER — TELEPHONE (OUTPATIENT)
Dept: PHYSICAL MEDICINE AND REHAB | Age: 50
End: 2020-06-30

## 2020-06-30 VITALS — TEMPERATURE: 97.1 F | HEIGHT: 70 IN | BODY MASS INDEX: 37.22 KG/M2 | WEIGHT: 260 LBS

## 2020-06-30 LAB
BILIRUBIN URINE: NEGATIVE
BLOOD URINE, POC: NEGATIVE
CHARACTER, URINE: CLEAR
COLOR, URINE: YELLOW
GLUCOSE URINE: NEGATIVE MG/DL
KETONES, URINE: NEGATIVE
LEUKOCYTE CLUMPS, URINE: NEGATIVE
NITRITE, URINE: NEGATIVE
PH, URINE: 6 (ref 5–9)
PROTEIN, URINE: NEGATIVE MG/DL
SPECIFIC GRAVITY, URINE: 1.02 (ref 1–1.03)
UROBILINOGEN, URINE: 1 EU/DL (ref 0–1)

## 2020-06-30 PROCEDURE — 99213 OFFICE O/P EST LOW 20 MIN: CPT | Performed by: NURSE PRACTITIONER

## 2020-06-30 PROCEDURE — 81003 URINALYSIS AUTO W/O SCOPE: CPT | Performed by: NURSE PRACTITIONER

## 2020-06-30 RX ORDER — POTASSIUM CITRATE 10 MEQ/1
TABLET, EXTENDED RELEASE ORAL
Qty: 90 TABLET | Refills: 3 | Status: SHIPPED | OUTPATIENT
Start: 2020-06-30 | End: 2021-09-08 | Stop reason: SDUPTHER

## 2020-06-30 RX ORDER — TAMSULOSIN HYDROCHLORIDE 0.4 MG/1
0.4 CAPSULE ORAL DAILY
Qty: 90 CAPSULE | Refills: 3 | Status: SHIPPED | OUTPATIENT
Start: 2020-06-30 | End: 2021-07-06

## 2020-06-30 RX ORDER — ALLOPURINOL 300 MG/1
TABLET ORAL
Qty: 90 TABLET | Refills: 3 | Status: SHIPPED | OUTPATIENT
Start: 2020-06-30 | End: 2021-06-30

## 2020-06-30 ASSESSMENT — ENCOUNTER SYMPTOMS
BACK PAIN: 0
ABDOMINAL PAIN: 0

## 2020-06-30 NOTE — PROGRESS NOTES
tablet Take 2.5 mg by mouth daily at 1800 Takes 5mg 3 days per week & 2.5mg 4 days per week      busPIRone (BUSPAR) 10 MG tablet Take 30 mg by mouth 2 times daily       omeprazole (PRILOSEC) 20 MG capsule Take 20 mg by mouth daily. No current facility-administered medications for this visit. Past Medical History  Austyn Rosas  has a past medical history of Chronic back pain, GERD (gastroesophageal reflux disease), History of kidney stones, Hx of blood clots, Kidney stone, and Lumbar spondylosis. Past Surgical History  The patient  has a past surgical history that includes Ureter stent placement (2006); Lithotripsy (2006); Cystocopy (6/14/2013); Cystocopy (07/29/2013); Cystoscopy (12/23/13); other surgical history (Bilateral, 03/26/2018); pr inj dx/ther agnt paravert facet joint, lumbar/sac, 2nd level (Bilateral, 3/26/2018); pr inj dx/ther agnt paravert facet joint, lumbar/sac, 2nd level (Bilateral, 5/21/2018); Colonoscopy; eye surgery (2007); hernia repair (2007); pr inject rx other periph nerve (Left, 9/28/2018); pr inject rx other periph nerve (Right, 11/30/2018); lumbar nerve block (N/A, 4/5/2019); Injection Procedure For Sacroiliac Joint (Left, 5/17/2019); Injection Procedure For Sacroiliac Joint (Left, 6/27/2019); Lumbar spine surgery (Left, 8/15/2019); Nerve Surgery (Right, 10/31/2019); Pain management procedure (Left, 1/16/2020); and Pain management procedure (Right, 3/5/2020). Family History  This patient's family history includes Cancer in his mother; Heart Disease in his father. Social History  Austyn Rosas  reports that he quit smoking about 15 years ago. He has a 12.00 pack-year smoking history. He has never used smokeless tobacco. He reports that he does not drink alcohol or use drugs. Subjective:      Review of Systems   Constitutional: Negative for activity change, appetite change, chills, diaphoresis, fatigue, fever and unexpected weight change.    Gastrointestinal: Negative for

## 2020-06-30 NOTE — PATIENT INSTRUCTIONS
You may receive a survey regarding the care you received during your visit. Your input is valuable to us. We encourage you to complete and return your survey. We hope you will choose us in the future for your healthcare needs.
patient

## 2020-07-02 ENCOUNTER — ANESTHESIA EVENT (OUTPATIENT)
Dept: OPERATING ROOM | Age: 50
End: 2020-07-02
Payer: COMMERCIAL

## 2020-07-02 ENCOUNTER — APPOINTMENT (OUTPATIENT)
Dept: GENERAL RADIOLOGY | Age: 50
End: 2020-07-02
Attending: PAIN MEDICINE
Payer: COMMERCIAL

## 2020-07-02 ENCOUNTER — ANESTHESIA (OUTPATIENT)
Dept: OPERATING ROOM | Age: 50
End: 2020-07-02
Payer: COMMERCIAL

## 2020-07-02 ENCOUNTER — HOSPITAL ENCOUNTER (OUTPATIENT)
Age: 50
Setting detail: OUTPATIENT SURGERY
Discharge: HOME | End: 2020-07-02
Attending: PAIN MEDICINE | Admitting: PAIN MEDICINE
Payer: COMMERCIAL

## 2020-07-02 VITALS
TEMPERATURE: 97.3 F | OXYGEN SATURATION: 93 % | SYSTOLIC BLOOD PRESSURE: 113 MMHG | DIASTOLIC BLOOD PRESSURE: 72 MMHG | RESPIRATION RATE: 16 BRPM | WEIGHT: 259.6 LBS | HEART RATE: 66 BPM | BODY MASS INDEX: 37.16 KG/M2 | HEIGHT: 70 IN

## 2020-07-02 VITALS
RESPIRATION RATE: 20 BRPM | SYSTOLIC BLOOD PRESSURE: 102 MMHG | DIASTOLIC BLOOD PRESSURE: 65 MMHG | OXYGEN SATURATION: 92 %

## 2020-07-02 LAB — POC INR: 1 (ref 0.8–1.2)

## 2020-07-02 PROCEDURE — 2500000003 HC RX 250 WO HCPCS: Performed by: PAIN MEDICINE

## 2020-07-02 PROCEDURE — 2709999900 HC NON-CHARGEABLE SUPPLY: Performed by: PAIN MEDICINE

## 2020-07-02 PROCEDURE — 6360000002 HC RX W HCPCS: Performed by: NURSE ANESTHETIST, CERTIFIED REGISTERED

## 2020-07-02 PROCEDURE — 64640 INJECTION TREATMENT OF NERVE: CPT | Performed by: PAIN MEDICINE

## 2020-07-02 PROCEDURE — 64635 DESTROY LUMB/SAC FACET JNT: CPT | Performed by: PAIN MEDICINE

## 2020-07-02 PROCEDURE — 3600000057 HC PAIN LEVEL 4 ADDL 15 MIN: Performed by: PAIN MEDICINE

## 2020-07-02 PROCEDURE — 7100000011 HC PHASE II RECOVERY - ADDTL 15 MIN: Performed by: PAIN MEDICINE

## 2020-07-02 PROCEDURE — 7100000010 HC PHASE II RECOVERY - FIRST 15 MIN: Performed by: PAIN MEDICINE

## 2020-07-02 PROCEDURE — 3600000056 HC PAIN LEVEL 4 BASE: Performed by: PAIN MEDICINE

## 2020-07-02 PROCEDURE — 2720000010 HC SURG SUPPLY STERILE: Performed by: PAIN MEDICINE

## 2020-07-02 PROCEDURE — 3700000000 HC ANESTHESIA ATTENDED CARE: Performed by: PAIN MEDICINE

## 2020-07-02 PROCEDURE — 3700000001 HC ADD 15 MINUTES (ANESTHESIA): Performed by: PAIN MEDICINE

## 2020-07-02 PROCEDURE — 3209999900 FLUORO FOR SURGICAL PROCEDURES

## 2020-07-02 RX ORDER — KETOROLAC TROMETHAMINE 30 MG/ML
INJECTION, SOLUTION INTRAMUSCULAR; INTRAVENOUS PRN
Status: DISCONTINUED | OUTPATIENT
Start: 2020-07-02 | End: 2020-07-02 | Stop reason: SDUPTHER

## 2020-07-02 RX ORDER — LIDOCAINE HYDROCHLORIDE 10 MG/ML
INJECTION, SOLUTION EPIDURAL; INFILTRATION; INTRACAUDAL; PERINEURAL PRN
Status: DISCONTINUED | OUTPATIENT
Start: 2020-07-02 | End: 2020-07-02 | Stop reason: ALTCHOICE

## 2020-07-02 RX ORDER — PROPOFOL 10 MG/ML
INJECTION, EMULSION INTRAVENOUS PRN
Status: DISCONTINUED | OUTPATIENT
Start: 2020-07-02 | End: 2020-07-02 | Stop reason: SDUPTHER

## 2020-07-02 RX ORDER — BUPIVACAINE HYDROCHLORIDE 2.5 MG/ML
INJECTION, SOLUTION EPIDURAL; INFILTRATION; INTRACAUDAL PRN
Status: DISCONTINUED | OUTPATIENT
Start: 2020-07-02 | End: 2020-07-02 | Stop reason: ALTCHOICE

## 2020-07-02 RX ORDER — FENTANYL CITRATE 50 UG/ML
INJECTION, SOLUTION INTRAMUSCULAR; INTRAVENOUS PRN
Status: DISCONTINUED | OUTPATIENT
Start: 2020-07-02 | End: 2020-07-02 | Stop reason: SDUPTHER

## 2020-07-02 RX ADMIN — FENTANYL CITRATE 100 MCG: 50 INJECTION, SOLUTION INTRAMUSCULAR; INTRAVENOUS at 11:43

## 2020-07-02 RX ADMIN — PROPOFOL 10 MG: 10 INJECTION, EMULSION INTRAVENOUS at 11:55

## 2020-07-02 RX ADMIN — PROPOFOL 40 MG: 10 INJECTION, EMULSION INTRAVENOUS at 11:51

## 2020-07-02 RX ADMIN — PROPOFOL 10 MG: 10 INJECTION, EMULSION INTRAVENOUS at 11:53

## 2020-07-02 RX ADMIN — PROPOFOL 20 MG: 10 INJECTION, EMULSION INTRAVENOUS at 11:54

## 2020-07-02 RX ADMIN — PROPOFOL 10 MG: 10 INJECTION, EMULSION INTRAVENOUS at 11:57

## 2020-07-02 RX ADMIN — PROPOFOL 10 MG: 10 INJECTION, EMULSION INTRAVENOUS at 11:56

## 2020-07-02 RX ADMIN — KETOROLAC TROMETHAMINE 30 MG: 30 INJECTION, SOLUTION INTRAMUSCULAR at 12:01

## 2020-07-02 ASSESSMENT — PULMONARY FUNCTION TESTS
PIF_VALUE: 1
PIF_VALUE: 0

## 2020-07-02 ASSESSMENT — PAIN DESCRIPTION - LOCATION: LOCATION: BACK

## 2020-07-02 ASSESSMENT — PAIN DESCRIPTION - PAIN TYPE: TYPE: CHRONIC PAIN

## 2020-07-02 ASSESSMENT — PAIN DESCRIPTION - ORIENTATION: ORIENTATION: LOWER

## 2020-07-02 ASSESSMENT — PAIN SCALES - GENERAL: PAINLEVEL_OUTOF10: 6

## 2020-07-02 ASSESSMENT — PAIN - FUNCTIONAL ASSESSMENT: PAIN_FUNCTIONAL_ASSESSMENT: 0-10

## 2020-07-02 NOTE — H&P
[address] [address]  History and Physical Update    Pt Name: Johnny Dimas  MRN: [id number]  YOB: 1970  Date of evaluation: 7/2/2020      I have examined the patient and reviewed the H&P/Consult and there are no changes to the patient or plans.         Electronically signed by Tanvir Patton MD on 7/2/2020 at 10:46 AM

## 2020-07-02 NOTE — H&P
H & P    Main complaint today is left SI pain and left lower back as effects from SI RFA has worn off. Still some relief from Left L-facet RFA. Sharp and squeezing pain. Pain is increased with activity.     Still good relief from SI RFA and LESI      Norco prn remains effective   Medications reviewed. Patient denies side effects with medications. Patient states he is taking medications as prescribed. Hedenies receiving pain medications from other sources. He denies any ER visits since last visit.     Pain scale with out pain medications or at its worst is 7-8/10. Pain scale with pain medications or at its best is 3/10. Last dose of [place] was yesterday  Drug screen reviewed from 2/4/2020 and was appropriate  Pill count was completed today and was appropriate  Patient does have naloxone available at home. Patient has not required use of naloxone at home since last office visit.         The patientis allergic to latex; codeine; nickel; and oxybutynin chloride [oxybutynin chloride].     Past Medical History  Laverne Ward  has a past medical history of Chronic back pain, GERD (gastroesophageal reflux disease), History of kidney stones, Hx of blood clots, Kidney stone, and Lumbar spondylosis.     Past Surgical History  The patient  has a past surgical history that includes Ureter stent placement (2006); Lithotripsy (2006); Cystocopy (6/14/2013); Cystocopy (07/29/2013); Cystoscopy (12/23/13); other surgical history (Bilateral, 03/26/2018); pr inj dx/ther agnt paravert facet joint, lumbar/sac, 2nd level (Bilateral, 3/26/2018); pr inj dx/ther agnt paravert facet joint, lumbar/sac, 2nd level (Bilateral, 5/21/2018); Colonoscopy; eye surgery (2007); hernia repair (2007); pr inject rx other periph nerve (Left, 9/28/2018); pr inject rx other periph nerve (Right, 11/30/2018); lumbar nerve block (N/A, 4/5/2019); Injection Procedure For Sacroiliac Joint (Left, 5/17/2019);  Injection Procedure For Sacroiliac Joint (Left, 6/27/2019); Height: 5' 10\" (1.778 m)            Physical Exam  Vitals signs and nursing note reviewed. Constitutional:       General: He is not in acute distress. Appearance: He is well-developed. He is not diaphoretic. HENT:      Head: Normocephalic and atraumatic. Right Ear: External ear normal.      Left Ear: External ear normal.      Nose: Nose normal.      Mouth/Throat:      Pharynx: No oropharyngeal exudate. Eyes:      General: No scleral icterus. Right eye: No discharge. Left eye: No discharge. Conjunctiva/sclera: Conjunctivae normal.      Pupils: Pupils are equal, round, and reactive to light. Neck:      Musculoskeletal: Full passive range of motion without pain, normal range of motion and neck supple. Normal range of motion. No edema, erythema, neck rigidity or muscular tenderness. Thyroid: No thyromegaly. Cardiovascular:      Rate and Rhythm: Normal rate and regular rhythm. Heart sounds: Normal heart sounds. No murmur. No friction rub. No gallop. Pulmonary:      Effort: Pulmonary effort is normal. No respiratory distress. Breath sounds: Normal breath sounds. No wheezing or rales. Chest:      Chest wall: No tenderness. Abdominal:      General: Bowel sounds are normal. There is no distension. Palpations: Abdomen is soft. Tenderness: There is no abdominal tenderness. There is no guarding or rebound. Musculoskeletal:         General: Tenderness present. Right shoulder: Normal.      Left shoulder: Normal.      Right hip: He exhibits tenderness and bony tenderness. Left hip: He exhibits tenderness and bony tenderness. Right knee: Normal.      Left knee: Normal.      Cervical back: Normal.      Lumbar back: He exhibits decreased range of motion, tenderness, bony tenderness, pain and spasm. Back:       Right upper leg: He exhibits tenderness. Left upper leg: He exhibits tenderness. Skin:     General: Skin is warm. Coloration: Skin is not pale. Findings: No erythema or rash. Neurological:      Mental Status: He is alert and oriented to person, place, and time. He is not disoriented. Cranial Nerves: No cranial nerve deficit. Sensory: No sensory deficit. Motor: No atrophy or abnormal muscle tone. Coordination: Coordination normal.      Gait: Gait normal.      Deep Tendon Reflexes: Reflexes are normal and symmetric. Babinski sign absent on the right side. Reflex Scores:       Tricep reflexes are 2+ on the right side and 2+ on the left side. Bicep reflexes are 2+ on the right side and 2+ on the left side. Brachioradialis reflexes are 2+ on the right side and 2+ on the left side. Patellar reflexes are 2+ on the right side and 2+ on the left side. Achilles reflexes are 2+ on the right side and 2+ on the left side. Psychiatric:         Attention and Perception: Attention normal. He is attentive. Mood and Affect: Mood normal. Mood is not anxious or depressed. Affect is not labile, blunt, angry or inappropriate. Speech: Speech normal. He is communicative. Speech is not rapid and pressured, delayed, slurred or tangential.         Behavior: Behavior normal. Behavior is not agitated, slowed, aggressive, withdrawn, hyperactive or combative. Thought Content: Thought content normal. Thought content is not paranoid or delusional. Thought content does not include homicidal or suicidal ideation. Thought content does not include homicidal or suicidal plan. Cognition and Memory: Cognition normal. Memory is not impaired. He does not exhibit impaired recent memory or impaired remote memory. Judgment: Judgment normal. Judgment is not impulsive or inappropriate.         [place]ETTA test: + bilaterally   [place] test: + bilaterally   [place] test: + bilaterally   Assessment:      1. SI (sacroiliac) pain    2.  Spondylosis of lumbar region without myelopathy or radiculopathy    3. Lumbar spondylosis    4. Bulge of lumbar disc without myelopathy    5. Lumbosacral radiculitis    6. Chronic pain syndrome       Plan:      · OARRS reviewed. Current MED: 15.00  · Patient was offered naloxone for home. Already has   · Discussed long term side effects of medications, tolerance, dependency and addiction. · Previous UDS reviewed  · UDS preformed today for compliance. · Patient told can not receive any pain medications from any other source. · No evidence of abuse, diversion or aberrant behavior. · Medications and/or procedures to improve function and quality of life- patient understanding with this and that may not be pain free  · Discussed with patient about safe storage of medications at home  · Discussed possible weaning of medication dosing dependent on treatment/procedure results. · Discussed with patient about risks with procedure including infection, reaction to medication, increased pain, or bleeding. · Plan Left SI RFA for longer term pain relief. Procedure and risks discussed in detail with patient,  · Still relief from L-facet RFA   · Denies leg pain as patient continues to receive good relief from LESI   · Continue Norco 5/325 TID prn- recently filled 4/9/2020. Medications remain effective, patient is compliant             Return for  Left SI RFA . , follow up after procedure.

## 2020-07-02 NOTE — ANESTHESIA PRE PROCEDURE
Department of Anesthesiology  Preprocedure Note       Name:  David Cope   Age:  47 y.o.  :  1970                                          MRN:  [id number]         Date:  2020      Surgeon: Jose J Brush):  Concepcion Matthews MD    Procedure: Procedure(s):  Left SI RFA    Medications prior to admission:   Prior to Admission medications    Medication Sig Start Date End Date Taking? Authorizing Provider   tamsulosin (FLOMAX) 0.4 MG capsule Take 1 capsule by mouth daily 20  Yes WALT Jaquez CNP   potassium citrate (UROCIT-K) 10 MEQ (1080 MG) extended release tablet TAKE 1 TABLET BY MOUTH EVERY MORNING WITH BREAKFAST 20  Yes WALT Gross CNP   allopurinol (ZYLOPRIM) 300 MG tablet TAKE 1 TABLET BY MOUTH EVERY DAY 20  Yes WALT Jaquez CNP   ketorolac (TORADOL) 10 MG tablet Take 1 tablet by mouth every 6 hours as needed for Pain 20  Yes WALT Malik CNP   traZODone (DESYREL) 50 MG tablet Take 50 mg by mouth nightly   Yes Historical Provider, MD   HYDROcodone-acetaminophen (NORCO) 5-325 MG per tablet Take 1 tablet by mouth every 8 hours as needed for Pain for up to 30 days. 20 Yes WALT Hanna CNP   triamcinolone (KENALOG) 0.1 % cream  19  Yes Historical Provider, MD YOUSIFVENT  MCG/ACT inhaler  19  Yes Historical Provider, MD   ARIPiprazole (ABILIFY) 2 MG tablet Take 2 mg by mouth 2 times daily   Yes Historical Provider, MD   citalopram (CELEXA) 40 MG tablet Take 40 mg by mouth daily. Yes Historical Provider, MD   warfarin (COUMADIN) 2.5 MG tablet Take 2.5 mg by mouth daily at 1800 Takes 5mg 3 days per week & 2.5mg 4 days per week   Yes Historical Provider, MD   busPIRone (BUSPAR) 10 MG tablet Take 30 mg by mouth 2 times daily    Yes Historical Provider, MD   omeprazole (PRILOSEC) 20 MG capsule Take 20 mg by mouth daily.      Yes Historical Provider, MD Current medications:    No current facility-administered medications for this encounter. Allergies: Allergies   Allergen Reactions    Latex Rash    Codeine Hives     TYLENOL WITH CODEINE    Nickel Rash    Oxybutynin Chloride [Oxybutynin Chloride] Other (See Comments)     Warm feeling and extreme dry mouth       Problem List:    Patient Active Problem List   Diagnosis Code    Weak urinary stream R39.12    Obstructive sleep apnea G47.33    Snoring R06.83    Sleep disturbance G47.9    Insomnia G47.00    Sleep talking G47.8    Morning headache R51    Bruxism F45.8    Restless sleeper G47.9    Poor concentration R41.840    Claustrophobia F40.240    Vivid dream R68.89    GERD (gastroesophageal reflux disease) K21.9    Anxiety F41.9    Panic disorder F41.0    Obesity (BMI 30.0-34. 9) E66.9    Lumbar spondylosis M47.816    Bulging lumbar disc M51.26    Sacroiliac inflammation (HCC) M46.1    Lumbar radiculitis M54.16       Past Medical History:        Diagnosis Date    Chronic back pain     GERD (gastroesophageal reflux disease)     History of kidney stones     Hx of blood clots early 2000's & 2013    MUNA LUNGS    Kidney stone     Lumbar spondylosis        Past Surgical History:        Procedure Laterality Date    COLONOSCOPY      last one 2007???    CYSTOSCOPY  6/14/2013    URETEROSCOPY STONE REMOVAL    CYSTOSCOPY  07/29/2013    Left ureteroscopy, Left ureteral stone removal and stent exchange    CYSTOSCOPY  12/23/13    Cystoscopy, Left Optical Internal Ureterotomy, Left Ureteroscopy and Dilated [facility]  2007    lasik    [address] [facility] Left 5/17/2019    SI MBB #1 left performed by Concepcion Hayes MD at [address] [facility] Left 6/27/2019    Left SI MBB # 2. performed by Concepcion Hayes MD at [address]  2007    left groin    LITHOTRIPSY  2006    LUMBAR NERVE BLOCK N/A 4/5/2019    LUMBAR INTER LAMINAR GUNNAR @ L4 performed by John Allan MD at [address] Left 8/15/2019    Left SI RFA. performed by John Allan MD at [facility] 98 Right 10/31/2019    LUMBAR INTER LAMINAR GUNNAR @ L4 Right performed by John Allan MD at [facility] 104 Bilateral 03/26/2018    Lumbar Facet MBB at L4-5, L5-S1    PAIN MANAGEMENT PROCEDURE Left 1/16/2020    Lumbar RFA left side L4-5,5-S1 performed by John Allan MD at [facility] 113 Right 3/5/2020    Lumbar RFA Right side@ L4-5,5-S1 performed by John Allan MD at [address] [place] [address] DX/THER AGNT PARAVERT FACET JOINT, LUMBAR/SAC, 2ND LEVEL Bilateral 3/26/2018    BILATERAL L-FACET MBB @ L4-5 and L5-S1, performed by John Allan MD at [address] [place] [address] DX/THER AGNT PARAVERT FACET JOINT, LUMBAR/SAC, 2ND LEVEL Bilateral 5/21/2018    Bilateral L-facet MBB @ L4-5, L5-S1. performed by John Allan MD at [address] Left 9/28/2018    LUMBAR RFA @ L4-5, and L5-S1 LEFT SIDE FIRST. Leia Distad performed by John Allan MD at [address] Right 11/30/2018    Right L-RFA @ L4-5, and L5-S1 performed by John Allan MD at [address] [place] [address]  2006       Social History:    Social History     Tobacco Use    Smoking status: Former Smoker     Packs/day: 1.00     Years: 12.00     Pack years: 12.00     Last attempt to quit: 6/6/2005     Years since quitting: 15.0    Smokeless tobacco: Never Used   Substance Use Topics    Alcohol use:  No                                Counseling given: Not Answered      Vital Signs (Current):   Vitals:    07/02/20 1055   BP: 122/74 Pulse: 63   Resp: 16   Temp: 97.9 °F (36.6 °C)   TempSrc: Infrared   SpO2: 95%   Weight: 259 lb 9.6 oz (117.8 kg)   Height: 5' 10\" (1.778 m)                                              BP Readings from Last 3 Encounters:   07/02/20 122/74   06/18/20 128/83   06/11/20 124/78       NPO Status: Time of last liquid consumption: 2200                        Time of last solid consumption: 1700                        Date of last liquid consumption: 07/01/20                        Date of last solid food consumption: 07/01/20    BMI:   Wt Readings from Last 3 Encounters:   07/02/20 259 lb 9.6 oz (117.8 kg)   06/30/20 260 lb (117.9 kg)   06/18/20 262 lb (118.8 kg)     Body mass index is 37.25 kg/m². CBC:   Lab Results   Component Value Date    WBC 5.7 06/24/2020    RBC 4.53 06/24/2020    HGB 13.7 06/24/2020    HCT 41.2 06/24/2020    MCV 91.1 06/24/2020    RDW 13.2 06/24/2020     06/24/2020       CMP:   Lab Results   Component Value Date     06/24/2020    K 3.6 06/24/2020    K 3.8 06/18/2020     06/24/2020    CO2 25 06/24/2020    BUN 12 06/24/2020    CREATININE 0.79 06/24/2020    AGRATIO 2.1 07/27/2019    LABGLOM 71 06/18/2020    GLUCOSE 93 06/24/2020    PROT 6.1 07/27/2019    CALCIUM 8.90 06/24/2020    BILITOT 0.6 07/27/2019    ALKPHOS 75 07/27/2019    AST 38 07/27/2019    ALT 47 07/27/2019       POC Tests: No results for input(s): POCGLU, POCNA, POCK, POCCL, POCBUN, POCHEMO, POCHCT in the last 72 hours.     Coags:   Lab Results   Component Value Date    PROTIME 34.6 06/11/2020    INR 1.00 07/02/2020    INR 2.90 06/11/2020    APTT 32.1 06/14/2013       HCG (If Applicable): No results found for: PREGTESTUR, PREGSERUM, HCG, HCGQUANT     ABGs: No results found for: PHART, PO2ART, UDO0MZD, AGS6EJJ, BEART, R9ZVVKTJ     Type & Screen (If Applicable):  No results found for: LABABO, LABRH    Drug/Infectious Status (If Applicable):  No results found for: HIV, HEPCAB    COVID-19 Screening (If Applicable): Lab Results   Component Value Date    COVID19 NOT DETECTED 06/26/2020         Anesthesia Evaluation    Airway: Mallampati: II       Mouth opening: > = 3 FB Dental:          Pulmonary:   (+) sleep apnea:                             Cardiovascular:                      Neuro/Psych:   (+) neuromuscular disease:, headaches:, psychiatric history:            GI/Hepatic/Renal:   (+) GERD:,           Endo/Other:                     Abdominal:           Vascular:                                        Anesthesia Plan      MAC     ASA 2             Anesthetic plan and risks discussed with patient. Plan discussed with CRNA.                   Daniella Burns MD   7/2/2020

## 2020-07-02 NOTE — OP NOTE
Operative Note     Patient Name: David Cope  YOB: 1970  Medical Record Number: [id number]  Date: 7/2/20     Indication: Lower back and Left SI pain   Consent: On file. Vital Signs:   @     Past Medical History:    has a past medical history of Chronic back pain, GERD (gastroesophageal reflux disease), History of kidney stones, Hx of blood clots, Kidney stone, and Lumbar spondylosis. Past Surgical History:   has a past surgical history that includes Ureter stent placement (2006); Lithotripsy (2006); Cystocopy (6/14/2013); Cystocopy (07/29/2013); Cystoscopy (12/23/13); other surgical history (Bilateral, 03/26/2018); pr inj dx/ther agnt paravert facet joint, lumbar/sac, 2nd level (Bilateral, 3/26/2018); pr inj dx/ther agnt paravert facet joint, lumbar/sac, 2nd level (Bilateral, 5/21/2018); Colonoscopy; eye surgery (2007); hernia repair (2007); pr inject rx other periph nerve (Left, 9/28/2018); pr inject rx other periph nerve (Right, 11/30/2018); lumbar nerve block (N/A, 4/5/2019); Injection Procedure For Sacroiliac Joint (Left, 5/17/2019); Injection Procedure For Sacroiliac Joint (Left, 6/27/2019); Lumbar spine surgery (Left, 8/15/2019); Nerve Surgery (Right, 10/31/2019); Pain management procedure (Left, 1/16/2020); and Pain management procedure (Right, 3/5/2020). Pre-Sedation Documentation and Exam:   Vital signs have been reviewed (see flow sheet for vitals). Sedation/ Anesthesia Plan:   MAC     Patient is an appropriate candidate for plan of sedation: yes     Preoperative Diagnosis: L-spondylosis. Left Sacroiliac Inflammation    Post-Op Dx: as above     Procedure Performed: Left Radiofrequency ablation Dorsal Ramus of L5. median branch of L4 and S1,S2,S3 lateral branches  at the levels of SI  under fluoroscopic guidance      Indication for the Procedure: The patient has ahistory of chronic left SI pain that is not responding well to the conservative treatment.  Pain is interfering with the activities of daily living. Patient had undergone SI joint injections with pain relief that lasted for only a short period of time and had greater than 70% pain relief with confirmatory . Hence we decided to do radiofrequency abalation for long term pain relief. The procedure and risks were discussed with the patient and an informed consent was obtained. Procedure: Left  The patient was brought into the OR and carefully assisted into the prone position on the table and allowed to adjust to a position of comfort. A grounding pad was placed on the right thigh. The low back and buttocks were widely prepped with chloroprep solution, allowed to air dry and draped in the standard sterile surgical fashion. Local anesthesia was provided by 8 ml of 1% Xylocaine delivered with a 25 g needle. PROCEDURE #1: Radiofrequency Ablation of [place] Ramus of L5, medical branch of L4. A 17g 75 mm radiofrequency introducer needle was placed to the planned anatomic target, guided with intermittent fluoroscopy with a perpendicular approach, to terminally place at the left sacral ala and the superior articular process at the transverse process for the left L4 medial branch nerve. The stylets were removed and the radiofrequency probes with a 4 mm active tip were then inserted. Needle tip position of the probes were verified in the AP, oblique and lateral views. At each site, the medical branch nerve was [facility] at [facility] to a maximum of 1-2 volts determined to finalize safe needle and electrode placement. The patient was awake and responsive during this portion of the procedure. Each target was anesthetized with 1ml of 0.25% marcaine anesthesia for lesioning and then each target was lesioned at 80 degrees Celsius for 2 minutes and 30 seconds. Tissue impedance were noted to be between 250 and 500 Ohms. PROCEDURE #2: Radiofrequency Ablation of S1, S2, S3 Lateral Branches.   Using the AP fluoroscopic view for visualization of the lateral PSFA as defined by the pre-placed 27-gauge Quincke needles, appropriate skin starting positions were defined. Using the PSFA as a \"clock-face\", the positions were:     S1:left = 1 o'clock, 3 o'clock, and 5 o'clock. S2:left = 1 o'clock, 3 o'clock, and 5 o'clock. S3: left = 1 o'clock and 5 o'clock. Using fluoroscopic guidance, a 17g introducer needle was inserted Sequentially into the target positions described above until the introducer tip touched the bony surface of the sacrum. The stylet was withdrawn from the introducer and the radiofrequency probe with a 4 mm active tip was fully inserted into the introducer. A lateral view was obtained for standard reference. At each of the targets, needle placement was verified with the use of the multi-planar fluoroscopy. The needle tip position was approximately 7-10 mm lateral to the PSFA as determined by using the Epsilon ruler. At each site, the lateral branch nerve was stimulated at 2Hz to a maximum of 1-2 volts determined to finalize safe needle and electrode placement. The patient was awake and responsive during this portion of the procedure. Each electrode was anesthetized with 1-2 ml of 1% Xylocaine anesthesia for lesioning and then each target was lesioned at 80 degrees Celsius for 2 minutes and 30 seconds. Tissues impedance were noted to be between 250-500 Ohms. At the conclusion of the lesioning the needles were removed and bandages placed over the needle placement sites and the patient returned to the supine position on a stretcher and transported to the recovery room without hemodynamic, neurologic, or allergic reactions. EBL-0  Patient's vital signs and neurological status remained stable throughout the procedure and post procedural period. The patient tolerated the procedure well and was discharged home in stable condition.       Electronically signed by Mary Ann Sargent MD on 7/2/20 at 11:44 AM EDT

## 2020-07-06 RX ORDER — HYDROCODONE BITARTRATE AND ACETAMINOPHEN 5; 325 MG/1; MG/1
1 TABLET ORAL EVERY 8 HOURS PRN
Qty: 90 TABLET | Refills: 0 | Status: SHIPPED | OUTPATIENT
Start: 2020-07-11 | End: 2020-08-10 | Stop reason: SDUPTHER

## 2020-07-06 NOTE — TELEPHONE ENCOUNTER
Geovani Orellana called requesting a refill on the following medications:  Requested Prescriptions     Pending Prescriptions Disp Refills    HYDROcodone-acetaminophen (NORCO) 5-325 MG per tablet 90 tablet 0     Sig: Take 1 tablet by mouth every 8 hours as needed for Pain for up to 30 days. Pharmacy verified: [facility] [id number] [facility]  . pv      Date of last visit:  6/11/2020  Date of next visit (if applicable): 0/53/3323

## 2020-07-16 ENCOUNTER — OFFICE VISIT (OUTPATIENT)
Dept: PHYSICAL MEDICINE AND REHAB | Age: 50
End: 2020-07-16
Payer: COMMERCIAL

## 2020-07-16 VITALS
SYSTOLIC BLOOD PRESSURE: 126 MMHG | HEIGHT: 70 IN | TEMPERATURE: 97.5 F | BODY MASS INDEX: 37.08 KG/M2 | DIASTOLIC BLOOD PRESSURE: 70 MMHG | WEIGHT: 259 LBS | HEART RATE: 72 BPM

## 2020-07-16 PROCEDURE — 99213 OFFICE O/P EST LOW 20 MIN: CPT | Performed by: NURSE PRACTITIONER

## 2020-07-16 ASSESSMENT — ENCOUNTER SYMPTOMS: BACK PAIN: 1

## 2020-07-16 NOTE — PROGRESS NOTES
medications or at its best is 3/10. Last dose of [place] was yesterday  Drug screen reviewed from 6/11/2020 and was appropriate  Pill count was completed today and was appropriate  Patient does have naloxone available at home. Patient has not required use of naloxone at home since last office visit. The patientis allergic to latex; codeine; nickel; and oxybutynin chloride [oxybutynin chloride]. Past Medical History  Martina Ngo  has a past medical history of Chronic back pain, GERD (gastroesophageal reflux disease), History of kidney stones, Hx of blood clots, Kidney stone, and Lumbar spondylosis. Past Surgical History  The patient  has a past surgical history that includes Ureter stent placement (2006); Lithotripsy (2006); Cystocopy (6/14/2013); Cystocopy (07/29/2013); Cystoscopy (12/23/13); other surgical history (Bilateral, 03/26/2018); pr inj dx/ther agnt paravert facet joint, lumbar/sac, 2nd level (Bilateral, 3/26/2018); pr inj dx/ther agnt paravert facet joint, lumbar/sac, 2nd level (Bilateral, 5/21/2018); Colonoscopy; eye surgery (2007); hernia repair (2007); pr inject rx other periph nerve (Left, 9/28/2018); pr inject rx other periph nerve (Right, 11/30/2018); lumbar nerve block (N/A, 4/5/2019); Injection Procedure For Sacroiliac Joint (Left, 5/17/2019); Injection Procedure For Sacroiliac Joint (Left, 6/27/2019); Lumbar spine surgery (Left, 8/15/2019); Nerve Surgery (Right, 10/31/2019); Pain management procedure (Left, 1/16/2020); Pain management procedure (Right, 3/5/2020); and Pain management procedure (Left, 7/2/2020). Family History  This patient's family history includes Cancer in his mother; Heart Disease in his father. Social History  Martina Ngo  reports that he quit smoking about 15 years ago. He has a 12.00 pack-year smoking history. He has never used smokeless tobacco. He reports that he does not drink alcohol or use drugs.     Medications    Current Outpatient Medications:    HYDROcodone-acetaminophen (NORCO) 5-325 MG per tablet, Take 1 tablet by mouth every 8 hours as needed for Pain for up to 30 days. , Disp: 90 tablet, Rfl: 0    tamsulosin (FLOMAX) 0.4 MG capsule, Take 1 capsule by mouth daily, Disp: 90 capsule, Rfl: 3    potassium citrate (UROCIT-K) 10 MEQ (1080 MG) extended release tablet, TAKE 1 TABLET BY MOUTH EVERY MORNING WITH BREAKFAST, Disp: 90 tablet, Rfl: 3    allopurinol (ZYLOPRIM) 300 MG tablet, TAKE 1 TABLET BY MOUTH EVERY DAY, Disp: 90 tablet, Rfl: 3    ketorolac (TORADOL) 10 MG tablet, Take 1 tablet by mouth every 6 hours as needed for Pain, Disp: 20 tablet, Rfl: 0    traZODone (DESYREL) 50 MG tablet, Take 50 mg by mouth nightly, Disp: , Rfl:     triamcinolone (KENALOG) 0.1 % cream, , Disp: , Rfl:     FLOVENT  MCG/ACT inhaler, , Disp: , Rfl:     ARIPiprazole (ABILIFY) 2 MG tablet, Take 2 mg by mouth 2 times daily, Disp: , Rfl:     citalopram (CELEXA) 40 MG tablet, Take 40 mg by mouth daily. , Disp: , Rfl:     warfarin (COUMADIN) 2.5 MG tablet, Take 2.5 mg by mouth daily at 1800 Takes 5mg 3 days per week & 2.5mg 4 days per week, Disp: , Rfl:     busPIRone (BUSPAR) 10 MG tablet, Take 30 mg by mouth 2 times daily , Disp: , Rfl:     omeprazole (PRILOSEC) 20 MG capsule, Take 20 mg by mouth daily. , Disp: , Rfl:     Subjective:      Review of Systems   Musculoskeletal: Positive for arthralgias, back pain, joint swelling and myalgias. Negative for gait problem. Neurological: Negative for weakness and numbness. Objective:     Vitals:    07/16/20 0801   BP: 126/70   Site: Left Upper Arm   Position: Sitting   Cuff Size: Large Adult   Pulse: 72   Temp: 97.5 °F (36.4 °C)   TempSrc: Temporal   Weight: 259 lb (117.5 kg)   Height: 5' 10\" (1.778 m)       Physical Exam  Vitals signs and nursing note reviewed. Constitutional:       General: He is not in acute distress. Appearance: He is well-developed. He is not diaphoretic.    HENT:      Head: Normocephalic and atraumatic. Right Ear: External ear normal.      Left Ear: External ear normal.      Nose: Nose normal.      Mouth/Throat:      Pharynx: No oropharyngeal exudate. Eyes:      General: No scleral icterus. Right eye: No discharge. Left eye: No discharge. Conjunctiva/sclera: Conjunctivae normal.      Pupils: Pupils are equal, round, and reactive to light. Neck:      Musculoskeletal: Full passive range of motion without pain, normal range of motion and neck supple. Normal range of motion. No edema, erythema, neck rigidity or muscular tenderness. Thyroid: No thyromegaly. Cardiovascular:      Rate and Rhythm: Normal rate and regular rhythm. Heart sounds: Normal heart sounds. No murmur. No friction rub. No gallop. Pulmonary:      Effort: Pulmonary effort is normal. No respiratory distress. Breath sounds: Normal breath sounds. No wheezing or rales. Chest:      Chest wall: No tenderness. Abdominal:      General: Bowel sounds are normal. There is no distension. Palpations: Abdomen is soft. Tenderness: There is no abdominal tenderness. There is no guarding or rebound. Musculoskeletal:         General: Tenderness present. Right shoulder: Normal.      Left shoulder: Normal.      Right hip: He exhibits tenderness and bony tenderness. Left hip: He exhibits tenderness and bony tenderness. Right knee: Normal.      Left knee: Normal.      Cervical back: Normal.      Lumbar back: He exhibits decreased range of motion, tenderness, bony tenderness, pain and spasm. Back:       Right upper leg: He exhibits tenderness. Left upper leg: He exhibits tenderness. Skin:     General: Skin is warm. Coloration: Skin is not pale. Findings: No erythema or rash. Neurological:      Mental Status: He is alert and oriented to person, place, and time. He is not disoriented. Cranial Nerves: No cranial nerve deficit.       Sensory: No sensory deficit. Motor: No atrophy or abnormal muscle tone. Coordination: Coordination normal.      Gait: Gait normal.      Deep Tendon Reflexes: Reflexes are normal and symmetric. Babinski sign absent on the right side. Reflex Scores:       Tricep reflexes are 2+ on the right side and 2+ on the left side. Bicep reflexes are 2+ on the right side and 2+ on the left side. Brachioradialis reflexes are 2+ on the right side and 2+ on the left side. Patellar reflexes are 2+ on the right side and 2+ on the left side. Achilles reflexes are 2+ on the right side and 2+ on the left side. Psychiatric:         Attention and Perception: Attention normal. He is attentive. Mood and Affect: Mood normal. Mood is not anxious or depressed. Affect is not labile, blunt, angry or inappropriate. Speech: Speech normal. He is communicative. Speech is not rapid and pressured, delayed, slurred or tangential.         Behavior: Behavior normal. Behavior is not agitated, slowed, aggressive, withdrawn, hyperactive or combative. Thought Content: Thought content normal. Thought content is not paranoid or delusional. Thought content does not include homicidal or suicidal ideation. Thought content does not include homicidal or suicidal plan. Cognition and Memory: Cognition normal. Memory is not impaired. He does not exhibit impaired recent memory or impaired remote memory. Judgment: Judgment normal. Judgment is not impulsive or inappropriate. CORETTA test: + bilaterally   Yeomans test: + bilaterally   Gaenslen test: + bilaterally      Assessment:     1. Spondylosis of lumbar region without myelopathy or radiculopathy    2. Lumbar spondylosis    3. Bulge of lumbar disc without myelopathy    4. SI (sacroiliac) pain    5. Lumbosacral radiculitis    6. Chronic pain syndrome            Plan:      · OARRS reviewed.  Current MED: 15.00  · Patient was offered naloxone for home. Already has   · Discussed long term side effects of medications, tolerance, dependency and addiction. · Previous UDS reviewed  · UDS preformed today for compliance. · Patient told can not receive any pain medications from any other source. · No evidence of abuse, diversion or aberrant behavior.  Medications and/or procedures to improve function and quality of life- patient understanding with this and that may not be pain free   Discussed with patient about safe storage of medications at home   Discussed possible weaning of medication dosing dependent on treatment/procedure results.  Discussed with patient about risks with procedure including infection, reaction to medication, increased pain, or bleeding.  Procedure notes reviewed in detail.  Receiving 70% relief of left SI pain, main complaint now is lower back pain as effects from Left L-facet RFA has worn off.  Plan Left L-facet RFA @ L4-5 and L5-S1 for longer term pain relief. Procedure and risks discussed in detail with patient. · Denies leg pain as patient continues to receive good relief from LESI   · Continue Norco 5/325 TID prn- recently filled 7/11/2020. Medications remain effective, patient is compliant  · Needs to be off blood thinners 5 days prior to procedure. Meds. Prescribed:   No orders of the defined types were placed in this encounter. Return for Left L-facet RFA @ L4-5 and L5-S1. , follow up after procedure.          Electronically signed by WALT Dukes CNP on7/16/2020 at 8:20 AM

## 2020-07-29 ENCOUNTER — HOSPITAL ENCOUNTER (OUTPATIENT)
Age: 50
Discharge: HOME | End: 2020-07-29
Payer: COMMERCIAL

## 2020-07-29 PROCEDURE — U0003 INFECTIOUS AGENT DETECTION BY NUCLEIC ACID (DNA OR RNA); SEVERE ACUTE RESPIRATORY SYNDROME CORONAVIRUS 2 (SARS-COV-2) (CORONAVIRUS DISEASE [COVID-19]), AMPLIFIED PROBE TECHNIQUE, MAKING USE OF HIGH THROUGHPUT TECHNOLOGIES AS DESCRIBED BY CMS-2020-01-R: HCPCS

## 2020-07-30 LAB — SARS-COV-2: NOT DETECTED

## 2020-08-03 ENCOUNTER — TELEPHONE (OUTPATIENT)
Dept: PHYSICAL MEDICINE AND REHAB | Age: 50
End: 2020-08-03

## 2020-08-04 ENCOUNTER — ANESTHESIA EVENT (OUTPATIENT)
Dept: OPERATING ROOM | Age: 50
End: 2020-08-04
Payer: COMMERCIAL

## 2020-08-04 ENCOUNTER — HOSPITAL ENCOUNTER (OUTPATIENT)
Age: 50
Setting detail: OUTPATIENT SURGERY
Discharge: HOME | End: 2020-08-04
Attending: PAIN MEDICINE | Admitting: PAIN MEDICINE
Payer: COMMERCIAL

## 2020-08-04 ENCOUNTER — ANESTHESIA (OUTPATIENT)
Dept: OPERATING ROOM | Age: 50
End: 2020-08-04
Payer: COMMERCIAL

## 2020-08-04 ENCOUNTER — APPOINTMENT (OUTPATIENT)
Dept: GENERAL RADIOLOGY | Age: 50
End: 2020-08-04
Attending: PAIN MEDICINE
Payer: COMMERCIAL

## 2020-08-04 VITALS
HEIGHT: 70 IN | TEMPERATURE: 97.5 F | SYSTOLIC BLOOD PRESSURE: 108 MMHG | WEIGHT: 254 LBS | BODY MASS INDEX: 36.36 KG/M2 | HEART RATE: 68 BPM | OXYGEN SATURATION: 93 % | RESPIRATION RATE: 14 BRPM | DIASTOLIC BLOOD PRESSURE: 63 MMHG

## 2020-08-04 VITALS
RESPIRATION RATE: 18 BRPM | SYSTOLIC BLOOD PRESSURE: 119 MMHG | OXYGEN SATURATION: 91 % | DIASTOLIC BLOOD PRESSURE: 72 MMHG

## 2020-08-04 LAB — POC INR: 1 (ref 0.8–1.2)

## 2020-08-04 PROCEDURE — 2720000010 HC SURG SUPPLY STERILE: Performed by: PAIN MEDICINE

## 2020-08-04 PROCEDURE — 2500000003 HC RX 250 WO HCPCS: Performed by: PAIN MEDICINE

## 2020-08-04 PROCEDURE — 6360000002 HC RX W HCPCS: Performed by: PAIN MEDICINE

## 2020-08-04 PROCEDURE — 2709999900 HC NON-CHARGEABLE SUPPLY: Performed by: PAIN MEDICINE

## 2020-08-04 PROCEDURE — 6360000002 HC RX W HCPCS: Performed by: NURSE ANESTHETIST, CERTIFIED REGISTERED

## 2020-08-04 PROCEDURE — 3700000001 HC ADD 15 MINUTES (ANESTHESIA): Performed by: PAIN MEDICINE

## 2020-08-04 PROCEDURE — 3700000000 HC ANESTHESIA ATTENDED CARE: Performed by: PAIN MEDICINE

## 2020-08-04 PROCEDURE — 64636 DESTROY L/S FACET JNT ADDL: CPT | Performed by: PAIN MEDICINE

## 2020-08-04 PROCEDURE — 64635 DESTROY LUMB/SAC FACET JNT: CPT | Performed by: PAIN MEDICINE

## 2020-08-04 PROCEDURE — 7100000010 HC PHASE II RECOVERY - FIRST 15 MIN: Performed by: PAIN MEDICINE

## 2020-08-04 PROCEDURE — 2500000003 HC RX 250 WO HCPCS: Performed by: NURSE ANESTHETIST, CERTIFIED REGISTERED

## 2020-08-04 PROCEDURE — 3600000056 HC PAIN LEVEL 4 BASE: Performed by: PAIN MEDICINE

## 2020-08-04 PROCEDURE — 7100000011 HC PHASE II RECOVERY - ADDTL 15 MIN: Performed by: PAIN MEDICINE

## 2020-08-04 PROCEDURE — 2580000003 HC RX 258: Performed by: NURSE ANESTHETIST, CERTIFIED REGISTERED

## 2020-08-04 PROCEDURE — 3600000057 HC PAIN LEVEL 4 ADDL 15 MIN: Performed by: PAIN MEDICINE

## 2020-08-04 PROCEDURE — 3209999900 FLUORO FOR SURGICAL PROCEDURES

## 2020-08-04 RX ORDER — METHYLPREDNISOLONE ACETATE 80 MG/ML
INJECTION, SUSPENSION INTRA-ARTICULAR; INTRALESIONAL; INTRAMUSCULAR; SOFT TISSUE PRN
Status: DISCONTINUED | OUTPATIENT
Start: 2020-08-04 | End: 2020-08-04 | Stop reason: ALTCHOICE

## 2020-08-04 RX ORDER — FENTANYL CITRATE 50 UG/ML
INJECTION, SOLUTION INTRAMUSCULAR; INTRAVENOUS PRN
Status: DISCONTINUED | OUTPATIENT
Start: 2020-08-04 | End: 2020-08-04 | Stop reason: SDUPTHER

## 2020-08-04 RX ORDER — BUPIVACAINE HYDROCHLORIDE 2.5 MG/ML
INJECTION, SOLUTION EPIDURAL; INFILTRATION; INTRACAUDAL PRN
Status: DISCONTINUED | OUTPATIENT
Start: 2020-08-04 | End: 2020-08-04 | Stop reason: ALTCHOICE

## 2020-08-04 RX ORDER — LIDOCAINE HYDROCHLORIDE 10 MG/ML
INJECTION, SOLUTION EPIDURAL; INFILTRATION; INTRACAUDAL; PERINEURAL PRN
Status: DISCONTINUED | OUTPATIENT
Start: 2020-08-04 | End: 2020-08-04 | Stop reason: ALTCHOICE

## 2020-08-04 RX ORDER — LIDOCAINE HYDROCHLORIDE 10 MG/ML
INJECTION, SOLUTION INFILTRATION; PERINEURAL PRN
Status: DISCONTINUED | OUTPATIENT
Start: 2020-08-04 | End: 2020-08-04 | Stop reason: SDUPTHER

## 2020-08-04 RX ORDER — SODIUM CHLORIDE 9 MG/ML
INJECTION, SOLUTION INTRAVENOUS CONTINUOUS PRN
Status: DISCONTINUED | OUTPATIENT
Start: 2020-08-04 | End: 2020-08-04 | Stop reason: SDUPTHER

## 2020-08-04 RX ORDER — PROPOFOL 10 MG/ML
INJECTION, EMULSION INTRAVENOUS PRN
Status: DISCONTINUED | OUTPATIENT
Start: 2020-08-04 | End: 2020-08-04 | Stop reason: SDUPTHER

## 2020-08-04 RX ADMIN — PROPOFOL 50 MG: 10 INJECTION, EMULSION INTRAVENOUS at 10:00

## 2020-08-04 RX ADMIN — PROPOFOL 50 MG: 10 INJECTION, EMULSION INTRAVENOUS at 09:59

## 2020-08-04 RX ADMIN — FENTANYL CITRATE 50 MCG: 50 INJECTION, SOLUTION INTRAMUSCULAR; INTRAVENOUS at 09:44

## 2020-08-04 RX ADMIN — LIDOCAINE HYDROCHLORIDE 50 MG: 10 INJECTION, SOLUTION INFILTRATION; PERINEURAL at 09:59

## 2020-08-04 RX ADMIN — SODIUM CHLORIDE: 9 INJECTION, SOLUTION INTRAVENOUS at 09:42

## 2020-08-04 RX ADMIN — PROPOFOL 5 MG: 10 INJECTION, EMULSION INTRAVENOUS at 10:01

## 2020-08-04 RX ADMIN — FENTANYL CITRATE 50 MCG: 50 INJECTION, SOLUTION INTRAMUSCULAR; INTRAVENOUS at 09:43

## 2020-08-04 ASSESSMENT — PULMONARY FUNCTION TESTS
PIF_VALUE: 0

## 2020-08-04 ASSESSMENT — PAIN - FUNCTIONAL ASSESSMENT: PAIN_FUNCTIONAL_ASSESSMENT: 0-10

## 2020-08-04 NOTE — H&P
H&P    Left SI RFA from 7/2/2020. Receiving 70% relief fo left SI pain and continues to receive that relief. Main complaint today is pain in in left lower back. Stabbing and aching pain that is increasing as effects from Left L-facet RFAis waning.         Right lower back pain is currently tolerable and leg pain remains tolerable at this time.      Norco continues to help  Medications reviewed. Patient denies side effects with medications. Patient states he is taking medications as prescribed. Hedenies receiving pain medications from other sources. He head 1 ER visit since last visit     Pain scale with out pain medications or at its worst is 8/10. Pain scale with pain medications or at its best is 3/10. Last dose of Brantley was yesterday  Drug screen reviewed from 6/11/2020 and was appropriate  Pill count was completed today and was appropriate  Patient does have naloxone available at home. Patient has not required use of naloxone at home since last office visit.         The patientis allergic to latex; codeine; nickel; and oxybutynin chloride [oxybutynin chloride].     Past Medical History  Kaden Santiago  has a past medical history of Chronic back pain, GERD (gastroesophageal reflux disease), History of kidney stones, Hx of blood clots, Kidney stone, and Lumbar spondylosis.     Past Surgical History  The patient  has a past surgical history that includes Ureter stent placement (2006); Lithotripsy (2006); Cystocopy (6/14/2013); Cystocopy (07/29/2013); Cystoscopy (12/23/13); other surgical history (Bilateral, 03/26/2018); pr inj dx/ther agnt paravert facet joint, lumbar/sac, 2nd level (Bilateral, 3/26/2018); pr inj dx/ther agnt paravert facet joint, lumbar/sac, 2nd level (Bilateral, 5/21/2018); Colonoscopy; eye surgery (2007); hernia repair (2007); pr inject rx other periph nerve (Left, 9/28/2018); pr inject rx other periph nerve (Right, 11/30/2018); lumbar nerve block (N/A, 4/5/2019);  Injection Procedure For Sacroiliac Joint (Left, 5/17/2019); Injection Procedure For Sacroiliac Joint (Left, 6/27/2019); Lumbar spine surgery (Left, 8/15/2019); Nerve Surgery (Right, 10/31/2019); Pain management procedure (Left, 1/16/2020); Pain management procedure (Right, 3/5/2020); and Pain management procedure (Left, 7/2/2020).    Family History  This patient's family history includes Cancer in his mother; Heart Disease in his father.  Jazmin Kaba  reports that he quit smoking about 15 years ago. He has a 12.00 pack-year smoking history. He has never used smokeless tobacco. He reports that he does not drink alcohol or use drugs.     Medications  Current Medication     Current Outpatient Medications:     HYDROcodone-acetaminophen (NORCO) 5-325 MG per tablet, Take 1 tablet by mouth every 8 hours as needed for Pain for up to 30 days. , Disp: 90 tablet, Rfl: 0    tamsulosin (FLOMAX) 0.4 MG capsule, Take 1 capsule by mouth daily, Disp: 90 capsule, Rfl: 3    potassium citrate (UROCIT-K) 10 MEQ (1080 MG) extended release tablet, TAKE 1 TABLET BY MOUTH EVERY MORNING WITH BREAKFAST, Disp: 90 tablet, Rfl: 3    allopurinol (ZYLOPRIM) 300 MG tablet, TAKE 1 TABLET BY MOUTH EVERY DAY, Disp: 90 tablet, Rfl: 3    ketorolac (TORADOL) 10 MG tablet, Take 1 tablet by mouth every 6 hours as needed for Pain, Disp: 20 tablet, Rfl: 0    traZODone (DESYREL) 50 MG tablet, Take 50 mg by mouth nightly, Disp: , Rfl:     triamcinolone (KENALOG) 0.1 % cream, , Disp: , Rfl:     FLOVENT  MCG/ACT inhaler, , Disp: , Rfl:     ARIPiprazole (ABILIFY) 2 MG tablet, Take 2 mg by mouth 2 times daily, Disp: , Rfl:     citalopram (CELEXA) 40 MG tablet, Take 40 mg by mouth daily. , Disp: , Rfl:     warfarin (COUMADIN) 2.5 MG tablet, Take 2.5 mg by mouth daily at 1800 Takes 5mg 3 days per week & 2.5mg 4 days per week, Disp: , Rfl:     busPIRone (BUSPAR) 10 MG tablet, Take 30 mg by mouth 2 times daily , Disp: , Rfl:     omeprazole (PRILOSEC) 20 MG capsule, Take 20 mg by mouth daily. , Disp: , Rfl:         Subjective:      Review of Systems   Musculoskeletal: Positive for arthralgias, back pain, joint swelling and myalgias. Negative for gait problem. Neurological: Negative for weakness and numbness.         Objective:      Vitals       Vitals:     07/16/20 0801   BP: 126/70   Site: Left Upper Arm   Position: Sitting   Cuff Size: Large Adult   Pulse: 72   Temp: 97.5 °F (36.4 °C)   TempSrc: Temporal   Weight: 259 lb (117.5 kg)   Height: 5' 10\" (1.778 m)           Physical Exam  Vitals signs and nursing note reviewed. Constitutional:       General: He is not in acute distress. Appearance: He is well-developed. He is not diaphoretic. HENT:      Head: Normocephalic and atraumatic. Right Ear: External ear normal.      Left Ear: External ear normal.      Nose: Nose normal.      Mouth/Throat:      Pharynx: No oropharyngeal exudate. Eyes:      General: No scleral icterus. Right eye: No discharge. Left eye: No discharge. Conjunctiva/sclera: Conjunctivae normal.      Pupils: Pupils are equal, round, and reactive to light. Neck:      Musculoskeletal: Full passive range of motion without pain, normal range of motion and neck supple. Normal range of motion. No edema, erythema, neck rigidity or muscular tenderness. Thyroid: No thyromegaly. Cardiovascular:      Rate and Rhythm: Normal rate and regular rhythm. Heart sounds: Normal heart sounds. No murmur. No friction rub. No gallop. Pulmonary:      Effort: Pulmonary effort is normal. No respiratory distress. Breath sounds: Normal breath sounds. No wheezing or rales. Chest:      Chest wall: No tenderness. Abdominal:      General: Bowel sounds are normal. There is no distension. Palpations: Abdomen is soft. Tenderness: There is no abdominal tenderness. There is no guarding or rebound. Musculoskeletal:         General: Tenderness present.       Right shoulder: Normal.      Left shoulder: Normal.      Right hip: He exhibits tenderness and bony tenderness. Left hip: He exhibits tenderness and bony tenderness. Right knee: Normal.      Left knee: Normal.      Cervical back: Normal.      Lumbar back: He exhibits decreased range of motion, tenderness, bony tenderness, pain and spasm. Back:       Right upper leg: He exhibits tenderness. Left upper leg: He exhibits tenderness. Skin:     General: Skin is warm. Coloration: Skin is not pale. Findings: No erythema or rash. Neurological:      Mental Status: He is alert and oriented to person, place, and time. He is not disoriented. Cranial Nerves: No cranial nerve deficit. Sensory: No sensory deficit. Motor: No atrophy or abnormal muscle tone. Coordination: Coordination normal.      Gait: Gait normal.      Deep Tendon Reflexes: Reflexes are normal and symmetric. Babinski sign absent on the right side. Reflex Scores:       Tricep reflexes are 2+ on the right side and 2+ on the left side. Bicep reflexes are 2+ on the right side and 2+ on the left side. Brachioradialis reflexes are 2+ on the right side and 2+ on the left side. Patellar reflexes are 2+ on the right side and 2+ on the left side. Achilles reflexes are 2+ on the right side and 2+ on the left side. Psychiatric:         Attention and Perception: Attention normal. He is attentive. Mood and Affect: Mood normal. Mood is not anxious or depressed. Affect is not labile, blunt, angry or inappropriate. Speech: Speech normal. He is communicative. Speech is not rapid and pressured, delayed, slurred or tangential.         Behavior: Behavior normal. Behavior is not agitated, slowed, aggressive, withdrawn, hyperactive or combative. Thought Content: Thought content normal. Thought content is not paranoid or delusional. Thought content does not include homicidal or suicidal ideation. Thought content does not include homicidal or suicidal plan. Cognition and Memory: Cognition normal. Memory is not impaired. He does not exhibit impaired recent memory or impaired remote memory. Judgment: Judgment normal. Judgment is not impulsive or inappropriate.         CORETTA test: + bilaterally   Yeomans test: + bilaterally   Gaenslen test: + bilaterally      Assessment:      1. Spondylosis of lumbar region without myelopathy or radiculopathy    2. Lumbar spondylosis    3. Bulge of lumbar disc without myelopathy    4. SI (sacroiliac) pain    5. Lumbosacral radiculitis    6. Chronic pain syndrome            Plan:      · OARRS reviewed. Current MED: 15.00  · Patient was offered naloxone for home. Already has   · Discussed long term side effects of medications, tolerance, dependency and addiction. · Previous UDS reviewed  · UDS preformed today for compliance. · Patient told can not receive any pain medications from any other source. · No evidence of abuse, diversion or aberrant behavior. · Medications and/or procedures to improve function and quality of life- patient understanding with this and that may not be pain free  · Discussed with patient about safe storage of medications at home  · Discussed possible weaning of medication dosing dependent on treatment/procedure results. · Discussed with patient about risks with procedure including infection, reaction to medication, increased pain, or bleeding. · Procedure notes reviewed in detail. · Receiving 70% relief of left SI pain, main complaint now is lower back pain as effects from Left L-facet RFA has worn off. · Plan Left L-facet RFA @ L4-5 and L5-S1 for longer term pain relief. Procedure and risks discussed in detail with patient. · Denies leg pain as patient continues to receive good relief from LESI   · Continue Norco 5/325 TID prn- recently filled 7/11/2020.  Medications remain effective, patient is compliant  · Needs to be off blood thinners 5 days prior to procedure.         Meds.  Prescribed:     Encounter Medications    No orders of the defined types were placed in this encounter.        Return for Left L-facet RFA @ L4-5 and L5-S1. , follow up after procedure.

## 2020-08-04 NOTE — ANESTHESIA PRE PROCEDURE
Department of Anesthesiology  Preprocedure Note       Name:  Claudia Booker   Age:  47 y.o.  :  1970                                          MRN:  [id number]         Date:  2020      Surgeon: Gen Jean):  Garry Hein MD    Procedure: Procedure(s):  Left L-facet RFA @ L4-5 and L5-S1    Medications prior to admission:   Prior to Admission medications    Medication Sig Start Date End Date Taking? Authorizing Provider   HYDROcodone-acetaminophen (NORCO) 5-325 MG per tablet Take 1 tablet by mouth every 8 hours as needed for Pain for up to 30 days. 7/11/20 8/10/20  WALT Mckeon - CNP   tamsulosin [facility]) 0.4 MG capsule Take 1 capsule by mouth daily 20   WALT Herrera - CNP   potassium citrate (UROCIT-K) 10 MEQ (1080 MG) extended release tablet TAKE 1 TABLET BY MOUTH EVERY MORNING WITH BREAKFAST 20   WALT Herrera - CNP   allopurinol (ZYLOPRIM) 300 MG tablet TAKE 1 TABLET BY MOUTH EVERY DAY 20   WALT Herrera - CNP   ketorolac (TORADOL) 10 MG tablet Take 1 tablet by mouth every 6 hours as needed for Pain 20   WALT Mcelroy CNP   traZODone (DESYREL) 50 MG tablet Take 50 mg by mouth nightly    Historical Provider, MD   triamcinolone (KENALOG) 0.1 % cream  19   Historical Provider, MD   FLOVENT  MCG/ACT inhaler  19   Historical Provider, MD   ARIPiprazole (ABILIFY) 2 MG tablet Take 2 mg by mouth 2 times daily    Historical Provider, MD   citalopram (CELEXA) 40 MG tablet Take 40 mg by mouth daily. Historical Provider, MD   warfarin (COUMADIN) 2.5 MG tablet Take 2.5 mg by mouth daily at 1800 Takes 5mg 3 days per week & 2.5mg 4 days per week    Historical Provider, MD   busPIRone (BUSPAR) 10 MG tablet Take 30 mg by mouth 2 times daily     Historical Provider, MD   omeprazole (PRILOSEC) 20 MG capsule Take 20 mg by mouth daily.       Historical Provider, MD       Current medications: No current facility-administered medications for this encounter. Allergies: Allergies   Allergen Reactions    Latex Rash    Codeine Hives     TYLENOL WITH CODEINE    Nickel Rash    Oxybutynin Chloride [Oxybutynin Chloride] Other (See Comments)     Warm feeling and extreme dry mouth       Problem List:    Patient Active Problem List   Diagnosis Code    Weak urinary stream R39.12    Obstructive sleep apnea G47.33    Snoring R06.83    Sleep disturbance G47.9    Insomnia G47.00    Sleep talking G47.8    Morning headache R51    Bruxism F45.8    Restless sleeper G47.9    Poor concentration R41.840    Claustrophobia F40.240    Vivid dream R68.89    GERD (gastroesophageal reflux disease) K21.9    Anxiety F41.9    Panic disorder F41.0    Obesity (BMI 30.0-34. 9) E66.9    Lumbar spondylosis M47.816    Bulging lumbar disc M51.26    Sacroiliac inflammation (HCC) M46.1    Lumbar radiculitis M54.16       Past Medical History:        Diagnosis Date    Chronic back pain     GERD (gastroesophageal reflux disease)     History of kidney stones     Hx of blood clots early 2000's & 2013    MUNA LUNGS    Kidney stone     Lumbar spondylosis        Past Surgical History:        Procedure Laterality Date    COLONOSCOPY      last one 2007???    CYSTOSCOPY  6/14/2013    URETEROSCOPY STONE REMOVAL    CYSTOSCOPY  07/29/2013    Left ureteroscopy, Left ureteral stone removal and stent exchange    CYSTOSCOPY  12/23/13    Cystoscopy, Left Optical Internal Ureterotomy, Left Ureteroscopy and Dilated [facility]  2007    lasik    [facility] Left 5/17/2019    SI MBB #1 left performed by Mary Ann Sargent MD at [address] [facility] Left 6/27/2019    Left SI MBB # 2. performed by Mary Ann Sargent MD at [address]  2007    left groin    LITHOTRIPSY  2006    given: Not Answered      Vital Signs (Current): There were no vitals filed for this visit. BP Readings from Last 3 Encounters:   07/16/20 126/70   07/02/20 113/72   07/02/20 102/65       NPO Status:                                                                                 BMI:   Wt Readings from Last 3 Encounters:   07/16/20 259 lb (117.5 kg)   07/02/20 259 lb 9.6 oz (117.8 kg)   06/30/20 260 lb (117.9 kg)     There is no height or weight on file to calculate BMI.    CBC:   Lab Results   Component Value Date    WBC 5.7 06/24/2020    RBC 4.53 06/24/2020    HGB 13.7 06/24/2020    HCT 41.2 06/24/2020    MCV 91.1 06/24/2020    RDW 13.2 06/24/2020     06/24/2020       CMP:   Lab Results   Component Value Date     06/24/2020    K 3.6 06/24/2020    K 3.8 06/18/2020     06/24/2020    CO2 25 06/24/2020    BUN 12 06/24/2020    CREATININE 0.79 06/24/2020    AGRATIO 2.1 07/27/2019    LABGLOM 71 06/18/2020    GLUCOSE 93 06/24/2020    PROT 6.1 07/27/2019    CALCIUM 8.90 06/24/2020    BILITOT 0.6 07/27/2019    ALKPHOS 75 07/27/2019    AST 38 07/27/2019    ALT 47 07/27/2019       POC Tests: No results for input(s): POCGLU, POCNA, POCK, POCCL, POCBUN, POCHEMO, POCHCT in the last 72 hours.     Coags:   Lab Results   Component Value Date    PROTIME 28.3 07/16/2020    INR 2.40 07/16/2020    APTT 32.1 06/14/2013       HCG (If Applicable): No results found for: PREGTESTUR, PREGSERUM, HCG, HCGQUANT     ABGs: No results found for: PHART, PO2ART, JYC9CIS, RJO5TNY, BEART, A8ZKWNLK     Type & Screen (If Applicable):  No results found for: LABABO, LABRH    Drug/Infectious Status (If Applicable):  No results found for: HIV, HEPCAB    COVID-19 Screening (If Applicable):   Lab Results   Component Value Date    COVID19 Not Detected 07/29/2020         Anesthesia Evaluation    Airway: Mallampati: II  TM distance: >3 FB   Neck ROM: full  Mouth opening: > = 3 FB Dental: Pulmonary: breath sounds clear to auscultation  (+) sleep apnea:                             Cardiovascular:            Rhythm: regular                      Neuro/Psych:   (+) psychiatric history:            GI/Hepatic/Renal:   (+) GERD:,           Endo/Other:    (+) : arthritis:., .                 Abdominal:   (+) obese,         Vascular:                                        Anesthesia Plan      MAC     ASA 3       Induction: intravenous. Anesthetic plan and risks discussed with patient. Plan discussed with CRNA.                   Megan Yao MD   8/4/2020

## 2020-08-04 NOTE — ANESTHESIA POSTPROCEDURE EVALUATION
Department of Anesthesiology  Postprocedure Note    Patient: Chino Willett  MRN: [id number]  YOB: 1970  Date of evaluation: 8/4/2020  Time:  12:50 PM     Procedure Summary     Date:  08/04/20 Room / Location:  [address] [place] [address] 03 / [facility]    Anesthesia Start:  5151 Anesthesia Stop:  1005    Procedure:  Left L-facet RFA @ L4-5 and L5-S1 (Left ) Diagnosis:  (lumbar spondylosis)    Surgeon:  Pecola Goldmann, MD Responsible Provider:  Thiago Stinson MD    Anesthesia Type:  MAC ASA Status:  3          Anesthesia Type: MAC    Angelina Phase I: Angelina Score: 10    Angelina Phase II: Angelina Score: 10    Last vitals: Reviewed and per EMR flowsheets.        Anesthesia Post Evaluation    Patient location during evaluation: PACU  Patient participation: complete - patient participated  Level of consciousness: awake  Airway patency: patent  Nausea & Vomiting: no vomiting and no nausea  Complications: no  Cardiovascular status: hemodynamically stable  Respiratory status: acceptable  Hydration status: stable

## 2020-08-04 NOTE — OP NOTE
Operative Note    Pre-Procedure Note    Patient Name: Dory Ratliff   YOB: 1970  Medical Record Number: [id number]  Date: 8/4/20    Indication:  Lower back pain  Consent: On file. Vital Signs:   Vitals:    08/04/20 0831   BP: 119/76   Pulse: 72   Resp: 15   Temp: 97.6 °F (36.4 °C)   SpO2: 94%       Past Medical History:   has a past medical history of Chronic back pain, GERD (gastroesophageal reflux disease), History of kidney stones, Hx of blood clots, Kidney stone, and Lumbar spondylosis. Past Surgical History:   has a past surgical history that includes Ureter stent placement (2006); Lithotripsy (2006); Cystocopy (6/14/2013); Cystocopy (07/29/2013); Cystoscopy (12/23/13); other surgical history (Bilateral, 03/26/2018); pr inj dx/ther agnt paravert facet joint, lumbar/sac, 2nd level (Bilateral, 3/26/2018); pr inj dx/ther agnt paravert facet joint, lumbar/sac, 2nd level (Bilateral, 5/21/2018); Colonoscopy; eye surgery (2007); hernia repair (2007); pr inject rx other periph nerve (Left, 9/28/2018); pr inject rx other periph nerve (Right, 11/30/2018); lumbar nerve block (N/A, 4/5/2019); Injection Procedure For Sacroiliac Joint (Left, 5/17/2019); Injection Procedure For Sacroiliac Joint (Left, 6/27/2019); Lumbar spine surgery (Left, 8/15/2019); Nerve Surgery (Right, 10/31/2019); Pain management procedure (Left, 1/16/2020); Pain management procedure (Right, 3/5/2020); and Pain management procedure (Left, 7/2/2020). Pre-Sedation Documentation and Exam:   Vital signs have been reviewed (see flow sheet for vitals).      Sedation/ Anesthesia Plan:   MAC    Patient is an appropriate candidate for plan of sedation: yes       Preoperative Diagnosis: L-spondylosis     Post-Op Dx: as above     Procedure Performed:  :Radiofrequency ablation of median branches at the levels of L4-5 and L5-S1 left under fluoroscopic guidance      Indication for the Procedure:  The patient has ahistory of chronic low back pain that is not responding well to the conservative treatment.  Patient's pain is mostly axial in nature.  Pain is interfering with the activities of daily living.  Physical examination revealed facet tenderness and facet loading is positive.  Patient had undergone lumbar facet joint injections with pain relief that lasted for only a short period of time and had greater than 70% pain relief with confirmatory median branch blocks.  Hence we decided to do radiofrequency abalation of median branches for long term pain releif.   The procedure and risks  were discussed with the patient and an informed consent was obtained.     Procedure:  Left side   A meaningful communication was kept up with the patient throughout the procedure. The patient is placed in prone position and skin over the back was prepped and draped in sterile manner.  Then using fluoroscopy the junction of the transverse process of the vertebra with the superior process of the facet joint was observed and the view was optimized.  The skin and deep tissues posterior were infiltrated with 10 ml of  1% xylocaine. The RF canula with the 15 mm active tip was introduced through the skin wheal under fluoroscopy guidance such that the tip of the needle lies in the groove of the transverse process with the superior processes of the facet joint.  Then a lateral view of the lumbar spine was obtained to make sure the tip of needle is not in the neural foramen.  Then electric impedence was checked to make sure it is acceptable. Then a sensory stimulus was applied at 50 Hz up to 1 volt and concordant pain symptoms were reproduced. Then a motor stimulus was applied at 2 Hz up to 2 volts and no motor stimulation was seen in lower extremities.  Some multifidus stimulus was seen. Schuyler Santo after negative aspiration a mixture of depomedrol  80 mg and 0.25% rmarcaine 1 cc  was injected through the needle. Then a  lesion was done at 80 degrees centigrade for 90 seconds. For L5 median branch block the junction of the ala of  the sacrum with the superior articular process of the facet joint was taken as a reference point.  For the L4 median branch the junction of the transverse process of L5 with the superolateral possible facet joint was taken as a reference point and for S1 median branch the most lateral and superior aspect of S1 foramina was taken as a reference point,.      EBL-0   Patient's vital signs and neurological status remained stable throughout the procedure and post procedural period.  The patient tolerated the procedure well and was discharged home in stable condition.     Electronically signed by Ina Reddy MD on 8/4/20 at 9:45 AM EDT

## 2020-08-07 NOTE — TELEPHONE ENCOUNTER
Gael Scales called requesting a refill on the following medications:  Requested Prescriptions     Pending Prescriptions Disp Refills    HYDROcodone-acetaminophen (NORCO) 5-325 MG per tablet 90 tablet 0     Sig: Take 1 tablet by mouth every 8 hours as needed for Pain for up to 30 days.      Pharm[facility] verified:[facility]   .[facility]      Date of last visit: 7/16/20  Date of next visit (if applicable): 3/91/9047

## 2020-08-10 RX ORDER — HYDROCODONE BITARTRATE AND ACETAMINOPHEN 5; 325 MG/1; MG/1
1 TABLET ORAL EVERY 8 HOURS PRN
Qty: 90 TABLET | Refills: 0 | Status: SHIPPED | OUTPATIENT
Start: 2020-08-10 | End: 2020-09-09 | Stop reason: SDUPTHER

## 2020-08-10 NOTE — TELEPHONE ENCOUNTER
OARRS reviewed. UDS: + for  norhydrocodone - consistent      Last seen: 7/16/2020.      Follow-up:   Future Appointments   Date Time Provider Saul Maria G   8/19/2020  7:45 AM WALT Lopez - CNP [facility] Pain [facility] - [facility]   9/4/2020  7:45 AM Nell Najjar, [facility]

## 2020-08-19 ENCOUNTER — OFFICE VISIT (OUTPATIENT)
Dept: PHYSICAL MEDICINE AND REHAB | Age: 50
End: 2020-08-19
Payer: COMMERCIAL

## 2020-08-19 ENCOUNTER — HOSPITAL ENCOUNTER (OUTPATIENT)
Age: 50
Discharge: HOME | End: 2020-08-19
Payer: COMMERCIAL

## 2020-08-19 ENCOUNTER — HOSPITAL ENCOUNTER (OUTPATIENT)
Dept: GENERAL RADIOLOGY | Age: 50
Discharge: HOME | End: 2020-08-19
Payer: COMMERCIAL

## 2020-08-19 VITALS
SYSTOLIC BLOOD PRESSURE: 130 MMHG | BODY MASS INDEX: 36.36 KG/M2 | HEIGHT: 70 IN | WEIGHT: 254 LBS | DIASTOLIC BLOOD PRESSURE: 84 MMHG | TEMPERATURE: 97.1 F

## 2020-08-19 PROCEDURE — 99213 OFFICE O/P EST LOW 20 MIN: CPT | Performed by: NURSE PRACTITIONER

## 2020-08-19 PROCEDURE — 72072 X-RAY EXAM THORAC SPINE 3VWS: CPT

## 2020-08-19 ASSESSMENT — ENCOUNTER SYMPTOMS: BACK PAIN: 1

## 2020-08-19 NOTE — PROGRESS NOTES
[address] [facility]  [address]  Dept: [phone]  Dept Fax: [phone]: [phone]    Visit Date: 8/19/2020    Functionality Assessment/Goals Worksheet     On a scale of 0 (Does not Interfere) to 10 (Completely Interferes)     1. Which number describes how during the past week pain has interfered with       the following:  A. General Activity:  2  B. Mood: 4  C. Walking Ability:  2  D. Normal Work (Includes both work outside the home and housework):  4  E. Relations with Other People:   3  F. Sleep:   3  G. Enjoyment of Life:   2    2. Patient Prefers to Take their Pain Medications:     []  On a regular basis   [x]  Only when necessary    []  Does not take pain medications    3. What are the Patient's Goals/Expectations for Visiting Pain Management? []  Learn about my pain    []  Receive Medication   []  Physical Therapy     []  Treat Depression   []  Receive Injections    []  Treat Sleep   []  Deal with Anxiety and Stress   []  Treat Opoid Dependence/Addiction   []  Other:      HPI:   Dustin Mehta is a 47 y.o. male is here today for    Chief Complaint: Lower back pain, SI pain, mid back pain     HPI   FU from Left L-facet RFA from 8/4/2020. Receiving 90% relief of lower back pain and Si pain Since Left SSI and Left L-facet RFA. Lower back pain is more tolerable and right side remains well controlled as well as he continues to receive good relief from right L-facet RFA. States that he is able to get around better and tolerate more activity. Right leg remains tolerable from LESI     Has been using less prn pain medications since procedure. Now has a complaint of some right mid back pain radiating to his right shoulder blade that he is noticing while at work- [employer] Hurter, stabbing, and intermittent pain   Medications reviewed.  Patient denies side effects with medications. Patient states he is taking medications as prescribed. Hedenies receiving pain medications from other sources. He denies any ER visits since last visit. Pain scale with out pain medications or at its worst is 4-5/10. Pain scale with pain medications or at its best is 1/10. Last dose of Princella Cola was yesterday at work   Drug screen reviewed from 6/11/2020 and was appropriate  Pill count was completed today and was appropriate  Patient does have naloxone available at home. Patient has not required use of naloxone at home since last office visit. The patientis allergic to latex; codeine; nickel; and oxybutynin chloride [oxybutynin chloride]. Past Medical History  [place] Patient  has a past medical history of Chronic back pain, GERD (gastroesophageal reflux disease), History of kidney stones, Hx of blood clots, Kidney stone, and Lumbar spondylosis. Past Surgical History  The patient  has a past surgical history that includes Ureter stent placement (2006); Lithotripsy (2006); Cystocopy (6/14/2013); Cystocopy (07/29/2013); Cystoscopy (12/23/13); other surgical history (Bilateral, 03/26/2018); pr inj dx/ther agnt paravert facet joint, lumbar/sac, 2nd level (Bilateral, 3/26/2018); pr inj dx/ther agnt paravert facet joint, lumbar/sac, 2nd level (Bilateral, 5/21/2018); Colonoscopy; eye surgery (2007); hernia repair (2007); pr inject rx other periph nerve (Left, 9/28/2018); pr inject rx other periph nerve (Right, 11/30/2018); lumbar nerve block (N/A, 4/5/2019); Injection Procedure For Sacroiliac Joint (Left, 5/17/2019); Injection Procedure For Sacroiliac Joint (Left, 6/27/2019); Lumbar spine surgery (Left, 8/15/2019); Nerve Surgery (Right, 10/31/2019); Pain management procedure (Left, 1/16/2020); Pain management procedure (Right, 3/5/2020); Pain management procedure (Left, 7/2/2020); and Pain management procedure (Left, 8/4/2020).     Family History  This patient's family history includes Cancer in his mother; Heart Disease in his father. Social History  Herberth Bob  reports that he quit smoking about 15 years ago. He has a 12.00 pack-year smoking history. He has never used smokeless tobacco. He reports that he does not drink alcohol or use drugs. Medications    Current Outpatient Medications:     HYDROcodone-acetaminophen (NORCO) 5-325 MG per tablet, Take 1 tablet by mouth every 8 hours as needed for Pain for up to 30 days. , Disp: 90 tablet, Rfl: 0    tamsulosin (FLOMAX) 0.4 MG capsule, Take 1 capsule by mouth daily, Disp: 90 capsule, Rfl: 3    potassium citrate (UROCIT-K) 10 MEQ (1080 MG) extended release tablet, TAKE 1 TABLET BY MOUTH EVERY MORNING WITH BREAKFAST, Disp: 90 tablet, Rfl: 3    allopurinol (ZYLOPRIM) 300 MG tablet, TAKE 1 TABLET BY MOUTH EVERY DAY, Disp: 90 tablet, Rfl: 3    traZODone (DESYREL) 50 MG tablet, Take 50 mg by mouth nightly, Disp: , Rfl:     triamcinolone (KENALOG) 0.1 % cream, , Disp: , Rfl:     FLOVENT  MCG/ACT inhaler, , Disp: , Rfl:     ARIPiprazole (ABILIFY) 2 MG tablet, Take 2 mg by mouth 2 times daily, Disp: , Rfl:     citalopram (CELEXA) 40 MG tablet, Take 40 mg by mouth daily. , Disp: , Rfl:     warfarin (COUMADIN) 2.5 MG tablet, Take 2.5 mg by mouth daily at 1800 Takes 5mg 3 days per week & 2.5mg 4 days per week, Disp: , Rfl:     busPIRone (BUSPAR) 10 MG tablet, Take 30 mg by mouth 2 times daily , Disp: , Rfl:     omeprazole (PRILOSEC) 20 MG capsule, Take 20 mg by mouth daily. , Disp: , Rfl:     Subjective:      Review of Systems   Constitutional: Negative. Musculoskeletal: Positive for arthralgias, back pain and myalgias. Negative for gait problem and neck pain. Neurological: Negative for numbness. Psychiatric/Behavioral: Negative. Objective:     Vitals:    08/19/20 0746   BP: 130/84   Temp: 97.1 °F (36.2 °C)   Weight: 254 lb (115.2 kg)   Height: 5' 10\" (1.778 m)       Physical Exam  Vitals signs and nursing note reviewed.    Constitutional: General: He is not in acute distress. Appearance: He is well-developed. He is not diaphoretic. HENT:      Head: Normocephalic and atraumatic. Right Ear: External ear normal.      Left Ear: External ear normal.      Nose: Nose normal.      Mouth/Throat:      Pharynx: No oropharyngeal exudate. Eyes:      General: No scleral icterus. Right eye: No discharge. Left eye: No discharge. Conjunctiva/sclera: Conjunctivae normal.      Pupils: Pupils are equal, round, and reactive to light. Neck:      Musculoskeletal: Full passive range of motion without pain, normal range of motion and neck supple. Normal range of motion. No edema, erythema, neck rigidity or muscular tenderness. Thyroid: No thyromegaly. Cardiovascular:      Rate and Rhythm: Normal rate and regular rhythm. Heart sounds: Normal heart sounds. No murmur. No friction rub. No gallop. Pulmonary:      Effort: Pulmonary effort is normal. No respiratory distress. Breath sounds: Normal breath sounds. No wheezing or rales. Chest:      Chest wall: No tenderness. Abdominal:      General: Bowel sounds are normal. There is no distension. Palpations: Abdomen is soft. Tenderness: There is no abdominal tenderness. There is no guarding or rebound. Musculoskeletal:         General: Tenderness present. Right shoulder: Normal.      Left shoulder: Normal.      Right hip: He exhibits tenderness and bony tenderness. Left hip: He exhibits tenderness and bony tenderness. Right knee: Normal.      Left knee: Normal.      Cervical back: Normal.      Thoracic back: He exhibits tenderness. Lumbar back: He exhibits decreased range of motion, tenderness, bony tenderness, pain and spasm. Back:       Right upper leg: He exhibits tenderness. Left upper leg: He exhibits tenderness. Skin:     General: Skin is warm. Coloration: Skin is not pale. Findings: No erythema or rash. Neurological:      Mental Status: He is alert and oriented to person, place, and time. He is not disoriented. Cranial Nerves: No cranial nerve deficit. Sensory: No sensory deficit. Motor: No atrophy or abnormal muscle tone. Coordination: Coordination normal.      Gait: Gait normal.      Deep Tendon Reflexes: Reflexes are normal and symmetric. Babinski sign absent on the right side. Reflex Scores:       Tricep reflexes are 2+ on the right side and 2+ on the left side. Bicep reflexes are 2+ on the right side and 2+ on the left side. Brachioradialis reflexes are 2+ on the right side and 2+ on the left side. Patellar reflexes are 2+ on the right side and 2+ on the left side. Achilles reflexes are 2+ on the right side and 2+ on the left side. Psychiatric:         Attention and Perception: Attention normal. He is attentive. Mood and Affect: Mood normal. Mood is not anxious or depressed. Affect is not labile, blunt, angry or inappropriate. Speech: Speech normal. He is communicative. Speech is not rapid and pressured, delayed, slurred or tangential.         Behavior: Behavior normal. Behavior is not agitated, slowed, aggressive, withdrawn, hyperactive or combative. Thought Content: Thought content normal. Thought content is not paranoid or delusional. Thought content does not include homicidal or suicidal ideation. Thought content does not include homicidal or suicidal plan. Cognition and Memory: Cognition normal. Memory is not impaired. He does not exhibit impaired recent memory or impaired remote memory. Judgment: Judgment normal. Judgment is not impulsive or inappropriate. CORETTA test: + bilaterally   Yeomans test: + bilaterally   Gaenslen test: + bilaterally      Assessment:     1. Spondylosis of lumbar region without myelopathy or radiculopathy    2. Lumbar spondylosis    3. Bulge of lumbar disc without myelopathy    4.  Mid

## 2020-08-20 LAB — PSA, ULTRASENSITIVE: 0.47 NG/ML (ref 0–4)

## 2020-09-01 ENCOUNTER — TELEPHONE (OUTPATIENT)
Dept: UROLOGY | Age: 50
End: 2020-09-01

## 2020-09-01 NOTE — TELEPHONE ENCOUNTER
Patient has an appt on 09/09/20 in [address] [place] [address]. Your note on 09/14/20 said to follow up in one year with BMP prior. You saw the patient on 06/30/20 and said to f/u in September with a PSA prior. Does the patient need to have a BMP also? Last BMP was 06/24/20.

## 2020-09-02 NOTE — TELEPHONE ENCOUNTER
I called and spoke with the patient and notified him that [facility] would like a BMP before appt. Patient stated that he will go to [facility] in [place]. BMP order faxed to [phone]. Confirmation received.

## 2020-09-08 NOTE — TELEPHONE ENCOUNTER
Ya De Leon called requesting a refill on the following medications:  Requested Prescriptions     Pending Prescriptions Disp Refills    HYDROcodone-acetaminophen (NORCO) 5-325 MG per tablet 90 tablet 0     Sig: Take 1 tablet by mouth every 8 hours as needed for Pain for up to 30 days. Pharmacy verified: [facility] in [address] [place] [address]   . pv      Date of last visit: 8/19/2020  Date of next visit (if applicable): 0/00/4810 General

## 2020-09-09 ENCOUNTER — OFFICE VISIT (OUTPATIENT)
Dept: UROLOGY | Age: 50
End: 2020-09-09
Payer: COMMERCIAL

## 2020-09-09 VITALS — BODY MASS INDEX: 35.79 KG/M2 | TEMPERATURE: 98 F | WEIGHT: 250 LBS | HEIGHT: 70 IN

## 2020-09-09 LAB
BACTERIA: NORMAL
BILIRUBIN URINE: NEGATIVE
BILIRUBIN, POC: NORMAL
BLOOD URINE, POC: NORMAL
BLOOD, URINE: NEGATIVE
CASTS: NORMAL /LPF
CASTS: NORMAL /LPF
CHARACTER, URINE: CLEAR
CLARITY, POC: CLEAR
COLOR, POC: YELLOW
COLOR: YELLOW
CRYSTALS: NORMAL
EPITHELIAL CELLS, UA: NORMAL /HPF
GLUCOSE URINE, POC: NORMAL
GLUCOSE, URINE: NEGATIVE MG/DL
KETONES, POC: NORMAL
KETONES, URINE: NEGATIVE
LEUKOCYTE EST, POC: NEGATIVE
LEUKOCYTE ESTERASE, URINE: NEGATIVE
MISCELLANEOUS LAB TEST RESULT: NORMAL
NITRITE, POC: NEGATIVE
NITRITE, URINE: NEGATIVE
PH UA: 7.5 (ref 5–9)
PH, POC: NORMAL
POST VOID RESIDUAL (PVR): 0 ML
PROTEIN UA: NEGATIVE MG/DL
PROTEIN, POC: NORMAL
RBC URINE: NORMAL /HPF
RENAL EPITHELIAL, UA: NORMAL
SPECIFIC GRAVITY UA: 1.02 (ref 1–1.03)
SPECIFIC GRAVITY, POC: NORMAL
UROBILINOGEN, POC: NORMAL
UROBILINOGEN, URINE: 1 EU/DL (ref 0–1)
WBC UA: NORMAL /HPF
YEAST: NORMAL

## 2020-09-09 PROCEDURE — 81003 URINALYSIS AUTO W/O SCOPE: CPT | Performed by: NURSE PRACTITIONER

## 2020-09-09 PROCEDURE — 51798 US URINE CAPACITY MEASURE: CPT | Performed by: NURSE PRACTITIONER

## 2020-09-09 PROCEDURE — 99213 OFFICE O/P EST LOW 20 MIN: CPT | Performed by: NURSE PRACTITIONER

## 2020-09-09 RX ORDER — HYDROCODONE BITARTRATE AND ACETAMINOPHEN 5; 325 MG/1; MG/1
1 TABLET ORAL EVERY 8 HOURS PRN
Qty: 90 TABLET | Refills: 0 | Status: SHIPPED | OUTPATIENT
Start: 2020-09-09 | End: 2020-10-06 | Stop reason: SDUPTHER

## 2020-09-09 ASSESSMENT — ENCOUNTER SYMPTOMS
BACK PAIN: 0
ABDOMINAL PAIN: 0

## 2020-09-09 NOTE — PROGRESS NOTES
[address] [address]  Dept: [phone]  Loc: [phone]    Visit Date: 9/9/2020        HPI:     Johnny Dimas is a 47 y.o. male who presents today for:  Chief Complaint   Patient presents with    1 Year Follow Up     kidney stone, PSA prior    Benign Prostatic Hypertrophy       HPI   Pt seen in follow up for flank pain and dysuria. Pt has a hx of kidney stones for which he takes allopurinol and potassium citrate. He presented to ER 6/18/2020 secondary to bilateral flank pain and 3 day history of dysuria. CT imaging revealed a 2 mm nonobstructive renal stone. Urinalysis was negative for infection. PVR 75 mls. Pt was started on tamsulosin 0.4 mg daily which he continues. Pt reports urinary stream is now strong, denies hematuria, dysuria, nocturia. He is happy with symptoms at this time on the tamsulosin. Current Outpatient Medications   Medication Sig Dispense Refill    HYDROcodone-acetaminophen (NORCO) 5-325 MG per tablet Take 1 tablet by mouth every 8 hours as needed for Pain for up to 30 days. 90 tablet 0    tamsulosin (FLOMAX) 0.4 MG capsule Take 1 capsule by mouth daily 90 capsule 3    potassium citrate (UROCIT-K) 10 MEQ (1080 MG) extended release tablet TAKE 1 TABLET BY MOUTH EVERY MORNING WITH BREAKFAST 90 tablet 3    allopurinol (ZYLOPRIM) 300 MG tablet TAKE 1 TABLET BY MOUTH EVERY DAY 90 tablet 3    traZODone (DESYREL) 50 MG tablet Take 50 mg by mouth nightly      triamcinolone (KENALOG) 0.1 % cream       FLOVENT  MCG/ACT inhaler       ARIPiprazole (ABILIFY) 2 MG tablet Take 2 mg by mouth 2 times daily      citalopram (CELEXA) 40 MG tablet Take 40 mg by mouth daily.       warfarin (COUMADIN) 2.5 MG tablet Take 2.5 mg by mouth daily at 1800 Takes 5mg 3 days per week & 2.5mg 4 days per week      busPIRone (BUSPAR) 10 MG tablet Take 30 mg by mouth 2 times daily       omeprazole dysuria, flank pain, frequency, hematuria, penile pain, scrotal swelling, testicular pain and urgency. Musculoskeletal: Negative for back pain. Objective:   Temp 98 °F (36.7 °C) (Temporal)   Ht 5' 10\" (1.778 m)   Wt 250 lb (113.4 kg)   BMI 35.87 kg/m²     Physical Exam  Constitutional:       General: He is not in acute distress. Appearance: Normal appearance. He is not ill-appearing or diaphoretic. HENT:      Head: Normocephalic and atraumatic. Nose: Nose normal.   Eyes:      General: No scleral icterus. Right eye: No discharge. Left eye: No discharge. Skin:     General: Skin is warm. Neurological:      Mental Status: He is alert and oriented to person, place, and time. Mental status is at baseline. Psychiatric:         Mood and Affect: Mood normal.         Behavior: Behavior normal.         Thought Content: Thought content normal.         POC  Results for POC orders placed in visit on 20   POCT Urinalysis No Micro (Auto)   Result Value Ref Range    Color, UA yellow     Clarity, UA clear     Glucose, UA POC      Bilirubin, UA      Ketones, UA      Spec Grav, UA      Blood, UA POC moderate     pH, UA      Protein, UA POC      Urobilinogen, UA      Leukocytes, UA negative     Nitrite, UA negative    poct post void residual   Result Value Ref Range    post void residual 0 ml         Patients recent PSA values are as follows  Lab Results   Component Value Date    PSA 0.30 2012        Recent BUN/Creatinine:  Lab Results   Component Value Date    BUN 12 2020    CREATININE 0.79 2020       Radiology  The patient has had a CT abd/pelvis without contrast 2020 which I have reviewed along with its accompanying report. The study demonstrates a nonobstructive 2 mm stone at the inferior pole of the left kidney. No hydronephrosis. Unremarkable bladder.  Hepatic steatosis          Assessment:   BPH with LUTs  Family hx of prostate cancer--father--70s--\" from old age\"  Small L nonobstructive kidney stone, inferior pole    Plan:     Pt doing well with flomax 0.4 mg daily at this time. Continue. Continue allopurinol and k citrate. Monitor small nonobstructive stone--no intervention at this time. F/u in 1 year with PVR. PSA and BMP a few days prior to appt.

## 2020-09-09 NOTE — TELEPHONE ENCOUNTER
OARRS reviewed. UDS: + for norco - consistent     Narcan offered: yes, refused       Last seen: 8/19/2020.      Follow-up:   Future Appointments   Date Time Provider Saul Cmumins   9/23/2020  8:00 AM W[facility]on [facility]   9/8/2021  7:45 AM Tommy Bonine, APRN - CNP [facility] Hamman [facility]

## 2020-09-23 ENCOUNTER — OFFICE VISIT (OUTPATIENT)
Dept: PHYSICAL MEDICINE AND REHAB | Age: 50
End: 2020-09-23
Payer: COMMERCIAL

## 2020-09-23 VITALS
BODY MASS INDEX: 35.79 KG/M2 | HEIGHT: 70 IN | WEIGHT: 250 LBS | TEMPERATURE: 97.7 F | SYSTOLIC BLOOD PRESSURE: 108 MMHG | DIASTOLIC BLOOD PRESSURE: 62 MMHG

## 2020-09-23 PROCEDURE — 99214 OFFICE O/P EST MOD 30 MIN: CPT | Performed by: NURSE PRACTITIONER

## 2020-09-23 ASSESSMENT — ENCOUNTER SYMPTOMS: BACK PAIN: 1

## 2020-09-23 NOTE — PROGRESS NOTES
[address] [facility] (formerly [facility])[3]  [address]  Dept: [phone]  Dept Fax: [phone]: [phone]    Visit Date: 9/23/2020    Functionality Assessment/Goals Worksheet     On a scale of 0 (Does not Interfere) to 10 (Completely Interferes)     1. Which number describes how during the past week pain has interfered with       the following:  A. General Activity:  4  B. Mood: 4  C. Walking Ability:  3  D. Normal Work (Includes both work outside the home and housework):  6  E. Relations with Other People:   3  F. Sleep:   3  G. Enjoyment of Life:   4    2. Patient Prefers to Take their Pain Medications:     []  On a regular basis   [x]  Only when necessary    []  Does not take pain medications    3. What are the Patient's Goals/Expectations for Visiting Pain Management? []  Learn about my pain    []  Receive Medication   []  Physical Therapy     []  Treat Depression   []  Receive Injections    []  Treat Sleep   []  Deal with Anxiety and Stress   []  Treat Opoid Dependence/Addiction   []  Other:      HPI:   Dustin Mehta is a 45 y.o. male is here today for    Chief Complaint:Mid back pain, low back pain, SI pain     HPI   1 month Fu to review T-xray. Main complaint remains pain in right mid back  radiating to his right shoulder blade- increased with activity, punching and aching. He states that he has been limited with activity. Lower back pain, SI pain and leg pain remains tolerable from procedures     Norco prn continues to help     Medications reviewed. Patient denies side effects with medications. Patient states he is taking medications as prescribed. Hedenies receiving pain medications from other sources. He denies any ER visits since last visit. Pain scale with out pain medications or at its worst is 7/10- mid back   Pain scale with pain medications or at its best is 3/10.   Last dose of Norco was yesterday  Drug screen reviewed from 8/19/2020 and was appropriate  Pill count was completed today and was appropriate  Patient does have naloxone available at home. Patient has not required use of naloxone at home since last office visit. T-xray:   1. Mildly increased thoracic kyphosis. Moderate vertebral body spondylosis scattered throughout the thoracic spine. 2. Slight anterior wedging of T12, unchanged from prior study, in addition to T11, T8, and T7, not present on prior study. The appearance is consistent with mild chronic compression fractures, nonetheless. 3. Slight levoscoliosis mid and lower thoracic spine, unchanged from prior study. Paravertebral soft tissues unremarkable.             The patientis allergic to latex; codeine; nickel; and oxybutynin chloride [oxybutynin chloride]. Past Medical History  Rosemary Luna  has a past medical history of Chronic back pain, GERD (gastroesophageal reflux disease), History of kidney stones, Hx of blood clots, Kidney stone, and Lumbar spondylosis. Past Surgical History  The patient  has a past surgical history that includes Ureter stent placement (2006); Lithotripsy (2006); Cystocopy (6/14/2013); Cystocopy (07/29/2013); Cystoscopy (12/23/13); other surgical history (Bilateral, 03/26/2018); pr inj dx/ther agnt paravert facet joint, lumbar/sac, 2nd level (Bilateral, 3/26/2018); pr inj dx/ther agnt paravert facet joint, lumbar/sac, 2nd level (Bilateral, 5/21/2018); Colonoscopy; eye surgery (2007); hernia repair (2007); pr inject rx other periph nerve (Left, 9/28/2018); pr inject rx other periph nerve (Right, 11/30/2018); lumbar nerve block (N/A, 4/5/2019); Injection Procedure For Sacroiliac Joint (Left, 5/17/2019); Injection Procedure For Sacroiliac Joint (Left, 6/27/2019); Lumbar spine surgery (Left, 8/15/2019); Nerve Surgery (Right, 10/31/2019); Pain management procedure (Left, 1/16/2020); Pain management procedure (Right, 3/5/2020);  Pain management procedure (Left, 7/2/2020); and Pain management procedure (Left, 8/4/2020). Family History  This patient's family history includes Cancer in his mother; Heart Disease in his father. Social History  Jose Ho  reports that he quit smoking about 15 years ago. He has a 12.00 pack-year smoking history. He has never used smokeless tobacco. He reports that he does not drink alcohol or use drugs. Medications    Current Outpatient Medications:     HYDROcodone-acetaminophen (NORCO) 5-325 MG per tablet, Take 1 tablet by mouth every 8 hours as needed for Pain for up to 30 days. , Disp: 90 tablet, Rfl: 0    tamsulosin (FLOMAX) 0.4 MG capsule, Take 1 capsule by mouth daily, Disp: 90 capsule, Rfl: 3    potassium citrate (UROCIT-K) 10 MEQ (1080 MG) extended release tablet, TAKE 1 TABLET BY MOUTH EVERY MORNING WITH BREAKFAST, Disp: 90 tablet, Rfl: 3    allopurinol (ZYLOPRIM) 300 MG tablet, TAKE 1 TABLET BY MOUTH EVERY DAY, Disp: 90 tablet, Rfl: 3    traZODone (DESYREL) 50 MG tablet, Take 50 mg by mouth nightly, Disp: , Rfl:     triamcinolone (KENALOG) 0.1 % cream, , Disp: , Rfl:     FLOVENT  MCG/ACT inhaler, , Disp: , Rfl:     ARIPiprazole (ABILIFY) 2 MG tablet, Take 2 mg by mouth 2 times daily, Disp: , Rfl:     citalopram (CELEXA) 40 MG tablet, Take 40 mg by mouth daily. , Disp: , Rfl:     warfarin (COUMADIN) 2.5 MG tablet, Take 2.5 mg by mouth daily at 1800 Takes 5mg 3 days per week & 2.5mg 4 days per week, Disp: , Rfl:     busPIRone (BUSPAR) 10 MG tablet, Take 30 mg by mouth 2 times daily , Disp: , Rfl:     omeprazole (PRILOSEC) 20 MG capsule, Take 20 mg by mouth daily. , Disp: , Rfl:     Subjective:      Review of Systems   Constitutional: Negative. Musculoskeletal: Positive for arthralgias, back pain and myalgias. Negative for gait problem and neck pain. Neurological: Negative for numbness. Psychiatric/Behavioral: Negative.         Objective:     Vitals:    09/23/20 0754   BP: 108/62 spasm.        Back:       Right upper leg: He exhibits tenderness. Left upper leg: He exhibits tenderness. Skin:     General: Skin is warm. Coloration: Skin is not pale. Findings: No erythema or rash. Neurological:      Mental Status: He is alert and oriented to person, place, and time. He is not disoriented. Cranial Nerves: No cranial nerve deficit. Sensory: No sensory deficit. Motor: No atrophy or abnormal muscle tone. Coordination: Coordination normal.      Gait: Gait normal.      Deep Tendon Reflexes: Reflexes are normal and symmetric. Babinski sign absent on the right side. Reflex Scores:       Tricep reflexes are 2+ on the right side and 2+ on the left side. Bicep reflexes are 2+ on the right side and 2+ on the left side. Brachioradialis reflexes are 2+ on the right side and 2+ on the left side. Patellar reflexes are 2+ on the right side and 2+ on the left side. Achilles reflexes are 2+ on the right side and 2+ on the left side. Psychiatric:         Attention and Perception: Attention normal. He is attentive. Mood and Affect: Mood normal. Mood is not anxious or depressed. Affect is not labile, blunt, angry or inappropriate. Speech: Speech normal. He is communicative. Speech is not rapid and pressured, delayed, slurred or tangential.         Behavior: Behavior normal. Behavior is not agitated, slowed, aggressive, withdrawn, hyperactive or combative. Thought Content: Thought content normal. Thought content is not paranoid or delusional. Thought content does not include homicidal or suicidal ideation. Thought content does not include homicidal or suicidal plan. Cognition and Memory: Cognition normal. Memory is not impaired. He does not exhibit impaired recent memory or impaired remote memory. Judgment: Judgment normal. Judgment is not impulsive or inappropriate.        CORETTA test: +   Yeomans test: + Gaenslen test: +      Assessment:     1. Thoracic compression fracture, closed, initial encounter ([facility] [address]    2. Mid back pain    3. Spondylosis of thoracic region without myelopathy or radiculopathy    4. Spondylosis of lumbar region without myelopathy or radiculopathy    5. Lumbar spondylosis    6. Bulge of lumbar disc without myelopathy    7. Lumbosacral radiculitis    8. SI (sacroiliac) pain    9. Chronic pain syndrome            Plan:      · OARRS reviewed. Current MED: 15.00  · Patient was offered naloxone for home. Refused  · Discussed long term side effects of medications, tolerance, dependency and addiction. · Previous UDS reviewed  · UDS preformed today for compliance. · Patient told can not receive any pain medications from any other source. · No evidence of abuse, diversion or aberrant behavior.  Medications and/or procedures to improve function and quality of life- patient understanding with this and that may not be pain free   Discussed with patient about safe storage of medications at home   Discussed possible weaning of medication dosing dependent on treatment/procedure results.  Discussed with patient about risks with procedure including infection, reaction to medication, increased pain, or bleeding.  Procedure notes reviewed in detail    Receiving 90% relief of lower back pain following left L-facet RFA and Left SI RFA and continues to receive that relief. Lower back pain is tolerable    Receiving 100% relief of leg pain from LESI    Reviewed T-xray in detail- showed compression fractures. Ordered thoracic MRI to check acuity- very tender mid back over spine    Discussed possible Thoracic Facet MBB   Continue Norco 5/325 TID prn- recently filled 9/9/2020. Medications remain effective, patient is compliant      Meds. Prescribed:   No orders of the defined types were placed in this encounter. Return in about 1 month (around 10/23/2020) for FU after T-MRI.          Time spent with patient was 25 minutes, more than 50% was spent Counseling/coordinated the patient's care.       Electronically signed by WALT Bates CNP on9/23/2020 at 8:12 AM

## 2020-10-06 RX ORDER — HYDROCODONE BITARTRATE AND ACETAMINOPHEN 5; 325 MG/1; MG/1
1 TABLET ORAL EVERY 8 HOURS PRN
Qty: 90 TABLET | Refills: 0 | Status: SHIPPED | OUTPATIENT
Start: 2020-10-09 | End: 2020-11-05 | Stop reason: SDUPTHER

## 2020-10-06 NOTE — TELEPHONE ENCOUNTER
[facility] called requesting a refill on the following medications:  Requested Prescriptions     Pending Prescriptions Disp Refills    HYDROcodone-acetaminophen (NORCO) 5-325 MG per tablet 90 tablet 0     Sig: Take 1 tablet by mouth every 8 hours as needed for Pain for up to 30 days. Pharmacy verified: [facility]  . pv      Date of last visit: 9/23/2020  Date of next visit (if applicable): 01/43/8017

## 2020-10-06 NOTE — TELEPHONE ENCOUNTER
OARRS reviewed. UDS: + for  norco - consistent     Narcan offered: yes, refused       Last seen: 9/23/2020.      Follow-up:   Future Appointments   Date Time Provider Saul Cummins   10/26/2020  8:30 AM WALT Hull CNP [facility]PX Pain [facility] - [address] [place] [address]   9/8/2021  7:45 AM WALT Jenkins CNP URO [facility] - [address] [place] [address]

## 2020-10-26 ENCOUNTER — TELEPHONE (OUTPATIENT)
Dept: PHYSICAL MEDICINE AND REHAB | Age: 50
End: 2020-10-26

## 2020-10-26 NOTE — TELEPHONE ENCOUNTER
Patient calling to check on the status of MRI. Do not see that it has been approved yet. Please advise patient.

## 2020-11-04 NOTE — TELEPHONE ENCOUNTER
Zay Garcia called requesting a refill on the following medications:  Requested Prescriptions     Pending Prescriptions Disp Refills    HYDROcodone-acetaminophen (NORCO) 5-325 MG per tablet 90 tablet 0     Sig: Take 1 tablet by mouth every 8 hours as needed for Pain for up to 30 days.      [facility]      Date of last visit: 09/23/20  Date of next visit (if applicable): 46/84/4147

## 2020-11-05 RX ORDER — HYDROCODONE BITARTRATE AND ACETAMINOPHEN 5; 325 MG/1; MG/1
1 TABLET ORAL EVERY 8 HOURS PRN
Qty: 90 TABLET | Refills: 0 | Status: SHIPPED | OUTPATIENT
Start: 2020-11-10 | End: 2020-12-07 | Stop reason: SDUPTHER

## 2020-11-05 NOTE — TELEPHONE ENCOUNTER
OARRS reviewed. UDS: + for  Hydrocodone -consistent.      Last seen: 9/23/2020    Follow-up: 11/17/2020

## 2020-11-10 ENCOUNTER — HOSPITAL ENCOUNTER (OUTPATIENT)
Dept: MRI IMAGING | Age: 50
Discharge: HOME | End: 2020-11-10
Payer: COMMERCIAL

## 2020-11-10 PROCEDURE — 72146 MRI CHEST SPINE W/O DYE: CPT

## 2020-11-17 ENCOUNTER — OFFICE VISIT (OUTPATIENT)
Dept: PHYSICAL MEDICINE AND REHAB | Age: 50
End: 2020-11-17
Payer: COMMERCIAL

## 2020-11-17 VITALS
TEMPERATURE: 97.6 F | DIASTOLIC BLOOD PRESSURE: 68 MMHG | HEIGHT: 70 IN | BODY MASS INDEX: 35.79 KG/M2 | WEIGHT: 250 LBS | SYSTOLIC BLOOD PRESSURE: 112 MMHG | HEART RATE: 70 BPM

## 2020-11-17 PROCEDURE — 99214 OFFICE O/P EST MOD 30 MIN: CPT | Performed by: NURSE PRACTITIONER

## 2020-11-17 ASSESSMENT — ENCOUNTER SYMPTOMS: BACK PAIN: 1

## 2020-11-17 NOTE — PROGRESS NOTES
today and was appropriate  Patient does have naloxone available at home. Patient has not required use of naloxone at home since last office visit. T-MRI:     FINDINGS:              There is a mild thoracic kyphosis.  There are no compression fractures.  There is no suspicious marrow signal abnormality.  There are small anterior osteophytes. There is no bone marrow edema. There is mild disc desiccation at the T7-8, T8-9, T9-10 and    T10-11 levels.         The thoracic spinal cord is of normal caliber and signal intensity.  There are no abnormalities within the spinal canal.         On the axial images, at the T7-8 and T8-9 levels, there are very tiny central disc protrusions. There is no associated spinal canal stenosis.         At the T9-10 level, there is a right central disc protrusion. There is mild spinal canal stenosis.         At the T10-11 level, there is a shallow left central disc protrusion. There is no spinal canal stenosis.         There are no suspicious findings in the paraspinal soft tissues.         There are no gross abnormalities on the localizer images.                   Impression     Mild degenerative disc disease in the lower thoracic spine. There is mild spinal canal stenosis at the T10-11 level.                  The patientis allergic to latex; codeine; nickel; and oxybutynin chloride [oxybutynin chloride]. Past Medical History  Triston Dukes  has a past medical history of Chronic back pain, GERD (gastroesophageal reflux disease), History of kidney stones, Hx of blood clots, Kidney stone, and Lumbar spondylosis. Past Surgical History  The patient  has a past surgical history that includes Ureter stent placement (2006); Lithotripsy (2006); Cystocopy (6/14/2013); Cystocopy (07/29/2013);  Cystoscopy (12/23/13); other surgical history (Bilateral, 03/26/2018); pr inj dx/ther agnt paravert facet joint, lumbar/sac, 2nd level (Bilateral, 3/26/2018); pr inj dx/ther agnt paravert facet joint, lumbar/sac, 2nd level (Bilateral, 5/21/2018); Colonoscopy; eye surgery (2007); hernia repair (2007); pr inject rx other periph nerve (Left, 9/28/2018); pr inject rx other periph nerve (Right, 11/30/2018); lumbar nerve block (N/A, 4/5/2019); Injection Procedure For Sacroiliac Joint (Left, 5/17/2019); Injection Procedure For Sacroiliac Joint (Left, 6/27/2019); Lumbar spine surgery (Left, 8/15/2019); Nerve Surgery (Right, 10/31/2019); Pain management procedure (Left, 1/16/2020); Pain management procedure (Right, 3/5/2020); Pain management procedure (Left, 7/2/2020); and Pain management procedure (Left, 8/4/2020). Family History  This patient's family history includes Cancer in his mother; Heart Disease in his father. Social History  Juana Hay  reports that he quit smoking about 15 years ago. He has a 12.00 pack-year smoking history. He has never used smokeless tobacco. He reports that he does not drink alcohol or use drugs. Medications    Current Outpatient Medications:     HYDROcodone-acetaminophen (NORCO) 5-325 MG per tablet, Take 1 tablet by mouth every 8 hours as needed for Pain for up to 30 days. , Disp: 90 tablet, Rfl: 0    tamsulosin (FLOMAX) 0.4 MG capsule, Take 1 capsule by mouth daily, Disp: 90 capsule, Rfl: 3    potassium citrate (UROCIT-K) 10 MEQ (1080 MG) extended release tablet, TAKE 1 TABLET BY MOUTH EVERY MORNING WITH BREAKFAST, Disp: 90 tablet, Rfl: 3    allopurinol (ZYLOPRIM) 300 MG tablet, TAKE 1 TABLET BY MOUTH EVERY DAY, Disp: 90 tablet, Rfl: 3    traZODone (DESYREL) 50 MG tablet, Take 50 mg by mouth nightly, Disp: , Rfl:     triamcinolone (KENALOG) 0.1 % cream, , Disp: , Rfl:     FLOVENT  MCG/ACT inhaler, , Disp: , Rfl:     ARIPiprazole (ABILIFY) 2 MG tablet, Take 2 mg by mouth 2 times daily, Disp: , Rfl:     citalopram (CELEXA) 40 MG tablet, Take 40 mg by mouth daily. , Disp: , Rfl:     warfarin (COUMADIN) 2.5 MG tablet, Take 2.5 mg by mouth daily at 1800 Takes 5mg 3 days per week & 2.5mg 4 days per week, Disp: , Rfl:     busPIRone (BUSPAR) 10 MG tablet, Take 30 mg by mouth 2 times daily , Disp: , Rfl:     omeprazole (PRILOSEC) 20 MG capsule, Take 20 mg by mouth daily. , Disp: , Rfl:     Subjective:      Review of Systems   Constitutional: Negative. Musculoskeletal: Positive for arthralgias, back pain and myalgias. Negative for gait problem and neck pain. Neurological: Negative for numbness. Psychiatric/Behavioral: Negative. Objective:     Vitals:    11/17/20 0833   BP: 112/68   Site: Left Upper Arm   Position: Sitting   Cuff Size: Large Adult   Pulse: 70   Temp: 97.6 °F (36.4 °C)   TempSrc: Temporal   Weight: 250 lb (113.4 kg)   Height: 5' 10\" (1.778 m)       Physical Exam  Vitals signs and nursing note reviewed. Constitutional:       General: He is not in acute distress. Appearance: He is well-developed. He is not diaphoretic. HENT:      Head: Normocephalic and atraumatic. Right Ear: External ear normal.      Left Ear: External ear normal.      Nose: Nose normal.      Mouth/Throat:      Mouth: Mucous membranes are moist.      Pharynx: Oropharynx is clear. No oropharyngeal exudate. Eyes:      General: No scleral icterus. Right eye: No discharge. Left eye: No discharge. Conjunctiva/sclera: Conjunctivae normal.      Pupils: Pupils are equal, round, and reactive to light. Neck:      Musculoskeletal: Full passive range of motion without pain, normal range of motion and neck supple. Normal range of motion. No edema, erythema, neck rigidity or muscular tenderness. Thyroid: No thyromegaly. Cardiovascular:      Rate and Rhythm: Normal rate and regular rhythm. Heart sounds: Normal heart sounds. No murmur. No friction rub. No gallop. Pulmonary:      Effort: Pulmonary effort is normal. No respiratory distress. Breath sounds: Normal breath sounds. No wheezing or rales. Chest:      Chest wall: No tenderness.    Abdominal: inappropriate. Speech: Speech normal. He is communicative. Speech is not rapid and pressured, delayed, slurred or tangential.         Behavior: Behavior normal. Behavior is not agitated, slowed, aggressive, withdrawn, hyperactive or combative. Thought Content: Thought content normal. Thought content is not paranoid or delusional. Thought content does not include homicidal or suicidal ideation. Thought content does not include homicidal or suicidal plan. Cognition and Memory: Cognition normal. Memory is not impaired. He does not exhibit impaired recent memory or impaired remote memory. Judgment: Judgment normal. Judgment is not impulsive or inappropriate. CORETTA test: +   Yeomans test: +   Gaenslen test: +      Assessment:     1. Thoracic stenosis    2. Mid back pain    3. Spondylosis of lumbar region without myelopathy or radiculopathy    4. Spondylosis of thoracic region without myelopathy or radiculopathy    5. Lumbar spondylosis    6. Bulge of lumbar disc without myelopathy    7. Lumbosacral radiculitis    8. SI (sacroiliac) pain    9. Chronic pain syndrome            Plan:      · OARRS reviewed. Current MED: 15.00  · Patient was offered naloxone for home. Has  · Discussed long term side effects of medications, tolerance, dependency and addiction. · Previous UDS reviewed  · UDS preformed today for compliance. · Patient told can not receive any pain medications from any other source. · No evidence of abuse, diversion or aberrant behavior.  Medications and/or procedures to improve function and quality of life- patient understanding with this and that may not be pain free   Discussed with patient about safe storage of medications at home   Discussed possible weaning of medication dosing dependent on treatment/procedure results.  Discussed with patient about risks with procedure including infection, reaction to medication, increased pain, or bleeding.    Reviewed T-MRI in detail   Plan Left TESI @ T10-T11. Procedure and risks discussed in detail with patient  · Will need to be cleared to be off Coumadin for 5 days   Discussed possible thoracic trigger point injections in future   · Receiving 100% relief of leg pain from LESI   · Continue Norco 5/325 TID prn- recently filled 11/10/2020. Medications remain effective, patient is compliant      Meds. Prescribed:   No orders of the defined types were placed in this encounter. Return for Left TESI @ T10-T11. , follow up after procedure. Time spent with patient was 25 minutes, more than 50% was spent Counseling/coordinated the patient'scare.       Electronically signed by WALT Jane CNP on11/17/2020 at 8:52 AM

## 2020-11-18 ENCOUNTER — TELEPHONE (OUTPATIENT)
Dept: PHYSICAL MEDICINE AND REHAB | Age: 50
End: 2020-11-18

## 2020-11-18 NOTE — TELEPHONE ENCOUNTER
Medication clearance request sent to Dr Weston Rausch to hold Coumadin 5 days before procedure. Fax scanned into media tab.

## 2020-12-07 RX ORDER — HYDROCODONE BITARTRATE AND ACETAMINOPHEN 5; 325 MG/1; MG/1
1 TABLET ORAL EVERY 8 HOURS PRN
Qty: 90 TABLET | Refills: 0 | Status: SHIPPED | OUTPATIENT
Start: 2020-12-10 | End: 2021-01-07 | Stop reason: SDUPTHER

## 2020-12-07 NOTE — TELEPHONE ENCOUNTER
OARRS reviewed. UDS: + for  Hydrocodone -consistent.      Last seen: 11/17/2020    Follow-up: 12/30/2020

## 2020-12-15 ENCOUNTER — ANESTHESIA (OUTPATIENT)
Dept: OPERATING ROOM | Age: 50
End: 2020-12-15
Payer: COMMERCIAL

## 2020-12-15 ENCOUNTER — HOSPITAL ENCOUNTER (OUTPATIENT)
Age: 50
Setting detail: OUTPATIENT SURGERY
Discharge: HOME | End: 2020-12-15
Attending: PAIN MEDICINE | Admitting: PAIN MEDICINE
Payer: COMMERCIAL

## 2020-12-15 ENCOUNTER — ANESTHESIA EVENT (OUTPATIENT)
Dept: OPERATING ROOM | Age: 50
End: 2020-12-15
Payer: COMMERCIAL

## 2020-12-15 ENCOUNTER — APPOINTMENT (OUTPATIENT)
Dept: GENERAL RADIOLOGY | Age: 50
End: 2020-12-15
Attending: PAIN MEDICINE
Payer: COMMERCIAL

## 2020-12-15 VITALS
HEIGHT: 70 IN | BODY MASS INDEX: 36.65 KG/M2 | HEART RATE: 72 BPM | WEIGHT: 256 LBS | DIASTOLIC BLOOD PRESSURE: 60 MMHG | SYSTOLIC BLOOD PRESSURE: 106 MMHG | OXYGEN SATURATION: 92 % | RESPIRATION RATE: 16 BRPM | TEMPERATURE: 97.1 F

## 2020-12-15 VITALS
RESPIRATION RATE: 13 BRPM | OXYGEN SATURATION: 95 % | DIASTOLIC BLOOD PRESSURE: 64 MMHG | SYSTOLIC BLOOD PRESSURE: 97 MMHG

## 2020-12-15 LAB — POC INR: 1 (ref 0.8–1.2)

## 2020-12-15 PROCEDURE — 6360000002 HC RX W HCPCS: Performed by: PAIN MEDICINE

## 2020-12-15 PROCEDURE — 7100000011 HC PHASE II RECOVERY - ADDTL 15 MIN: Performed by: PAIN MEDICINE

## 2020-12-15 PROCEDURE — 3600000054 HC PAIN LEVEL 3 BASE: Performed by: PAIN MEDICINE

## 2020-12-15 PROCEDURE — 2500000003 HC RX 250 WO HCPCS: Performed by: PAIN MEDICINE

## 2020-12-15 PROCEDURE — 2580000003 HC RX 258: Performed by: PAIN MEDICINE

## 2020-12-15 PROCEDURE — 3700000000 HC ANESTHESIA ATTENDED CARE: Performed by: PAIN MEDICINE

## 2020-12-15 PROCEDURE — 2500000003 HC RX 250 WO HCPCS: Performed by: NURSE ANESTHETIST, CERTIFIED REGISTERED

## 2020-12-15 PROCEDURE — 62321 NJX INTERLAMINAR CRV/THRC: CPT | Performed by: PAIN MEDICINE

## 2020-12-15 PROCEDURE — 7100000010 HC PHASE II RECOVERY - FIRST 15 MIN: Performed by: PAIN MEDICINE

## 2020-12-15 PROCEDURE — 2709999900 HC NON-CHARGEABLE SUPPLY: Performed by: PAIN MEDICINE

## 2020-12-15 PROCEDURE — 6360000004 HC RX CONTRAST MEDICATION: Performed by: PAIN MEDICINE

## 2020-12-15 PROCEDURE — 3209999900 FLUORO FOR SURGICAL PROCEDURES

## 2020-12-15 PROCEDURE — 6360000002 HC RX W HCPCS: Performed by: NURSE ANESTHETIST, CERTIFIED REGISTERED

## 2020-12-15 RX ORDER — LIDOCAINE HYDROCHLORIDE 10 MG/ML
INJECTION, SOLUTION INFILTRATION; PERINEURAL PRN
Status: DISCONTINUED | OUTPATIENT
Start: 2020-12-15 | End: 2020-12-15 | Stop reason: ALTCHOICE

## 2020-12-15 RX ORDER — PROPOFOL 10 MG/ML
INJECTION, EMULSION INTRAVENOUS PRN
Status: DISCONTINUED | OUTPATIENT
Start: 2020-12-15 | End: 2020-12-15 | Stop reason: SDUPTHER

## 2020-12-15 RX ORDER — DEXAMETHASONE SODIUM PHOSPHATE 4 MG/ML
INJECTION, SOLUTION INTRA-ARTICULAR; INTRALESIONAL; INTRAMUSCULAR; INTRAVENOUS; SOFT TISSUE PRN
Status: DISCONTINUED | OUTPATIENT
Start: 2020-12-15 | End: 2020-12-15 | Stop reason: ALTCHOICE

## 2020-12-15 RX ORDER — SODIUM CHLORIDE 9 MG/ML
INJECTION INTRAVENOUS PRN
Status: DISCONTINUED | OUTPATIENT
Start: 2020-12-15 | End: 2020-12-15 | Stop reason: ALTCHOICE

## 2020-12-15 RX ORDER — LIDOCAINE HYDROCHLORIDE 20 MG/ML
INJECTION, SOLUTION EPIDURAL; INFILTRATION; INTRACAUDAL; PERINEURAL PRN
Status: DISCONTINUED | OUTPATIENT
Start: 2020-12-15 | End: 2020-12-15 | Stop reason: SDUPTHER

## 2020-12-15 RX ADMIN — PROPOFOL 150 MG: 10 INJECTION, EMULSION INTRAVENOUS at 11:31

## 2020-12-15 RX ADMIN — LIDOCAINE HYDROCHLORIDE 100 MG: 20 INJECTION, SOLUTION EPIDURAL; INFILTRATION; INTRACAUDAL; PERINEURAL at 11:32

## 2020-12-15 ASSESSMENT — PULMONARY FUNCTION TESTS
PIF_VALUE: 0

## 2020-12-15 ASSESSMENT — PAIN DESCRIPTION - DESCRIPTORS: DESCRIPTORS: ACHING;STABBING

## 2020-12-15 ASSESSMENT — PAIN - FUNCTIONAL ASSESSMENT: PAIN_FUNCTIONAL_ASSESSMENT: 0-10

## 2020-12-15 ASSESSMENT — PAIN SCALES - GENERAL: PAINLEVEL_OUTOF10: 0

## 2020-12-15 NOTE — H&P
[address] [address]  History and Physical Update    Pt Name: Malena Mcgraw  MRN: [id number]  YOB: 1970  Date of evaluation: 12/15/2020      I have examined the patient and reviewed the H&P/Consult and there are no changes to the patient or plans.         Electronically signed by Roxana Phillips MD on 12/15/2020 at 11:06 AM

## 2020-12-15 NOTE — ANESTHESIA POSTPROCEDURE EVALUATION
Department of Anesthesiology  Postprocedure Note    Patient: Suzie Gutierrez  MRN: [id number]  YOB: 1970  Date of evaluation: 12/15/2020  Time:  1:51 PM     Procedure Summary     Date: 12/15/20 Room / Location: [address] [place] [address] 03 / [address] [facility]    Anesthesia Start: 1129 Anesthesia Stop: 4513    Procedure: Left TESI @ T11 (Right ) Diagnosis: (lumbar spondylosis)    Surgeons: Katrina May MD Responsible Provider: Emili Stoll MD    Anesthesia Type: MAC ASA Status: 3          Anesthesia Type: MAC    Angelina Phase I:      Angelina Phase II: Angelina Score: 10    Last vitals: Reviewed and per EMR flowsheets.        Anesthesia Post Evaluation    Patient location during evaluation: PACU  Patient participation: complete - patient participated  Level of consciousness: awake  Airway patency: patent  Nausea & Vomiting: no vomiting and no nausea  Complications: no  Cardiovascular status: hemodynamically stable  Respiratory status: acceptable  Hydration status: stable

## 2020-12-15 NOTE — H&P
H&P    FU to review T-MRI. Main pain complaint is pain in left lower back and in left mid back. Constant stabbing pain worse with activity.      Norco helps decrease pain. Patient continues to work at [employer] on [address]. Medications reviewed. Patient denies side effects with medications. Patient states he is taking medications as prescribed. Hedenies receiving pain medications from other sources. He denies any ER visits since last visit.     Pain scale with out pain medications or at its worst is 9/10. Pain scale with pain medications or at its best is 4/10. Last dose of Norco was yesterday  Drug screen reviewed from 8/19/2020 and was appropriate  Pill count was completed today and was appropriate  Patient does have naloxone available at home. Patient has not required use of naloxone at home since last office visit.      T-MRI:      FINDINGS:              There is a mild thoracic kyphosis.  There are no compression fractures.  There is no suspicious marrow signal abnormality.  There are small anterior osteophytes. There is no bone marrow edema. There is mild disc desiccation at the T7-8, T8-9, T9-10 and    T10-11 levels.         The thoracic spinal cord is of normal caliber and signal intensity.  There are no abnormalities within the spinal canal.         On the axial images, at the T7-8 and T8-9 levels, there are very tiny central disc protrusions. There is no associated spinal canal stenosis.         At the T9-10 level, there is a right central disc protrusion. There is mild spinal canal stenosis.         At the T10-11 level, there is a shallow left central disc protrusion. There is no spinal canal stenosis.         There are no suspicious findings in the paraspinal soft tissues.         There are no gross abnormalities on the localizer images.                   Impression     Mild degenerative disc disease in the lower thoracic spine.  There is mild spinal canal stenosis at the T10-11 level.                 The patientis allergic to latex; codeine; nickel; and oxybutynin chloride [oxybutynin chloride].     Past Medical History  José Miguel Rivera  has a past medical history of Chronic back pain, GERD (gastroesophageal reflux disease), History of kidney stones, Hx of blood clots, Kidney stone, and Lumbar spondylosis.     Past Surgical History  The patient  has a past surgical history that includes Ureter stent placement (2006); Lithotripsy (2006); Cystocopy (6/14/2013); Cystocopy (07/29/2013); Cystoscopy (12/23/13); other surgical history (Bilateral, 03/26/2018); pr inj dx/ther agnt paravert facet joint, lumbar/sac, 2nd level (Bilateral, 3/26/2018); pr inj dx/ther agnt paravert facet joint, lumbar/sac, 2nd level (Bilateral, 5/21/2018); Colonoscopy; eye surgery (2007); hernia repair (2007); pr inject rx other periph nerve (Left, 9/28/2018); pr inject rx other periph nerve (Right, 11/30/2018); lumbar nerve block (N/A, 4/5/2019); Injection Procedure For Sacroiliac Joint (Left, 5/17/2019); Injection Procedure For Sacroiliac Joint (Left, 6/27/2019); Lumbar spine surgery (Left, 8/15/2019); Nerve Surgery (Right, 10/31/2019); Pain management procedure (Left, 1/16/2020); Pain management procedure (Right, 3/5/2020); Pain management procedure (Left, 7/2/2020); and Pain management procedure (Left, 8/4/2020).    Family History  This patient's family history includes Cancer in his mother; Heart Disease in his father.  Shazia   reports that he quit smoking about 15 years ago. He has a 12.00 pack-year smoking history. He has never used smokeless tobacco. He reports that he does not drink alcohol or use drugs.     Medications    Current Medication      Current Outpatient Medications:     HYDROcodone-acetaminophen (NORCO) 5-325 MG per tablet, Take 1 tablet by mouth every 8 hours as needed for Pain for up to 30 days. , Disp: 90 tablet, Rfl: 0   tamsulosin (FLOMAX) 0.4 MG capsule, Take 1 capsule by mouth daily, Disp: 90 capsule, Rfl: 3    potassium citrate (UROCIT-K) 10 MEQ (1080 MG) extended release tablet, TAKE 1 TABLET BY MOUTH EVERY MORNING WITH BREAKFAST, Disp: 90 tablet, Rfl: 3    allopurinol (ZYLOPRIM) 300 MG tablet, TAKE 1 TABLET BY MOUTH EVERY DAY, Disp: 90 tablet, Rfl: 3    traZODone (DESYREL) 50 MG tablet, Take 50 mg by mouth nightly, Disp: , Rfl:     triamcinolone (KENALOG) 0.1 % cream, , Disp: , Rfl:     FLOVENT  MCG/ACT inhaler, , Disp: , Rfl:     ARIPiprazole (ABILIFY) 2 MG tablet, Take 2 mg by mouth 2 times daily, Disp: , Rfl:     citalopram (CELEXA) 40 MG tablet, Take 40 mg by mouth daily. , Disp: , Rfl:     warfarin (COUMADIN) 2.5 MG tablet, Take 2.5 mg by mouth daily at 1800 Takes 5mg 3 days per week & 2.5mg 4 days per week, Disp: , Rfl:     busPIRone (BUSPAR) 10 MG tablet, Take 30 mg by mouth 2 times daily , Disp: , Rfl:     omeprazole (PRILOSEC) 20 MG capsule, Take 20 mg by mouth daily. , Disp: , Rfl:         Subjective:      Review of Systems   Constitutional: Negative. Musculoskeletal: Positive for arthralgias, back pain and myalgias. Negative for gait problem and neck pain. Neurological: Negative for numbness. Psychiatric/Behavioral: Negative.          Objective:      Vitals       Vitals:     11/17/20 0833   BP: 112/68   Site: Left Upper Arm   Position: Sitting   Cuff Size: Large Adult   Pulse: 70   Temp: 97.6 °F (36.4 °C)   TempSrc: Temporal   Weight: 250 lb (113.4 kg)   Height: 5' 10\" (1.778 m)            Physical Exam  Vitals signs and nursing note reviewed. Constitutional:       General: He is not in acute distress. Appearance: He is well-developed. He is not diaphoretic. HENT:      Head: Normocephalic and atraumatic.       Right Ear: External ear normal.      Left Ear: External ear normal.      Nose: Nose normal.      Mouth/Throat:      Mouth: Mucous membranes are moist. Pharynx: Oropharynx is clear. No oropharyngeal exudate. Eyes:      General: No scleral icterus. Right eye: No discharge. Left eye: No discharge. Conjunctiva/sclera: Conjunctivae normal.      Pupils: Pupils are equal, round, and reactive to light. Neck:      Musculoskeletal: Full passive range of motion without pain, normal range of motion and neck supple. Normal range of motion. No edema, erythema, neck rigidity or muscular tenderness. Thyroid: No thyromegaly. Cardiovascular:      Rate and Rhythm: Normal rate and regular rhythm. Heart sounds: Normal heart sounds. No murmur. No friction rub. No gallop. Pulmonary:      Effort: Pulmonary effort is normal. No respiratory distress. Breath sounds: Normal breath sounds. No wheezing or rales. Chest:      Chest wall: No tenderness. Abdominal:      General: Bowel sounds are normal. There is no distension. Palpations: Abdomen is soft. Tenderness: There is no abdominal tenderness. There is no guarding or rebound. Musculoskeletal:         General: Tenderness present. Right shoulder: Normal.      Left shoulder: Normal.      Right hip: He exhibits tenderness and bony tenderness. Left hip: He exhibits tenderness and bony tenderness. Right knee: Normal.      Left knee: Normal.      Cervical back: Normal.      Thoracic back: He exhibits tenderness. Lumbar back: He exhibits decreased range of motion, tenderness, bony tenderness, pain and spasm. Back:       Right upper leg: He exhibits tenderness. Left upper leg: He exhibits tenderness. Right lower leg: No edema. Left lower leg: No edema. Skin:     General: Skin is warm. Coloration: Skin is not pale. Findings: No erythema or rash. Neurological:      General: No focal deficit present. Mental Status: He is alert and oriented to person, place, and time. He is not disoriented. Cranial Nerves: No cranial nerve deficit. Sensory: No sensory deficit. Motor: No atrophy or abnormal muscle tone. Coordination: Coordination normal.      Gait: Gait normal.      Deep Tendon Reflexes: Reflexes are normal and symmetric. Babinski sign absent on the right side. Reflex Scores:       Tricep reflexes are 2+ on the right side and 2+ on the left side. Bicep reflexes are 2+ on the right side and 2+ on the left side. Brachioradialis reflexes are 2+ on the right side and 2+ on the left side. Patellar reflexes are 2+ on the right side and 2+ on the left side. Achilles reflexes are 2+ on the right side and 2+ on the left side. Psychiatric:         Attention and Perception: Attention normal. He is attentive. Mood and Affect: Mood normal. Mood is not anxious or depressed. Affect is not labile, blunt, angry or inappropriate. Speech: Speech normal. He is communicative. Speech is not rapid and pressured, delayed, slurred or tangential.         Behavior: Behavior normal. Behavior is not agitated, slowed, aggressive, withdrawn, hyperactive or combative. Thought Content: Thought content normal. Thought content is not paranoid or delusional. Thought content does not include homicidal or suicidal ideation. Thought content does not include homicidal or suicidal plan. Cognition and Memory: Cognition normal. Memory is not impaired. He does not exhibit impaired recent memory or impaired remote memory. Judgment: Judgment normal. Judgment is not impulsive or inappropriate.         CORETTA test: +   Yeomans test: +   Gaenslen test: +   Assessment:      1. Thoracic stenosis    2. Mid back pain    3. Spondylosis of lumbar region without myelopathy or radiculopathy    4. Spondylosis of thoracic region without myelopathy or radiculopathy    5. Lumbar spondylosis    6. Bulge of lumbar disc without myelopathy    7.  Lumbosacral radiculitis 8. SI (sacroiliac) pain    9. Chronic pain syndrome       Plan:      · OARRS reviewed. Current MED: 15.00  · Patient was offered naloxone for home. Has  · Discussed long term side effects of medications, tolerance, dependency and addiction. · Previous UDS reviewed  · UDS preformed today for compliance. · Patient told can not receive any pain medications from any other source. · No evidence of abuse, diversion or aberrant behavior. · Medications and/or procedures to improve function and quality of life- patient understanding with this and that may not be pain free  · Discussed with patient about safe storage of medications at home  · Discussed possible weaning of medication dosing dependent on treatment/procedure results. · Discussed with patient about risks with procedure including infection, reaction to medication, increased pain, or bleeding. · Reviewed T-MRI in detail  · Plan Left TESI @ T10-T11. Procedure and risks discussed in detail with patient  · Will need to be cleared to be off Coumadin for 5 days  CLEARED  · Discussed possible thoracic trigger point injections in future   · Receiving 100% relief of leg pain from LESI   · Continue Norco 5/325 TID prn- recently filled 11/10/2020. Medications remain effective, patient is compliant        Meds.  Prescribed:     Encounter Medications    No orders of the defined types were placed in this encounter.         Return for Left TESI @ T10-T11. , follow up after procedure.

## 2020-12-15 NOTE — ANESTHESIA PRE PROCEDURE
Department of Anesthesiology  Preprocedure Note       Name:  Leann Zuniga   Age:  45 y.o.  :  1970                                          MRN:  [id number]         Date:  12/15/2020      Surgeon: Sandra Wang):  Aida Guevara MD    Procedure: Procedure(s):  Left TESI @ T10-T11    Medications prior to admission:   Prior to Admission medications    Medication Sig Start Date End Date Taking? Authorizing Provider   HYDROcodone-acetaminophen (NORCO) 5-325 MG per tablet Take 1 tablet by mouth every 8 hours as needed for Pain for up to 30 days. 12/10/20 1/9/21  WALT Arshad CNP   tamsulosin [facility]) 0.4 MG capsule Take 1 capsule by mouth daily 20   WALT Mena CNP   potassium citrate (UROCIT-K) 10 MEQ (1080 MG) extended release tablet TAKE 1 TABLET BY MOUTH EVERY MORNING WITH BREAKFAST 20   WALT Mena CNP   allopurinol (ZYLOPRIM) 300 MG tablet TAKE 1 TABLET BY MOUTH EVERY DAY 20   WALT Mena CNP   traZODone (DESYREL) 50 MG tablet Take 50 mg by mouth nightly    Historical Provider, MD   triamcinolone (KENALOG) 0.1 % cream  19   Historical Provider, MD   FLOVENT  MCG/ACT inhaler  19   Historical Provider, MD   ARIPiprazole (ABILIFY) 2 MG tablet Take 2 mg by mouth 2 times daily    Historical Provider, MD   citalopram (CELEXA) 40 MG tablet Take 40 mg by mouth daily. Historical Provider, MD   warfarin (COUMADIN) 2.5 MG tablet Take 2.5 mg by mouth daily at 1800 Takes 5mg 3 days per week & 2.5mg 4 days per week    Historical Provider, MD   busPIRone (BUSPAR) 10 MG tablet Take 30 mg by mouth 2 times daily     Historical Provider, MD   omeprazole (PRILOSEC) 20 MG capsule Take 20 mg by mouth daily. Historical Provider, MD       Current medications:    No current facility-administered medications for this encounter. Allergies:     Allergies   Allergen Reactions    Latex Rash  Codeine Hives     TYLENOL WITH CODEINE    Nickel Rash    Oxybutynin Chloride [Oxybutynin Chloride] Other (See Comments)     Warm feeling and extreme dry mouth       Problem List:    Patient Active Problem List   Diagnosis Code    Weak urinary stream R39.12    Obstructive sleep apnea G47.33    Snoring R06.83    Sleep disturbance G47.9    Insomnia G47.00    Sleep talking G47.8    Morning headache R51.9    Bruxism F45.8    Restless sleeper G47.9    Poor concentration R41.840    Claustrophobia F40.240    Vivid dream R68.89    GERD (gastroesophageal reflux disease) K21.9    Anxiety F41.9    Panic disorder F41.0    Obesity (BMI 30.0-34. 9) E66.9    Lumbar spondylosis M47.816    Bulging lumbar disc M51.26    Sacroiliac inflammation (HCC) M46.1    Lumbar radiculitis M54.16       Past Medical History:        Diagnosis Date    Chronic back pain     GERD (gastroesophageal reflux disease)     History of kidney stones     Hx of blood clots early 2000's & 2013    MUNA LUNGS    Kidney stone     Lumbar spondylosis        Past Surgical History:        Procedure Laterality Date    COLONOSCOPY      last one 2007???    CYSTOSCOPY  6/14/2013    URETEROSCOPY STONE REMOVAL    CYSTOSCOPY  07/29/2013    Left ureteroscopy, Left ureteral stone removal and stent exchange    CYSTOSCOPY  12/23/13    Cystoscopy, Left Optical Internal Ureterotomy, Left Ureteroscopy and Dilated [facility] Oscar Rubalcava  2007    lasik    [address] Left 5/17/2019    SI MBB #1 left performed by Juliet Weathers MD at [facility] Left 6/27/2019    Left SI MBB # 2. performed by Juliet Weathers MD at [address]  2007    left groin    LITHOTRIPSY  2006    LUMBAR NERVE BLOCK N/A 4/5/2019    LUMBAR INTER LAMINAR GUNNAR @ L4 performed by Juliet Weathers MD at [facility]  LUMBAR SPINE SURGERY Left 8/15/2019    Left SI RFA. performed by Wanda Muro MD at [facility] 98 Right 10/31/2019    LUMBAR INTER LAMINAR GUNNAR @ L4 Right performed by Wanda Muro MD at [facility] 104 Bilateral 03/26/2018    Lumbar Facet MBB at L4-5, L5-S1    PAIN MANAGEMENT PROCEDURE Left 1/16/2020    Lumbar RFA left side L4-5,5-S1 performed by Wanda Muro MD at [facility] 113 Right 3/5/2020    Lumbar RFA Right side@ L4-5,5-S1 performed by Wanda Muro MD at [place] [address] Left 7/2/2020    Left SI RFA performed by Wanda Muro MD at [place] [address] Left 8/4/2020    Left L-facet RFA @ L4-5 and L5-S1 performed by Wanda Muro MD at [address] [place] [address] DX/THER AGNT PARAVERT FACET JOINT, LUMBAR/SAC, 2ND LEVEL Bilateral 3/26/2018    BILATERAL L-FACET MBB @ L4-5 and L5-S1, performed by Wanda Muro MD at [address] [place] [address] DX/THER AGNT PARAVERT FACET JOINT, LUMBAR/SAC, 2ND LEVEL Bilateral 5/21/2018    Bilateral L-facet MBB @ L4-5, L5-S1. performed by Wanda Muro MD at [address] Left 9/28/2018    LUMBAR RFA @ L4-5, and L5-S1 LEFT SIDE FIRST. Genevieve Morse performed by Wanda Muro MD at [address] Right 11/30/2018    Right L-RFA @ L4-5, and L5-S1 performed by Wanda Muro MD at [address] [place] [address]  2006       Social History:    Social History     Tobacco Use    Smoking status: Former Smoker     Packs/day: 1.00     Years: 12.00     Pack years: 12.00     Quit date: 6/6/2005     Years since quitting: 15.5    Smokeless tobacco: Never Used   Substance Use Topics    Alcohol use:  No Counseling given: Not Answered      Vital Signs (Current): There were no vitals filed for this visit. BP Readings from Last 3 Encounters:   11/17/20 112/68   09/23/20 108/62   08/19/20 130/84       NPO Status: Time of last liquid consumption: 1630                        Time of last solid consumption: 2130                        Date of last liquid consumption: 12/14/20                        Date of last solid food consumption: 12/14/20    BMI:   Wt Readings from Last 3 Encounters:   11/17/20 250 lb (113.4 kg)   09/23/20 250 lb (113.4 kg)   09/09/20 250 lb (113.4 kg)     There is no height or weight on file to calculate BMI.    CBC:   Lab Results   Component Value Date    WBC 4.5 09/24/2020    WBC 5.7 06/24/2020    RBC 4.90 09/24/2020    HGB 15.3 09/24/2020    HCT 45.0 09/24/2020    MCV 92 09/24/2020    RDW 13.7 09/24/2020     09/24/2020     06/24/2020       CMP:   Lab Results   Component Value Date     09/24/2020    K 4.1 09/24/2020    K 3.8 06/18/2020     09/24/2020    CO2 27 09/24/2020    BUN 12 09/24/2020    CREATININE 0.97 09/24/2020    AGRATIO 2.1 07/27/2019    LABGLOM 71 06/18/2020    GLUCOSE 96 09/24/2020    PROT 6.6 09/24/2020    CALCIUM 9.0 09/24/2020    BILITOT 0.6 09/24/2020    ALKPHOS 86 09/24/2020    ALKPHOS 75 07/27/2019    AST 29 09/24/2020    ALT 32 09/24/2020       POC Tests: No results for input(s): POCGLU, POCNA, POCK, POCCL, POCBUN, POCHEMO, POCHCT in the last 72 hours.     Coags:   Lab Results   Component Value Date    PROTIME 29.7 11/19/2020    INR 2.50 11/19/2020    APTT 32.1 06/14/2013       HCG (If Applicable): No results found for: PREGTESTUR, PREGSERUM, HCG, HCGQUANT     ABGs: No results found for: PHART, PO2ART, KQC8BIJ, QEU3GIP, BEART, T1HYZORE     Type & Screen (If Applicable):  No results found for: LABABO, LABRH    Drug/Infectious Status (If Applicable):  No results found for: HIV, HEPCAB

## 2020-12-15 NOTE — OP NOTE
Operative Note    Pre-Procedure Note    Patient Name: Clayton Boyce   YOB: 1970  Medical Record Number: [id number]  Date: 12/15/20       Indication:  T-spinal stenosis  Consent: On file. Vital Signs:   Vitals:    12/15/20 1112   BP: 125/80   Pulse: 70   Resp: 16   Temp: 97.6 °F (36.4 °C)   SpO2: 96%       Past Medical History:   has a past medical history of Chronic back pain, GERD (gastroesophageal reflux disease), History of kidney stones, Hx of blood clots, Kidney stone, and Lumbar spondylosis. Past Surgical History:   has a past surgical history that includes Ureter stent placement (2006); Lithotripsy (2006); Cystocopy (6/14/2013); Cystocopy (07/29/2013); Cystoscopy (12/23/13); other surgical history (Bilateral, 03/26/2018); pr inj dx/ther agnt paravert facet joint, lumbar/sac, 2nd level (Bilateral, 3/26/2018); pr inj dx/ther agnt paravert facet joint, lumbar/sac, 2nd level (Bilateral, 5/21/2018); Colonoscopy; eye surgery (2007); hernia repair (2007); pr inject rx other periph nerve (Left, 9/28/2018); pr inject rx other periph nerve (Right, 11/30/2018); lumbar nerve block (N/A, 4/5/2019); Injection Procedure For Sacroiliac Joint (Left, 5/17/2019); Injection Procedure For Sacroiliac Joint (Left, 6/27/2019); Lumbar spine surgery (Left, 8/15/2019); Nerve Surgery (Right, 10/31/2019); Pain management procedure (Left, 1/16/2020); Pain management procedure (Right, 3/5/2020); Pain management procedure (Left, 7/2/2020); and Pain management procedure (Left, 8/4/2020). Pre-Sedation Documentation and Exam:   Vital signs have been reviewed (see flow sheet for vitals). Sedation/Anesthesia Plan:   MAC    Medications Planned:   Per Anesthesia.       Patient is an appropriate candidate for plan of sedation: yes    Preoperative Diagnosis:  T-spinal stenosis    Post-Op Dx: as above    Procedure Performed:  Thoracic epidural steroid injection under fluoroscopy guidance Indication for the Procedure: The patient failed conservative management  for pain in the low back radiating to lower extremities. The patient is undergoing thoracic epidural steroid injection As the patient is not responding to conservative management and it is interfering with activities of daily living we decided to proceed with lumbar epidural steroid injection. The procedure and risks were discussed with the patient and an informed consent was obtained    Procedure: The patient is placed in prone position. Skin over the back was prepped and draped in sterile manner. Then using fluoroscopy the T11 interspace was observed and the skin and deep tissues in the  paramedian area were infiltrated with 3 ml of 1% lidocaine. The #20-gauge, 3-1/2 inch Tuohy needle was inserted through the skin wheal and the epidural space was identified using loss of resistance technique . This was confirmed with AP and lateral views using fluoroscopy after injecting about 1 ml of Omnipaque-180 and observing the spread of the contrast in the epidural space. Then after negative aspiration a total of 12 mg of dexamethasonewith 4 ml of normal saline was injected into the epidural space. The needle is removed and a Band-Aid was placed over the needle insertion site. No paresthesia. EBL-0  Patient's vital signs remained stable and the patient tolerated the procedure well. The patient will be discharged home in stable condition and will be followed in the office in the next few weeks for further planning.     Electronically signed by Katharina Pride MD on 12/15/20 at 11:28 AM EST

## 2020-12-30 ENCOUNTER — OFFICE VISIT (OUTPATIENT)
Dept: PHYSICAL MEDICINE AND REHAB | Age: 50
End: 2020-12-30
Payer: COMMERCIAL

## 2020-12-30 VITALS
WEIGHT: 256 LBS | SYSTOLIC BLOOD PRESSURE: 128 MMHG | BODY MASS INDEX: 36.65 KG/M2 | TEMPERATURE: 97.7 F | DIASTOLIC BLOOD PRESSURE: 74 MMHG | HEIGHT: 70 IN

## 2020-12-30 PROCEDURE — 99213 OFFICE O/P EST LOW 20 MIN: CPT | Performed by: NURSE PRACTITIONER

## 2020-12-30 ASSESSMENT — ENCOUNTER SYMPTOMS: BACK PAIN: 1

## 2020-12-30 NOTE — PROGRESS NOTES
[facility]  [address]  Dept: [phone]  Dept Fax: [phone]: [phone]    Visit Date: 12/30/2020    Functionality Assessment/Goals Worksheet     On a scale of 0 (Does not Interfere) to 10 (Completely Interferes)     1. Which number describes how during the past week pain has interfered with       the following:  A. General Activity:  6  B. Mood: 8  C. Walking Ability:  6  D. Normal Work (Includes both work outside the home and housework):  9  E. Relations with Other People:   6  F. Sleep:   5  G. Enjoyment of Life:   6    2. Patient Prefers to Take their Pain Medications:     []  On a regular basis   [x]  Only when necessary    []  Does not take pain medications    3. What are the Patient's Goals/Expectations for Visiting Pain Management? []  Learn about my pain    []  Receive Medication   []  Physical Therapy     []  Treat Depression   []  Receive Injections    []  Treat Sleep   []  Deal with Anxiety and Stress   []  Treat Opoid Dependence/Addiction   []  Other:      HPI:   Mishel Oconnell is a 45 y.o. male is here today for    Chief Complaint: mid back pain, low back pain     HPI   FU from TESI # 1 from 12/15/2020. Receiving over 80% relief of left side mid back pain from injection and continues to receive that relief. Mid back pain is very tolerable now and just a dull pain. Now noticing increased pain in left SI area that has been increasing since left SI RFA has been wearing off. Sharp stabbing pain increased when active.      Lower back pain remains tolerable as patient continue to receive relief from bilateral L-facet RFA   Leg pain remains tolerable   Norco prn remains effective The patient  has a past surgical history that includes Ureter stent placement (2006); Lithotripsy (2006); Cystocopy (6/14/2013); Cystocopy (07/29/2013); Cystoscopy (12/23/13); other surgical history (Bilateral, 03/26/2018); pr inj dx/ther agnt paravert facet joint, lumbar/sac, 2nd level (Bilateral, 3/26/2018); pr inj dx/ther agnt paravert facet joint, lumbar/sac, 2nd level (Bilateral, 5/21/2018); Colonoscopy; eye surgery (2007); hernia repair (2007); pr inject rx other periph nerve (Left, 9/28/2018); pr inject rx other periph nerve (Right, 11/30/2018); lumbar nerve block (N/A, 4/5/2019); Injection Procedure For Sacroiliac Joint (Left, 5/17/2019); Injection Procedure For Sacroiliac Joint (Left, 6/27/2019); Lumbar spine surgery (Left, 8/15/2019); Nerve Surgery (Right, 10/31/2019); Pain management procedure (Left, 1/16/2020); Pain management procedure (Right, 3/5/2020); Pain management procedure (Left, 7/2/2020); Pain management procedure (Left, 8/4/2020); and Cervical spine surgery (Right, 12/15/2020). Family History  This patient's family history includes Cancer in his mother; Heart Disease in his father. Social History  Stephany Rosenthal  reports that he quit smoking about 15 years ago. He has a 12.00 pack-year smoking history. He has never used smokeless tobacco. He reports that he does not drink alcohol or use drugs. Medications    Current Outpatient Medications:     HYDROcodone-acetaminophen (NORCO) 5-325 MG per tablet, Take 1 tablet by mouth every 8 hours as needed for Pain for up to 30 days. , Disp: 90 tablet, Rfl: 0    tamsulosin (FLOMAX) 0.4 MG capsule, Take 1 capsule by mouth daily, Disp: 90 capsule, Rfl: 3    potassium citrate (UROCIT-K) 10 MEQ (1080 MG) extended release tablet, TAKE 1 TABLET BY MOUTH EVERY MORNING WITH BREAKFAST, Disp: 90 tablet, Rfl: 3    allopurinol (ZYLOPRIM) 300 MG tablet, TAKE 1 TABLET BY MOUTH EVERY DAY, Disp: 90 tablet, Rfl: 3   traZODone (DESYREL) 50 MG tablet, Take 50 mg by mouth nightly, Disp: , Rfl:     triamcinolone (KENALOG) 0.1 % cream, , Disp: , Rfl:     FLOVENT  MCG/ACT inhaler, , Disp: , Rfl:     ARIPiprazole (ABILIFY) 2 MG tablet, Take 2 mg by mouth 2 times daily, Disp: , Rfl:     citalopram (CELEXA) 40 MG tablet, Take 40 mg by mouth daily. , Disp: , Rfl:     warfarin (COUMADIN) 2.5 MG tablet, Take 2.5 mg by mouth daily at 1800 Takes 5mg 3 days per week & 2.5mg 4 days per week, Disp: , Rfl:     busPIRone (BUSPAR) 10 MG tablet, Take 30 mg by mouth 2 times daily , Disp: , Rfl:     omeprazole (PRILOSEC) 20 MG capsule, Take 20 mg by mouth daily. , Disp: , Rfl:     Subjective:      Review of Systems   Constitutional: Negative. Musculoskeletal: Positive for arthralgias, back pain and myalgias. Negative for gait problem, joint swelling, neck pain and neck stiffness. Neurological: Negative for weakness and numbness. Psychiatric/Behavioral: Negative. Objective:     Vitals:    12/30/20 0907   BP: 128/74   Temp: 97.7 °F (36.5 °C)   Weight: 256 lb (116.1 kg)   Height: 5' 10\" (1.778 m)       Physical Exam  Vitals signs and nursing note reviewed. Constitutional:       General: He is not in acute distress. Appearance: He is well-developed. He is not diaphoretic. HENT:      Head: Normocephalic and atraumatic. Right Ear: External ear normal.      Left Ear: External ear normal.      Nose: Nose normal.      Mouth/Throat:      Mouth: Mucous membranes are moist.      Pharynx: Oropharynx is clear. No oropharyngeal exudate. Eyes:      General: No scleral icterus. Right eye: No discharge. Left eye: No discharge. Conjunctiva/sclera: Conjunctivae normal.      Pupils: Pupils are equal, round, and reactive to light.    Neck: Musculoskeletal: Full passive range of motion without pain, normal range of motion and neck supple. Normal range of motion. No edema, erythema, neck rigidity or muscular tenderness. Thyroid: No thyromegaly. Cardiovascular:      Rate and Rhythm: Normal rate and regular rhythm. Heart sounds: Normal heart sounds. No murmur. No friction rub. No gallop. Pulmonary:      Effort: Pulmonary effort is normal. No respiratory distress. Breath sounds: Normal breath sounds. No wheezing or rales. Chest:      Chest wall: No tenderness. Abdominal:      General: Bowel sounds are normal. There is no distension. Palpations: Abdomen is soft. Tenderness: There is no abdominal tenderness. There is no guarding or rebound. Musculoskeletal:         General: Tenderness present. Right shoulder: Normal.      Left shoulder: Normal.      Right hip: He exhibits tenderness and bony tenderness. Left hip: He exhibits tenderness and bony tenderness. Right knee: Normal.      Left knee: Normal.      Cervical back: Normal.      Thoracic back: He exhibits tenderness. Lumbar back: He exhibits decreased range of motion, tenderness, bony tenderness, pain and spasm. Back:       Right upper leg: He exhibits tenderness. Left upper leg: He exhibits tenderness. Right lower leg: No edema. Left lower leg: No edema. Skin:     General: Skin is warm. Coloration: Skin is not pale. Findings: No erythema or rash. Neurological:      General: No focal deficit present. Mental Status: He is alert and oriented to person, place, and time. He is not disoriented. Cranial Nerves: No cranial nerve deficit. Sensory: No sensory deficit. Motor: No atrophy or abnormal muscle tone. Coordination: Coordination normal.      Gait: Gait normal.      Deep Tendon Reflexes: Reflexes are normal and symmetric. Babinski sign absent on the right side.       Reflex Scores: Tricep reflexes are 2+ on the right side and 2+ on the left side. Bicep reflexes are 2+ on the right side and 2+ on the left side. Brachioradialis reflexes are 2+ on the right side and 2+ on the left side. Patellar reflexes are 2+ on the right side and 2+ on the left side. Achilles reflexes are 2+ on the right side and 2+ on the left side. Psychiatric:         Attention and Perception: Attention normal. He is attentive. Mood and Affect: Mood normal. Mood is not anxious or depressed. Affect is not labile, blunt, angry or inappropriate. Speech: Speech normal. He is communicative. Speech is not rapid and pressured, delayed, slurred or tangential.         Behavior: Behavior normal. Behavior is not agitated, slowed, aggressive, withdrawn, hyperactive or combative. Thought Content: Thought content normal. Thought content is not paranoid or delusional. Thought content does not include homicidal or suicidal ideation. Thought content does not include homicidal or suicidal plan. Cognition and Memory: Cognition normal. Memory is not impaired. He does not exhibit impaired recent memory or impaired remote memory. Judgment: Judgment normal. Judgment is not impulsive or inappropriate. CORETTA test: + left side   Yeomans test: + left side   Gaenslen test: + left side      Assessment:     1. SI (sacroiliac) pain    2. Spondylosis of lumbar region without myelopathy or radiculopathy    3. Lumbar spondylosis    4. Bulge of lumbar disc without myelopathy    5. Lumbosacral radiculitis    6. Spondylosis of thoracic region without myelopathy or radiculopathy    7. Thoracic stenosis    8. Chronic pain syndrome            Plan:      · OARRS reviewed. Current MED: 15.00  · Patient was offered naloxone for home. Has  · Discussed long term side effects of medications, tolerance, dependency and addiction. · Previous UDS reviewed  · UDS preformed today for compliance. · Patient told can not receive any pain medications from any other source. · No evidence of abuse, diversion or aberrant behavior. ? Medications and/or procedures to improve function and quality of life- patient understanding with this and that may not be pain free  ? Discussed with patient about safe storage of medications at home  ? Discussed possible weaning of medication dosing dependent on treatment/procedure results. ? Discussed with patient about risks with procedure including infection, reaction to medication, increased pain, or bleeding. ? Procedure notes reveiwed in detail  ? Receiving 80% relief of mid back pain from thoracic GUNNAR and continue to receive relief. Mid back pain is tolerable. ? Left SI pain is main complaint as left SI RFA is waning. ? Plan left SI RFA for longer term pain relief. Procedure and risks discussed in detail with patient. · Will need to be cleared to be off Coumadin for 5 days  · Continues to recieve good relief from LESI and L-facet RFA  · Continue Norco 5/325 TID prn- recently filled 12/10/2020 and has plenty   · Patient is complaint will order next UDS at FU visit       Meds. Prescribed:   No orders of the defined types were placed in this encounter. Return for left SI RFA. , follow up after procedure.              Electronically signed by WALT Burton CNP on12/30/2020 at 9:42 AM

## 2021-01-05 DIAGNOSIS — G89.4 CHRONIC PAIN SYNDROME: ICD-10-CM

## 2021-01-05 NOTE — TELEPHONE ENCOUNTER
Luisito Amaro called requesting a refill on the following medications:  Requested Prescriptions     Pending Prescriptions Disp Refills    HYDROcodone-acetaminophen (NORCO) 5-325 MG per tablet 90 tablet 0     Sig: Take 1 tablet by mouth every 8 hours as needed for Pain for up to 30 days.      Pharmacy verified:  [facility]    Date of last visit: 12/30/20  Date of next visit (if applicable): 92/31/99

## 2021-01-07 NOTE — TELEPHONE ENCOUNTER
OARRS reviewed. UDS: + for  Hydrocodone consistent. Last seen: 12/30/2020.  Follow-up:   Future Appointments   Date Time Provider Saul Maria G   2/9/2021  9:00 AM WALT Adhikari CNP [facility]   9/8/2021  7:45 AM WALT Palacios CNP [facility]

## 2021-01-08 RX ORDER — HYDROCODONE BITARTRATE AND ACETAMINOPHEN 5; 325 MG/1; MG/1
1 TABLET ORAL EVERY 8 HOURS PRN
Qty: 90 TABLET | Refills: 0 | Status: SHIPPED | OUTPATIENT
Start: 2021-01-09 | End: 2021-02-03 | Stop reason: SDUPTHER

## 2021-01-18 ENCOUNTER — TELEPHONE (OUTPATIENT)
Dept: PHYSICAL MEDICINE AND REHAB | Age: 51
End: 2021-01-18

## 2021-01-18 NOTE — TELEPHONE ENCOUNTER
Received fax from [facility]. Per Dr. Royal Grade pt. Is to hold his Coumadin 4 days prior to procedure, not 5. Pt. Contacted regarding this. States he was aware of this but did start holding his Coumadin for 5 days. Contacted [facility] Coordinator and informed her that pt. did in fact hold Coumadin for 5 days.  Verbalized understanding and will talk with the pharmacist and call the pt. with any updates for his after procedure boost.

## 2021-01-20 ENCOUNTER — TELEPHONE (OUTPATIENT)
Dept: PHYSICAL MEDICINE AND REHAB | Age: 51
End: 2021-01-20

## 2021-01-20 NOTE — TELEPHONE ENCOUNTER
LVM for patient regarding Pre-Procedure Travel Screening Questions. Advised to call back if experiencing any Covid symptoms.

## 2021-01-21 ENCOUNTER — ANESTHESIA EVENT (OUTPATIENT)
Dept: OPERATING ROOM | Age: 51
End: 2021-01-21
Payer: COMMERCIAL

## 2021-01-21 ENCOUNTER — APPOINTMENT (OUTPATIENT)
Dept: GENERAL RADIOLOGY | Age: 51
End: 2021-01-21
Attending: PAIN MEDICINE
Payer: COMMERCIAL

## 2021-01-21 ENCOUNTER — HOSPITAL ENCOUNTER (OUTPATIENT)
Age: 51
Setting detail: OUTPATIENT SURGERY
Discharge: HOME | End: 2021-01-21
Attending: PAIN MEDICINE | Admitting: PAIN MEDICINE
Payer: COMMERCIAL

## 2021-01-21 ENCOUNTER — ANESTHESIA (OUTPATIENT)
Dept: OPERATING ROOM | Age: 51
End: 2021-01-21
Payer: COMMERCIAL

## 2021-01-21 VITALS
HEART RATE: 67 BPM | DIASTOLIC BLOOD PRESSURE: 71 MMHG | OXYGEN SATURATION: 95 % | BODY MASS INDEX: 36.94 KG/M2 | WEIGHT: 258 LBS | RESPIRATION RATE: 16 BRPM | HEIGHT: 70 IN | SYSTOLIC BLOOD PRESSURE: 115 MMHG | TEMPERATURE: 97.1 F

## 2021-01-21 VITALS
OXYGEN SATURATION: 91 % | RESPIRATION RATE: 17 BRPM | SYSTOLIC BLOOD PRESSURE: 97 MMHG | DIASTOLIC BLOOD PRESSURE: 57 MMHG

## 2021-01-21 LAB — POC INR: 1 (ref 0.8–1.2)

## 2021-01-21 PROCEDURE — 2500000003 HC RX 250 WO HCPCS: Performed by: NURSE ANESTHETIST, CERTIFIED REGISTERED

## 2021-01-21 PROCEDURE — 3700000000 HC ANESTHESIA ATTENDED CARE: Performed by: PAIN MEDICINE

## 2021-01-21 PROCEDURE — 2500000003 HC RX 250 WO HCPCS: Performed by: PAIN MEDICINE

## 2021-01-21 PROCEDURE — 3600000056 HC PAIN LEVEL 4 BASE: Performed by: PAIN MEDICINE

## 2021-01-21 PROCEDURE — 3600000057 HC PAIN LEVEL 4 ADDL 15 MIN: Performed by: PAIN MEDICINE

## 2021-01-21 PROCEDURE — 7100000011 HC PHASE II RECOVERY - ADDTL 15 MIN: Performed by: PAIN MEDICINE

## 2021-01-21 PROCEDURE — 64636 DESTROY L/S FACET JNT ADDL: CPT | Performed by: PAIN MEDICINE

## 2021-01-21 PROCEDURE — 2720000010 HC SURG SUPPLY STERILE: Performed by: PAIN MEDICINE

## 2021-01-21 PROCEDURE — 6360000002 HC RX W HCPCS: Performed by: NURSE ANESTHETIST, CERTIFIED REGISTERED

## 2021-01-21 PROCEDURE — 2709999900 HC NON-CHARGEABLE SUPPLY: Performed by: PAIN MEDICINE

## 2021-01-21 PROCEDURE — 3209999900 FLUORO FOR SURGICAL PROCEDURES

## 2021-01-21 PROCEDURE — 7100000010 HC PHASE II RECOVERY - FIRST 15 MIN: Performed by: PAIN MEDICINE

## 2021-01-21 PROCEDURE — 64635 DESTROY LUMB/SAC FACET JNT: CPT | Performed by: PAIN MEDICINE

## 2021-01-21 PROCEDURE — 3700000001 HC ADD 15 MINUTES (ANESTHESIA): Performed by: PAIN MEDICINE

## 2021-01-21 RX ORDER — LIDOCAINE HYDROCHLORIDE 20 MG/ML
INJECTION, SOLUTION INFILTRATION; PERINEURAL PRN
Status: DISCONTINUED | OUTPATIENT
Start: 2021-01-21 | End: 2021-01-21 | Stop reason: SDUPTHER

## 2021-01-21 RX ORDER — KETOROLAC TROMETHAMINE 30 MG/ML
INJECTION, SOLUTION INTRAMUSCULAR; INTRAVENOUS PRN
Status: DISCONTINUED | OUTPATIENT
Start: 2021-01-21 | End: 2021-01-21 | Stop reason: SDUPTHER

## 2021-01-21 RX ORDER — PROPOFOL 10 MG/ML
INJECTION, EMULSION INTRAVENOUS PRN
Status: DISCONTINUED | OUTPATIENT
Start: 2021-01-21 | End: 2021-01-21 | Stop reason: SDUPTHER

## 2021-01-21 RX ORDER — FENTANYL CITRATE 50 UG/ML
INJECTION, SOLUTION INTRAMUSCULAR; INTRAVENOUS PRN
Status: DISCONTINUED | OUTPATIENT
Start: 2021-01-21 | End: 2021-01-21 | Stop reason: SDUPTHER

## 2021-01-21 RX ORDER — BUPIVACAINE HYDROCHLORIDE 2.5 MG/ML
INJECTION, SOLUTION EPIDURAL; INFILTRATION; INTRACAUDAL PRN
Status: DISCONTINUED | OUTPATIENT
Start: 2021-01-21 | End: 2021-01-21 | Stop reason: ALTCHOICE

## 2021-01-21 RX ORDER — LIDOCAINE HYDROCHLORIDE 10 MG/ML
INJECTION, SOLUTION EPIDURAL; INFILTRATION; INTRACAUDAL; PERINEURAL PRN
Status: DISCONTINUED | OUTPATIENT
Start: 2021-01-21 | End: 2021-01-21 | Stop reason: ALTCHOICE

## 2021-01-21 RX ADMIN — PROPOFOL 300 MG: 10 INJECTION, EMULSION INTRAVENOUS at 10:21

## 2021-01-21 RX ADMIN — FENTANYL CITRATE 50 MCG: 50 INJECTION, SOLUTION INTRAMUSCULAR; INTRAVENOUS at 10:24

## 2021-01-21 RX ADMIN — KETOROLAC TROMETHAMINE 30 MG: 30 INJECTION, SOLUTION INTRAMUSCULAR at 10:36

## 2021-01-21 RX ADMIN — LIDOCAINE HYDROCHLORIDE 50 MG: 20 INJECTION, SOLUTION INFILTRATION; PERINEURAL at 10:21

## 2021-01-21 RX ADMIN — FENTANYL CITRATE 50 MCG: 50 INJECTION, SOLUTION INTRAMUSCULAR; INTRAVENOUS at 10:21

## 2021-01-21 ASSESSMENT — PULMONARY FUNCTION TESTS
PIF_VALUE: 0

## 2021-01-21 ASSESSMENT — PAIN DESCRIPTION - DESCRIPTORS: DESCRIPTORS: SHARP

## 2021-01-21 NOTE — ANESTHESIA PRE PROCEDURE
Department of Anesthesiology  Preprocedure Note       Name:  João Mccray   Age:  45 y.o.  :  1970                                          MRN:  [id number]         Date:  2021      Surgeon: Kp Zavala):  Judy Valverde MD    Procedure: left SI RFA (Left )    Medications prior to admission:   Prior to Admission medications    Medication Sig Start Date End Date Taking? Authorizing Provider   HYDROcodone-acetaminophen (NORCO) 5-325 MG per tablet Take 1 tablet by mouth every 8 hours as needed for Pain for up to 30 days. 21  WALT Hall CNP   tamsulosin [facility]) 0.4 MG capsule Take 1 capsule by mouth daily 20   WALT Mustafa CNP   potassium citrate (UROCIT-K) 10 MEQ (1080 MG) extended release tablet TAKE 1 TABLET BY MOUTH EVERY MORNING WITH BREAKFAST 20   WALT Mustafa CNP   allopurinol (ZYLOPRIM) 300 MG tablet TAKE 1 TABLET BY MOUTH EVERY DAY 20   WALT Mustafa CNP   traZODone (DESYREL) 50 MG tablet Take 50 mg by mouth nightly    Historical Provider, MD   triamcinolone (KENALOG) 0.1 % cream  19   Historical Provider, MD   FLOVENT  MCG/ACT inhaler  19   Historical Provider, MD   ARIPiprazole (ABILIFY) 2 MG tablet Take 2 mg by mouth 2 times daily    Historical Provider, MD   citalopram (CELEXA) 40 MG tablet Take 40 mg by mouth daily. Historical Provider, MD   warfarin (COUMADIN) 2.5 MG tablet Take 2.5 mg by mouth daily at 1800 Takes 5mg 3 days per week & 2.5mg 4 days per week    Historical Provider, MD   busPIRone (BUSPAR) 10 MG tablet Take 30 mg by mouth 2 times daily     Historical Provider, MD   omeprazole (PRILOSEC) 20 MG capsule Take 20 mg by mouth daily. Historical Provider, MD       Current medications:    No current outpatient medications on file. No current facility-administered medications for this visit. Allergies:     Allergies Allergen Reactions    Latex Rash    Codeine Hives     TYLENOL WITH CODEINE    Nickel Rash    Oxybutynin Chloride [Oxybutynin Chloride] Other (See Comments)     Warm feeling and extreme dry mouth       Problem List:    Patient Active Problem List   Diagnosis Code    Weak urinary stream R39.12    Obstructive sleep apnea G47.33    Snoring R06.83    Sleep disturbance G47.9    Insomnia G47.00    Sleep talking G47.8    Morning headache R51.9    Bruxism F45.8    Restless sleeper G47.9    Poor concentration R41.840    Claustrophobia F40.240    Vivid dream R68.89    GERD (gastroesophageal reflux disease) K21.9    Anxiety F41.9    Panic disorder F41.0    Obesity (BMI 30.0-34. 9) E66.9    Lumbar spondylosis M47.816    Bulging lumbar disc M51.26    Sacroiliac inflammation (HCC) M46.1    Lumbar radiculitis M54.16    Thoracic spinal stenosis M48.04       Past Medical History:        Diagnosis Date    Chronic back pain     GERD (gastroesophageal reflux disease)     History of kidney stones     Hx of blood clots early 2000's & 2013    MUNA LUNGS    Kidney stone     Lumbar spondylosis        Past Surgical History:        Procedure Laterality Date    CERVICAL SPINE SURGERY Right 12/15/2020    Left TESI @ T11 performed by Zackary Hernandez MD at [address] [place] [address]      last one 2007???    CYSTOSCOPY  6/14/2013    URETEROSCOPY STONE REMOVAL    CYSTOSCOPY  07/29/2013    Left ureteroscopy, Left ureteral stone removal and stent exchange    CYSTOSCOPY  12/23/13    Cystoscopy, Left Optical Internal Ureterotomy, Left Ureteroscopy and Dilated UPJ Oscar Wheeler  2007    [facility] [place]. INJECTION PROCEDURE FOR SACROILIAC JOINT Left 5/17/2019    SI MBB #1 left performed by Zackary Hernandez MD at [address] [place] [address] Left 6/27/2019 Left SI MBB # 2. performed by Raymond Stewart MD at [address]  2007    left groin    LITHOTRIPSY  2006    LUMBAR NERVE BLOCK N/A 4/5/2019    LUMBAR INTER LAMINAR GUNNAR @ L[address] [facility] MD [address] [facility] Left 8/15/2019    Left SI RFA. performed by Raymond Stewart MD at [facility] 98 Right 10/31/2019    LUMBAR INTER LAMINAR GUNNAR @ L4 Right performed by Raymond Stewart MD at [facility] 104 Bilateral 03/26/2018    Lumbar Facet MBB at L4-5, L5-S1    PAIN MANAGEMENT PROCEDURE Left 1/16/2020    Lumbar RFA left side L4-5,5-S1 performed by Raymond Stewart MD at [facility] 113 Right 3/5/2020    Lumbar RFA Right side@ L4-5,5-S1 performed by Raymond Stewart MD at [facility] 113 Left 7/2/2020    Left SI RFA performed by Raymond Stewart MD at [facility] 113 Left 8/4/2020    Left L-facet RFA @ L4-5 and L5-S1 performed by Raymond Stewart MD at [address] [place] [address] DX/THER AGNT PARAVERT FACET JOINT, LUMBAR/SAC, 2ND LEVEL Bilateral 3/26/2018    BILATERAL L-FACET MBB @ L4-5 and L5-S1, performed by Raymond Stewart MD at [address] [place] [address] DX/THER AGNT PARAVERT FACET JOINT, LUMBAR/SAC, 2ND LEVEL Bilateral 5/21/2018    Bilateral L-facet MBB @ L4-5, L5-S1. performed by Raymond Stewart MD at [address] Left 9/28/2018    LUMBAR RFA @ L4-5, and L5-S1 LEFT SIDE FIRST. Leda Norton  performed by Raymond Stewart MD at [address] Right 11/30/2018    Right L-RFA @ L4-5, and L5-S1 performed by Raymond Stewart MD at [address] [facility]  2006       Social History:    Social History Tobacco Use    Smoking status: Former Smoker     Packs/day: 1.00     Years: 12.00     Pack years: 12.00     Quit date: 6/6/2005     Years since quitting: 15.6    Smokeless tobacco: Never Used   Substance Use Topics    Alcohol use: No                                Counseling given: Not Answered      Vital Signs (Current): There were no vitals filed for this visit. BP Readings from Last 3 Encounters:   01/21/21 127/78   12/30/20 128/74   12/15/20 106/60       NPO Status:                                                                                 BMI:   Wt Readings from Last 3 Encounters:   01/21/21 258 lb (117 kg)   12/30/20 256 lb (116.1 kg)   12/15/20 256 lb (116.1 kg)     There is no height or weight on file to calculate BMI.    CBC:   Lab Results   Component Value Date    WBC 4.5 09/24/2020    WBC 5.7 06/24/2020    RBC 4.90 09/24/2020    HGB 15.3 09/24/2020    HCT 45.0 09/24/2020    MCV 92 09/24/2020    RDW 13.7 09/24/2020     09/24/2020     06/24/2020       CMP:   Lab Results   Component Value Date     09/24/2020    K 4.1 09/24/2020    K 3.8 06/18/2020     09/24/2020    CO2 27 09/24/2020    BUN 12 09/24/2020    CREATININE 0.97 09/24/2020    AGRATIO 2.1 07/27/2019    LABGLOM 71 06/18/2020    GLUCOSE 96 09/24/2020    PROT 6.6 09/24/2020    CALCIUM 9.0 09/24/2020    BILITOT 0.6 09/24/2020    ALKPHOS 86 09/24/2020    ALKPHOS 75 07/27/2019    AST 29 09/24/2020    ALT 32 09/24/2020       POC Tests: No results for input(s): POCGLU, POCNA, POCK, POCCL, POCBUN, POCHEMO, POCHCT in the last 72 hours.     Coags:   Lab Results   Component Value Date    PROTIME 18.5 01/14/2021    INR 1.00 01/21/2021    INR 1.50 01/14/2021    APTT 32.1 06/14/2013       HCG (If Applicable): No results found for: PREGTESTUR, PREGSERUM, HCG, HCGQUANT     ABGs: No results found for: PHART, PO2ART, MHA9NET, WXA4ZYO, BEART, M0QYATXQ     Type & Screen (If Applicable): No results found for: LABABO, [address]    Anesthesia Evaluation  Patient summary reviewed no history of anesthetic complications:   Airway: Mallampati: II  TM distance: >3 FB   Neck ROM: full  Mouth opening: > = 3 FB Dental:          Pulmonary: breath sounds clear to auscultation  (+) sleep apnea:                             Cardiovascular:            Rhythm: regular                      Neuro/Psych:   (+) psychiatric history:            GI/Hepatic/Renal:   (+) GERD:, renal disease: kidney stones,           Endo/Other:                     Abdominal:   (+) obese,         Vascular:                                          Anesthesia Plan      MAC     ASA 2       Induction: intravenous. Anesthetic plan and risks discussed with patient.       Plan discussed with CRNA and surgical team.                  Lewis Felty, MD   1/21/2021

## 2021-01-21 NOTE — PROGRESS NOTES
Resting quietly in bed with call light in reach. Updated on plan of care and discharge instructions. Voiced understanding. INR POCT was 1.0. Pt has been off coumadin since 1/15/2021.

## 2021-01-21 NOTE — ANESTHESIA POSTPROCEDURE EVALUATION
Department of Anesthesiology  Postprocedure Note    Patient: Arlette Sotelo  MRN: [id number]  YOB: 1970  Date of evaluation: 1/21/2021  Time:  10:55 AM     Procedure Summary     Date: 01/21/21 Room / Location: [address] [place] [address] 03 / [address]    Anesthesia Start: 7522 Anesthesia Stop: 8461    Procedure: left SI RFA (Left ) Diagnosis: (SI Pain)    Surgeons: Peter Houston MD Responsible Provider:     Anesthesia Type: MAC ASA Status: 2          Anesthesia Type: MAC    Angelina Phase I:      Angelina Phase II:      Last vitals: Reviewed and per EMR flowsheets. Anesthesia Post Evaluation    Patient location during evaluation: PACU  Patient participation: complete - patient participated  Level of consciousness: awake and alert  Airway patency: patent  Nausea & Vomiting: no nausea and no vomiting  Complications: no  Cardiovascular status: hemodynamically stable  Respiratory status: acceptable  Hydration status: euvolemic      ST. [address]  POST-ANESTHESIA NOTE       Name:  Arlette Sotelo                                         Age:  45 y.o.   MRN:  [id number]      Last Vitals:  /71   Pulse 67   Temp 97.1 °F (36.2 °C) (Temporal)   Resp 16   Ht 5' 10\" (1.778 m)   Wt 258 lb (117 kg)   SpO2 95%   BMI 37.02 kg/m²   Patient Vitals for the past 4 hrs:   BP Temp Temp src Pulse Resp SpO2 Height Weight   01/21/21 1041 115/71 97.1 °F (36.2 °C) Temporal 67 16 95 %     01/21/21 0922 127/78 97.6 °F (36.4 °C) Temporal 73 16 95 % 5' 10\" (1.778 m) 258 lb (117 kg)       Level of Consciousness:  Awake    Respiratory:  Stable    Oxygen Saturation:  Stable    Cardiovascular:  Stable    Hydration:  Adequate    PONV:  Stable    Post-op Pain:  Adequate analgesia    Post-op Assessment:  No apparent anesthetic complications    Additional Follow-Up / Treatment / Comment:  None    Alma Galindo MD  January 21, 2021   10:56 AM

## 2021-01-21 NOTE — H&P
H&P    TESI # 1 from 12/15/2020. Receiving over 80% relief of left side mid back pain from injection and continues to receive that relief. Mid back pain is very tolerable now and just a dull pain.      Now noticing increased pain in left SI area that has been increasing since left SI RFA has been wearing off. Sharp stabbing pain increased when active.      Lower back pain remains tolerable as patient continue to receive relief from bilateral L-facet RFA   Leg pain remains tolerable   Norco prn remains effective   Medications reviewed. Patient denies side effects with medications. Patient states he is taking medications as prescribed. Hedenies receiving pain medications from other sources. He denies any ER visits since last visit.     Pain scale with out pain medications or at its worst is 8/10. Pain scale with pain medications or at its best is 4/10. Last dose of Norco was yesterday  Drug screen reviewed from 11/17/2020 and was appropriate  Pill count was completed today and was appropriate  Patient does have naloxone available at home.  Patient has not required use of naloxone at home since last office visit.         The patientis allergic to latex; codeine; nickel; and oxybutynin chloride [oxybutynin chloride].     Past Medical History  Rajni Correa  has a past medical history of Chronic back pain, GERD (gastroesophageal reflux disease), History of kidney stones, Hx of blood clots, Kidney stone, and Lumbar spondylosis.     Past Surgical History The patient  has a past surgical history that includes Ureter stent placement (2006); Lithotripsy (2006); Cystocopy (6/14/2013); Cystocopy (07/29/2013); Cystoscopy (12/23/13); other surgical history (Bilateral, 03/26/2018); pr inj dx/ther agnt paravert facet joint, lumbar/sac, 2nd level (Bilateral, 3/26/2018); pr inj dx/ther agnt paravert facet joint, lumbar/sac, 2nd level (Bilateral, 5/21/2018); Colonoscopy; eye surgery (2007); hernia repair (2007); pr inject rx other periph nerve (Left, 9/28/2018); pr inject rx other periph nerve (Right, 11/30/2018); lumbar nerve block (N/A, 4/5/2019); Injection Procedure For Sacroiliac Joint (Left, 5/17/2019); Injection Procedure For Sacroiliac Joint (Left, 6/27/2019); Lumbar spine surgery (Left, 8/15/2019); Nerve Surgery (Right, 10/31/2019); Pain management procedure (Left, 1/16/2020); Pain management procedure (Right, 3/5/2020); Pain management procedure (Left, 7/2/2020); Pain management procedure (Left, 8/4/2020); and Cervical spine surgery (Right, 12/15/2020).    Family History  This patient's family history includes Cancer in his mother; Heart Disease in his father.  Yrn Rome  reports that he quit smoking about 15 years ago. He has a 12.00 pack-year smoking history. He has never used smokeless tobacco. He reports that he does not drink alcohol or use drugs.     Medications    Current Medication      Current Outpatient Medications:     HYDROcodone-acetaminophen (NORCO) 5-325 MG per tablet, Take 1 tablet by mouth every 8 hours as needed for Pain for up to 30 days. , Disp: 90 tablet, Rfl: 0    tamsulosin (FLOMAX) 0.4 MG capsule, Take 1 capsule by mouth daily, Disp: 90 capsule, Rfl: 3    potassium citrate (UROCIT-K) 10 MEQ (1080 MG) extended release tablet, TAKE 1 TABLET BY MOUTH EVERY MORNING WITH BREAKFAST, Disp: 90 tablet, Rfl: 3    allopurinol (ZYLOPRIM) 300 MG tablet, TAKE 1 TABLET BY MOUTH EVERY DAY, Disp: 90 tablet, Rfl: 3   traZODone (DESYREL) 50 MG tablet, Take 50 mg by mouth nightly, Disp: , Rfl:     triamcinolone (KENALOG) 0.1 % cream, , Disp: , Rfl:     FLOVENT  MCG/ACT inhaler, , Disp: , Rfl:     ARIPiprazole (ABILIFY) 2 MG tablet, Take 2 mg by mouth 2 times daily, Disp: , Rfl:     citalopram (CELEXA) 40 MG tablet, Take 40 mg by mouth daily. , Disp: , Rfl:     warfarin (COUMADIN) 2.5 MG tablet, Take 2.5 mg by mouth daily at 1800 Takes 5mg 3 days per week & 2.5mg 4 days per week, Disp: , Rfl:     busPIRone (BUSPAR) 10 MG tablet, Take 30 mg by mouth 2 times daily , Disp: , Rfl:     omeprazole (PRILOSEC) 20 MG capsule, Take 20 mg by mouth daily. , Disp: , Rfl:         Subjective:      Review of Systems   Constitutional: Negative. Musculoskeletal: Positive for arthralgias, back pain and myalgias. Negative for gait problem, joint swelling, neck pain and neck stiffness. Neurological: Negative for weakness and numbness. Psychiatric/Behavioral: Negative.          Objective:      Vitals       Vitals:     12/30/20 0907   BP: 128/74   Temp: 97.7 °F (36.5 °C)   Weight: 256 lb (116.1 kg)   Height: 5' 10\" (1.778 m)            Physical Exam  Vitals signs and nursing note reviewed. Constitutional:       General: He is not in acute distress. Appearance: He is well-developed. He is not diaphoretic. HENT:      Head: Normocephalic and atraumatic. Right Ear: External ear normal.      Left Ear: External ear normal.      Nose: Nose normal.      Mouth/Throat:      Mouth: Mucous membranes are moist.      Pharynx: Oropharynx is clear. No oropharyngeal exudate. Eyes:      General: No scleral icterus. Right eye: No discharge. Left eye: No discharge. Conjunctiva/sclera: Conjunctivae normal.      Pupils: Pupils are equal, round, and reactive to light.    Neck: Musculoskeletal: Full passive range of motion without pain, normal range of motion and neck supple. Normal range of motion. No edema, erythema, neck rigidity or muscular tenderness. Thyroid: No thyromegaly. Cardiovascular:      Rate and Rhythm: Normal rate and regular rhythm. Heart sounds: Normal heart sounds. No murmur. No friction rub. No gallop. Pulmonary:      Effort: Pulmonary effort is normal. No respiratory distress. Breath sounds: Normal breath sounds. No wheezing or rales. Chest:      Chest wall: No tenderness. Abdominal:      General: Bowel sounds are normal. There is no distension. Palpations: Abdomen is soft. Tenderness: There is no abdominal tenderness. There is no guarding or rebound. Musculoskeletal:         General: Tenderness present. Right shoulder: Normal.      Left shoulder: Normal.      Right hip: He exhibits tenderness and bony tenderness. Left hip: He exhibits tenderness and bony tenderness. Right knee: Normal.      Left knee: Normal.      Cervical back: Normal.      Thoracic back: He exhibits tenderness. Lumbar back: He exhibits decreased range of motion, tenderness, bony tenderness, pain and spasm. Back:       Right upper leg: He exhibits tenderness. Left upper leg: He exhibits tenderness. Right lower leg: No edema. Left lower leg: No edema. Skin:     General: Skin is warm. Coloration: Skin is not pale. Findings: No erythema or rash. Neurological:      General: No focal deficit present. Mental Status: He is alert and oriented to person, place, and time. He is not disoriented. Cranial Nerves: No cranial nerve deficit. Sensory: No sensory deficit. Motor: No atrophy or abnormal muscle tone. Coordination: Coordination normal.      Gait: Gait normal.      Deep Tendon Reflexes: Reflexes are normal and symmetric. Babinski sign absent on the right side.       Reflex Scores: Tricep reflexes are 2+ on the right side and 2+ on the left side. Bicep reflexes are 2+ on the right side and 2+ on the left side. Brachioradialis reflexes are 2+ on the right side and 2+ on the left side. Patellar reflexes are 2+ on the right side and 2+ on the left side. Achilles reflexes are 2+ on the right side and 2+ on the left side. Psychiatric:         Attention and Perception: Attention normal. He is attentive. Mood and Affect: Mood normal. Mood is not anxious or depressed. Affect is not labile, blunt, angry or inappropriate. Speech: Speech normal. He is communicative. Speech is not rapid and pressured, delayed, slurred or tangential.         Behavior: Behavior normal. Behavior is not agitated, slowed, aggressive, withdrawn, hyperactive or combative. Thought Content: Thought content normal. Thought content is not paranoid or delusional. Thought content does not include homicidal or suicidal ideation. Thought content does not include homicidal or suicidal plan. Cognition and Memory: Cognition normal. Memory is not impaired. He does not exhibit impaired recent memory or impaired remote memory. Judgment: Judgment normal. Judgment is not impulsive or inappropriate.         CORETTA test: + left side   Yeomans test: + left side   Gaenslen test: + left side   Assessment:      1. SI (sacroiliac) pain    2. Spondylosis of lumbar region without myelopathy or radiculopathy    3. Lumbar spondylosis    4. Bulge of lumbar disc without myelopathy    5. Lumbosacral radiculitis    6. Spondylosis of thoracic region without myelopathy or radiculopathy    7. Thoracic stenosis    8. Chronic pain syndrome       Plan:      · OARRS reviewed. Current MED: 15.00  · Patient was offered naloxone for home. Has  · Discussed long term side effects of medications, tolerance, dependency and addiction. · Previous UDS reviewed  · UDS preformed today for compliance. · Patient told can not receive any pain medications from any other source. · No evidence of abuse, diversion or aberrant behavior. · Medications and/or procedures to improve function and quality of life- patient understanding with this and that may not be pain free  · Discussed with patient about safe storage of medications at home  · Discussed possible weaning of medication dosing dependent on treatment/procedure results. · Discussed with patient about risks with procedure including infection, reaction to medication, increased pain, or bleeding. · Procedure notes reveiwed in detail  · Receiving 80% relief of mid back pain from thoracic GUNNAR and continue to receive relief. Mid back pain is tolerable. · Left SI pain is main complaint as left SI RFA is waning. · Plan left SI RFA for longer term pain relief. Procedure and risks discussed in detail with patient. · Will need to be cleared to be off Coumadin for 5 days CLEARED  · Continues to recieve good relief from LESI and L-facet RFA  · Continue Norco 5/325 TID prn- recently filled 12/10/2020 and has plenty   · Patient is complaint will order next UDS at FU visit         Meds. Prescribed:     Encounter Medications    No orders of the defined types were placed in this encounter.         Return for left SI RFA.  , follow up after procedure.

## 2021-01-21 NOTE — OP NOTE
Operative Note     Patient Name: Bari Lancaster  YOB: 1970  Medical Record Number: [id number]  Date: 1/21/21     Indication: Lower back and Left SI pain   Consent: On file. Vital Signs:   @     Past Medical History:    has a past medical history of Chronic back pain, GERD (gastroesophageal reflux disease), History of kidney stones, Hx of blood clots, Kidney stone, and Lumbar spondylosis. Past Surgical History:   has a past surgical history that includes Ureter stent placement (2006); Lithotripsy (2006); Cystocopy (6/14/2013); Cystocopy (07/29/2013); Cystoscopy (12/23/13); other surgical history (Bilateral, 03/26/2018); pr inj dx/ther agnt paravert facet joint, lumbar/sac, 2nd level (Bilateral, 3/26/2018); pr inj dx/ther agnt paravert facet joint, lumbar/sac, 2nd level (Bilateral, 5/21/2018); Colonoscopy; eye surgery (2007); hernia repair (2007); pr inject rx other periph nerve (Left, 9/28/2018); pr inject rx other periph nerve (Right, 11/30/2018); lumbar nerve block (N/A, 4/5/2019); Injection Procedure For Sacroiliac Joint (Left, 5/17/2019); Injection Procedure For Sacroiliac Joint (Left, 6/27/2019); Lumbar spine surgery (Left, 8/15/2019); Nerve Surgery (Right, 10/31/2019); Pain management procedure (Left, 1/16/2020); Pain management procedure (Right, 3/5/2020); Pain management procedure (Left, 7/2/2020); Pain management procedure (Left, 8/4/2020); and Cervical spine surgery (Right, 12/15/2020). Pre-Sedation Documentation and Exam:   Vital signs have been reviewed (see flow sheet for vitals). Sedation/ Anesthesia Plan:   MAC     Patient is an appropriate candidate for plan of sedation: yes          Preoperative Diagnosis: L-spondylosis.  Left Sacroiliac Inflammation     Post-Op Dx: as above     Procedure Performed: Left Radiofrequency ablation Dorsal Ramus of L5. median branch of L4 and S1,S2,S3 lateral branches  at the levels of SI  under fluoroscopic guidance     Indication for the Procedure: The patient has ahistory of chronic left SI pain that is not responding well to the conservative treatment. Pain is interfering with the activities of daily living. Patient had undergone SI joint injections with pain relief that lasted for only a short period of time and had greater than 70% pain relief with confirmatory . Hence we decided to do radiofrequency abalation for long term pain relief. The procedure and risks were discussed with the patient and an informed consent was obtained.     Procedure: Left  The patient was brought into the OR and carefully assisted into the prone position on the table and allowed to adjust to a position of comfort. A grounding pad was placed on the right thigh. The low back and buttocks were widely prepped with chloroprep solution, allowed to air dry and draped in the standard sterile surgical fashion. Local anesthesia was provided by 8 ml of 1% Xylocaine delivered with a 25 g needle.       PROCEDURE #1: Radiofrequency Ablation of [place] Ramus of L5, medical branch of L4. A 17g 75 mm radiofrequency introducer needle was placed to the planned anatomic target, guided with intermittent fluoroscopy with a perpendicular approach, to terminally place at the left sacral ala and the superior articular process at the transverse process for the left L4 medial branch nerve. The stylets were removed and the radiofrequency probes with a 4 mm active tip were then inserted. Needle tip position of the probes were verified in the AP, oblique and lateral views. At each site, the medical branch nerve was stimulated at [facility] to a maximum of 1-2 volts determined to finalize safe needle and electrode placement. The patient was awake and responsive during this portion of the procedure. Each target was anesthetized with 1ml of 0.25% marcaine anesthesia for lesioning and then each target was lesioned at 80 degrees Celsius for 2 minutes and 30 seconds. Tissue impedance were noted to be between 250 and 500 Ohms.     PROCEDURE #2: Radiofrequency Ablation of S1, S2, S3 Lateral Branches. Using the AP fluoroscopic view for visualization of the lateral PSFA as defined by the pre-placed 27-gauge Quincke needles, appropriate skin starting positions were defined. Using the PSFA as a \"clock-face\", the positions were:      S1:left = 1 o'clock, 3 o'clock, and 5 o'clock. S2:left = 1 o'clock, 3 o'clock, and 5 o'clock.   S3: left = 1 o'clock and 5 o'clock.

## 2021-01-21 NOTE — H&P
[address] [id number]  History and Physical Update    Pt Name: Emily Murillo  MRN: [id number]  YOB: 1970  Date of evaluation: 1/21/2021      I have examined the patient and reviewed the H&P/Consult and there are no changes to the patient or plans.         Electronically signed by Roxi Gilbert MD on 1/21/2021 at 9:09 AM

## 2021-02-03 DIAGNOSIS — G89.4 CHRONIC PAIN SYNDROME: ICD-10-CM

## 2021-02-03 RX ORDER — HYDROCODONE BITARTRATE AND ACETAMINOPHEN 5; 325 MG/1; MG/1
1 TABLET ORAL EVERY 8 HOURS PRN
Qty: 90 TABLET | Refills: 0 | Status: SHIPPED | OUTPATIENT
Start: 2021-02-08 | End: 2021-03-02 | Stop reason: SDUPTHER

## 2021-02-03 NOTE — TELEPHONE ENCOUNTER
OARRS reviewed. UDS: + for  Dihydrocodeine, Hydrocodone, Norhydrocodone, Hydromorphone  Last seen: 12/30/2020.  Follow-up: 2/9/21

## 2021-02-03 NOTE — TELEPHONE ENCOUNTER
Adrian Aparicio called requesting a refill on the following medications:  Requested Prescriptions     Pending Prescriptions Disp Refills    HYDROcodone-acetaminophen (NORCO) 5-325 MG per tablet 90 tablet 0     Sig: Take 1 tablet by mouth every 8 hours as needed for Pain for up to 30 days.      [facility]      Date of last visit: 12/30/20  Date of next visit (if applicable): Visit date not found

## 2021-02-09 ENCOUNTER — TELEPHONE (OUTPATIENT)
Dept: PHYSICAL MEDICINE AND REHAB | Age: 51
End: 2021-02-09

## 2021-02-09 ENCOUNTER — OFFICE VISIT (OUTPATIENT)
Dept: PHYSICAL MEDICINE AND REHAB | Age: 51
End: 2021-02-09
Payer: COMMERCIAL

## 2021-02-09 VITALS
SYSTOLIC BLOOD PRESSURE: 104 MMHG | BODY MASS INDEX: 36.94 KG/M2 | DIASTOLIC BLOOD PRESSURE: 70 MMHG | WEIGHT: 258 LBS | HEIGHT: 70 IN | TEMPERATURE: 97.8 F

## 2021-02-09 DIAGNOSIS — G89.4 CHRONIC PAIN SYNDROME: ICD-10-CM

## 2021-02-09 DIAGNOSIS — M53.3 SI (SACROILIAC) PAIN: ICD-10-CM

## 2021-02-09 DIAGNOSIS — M47.816 SPONDYLOSIS OF LUMBAR REGION WITHOUT MYELOPATHY OR RADICULOPATHY: Primary | ICD-10-CM

## 2021-02-09 DIAGNOSIS — M54.17 LUMBOSACRAL RADICULITIS: ICD-10-CM

## 2021-02-09 DIAGNOSIS — M48.04 THORACIC STENOSIS: ICD-10-CM

## 2021-02-09 DIAGNOSIS — M47.814 SPONDYLOSIS OF THORACIC REGION WITHOUT MYELOPATHY OR RADICULOPATHY: ICD-10-CM

## 2021-02-09 DIAGNOSIS — M51.36 BULGE OF LUMBAR DISC WITHOUT MYELOPATHY: ICD-10-CM

## 2021-02-09 PROCEDURE — 99213 OFFICE O/P EST LOW 20 MIN: CPT | Performed by: NURSE PRACTITIONER

## 2021-02-09 ASSESSMENT — ENCOUNTER SYMPTOMS: BACK PAIN: 1

## 2021-02-09 NOTE — PROGRESS NOTES
with pain medications or at its best is 2/10. Last dose of [place] was yesterday  Drug screen reviewed from 11/17/2020 and was appropriate  Pill count was completed today and was appropriate  Patient does have naloxone available at home. Patient has not required use of naloxone at home since last office visit. The patientis allergic to latex; codeine; nickel; and oxybutynin chloride [oxybutynin chloride]. Past Medical History  Elvin Burrows  has a past medical history of Chronic back pain, GERD (gastroesophageal reflux disease), History of kidney stones, Hx of blood clots, Kidney stone, and Lumbar spondylosis. Past Surgical History  The patient  has a past surgical history that includes Ureter stent placement (2006); Lithotripsy (2006); Cystocopy (6/14/2013); Cystocopy (07/29/2013); Cystoscopy (12/23/13); other surgical history (Bilateral, 03/26/2018); pr inj dx/ther agnt paravert facet joint, lumbar/sac, 2nd level (Bilateral, 3/26/2018); pr inj dx/ther agnt paravert facet joint, lumbar/sac, 2nd level (Bilateral, 5/21/2018); Colonoscopy; eye surgery (2007); hernia repair (2007); pr inject rx other periph nerve (Left, 9/28/2018); pr inject rx other periph nerve (Right, 11/30/2018); lumbar nerve block (N/A, 4/5/2019); Injection Procedure For Sacroiliac Joint (Left, 5/17/2019); Injection Procedure For Sacroiliac Joint (Left, 6/27/2019); Lumbar spine surgery (Left, 8/15/2019); Nerve Surgery (Right, 10/31/2019); Pain management procedure (Left, 1/16/2020); Pain management procedure (Right, 3/5/2020); Pain management procedure (Left, 7/2/2020); Pain management procedure (Left, 8/4/2020); Cervical spine surgery (Right, 12/15/2020); and Pain management procedure (Left, 1/21/2021). Family History  This patient's family history includes Cancer in his mother; Heart Disease in his father. Social History  Elvin Burrows  reports that he quit smoking about 15 years ago. He has a 12.00 pack-year smoking history.  He has never used smokeless tobacco. He reports that he does not drink alcohol or use drugs. Medications    Current Outpatient Medications:     apixaban (ELIQUIS) 2.5 MG TABS tablet, Take 2.5 mg by mouth 2 times daily, Disp: , Rfl:     HYDROcodone-acetaminophen (NORCO) 5-325 MG per tablet, Take 1 tablet by mouth every 8 hours as needed for Pain for up to 30 days. , Disp: 90 tablet, Rfl: 0    tamsulosin (FLOMAX) 0.4 MG capsule, Take 1 capsule by mouth daily, Disp: 90 capsule, Rfl: 3    potassium citrate (UROCIT-K) 10 MEQ (1080 MG) extended release tablet, TAKE 1 TABLET BY MOUTH EVERY MORNING WITH BREAKFAST, Disp: 90 tablet, Rfl: 3    allopurinol (ZYLOPRIM) 300 MG tablet, TAKE 1 TABLET BY MOUTH EVERY DAY, Disp: 90 tablet, Rfl: 3    traZODone (DESYREL) 50 MG tablet, Take 50 mg by mouth nightly, Disp: , Rfl:     triamcinolone (KENALOG) 0.1 % cream, , Disp: , Rfl:     FLOVENT  MCG/ACT inhaler, , Disp: , Rfl:     ARIPiprazole (ABILIFY) 2 MG tablet, Take 2 mg by mouth 2 times daily, Disp: , Rfl:     citalopram (CELEXA) 40 MG tablet, Take 40 mg by mouth daily. , Disp: , Rfl:     busPIRone (BUSPAR) 10 MG tablet, Take 30 mg by mouth 2 times daily , Disp: , Rfl:     omeprazole (PRILOSEC) 20 MG capsule, Take 20 mg by mouth daily. , Disp: , Rfl:     Subjective:      Review of Systems   Constitutional: Negative. HENT: Negative. Musculoskeletal: Positive for arthralgias, back pain and myalgias. Ambulating without assist devices    Skin: Negative. Neurological: Negative for weakness and numbness. Psychiatric/Behavioral: Negative. Objective:     Vitals:    02/09/21 0853   BP: 104/70   Temp: 97.8 °F (36.6 °C)   Weight: 258 lb (117 kg)   Height: 5' 10\" (1.778 m)       Physical Exam  Vitals signs and nursing note reviewed. Constitutional:       General: He is not in acute distress. Appearance: He is well-developed. He is not diaphoretic. HENT:      Head: Normocephalic and atraumatic.       Right Ear: External ear normal.      Left Ear: External ear normal.      Nose: Nose normal.      Mouth/Throat:      Mouth: Mucous membranes are moist.      Pharynx: Oropharynx is clear. No oropharyngeal exudate. Eyes:      General: No scleral icterus. Right eye: No discharge. Left eye: No discharge. Conjunctiva/sclera: Conjunctivae normal.      Pupils: Pupils are equal, round, and reactive to light. Neck:      Musculoskeletal: Full passive range of motion without pain, normal range of motion and neck supple. Normal range of motion. No edema, erythema, neck rigidity or muscular tenderness. Thyroid: No thyromegaly. Cardiovascular:      Rate and Rhythm: Normal rate and regular rhythm. Heart sounds: Normal heart sounds. No murmur. No friction rub. No gallop. Pulmonary:      Effort: Pulmonary effort is normal. No respiratory distress. Breath sounds: Normal breath sounds. No wheezing or rales. Chest:      Chest wall: No tenderness. Abdominal:      General: Bowel sounds are normal. There is no distension. Palpations: Abdomen is soft. Tenderness: There is no abdominal tenderness. There is no guarding or rebound. Musculoskeletal:         General: Tenderness present. Right shoulder: Normal.      Left shoulder: Normal.      Right hip: He exhibits tenderness and bony tenderness. Left hip: He exhibits tenderness and bony tenderness. Right knee: Normal.      Left knee: Normal.      Cervical back: Normal.      Thoracic back: He exhibits tenderness. Lumbar back: He exhibits decreased range of motion, tenderness, bony tenderness, pain and spasm. Back:       Right upper leg: He exhibits tenderness. Left upper leg: He exhibits tenderness. Right lower leg: No edema. Left lower leg: No edema. Skin:     General: Skin is warm. Coloration: Skin is not pale. Findings: No erythema or rash.    Neurological:      General: No focal deficit

## 2021-02-18 ENCOUNTER — TELEPHONE (OUTPATIENT)
Dept: PHYSICAL MEDICINE AND REHAB | Age: 51
End: 2021-02-18

## 2021-03-02 DIAGNOSIS — G89.4 CHRONIC PAIN SYNDROME: ICD-10-CM

## 2021-03-02 RX ORDER — HYDROCODONE BITARTRATE AND ACETAMINOPHEN 5; 325 MG/1; MG/1
1 TABLET ORAL EVERY 8 HOURS PRN
Qty: 90 TABLET | Refills: 0 | Status: SHIPPED | OUTPATIENT
Start: 2021-03-10 | End: 2021-04-06 | Stop reason: SDUPTHER

## 2021-03-02 NOTE — TELEPHONE ENCOUNTER
OARRS reviewed. UDS: + for  Dihydrocodeine, Hydrocodone, Norhydrocodone, Hydromorphone. Last seen: 2/9/2021.  Follow-up: 3/29/21

## 2021-03-12 ENCOUNTER — TELEPHONE (OUTPATIENT)
Dept: PHYSICAL MEDICINE AND REHAB | Age: 51
End: 2021-03-12

## 2021-03-15 ENCOUNTER — APPOINTMENT (OUTPATIENT)
Dept: GENERAL RADIOLOGY | Age: 51
End: 2021-03-15
Attending: PAIN MEDICINE
Payer: COMMERCIAL

## 2021-03-15 ENCOUNTER — HOSPITAL ENCOUNTER (OUTPATIENT)
Age: 51
Setting detail: OUTPATIENT SURGERY
Discharge: HOME | End: 2021-03-15
Attending: PAIN MEDICINE | Admitting: PAIN MEDICINE
Payer: COMMERCIAL

## 2021-03-15 ENCOUNTER — ANESTHESIA EVENT (OUTPATIENT)
Dept: OPERATING ROOM | Age: 51
End: 2021-03-15
Payer: COMMERCIAL

## 2021-03-15 ENCOUNTER — ANESTHESIA (OUTPATIENT)
Dept: OPERATING ROOM | Age: 51
End: 2021-03-15
Payer: COMMERCIAL

## 2021-03-15 VITALS
OXYGEN SATURATION: 93 % | SYSTOLIC BLOOD PRESSURE: 141 MMHG | DIASTOLIC BLOOD PRESSURE: 66 MMHG | RESPIRATION RATE: 18 BRPM

## 2021-03-15 VITALS
BODY MASS INDEX: 37.11 KG/M2 | RESPIRATION RATE: 14 BRPM | WEIGHT: 259.2 LBS | HEART RATE: 70 BPM | TEMPERATURE: 98.5 F | OXYGEN SATURATION: 93 % | HEIGHT: 70 IN | SYSTOLIC BLOOD PRESSURE: 108 MMHG | DIASTOLIC BLOOD PRESSURE: 72 MMHG

## 2021-03-15 PROCEDURE — 2709999900 HC NON-CHARGEABLE SUPPLY: Performed by: PAIN MEDICINE

## 2021-03-15 PROCEDURE — 7100000011 HC PHASE II RECOVERY - ADDTL 15 MIN: Performed by: PAIN MEDICINE

## 2021-03-15 PROCEDURE — 3600000057 HC PAIN LEVEL 4 ADDL 15 MIN: Performed by: PAIN MEDICINE

## 2021-03-15 PROCEDURE — 3600000056 HC PAIN LEVEL 4 BASE: Performed by: PAIN MEDICINE

## 2021-03-15 PROCEDURE — 7100000010 HC PHASE II RECOVERY - FIRST 15 MIN: Performed by: PAIN MEDICINE

## 2021-03-15 PROCEDURE — 3700000000 HC ANESTHESIA ATTENDED CARE: Performed by: PAIN MEDICINE

## 2021-03-15 PROCEDURE — 6360000002 HC RX W HCPCS: Performed by: NURSE ANESTHETIST, CERTIFIED REGISTERED

## 2021-03-15 PROCEDURE — 2500000003 HC RX 250 WO HCPCS: Performed by: PAIN MEDICINE

## 2021-03-15 PROCEDURE — 3209999900 FLUORO FOR SURGICAL PROCEDURES

## 2021-03-15 PROCEDURE — 64636 DESTROY L/S FACET JNT ADDL: CPT | Performed by: PAIN MEDICINE

## 2021-03-15 PROCEDURE — 6360000002 HC RX W HCPCS: Performed by: PAIN MEDICINE

## 2021-03-15 PROCEDURE — 64635 DESTROY LUMB/SAC FACET JNT: CPT | Performed by: PAIN MEDICINE

## 2021-03-15 PROCEDURE — 3700000001 HC ADD 15 MINUTES (ANESTHESIA): Performed by: PAIN MEDICINE

## 2021-03-15 RX ORDER — BUPIVACAINE HYDROCHLORIDE 2.5 MG/ML
INJECTION, SOLUTION EPIDURAL; INFILTRATION; INTRACAUDAL PRN
Status: DISCONTINUED | OUTPATIENT
Start: 2021-03-15 | End: 2021-03-15 | Stop reason: ALTCHOICE

## 2021-03-15 RX ORDER — LIDOCAINE HYDROCHLORIDE 10 MG/ML
INJECTION, SOLUTION EPIDURAL; INFILTRATION; INTRACAUDAL; PERINEURAL PRN
Status: DISCONTINUED | OUTPATIENT
Start: 2021-03-15 | End: 2021-03-15 | Stop reason: ALTCHOICE

## 2021-03-15 RX ORDER — PROPOFOL 10 MG/ML
INJECTION, EMULSION INTRAVENOUS PRN
Status: DISCONTINUED | OUTPATIENT
Start: 2021-03-15 | End: 2021-03-15 | Stop reason: SDUPTHER

## 2021-03-15 RX ORDER — METHYLPREDNISOLONE ACETATE 80 MG/ML
INJECTION, SUSPENSION INTRA-ARTICULAR; INTRALESIONAL; INTRAMUSCULAR; SOFT TISSUE PRN
Status: DISCONTINUED | OUTPATIENT
Start: 2021-03-15 | End: 2021-03-15 | Stop reason: ALTCHOICE

## 2021-03-15 RX ORDER — FENTANYL CITRATE 50 UG/ML
INJECTION, SOLUTION INTRAMUSCULAR; INTRAVENOUS PRN
Status: DISCONTINUED | OUTPATIENT
Start: 2021-03-15 | End: 2021-03-15 | Stop reason: SDUPTHER

## 2021-03-15 RX ADMIN — FENTANYL CITRATE 50 MCG: 50 INJECTION, SOLUTION INTRAMUSCULAR; INTRAVENOUS at 09:54

## 2021-03-15 RX ADMIN — PROPOFOL 50 MG: 10 INJECTION, EMULSION INTRAVENOUS at 10:03

## 2021-03-15 RX ADMIN — FENTANYL CITRATE 50 MCG: 50 INJECTION, SOLUTION INTRAMUSCULAR; INTRAVENOUS at 09:50

## 2021-03-15 ASSESSMENT — PULMONARY FUNCTION TESTS
PIF_VALUE: 0

## 2021-03-15 NOTE — OP NOTE
Operative Note  Pre-Procedure Note    Patient Name: Sima Burnett   YOB: 1970  Medical Record Number: [id number]  Date: 3/15/21    Indication:  Lower back pain  Consent: On file. Vital Signs:   Vitals:    03/15/21 0836   BP: 126/85   Pulse: 68   Resp: 16   Temp: 97.5 °F (36.4 °C)   SpO2: 95%       Past Medical History:   has a past medical history of Chronic back pain, GERD (gastroesophageal reflux disease), History of kidney stones, Hx of blood clots, Kidney stone, and Lumbar spondylosis. Past Surgical History:   has a past surgical history that includes Ureter stent placement (2006); Lithotripsy (2006); Cystocopy (6/14/2013); Cystocopy (07/29/2013); Cystoscopy (12/23/13); other surgical history (Bilateral, 03/26/2018); pr inj dx/ther agnt paravert facet joint, lumbar/sac, 2nd level (Bilateral, 3/26/2018); pr inj dx/ther agnt paravert facet joint, lumbar/sac, 2nd level (Bilateral, 5/21/2018); Colonoscopy; eye surgery (2007); hernia repair (2007); pr inject rx other periph nerve (Left, 9/28/2018); pr inject rx other periph nerve (Right, 11/30/2018); lumbar nerve block (N/A, 4/5/2019); Injection Procedure For Sacroiliac Joint (Left, 5/17/2019); Injection Procedure For Sacroiliac Joint (Left, 6/27/2019); Lumbar spine surgery (Left, 8/15/2019); Nerve Surgery (Right, 10/31/2019); Pain management procedure (Left, 1/16/2020); Pain management procedure (Right, 3/5/2020); Pain management procedure (Left, 7/2/2020); Pain management procedure (Left, 8/4/2020); Cervical spine surgery (Right, 12/15/2020); and Pain management procedure (Left, 1/21/2021). Pre-Sedation Documentation and Exam:   Vital signs have been reviewed (see flow sheet for vitals).      Sedation/ Anesthesia Plan:   MAC    Patient is an appropriate candidate for plan of sedation: yes         Preoperative Diagnosis: L-spondylosis     Post-Op Dx: as above     Procedure Performed:  :Radiofrequency ablation of median branches at the levels of L4-5 and L5-S1 left under fluoroscopic guidance      Indication for the Procedure:  The patient has ahistory of chronic low back pain that is not responding well to the conservative treatment.  Patient's pain is mostly axial in nature.  Pain is interfering with the activities of daily living.  Physical examination revealed facet tenderness and facet loading is positive.  Patient had undergone lumbar facet joint injections with pain relief that lasted for only a short period of time and had greater than 70% pain relief with confirmatory median branch blocks.  Hence we decided to do radiofrequency abalation of median branches for long term pain releif.   The procedure and risks  were discussed with the patient and an informed consent was obtained.     Procedure:  Left side   A meaningful communication was kept up with the patient throughout the procedure. The patient is placed in prone position and skin over the back was prepped and draped in sterile manner.  Then using fluoroscopy the junction of the transverse process of the vertebra with the superior process of the facet joint was observed and the view was optimized.  The skin and deep tissues posterior were infiltrated with 10 ml of  1% xylocaine. The RF canula with the 15 mm active tip was introduced through the skin wheal under fluoroscopy guidance such that the tip of the needle lies in the groove of the transverse process with the superior processes of the facet joint.  Then a lateral view of the lumbar spine was obtained to make sure the tip of needle is not in the neural foramen.  Then electric impedence was checked to make sure it is acceptable. Then a sensory stimulus was applied at 50 Hz up to 1 volt and concordant pain symptoms were reproduced.  Then a motor stimulus was applied at 2 Hz up to 2 volts and no motor stimulation was seen in lower extremities.  Some multifidus stimulus was seen. Huong Bey after negative aspiration a mixture of

## 2021-03-15 NOTE — PROGRESS NOTES
1008-  Patient arrived to phase II via cart. Spontaneous respirations even and unlabored. Placed on monitor--VSS. Report received from [facility].    1009-  Assessment completed. Patient is alert and oriented x4. IV capped off-- no complications. Patient denies pain--will monitor. Injection sites clean and dry. 1012-  Snack and drink given to patient. Family in car. 1020-  All belongings in room. Discharge instructions given in pre-op, no further questions. 1025-  IV removed-- no complications. Bandage applied. 1029-  Patient discharged in stable condition with all belongings. Alka MESA walked patient to car.

## 2021-03-15 NOTE — ANESTHESIA PRE PROCEDURE
Department of Anesthesiology  Preprocedure Note       Name:  Briseyda Candelario   Age:  45 y.o.  :  1970                                          MRN:  [id number]         Date:  3/15/2021      Surgeon: Lubna Camarillo):  Homer Medina MD    Procedure: Procedure(s):  Left L-facet RFA @ L4-5 and L5-S1    Medications prior to admission:   Prior to Admission medications    Medication Sig Start Date End Date Taking? Authorizing Provider   HYDROcodone-acetaminophen (NORCO) 5-325 MG per tablet Take 1 tablet by mouth every 8 hours as needed for Pain for up to 30 days. 3/10/21 4/9/21 Yes WALT Reynolds CNP   tamsulosin [facility]) 0.4 MG capsule Take 1 capsule by mouth daily 20  Yes WALT Ortiz CNP   potassium citrate (UROCIT-K) 10 MEQ (1080 MG) extended release tablet TAKE 1 TABLET BY MOUTH EVERY MORNING WITH BREAKFAST 20  Yes WALT Craig CNP   allopurinol (ZYLOPRIM) 300 MG tablet TAKE 1 TABLET BY MOUTH EVERY DAY 20  Yes WALT Ortiz CNP   traZODone (DESYREL) 50 MG tablet Take 50 mg by mouth nightly   Yes Historical Provider, MD   FLOVENT  MCG/ACT inhaler  19  Yes Historical Provider, MD   ARIPiprazole (ABILIFY) 2 MG tablet Take 2 mg by mouth 2 times daily   Yes Historical Provider, MD   citalopram (CELEXA) 40 MG tablet Take 40 mg by mouth daily. Yes Historical Provider, MD   busPIRone (BUSPAR) 10 MG tablet Take 30 mg by mouth 2 times daily    Yes Historical Provider, MD   omeprazole (PRILOSEC) 20 MG capsule Take 20 mg by mouth daily. Yes Historical Provider, MD   apixaban (ELIQUIS) 2.5 MG TABS tablet Take 2.5 mg by mouth 2 times daily    Historical Provider, MD   triamcinolone (KENALOG) 0.1 % cream  19   Historical Provider, MD       Current medications:    No current facility-administered medications for this encounter. Allergies:     Allergies   Allergen Reactions    Latex Rash    Codeine Hives     TYLENOL WITH CODEINE    Nickel Rash    Oxybutynin Chloride [Oxybutynin Chloride] Other (See Comments)     Warm feeling and extreme dry mouth       Problem List:    Patient Active Problem List   Diagnosis Code    Weak urinary stream R39.12    Obstructive sleep apnea G47.33    Snoring R06.83    Sleep disturbance G47.9    Insomnia G47.00    Sleep talking G47.8    Morning headache R51.9    Bruxism F45.8    Restless sleeper G47.9    Poor concentration R41.840    Claustrophobia F40.240    Vivid dream R68.89    GERD (gastroesophageal reflux disease) K21.9    Anxiety F41.9    Panic disorder F41.0    Obesity (BMI 30.0-34. 9) E66.9    Lumbar spondylosis M47.816    Bulging lumbar disc M51.26    Sacroiliac inflammation (HCC) M46.1    Lumbar radiculitis M54.16    Thoracic spinal stenosis M48.04       Past Medical History:        Diagnosis Date    Chronic back pain     GERD (gastroesophageal reflux disease)     History of kidney stones     Hx of blood clots early 2000's & 2013    MUNA LUNGS    Kidney stone     Lumbar spondylosis        Past Surgical History:        Procedure Laterality Date    CERVICAL SPINE SURGERY Right 12/15/2020    Left TESI @ T11 performed by Arden Ramirez MD at [address] [place] [address]      last one 2007???    CYSTOSCOPY  6/14/2013    URETEROSCOPY STONE REMOVAL    CYSTOSCOPY  07/29/2013    Left ureteroscopy, Left ureteral stone removal and stent exchange    CYSTOSCOPY  12/23/13    Cystoscopy, Left Optical Internal Ureterotomy, Left Ureteroscopy and Dilated [facility]  Yen    lasik    [facility] Left 5/17/2019    SI MBB #1 left performed by Arden Ramirez MD at [address] [place] [address] Left 6/27/2019    Left SI MBB # 2. performed by Arden Ramirez MD at [address]  2007    left groin    LITHOTRIPSY  2006    LUMBAR NERVE BLOCK N/A 4/5/2019    LUMBAR INTER LAMINAR GUNNAR @ L4 performed by Rome Garcias MD at [address] Left 8/15/2019    Left SI RFA. performed by Rome Garcias MD at [facility] 98 Right 10/31/2019    LUMBAR INTER LAMINAR GUNNAR @ L4 Right performed by Rome Garcias MD at [facility] 104 Bilateral 03/26/2018    Lumbar Facet MBB at L4-5, L5-S1    PAIN MANAGEMENT PROCEDURE Left 1/16/2020    Lumbar RFA left side L4-5,5-S1 performed by Rome Garcias MD at [facility] 113 Right 3/5/2020    Lumbar RFA Right side@ L4-5,5-S1 performed by Rome Garcias MD at [place] [address] Left 7/2/2020    Left SI RFA performed by Rome Garcias MD at [place] [address] Left 8/4/2020    Left L-facet RFA @ L4-5 and L5-S1 performed by Rome Garcias MD at [facility] 113 Left 1/21/2021    left SI RFA performed by Rome Garcias MD at [address] [place] [address] DX/THER AGNT PARAVERT FACET JOINT, LUMBAR/SAC, 2ND LEVEL Bilateral 3/26/2018    BILATERAL L-FACET MBB @ L4-5 and L5-S1, performed by Rome Garcias MD at [address] [place] [address] DX/THER AGNT PARAVERT FACET JOINT, LUMBAR/SAC, 2ND LEVEL Bilateral 5/21/2018    Bilateral L-facet MBB @ L4-5, L5-S1. performed by Rome Garcias MD at [address] Left 9/28/2018    LUMBAR RFA @ L4-5, and L5-S1 LEFT SIDE FIRST. Rik Angry  performed by Rome Garcias MD at [address] Right 11/30/2018    Right L-RFA @ L4-5, and L5-S1 performed by Rome Garcias MD at [address] [place], [place] [address]  2006       Social History:    Social History Tobacco Use    Smoking status: Former Smoker     Packs/day: 1.00     Years: 12.00     Pack years: 12.00     Quit date: 6/6/2005     Years since quitting: 15.7    Smokeless tobacco: Never Used   Substance Use Topics    Alcohol use: No                                Counseling given: Not Answered      Vital Signs (Current):   Vitals:    03/15/21 0836   BP: 126/85   Pulse: 68   Resp: 16   Temp: 97.5 °F (36.4 °C)   TempSrc: Temporal   SpO2: 95%   Weight: 259 lb 3.2 oz (117.6 kg)   Height: 5' 10\" (1.778 m)                                              BP Readings from Last 3 Encounters:   03/15/21 126/85   02/09/21 104/70   01/21/21 115/71       NPO Status: Time of last liquid consumption: 2200                        Time of last solid consumption: 2200                        Date of last liquid consumption: 03/05/21                        Date of last solid food consumption: 03/14/21    BMI:   Wt Readings from Last 3 Encounters:   03/15/21 259 lb 3.2 oz (117.6 kg)   02/09/21 258 lb (117 kg)   01/21/21 258 lb (117 kg)     Body mass index is 37.19 kg/m². CBC:   Lab Results   Component Value Date    WBC 4.5 09/24/2020    WBC 5.7 06/24/2020    RBC 4.90 09/24/2020    HGB 15.3 09/24/2020    HCT 45.0 09/24/2020    MCV 92 09/24/2020    RDW 13.7 09/24/2020     09/24/2020     06/24/2020       CMP:   Lab Results   Component Value Date     09/24/2020    K 4.1 09/24/2020    K 3.8 06/18/2020     09/24/2020    CO2 27 09/24/2020    BUN 12 09/24/2020    CREATININE 0.97 09/24/2020    AGRATIO 2.1 07/27/2019    LABGLOM 71 06/18/2020    GLUCOSE 96 09/24/2020    PROT 6.6 09/24/2020    CALCIUM 9.0 09/24/2020    BILITOT 0.6 09/24/2020    ALKPHOS 86 09/24/2020    ALKPHOS 75 07/27/2019    AST 29 09/24/2020    ALT 32 09/24/2020       POC Tests: No results for input(s): POCGLU, POCNA, POCK, POCCL, POCBUN, POCHEMO, POCHCT in the last 72 hours.     Coags:   Lab Results   Component Value Date    PROTIME 20.3 01/28/2021 INR 1.70 01/28/2021    APTT 32.1 06/14/2013       HCG (If Applicable): No results found for: PREGTESTUR, PREGSERUM, HCG, HCGQUANT     ABGs: No results found for: PHART, PO2ART, QFQ6ZBS, TCM2KVA, BEART, E9LQEJMX     Type & Screen (If Applicable):  No results found for: LABABO, LABRH    Drug/Infectious Status (If Applicable):  No results found for: HIV, HEPCAB    COVID-19 Screening (If Applicable):   Lab Results   Component Value Date    COVID19 Detected 10/16/2020           Anesthesia Evaluation  Patient summary reviewed  Airway: Mallampati: III  TM distance: >3 FB   Neck ROM: full  Mouth opening: > = 3 FB Dental:          Pulmonary:   (+) sleep apnea:                             Cardiovascular:                      Neuro/Psych:   (+) neuromuscular disease:, headaches:, psychiatric history:            GI/Hepatic/Renal:             Endo/Other:    (+) : arthritis:., .                 Abdominal:           Vascular:                                        Anesthesia Plan      MAC     ASA 2       Induction: intravenous. Anesthetic plan and risks discussed with patient. Plan discussed with CASA. [place].  [facility]   3/15/2021

## 2021-03-15 NOTE — H&P
H&P    FU from left SI RFA from 1/21/2021. Receiving about 80% relief of let SI pain from left SI RFA. Pain has greatly improved in this area. Pain at this time is most bothersome in left lower back above SI area as effects from left L-facet RFA has worn off- stabbing, sharp, aching, jabbing pain.      Right low back, mid back pain, and leg pain, remains tolerable     Norco prn remains effective     Medications reviewed. Patient denies side effects with medications. Patient states he is taking medications as prescribed. Hedenies receiving pain medications from other sources. He denies any ER visits since last visit.     Pain scale with out pain medications or at its worst is 7/10. Pain scale with pain medications or at its best is 2/10. Last dose of [place] was yesterday  Drug screen reviewed from 11/17/2020 and was appropriate  Pill count was completed today and was appropriate  Patient does have naloxone available at home. Patient has not required use of naloxone at home since last office visit.         The patientis allergic to latex; codeine; nickel; and oxybutynin chloride [oxybutynin chloride].     Past Medical History  Jian Jamey  has a past medical history of Chronic back pain, GERD (gastroesophageal reflux disease), History of kidney stones, Hx of blood clots, Kidney stone, and Lumbar spondylosis.     Past Surgical History  The patient  has a past surgical history that includes Ureter stent placement (2006); Lithotripsy (2006); Cystocopy (6/14/2013); Cystocopy (07/29/2013); Cystoscopy (12/23/13); other surgical history (Bilateral, 03/26/2018); pr inj dx/ther agnt paravert facet joint, lumbar/sac, 2nd level (Bilateral, 3/26/2018); pr inj dx/ther agnt paravert facet joint, lumbar/sac, 2nd level (Bilateral, 5/21/2018);  Colonoscopy; eye surgery (2007); hernia repair (2007); pr inject rx other periph nerve (Left, 9/28/2018); pr inject rx other periph nerve (Right, 11/30/2018); lumbar nerve block (N/A, 4/5/2019); Injection Procedure For Sacroiliac Joint (Left, 5/17/2019); Injection Procedure For Sacroiliac Joint (Left, 6/27/2019); Lumbar spine surgery (Left, 8/15/2019); Nerve Surgery (Right, 10/31/2019); Pain management procedure (Left, 1/16/2020); Pain management procedure (Right, 3/5/2020); Pain management procedure (Left, 7/2/2020); Pain management procedure (Left, 8/4/2020); Cervical spine surgery (Right, 12/15/2020); and Pain management procedure (Left, 1/21/2021).    Family History  This patient's family history includes Cancer in his mother; Heart Disease in his father.  Robbie Ramirez  reports that he quit smoking about 15 years ago. He has a 12.00 pack-year smoking history. He has never used smokeless tobacco. He reports that he does not drink alcohol or use drugs.     Medications    Current Medication      Current Outpatient Medications:     apixaban (ELIQUIS) 2.5 MG TABS tablet, Take 2.5 mg by mouth 2 times daily, Disp: , Rfl:     HYDROcodone-acetaminophen (NORCO) 5-325 MG per tablet, Take 1 tablet by mouth every 8 hours as needed for Pain for up to 30 days. , Disp: 90 tablet, Rfl: 0    tamsulosin (FLOMAX) 0.4 MG capsule, Take 1 capsule by mouth daily, Disp: 90 capsule, Rfl: 3    potassium citrate (UROCIT-K) 10 MEQ (1080 MG) extended release tablet, TAKE 1 TABLET BY MOUTH EVERY MORNING WITH BREAKFAST, Disp: 90 tablet, Rfl: 3    allopurinol (ZYLOPRIM) 300 MG tablet, TAKE 1 TABLET BY MOUTH EVERY DAY, Disp: 90 tablet, Rfl: 3    traZODone (DESYREL) 50 MG tablet, Take 50 mg by mouth nightly, Disp: , Rfl:     triamcinolone (KENALOG) 0.1 % cream, , Disp: , Rfl:     FLOVENT  MCG/ACT inhaler, , Disp: , Rfl:     ARIPiprazole (ABILIFY) 2 MG tablet, Take 2 mg by mouth 2 times daily, Disp: , Rfl:     citalopram (CELEXA) 40 MG tablet, Take 40 mg by mouth daily. , Disp: , Rfl:     busPIRone (BUSPAR) 10 MG tablet, Take 30 mg by mouth 2 times daily , Disp: , Rfl:     omeprazole (PRILOSEC) 20 MG Musculoskeletal:         General: Tenderness present. Right shoulder: Normal.      Left shoulder: Normal.      Right hip: He exhibits tenderness and bony tenderness. Left hip: He exhibits tenderness and bony tenderness. Right knee: Normal.      Left knee: Normal.      Cervical back: Normal.      Thoracic back: He exhibits tenderness. Lumbar back: He exhibits decreased range of motion, tenderness, bony tenderness, pain and spasm. Back:       Right upper leg: He exhibits tenderness. Left upper leg: He exhibits tenderness. Right lower leg: No edema. Left lower leg: No edema. Skin:     General: Skin is warm. Coloration: Skin is not pale. Findings: No erythema or rash. Neurological:      General: No focal deficit present. Mental Status: He is alert and oriented to person, place, and time. He is not disoriented. Cranial Nerves: No cranial nerve deficit. Sensory: No sensory deficit. Motor: No atrophy or abnormal muscle tone. Coordination: Coordination normal.      Gait: Gait normal.      Deep Tendon Reflexes: Reflexes are normal and symmetric. Babinski sign absent on the right side. Reflex Scores:       Tricep reflexes are 2+ on the right side and 2+ on the left side. Bicep reflexes are 2+ on the right side and 2+ on the left side. Brachioradialis reflexes are 2+ on the right side and 2+ on the left side. Patellar reflexes are 2+ on the right side and 2+ on the left side. Achilles reflexes are 2+ on the right side and 2+ on the left side. Psychiatric:         Attention and Perception: Attention normal. He is attentive. Mood and Affect: Mood normal. Mood is not anxious or depressed. Affect is not labile, blunt, angry or inappropriate. Speech: Speech normal. He is communicative.  Speech is not rapid and pressured, delayed, slurred or tangential.         Behavior: Behavior normal. Behavior is not agitated, slowed, aggressive, withdrawn, hyperactive or combative. Thought Content: Thought content normal. Thought content is not paranoid or delusional. Thought content does not include homicidal or suicidal ideation. Thought content does not include homicidal or suicidal plan. Cognition and Memory: Cognition normal. Memory is not impaired. He does not exhibit impaired recent memory or impaired remote memory. Judgment: Judgment normal. Judgment is not impulsive or inappropriate.         CORETTA test: + left side   Yeomans test: + left side   Gaenslen test: + left side   Assessment:      1. Spondylosis of lumbar region without myelopathy or radiculopathy    2. Bulge of lumbar disc without myelopathy    3. Lumbosacral radiculitis    4. Spondylosis of thoracic region without myelopathy or radiculopathy    5. Thoracic stenosis    6. SI (sacroiliac) pain    7. Chronic pain syndrome       Plan:      · OARRS reviewed. Current MED: 15.00  · Patient was offered naloxone for home. · Discussed long term side effects of medications, tolerance, dependency and addiction. · Previous UDS reviewed  · UDS preformed today for compliance. · Patient told can not receive any pain medications from any other source. · No evidence of abuse, diversion or aberrant behavior. · Medications and/or procedures to improve function and quality of life- patient understanding with this and that may not be pain free  · Discussed with patient about safe storage of medications at home  · Discussed possible weaning of medication dosing dependent on treatment/procedure results. · Discussed with patient about risks with procedure including infection, reaction to medication, increased pain, or bleeding. · Procedure notes reviewed in detail  · Receiving 80% relief of left SI pain from left SI RFA  · Right lower back and leg pain and mid back pain remain tolerable from procedure.    · Left lower back pain has been increasing as

## 2021-03-15 NOTE — ANESTHESIA POSTPROCEDURE EVALUATION
Department of Anesthesiology  Postprocedure Note    Patient: Emily Murillo  MRN: [id number]  YOB: 1970  Date of evaluation: 3/15/2021  Time:  10:37 AM     Procedure Summary     Date: 03/15/21 Room / Location: [address] [place] [address] 03 / [address] [facility]    Anesthesia Start: 9313 Anesthesia Stop: 1009    Procedure: Left L-facet RFA @ L4-5 and L5-S1 (Left ) Diagnosis: (lumbar spondylosis)    Surgeons: Roxi Gilbert MD Responsible Provider: Anthony Porter DO    Anesthesia Type: MAC ASA Status: 2          Anesthesia Type: MAC    Angelina Phase I:      Angelina Phase II: Angelina Score: 10    Last vitals: Reviewed and per EMR flowsheets.        Anesthesia Post Evaluation    Patient location during evaluation: floor  Patient participation: complete - patient participated  Level of consciousness: awake  Airway patency: patent  Nausea & Vomiting: no vomiting and no nausea  Cardiovascular status: hemodynamically stable  Respiratory status: acceptable  Hydration status: stable

## 2021-03-15 NOTE — H&P
[address] [place] [address]  History and Physical Update    Pt Name: Kali Hernandez  MRN: [id number]  YOB: 1970  Date of evaluation: 3/15/2021      I have examined the patient and reviewed the H&P/Consult and there are no changes to the patient or plans.         Electronically signed by Rome Garcias MD on 3/15/2021 at 10:37 AM

## 2021-03-29 ENCOUNTER — TELEPHONE (OUTPATIENT)
Dept: PHYSICAL MEDICINE AND REHAB | Age: 51
End: 2021-03-29

## 2021-03-29 ENCOUNTER — OFFICE VISIT (OUTPATIENT)
Dept: PHYSICAL MEDICINE AND REHAB | Age: 51
End: 2021-03-29
Payer: COMMERCIAL

## 2021-03-29 VITALS
BODY MASS INDEX: 37.08 KG/M2 | DIASTOLIC BLOOD PRESSURE: 74 MMHG | WEIGHT: 259 LBS | TEMPERATURE: 97.9 F | SYSTOLIC BLOOD PRESSURE: 108 MMHG | HEIGHT: 70 IN

## 2021-03-29 DIAGNOSIS — M51.36 BULGE OF LUMBAR DISC WITHOUT MYELOPATHY: ICD-10-CM

## 2021-03-29 DIAGNOSIS — M54.9 MID BACK PAIN: ICD-10-CM

## 2021-03-29 DIAGNOSIS — M47.814 SPONDYLOSIS OF THORACIC REGION WITHOUT MYELOPATHY OR RADICULOPATHY: ICD-10-CM

## 2021-03-29 DIAGNOSIS — M53.3 SI (SACROILIAC) PAIN: ICD-10-CM

## 2021-03-29 DIAGNOSIS — G89.4 CHRONIC PAIN SYNDROME: ICD-10-CM

## 2021-03-29 DIAGNOSIS — M48.04 THORACIC STENOSIS: ICD-10-CM

## 2021-03-29 DIAGNOSIS — M54.17 LUMBOSACRAL RADICULITIS: ICD-10-CM

## 2021-03-29 DIAGNOSIS — M47.816 SPONDYLOSIS OF LUMBAR REGION WITHOUT MYELOPATHY OR RADICULOPATHY: Primary | ICD-10-CM

## 2021-03-29 PROCEDURE — 99213 OFFICE O/P EST LOW 20 MIN: CPT | Performed by: NURSE PRACTITIONER

## 2021-03-29 ASSESSMENT — ENCOUNTER SYMPTOMS: BACK PAIN: 1

## 2021-03-29 NOTE — PROGRESS NOTES
[address] [facility] [address]  Dept: [phone]  Dept Fax: [phone]: [phone]    Visit Date: 3/29/2021    Functionality Assessment/Goals Worksheet     On a scale of 0 (Does not Interfere) to 10 (Completely Interferes)     1. Which number describes how during the past week pain has interfered with       the following:  A. General Activity:  2  B. Mood: 2  C. Walking Ability:  3  D. Normal Work (Includes both work outside the home and housework):  2  E. Relations with Other People:   3  F. Sleep:   2  G. Enjoyment of Life:   2    2. Patient Prefers to Take their Pain Medications:     []  On a regular basis   [x]  Only when necessary    []  Does not take pain medications    3. What are the Patient's Goals/Expectations for Visiting Pain Management? []  Learn about my pain    []  Receive Medication   []  Physical Therapy     []  Treat Depression   []  Receive Injections    []  Treat Sleep   []  Deal with Anxiety and Stress   []  Treat Opoid Dependence/Addiction   []  Other:      HPI:   Violet Swartz is a 45 y.o. male is here today for    Chief Complaint: Low back pain, SI pain    HPI   FU from left L-facet RFA from 3/15/2021. Receiving about 80% relief of left lower back pain from left L-facet RFA. Pain in this area is minimal and patient has been able to walk more with less pain and work has been easier with less pain. Now since left lower back pain is improved he is now noticing an increase of pain in right lower back as previous right L-facet RFA from 3/2020 is wearing off- aching pressure with sharp pain. mid back pain, and leg pain, remains tolerable  Norco prn remains very effective in decreasing pain     Medications reviewed. Patient denies side effects with medications. Patient states he is taking medications as prescribed.  Hedenies receiving pain medications from other sources. He denies any ER visits since last visit. Pain scale with out pain medications or at its worst is 7/10. Pain scale with pain medications or at its best is 1/10. Last dose of Norco was yesterday  Drug screen reviewed from 2/9/2021 and was appropriate  Pill count was completed today and was appropriate  Patient does have naloxone available at home. Patient has not required use of naloxone at home since last office visit. The patientis allergic to latex; codeine; nickel; and oxybutynin chloride [oxybutynin chloride]. Subjective:      Review of Systems   Constitutional: Negative. HENT: Negative. Musculoskeletal: Positive for arthralgias, back pain and myalgias. Negative for gait problem and joint swelling. Ambulating without assist devices    Skin: Negative. Neurological: Negative for weakness and numbness. Psychiatric/Behavioral: Negative. Objective:     Vitals:    03/29/21 0736   BP: 108/74   Temp: 97.9 °F (36.6 °C)   Weight: 259 lb (117.5 kg)   Height: 5' 10\" (1.778 m)       Physical Exam  Vitals signs and nursing note reviewed. Constitutional:       General: He is not in acute distress. Appearance: He is well-developed. He is not diaphoretic. HENT:      Head: Normocephalic and atraumatic. Right Ear: External ear normal.      Left Ear: External ear normal.      Nose: Nose normal.      Mouth/Throat:      Mouth: Mucous membranes are moist.      Pharynx: Oropharynx is clear. No oropharyngeal exudate. Eyes:      General: No scleral icterus. Right eye: No discharge. Left eye: No discharge. Conjunctiva/sclera: Conjunctivae normal.      Pupils: Pupils are equal, round, and reactive to light. Neck:      Musculoskeletal: Full passive range of motion without pain, normal range of motion and neck supple. Normal range of motion. No edema, erythema, neck rigidity or muscular tenderness. Thyroid: No thyromegaly. Cardiovascular:      Rate and Rhythm: Normal rate and regular rhythm. Heart sounds: Normal heart sounds. No murmur. No friction rub. No gallop. Pulmonary:      Effort: Pulmonary effort is normal. No respiratory distress. Breath sounds: Normal breath sounds. No wheezing or rales. Chest:      Chest wall: No tenderness. Abdominal:      General: Bowel sounds are normal. There is no distension. Palpations: Abdomen is soft. Tenderness: There is no abdominal tenderness. There is no guarding or rebound. Musculoskeletal:         General: Tenderness present. Right shoulder: Normal.      Left shoulder: Normal.      Right hip: He exhibits tenderness and bony tenderness. Left hip: He exhibits tenderness and bony tenderness. Right knee: Normal.      Left knee: Normal.      Cervical back: Normal.      Thoracic back: He exhibits tenderness. Lumbar back: He exhibits decreased range of motion, tenderness, bony tenderness, pain and spasm. Back:       Right upper leg: He exhibits tenderness. Left upper leg: He exhibits tenderness. Right lower leg: No edema. Left lower leg: No edema. Skin:     General: Skin is warm. Coloration: Skin is not pale. Findings: No erythema or rash. Neurological:      General: No focal deficit present. Mental Status: He is alert and oriented to person, place, and time. Mental status is at baseline. He is not disoriented. Cranial Nerves: No cranial nerve deficit. Sensory: No sensory deficit. Motor: No atrophy or abnormal muscle tone. Coordination: Coordination normal.      Gait: Gait normal.      Deep Tendon Reflexes: Reflexes are normal and symmetric. Babinski sign absent on the right side. Reflex Scores:       Tricep reflexes are 2+ on the right side and 2+ on the left side. Bicep reflexes are 2+ on the right side and 2+ on the left side.        Brachioradialis reflexes are 2+ on the right side and 2+ on the left side. Patellar reflexes are 2+ on the right side and 2+ on the left side. Achilles reflexes are 2+ on the right side and 2+ on the left side. Psychiatric:         Attention and Perception: Attention normal. He is attentive. Mood and Affect: Mood normal. Mood is not anxious or depressed. Affect is not labile, blunt, angry or inappropriate. Speech: Speech normal. He is communicative. Speech is not rapid and pressured, delayed, slurred or tangential.         Behavior: Behavior normal. Behavior is not agitated, slowed, aggressive, withdrawn, hyperactive or combative. Thought Content: Thought content normal. Thought content is not paranoid or delusional. Thought content does not include homicidal or suicidal ideation. Thought content does not include homicidal or suicidal plan. Cognition and Memory: Cognition normal. Memory is not impaired. He does not exhibit impaired recent memory or impaired remote memory. Judgment: Judgment normal. Judgment is not impulsive or inappropriate. CORETTA test: +   Yeomans test: +   Gaenslen test: +      Assessment:     1. Spondylosis of lumbar region without myelopathy or radiculopathy    2. Bulge of lumbar disc without myelopathy    3. Lumbosacral radiculitis    4. Spondylosis of thoracic region without myelopathy or radiculopathy    5. Thoracic stenosis    6. SI (sacroiliac) pain    7. Mid back pain    8. Chronic pain syndrome            Plan:      · OARRS reviewed. Current MED: 15.00  · Patient was offered naloxone for home. · Discussed long term side effects of medications, tolerance, dependency and addiction. · Previous UDS reviewed  · UDS preformed today for compliance. · Patient told can not receive any pain medications from any other source. · No evidence of abuse, diversion or aberrant behavior.    Medications and/or procedures to improve function and quality of life- patient understanding with this and that may not be pain free   Discussed with patient about safe storage of medications at home   Discussed possible weaning of medication dosing dependent on treatment/procedure results.  Discussed with patient about risks with procedure including infection, reaction to medication, increased pain, or bleeding. · Procedure notes reviewed in detail  · Receiving 85% relief of left SI pain from left SI RFA  · Receiving 85% relief of left lower back pain from left L-facet RFA. Now main complaint is pain In right lower back as effects from previous right L-facet RFA from 3/2020 is wearing off. · Plan Right L-facet RFA @ L4-5 and L5-S1 for longer term pain relief. Procedure and risks discussed in detail with patient.   · Needs to be cleared to be off Eliquis for 3 days prior to procedure   · Continue Norco 5/325 TID prn- filled 3/10/2021 and has plenty  · Patient is compliant will order next UDS at next procedural FU. Meds. Prescribed:   No orders of the defined types were placed in this encounter. Return for Right L-facet RFA @ L4-5 and L5-S1. , follow up after procedure.                Electronically signed by WALT Oviedo CNP on3/29/2021 at 7:51 AM

## 2021-04-06 DIAGNOSIS — G89.4 CHRONIC PAIN SYNDROME: ICD-10-CM

## 2021-04-06 RX ORDER — HYDROCODONE BITARTRATE AND ACETAMINOPHEN 5; 325 MG/1; MG/1
1 TABLET ORAL EVERY 8 HOURS PRN
Qty: 90 TABLET | Refills: 0 | Status: SHIPPED | OUTPATIENT
Start: 2021-04-09 | End: 2021-05-06 | Stop reason: SDUPTHER

## 2021-04-06 NOTE — TELEPHONE ENCOUNTER
OARRS reviewed. UDS: + for Dihydrocodeine, Hydrocodone, Norhydrocodone, Hydromorphone. Last seen: 3/29/2021.  Follow-up: 5/6/21

## 2021-04-06 NOTE — TELEPHONE ENCOUNTER
Ralene Bloch called requesting a refill on the following medications:  Requested Prescriptions     Pending Prescriptions Disp Refills    HYDROcodone-acetaminophen (NORCO) 5-325 MG per tablet 90 tablet 0     Sig: Take 1 tablet by mouth every 8 hours as needed for Pain for up to 30 days.      [facility]      Date of last visit: 3/29/21  Date of next visit (if applicable): 5/2/7242

## 2021-04-14 ENCOUNTER — TELEPHONE (OUTPATIENT)
Dept: PHYSICAL MEDICINE AND REHAB | Age: 51
End: 2021-04-14

## 2021-04-19 ENCOUNTER — TELEPHONE (OUTPATIENT)
Dept: PHYSICAL MEDICINE AND REHAB | Age: 51
End: 2021-04-19

## 2021-04-28 ENCOUNTER — TELEPHONE (OUTPATIENT)
Dept: PHYSICAL MEDICINE AND REHAB | Age: 51
End: 2021-04-28

## 2021-04-29 ENCOUNTER — ANESTHESIA (OUTPATIENT)
Dept: OPERATING ROOM | Age: 51
End: 2021-04-29
Payer: COMMERCIAL

## 2021-04-29 ENCOUNTER — ANESTHESIA EVENT (OUTPATIENT)
Dept: OPERATING ROOM | Age: 51
End: 2021-04-29
Payer: COMMERCIAL

## 2021-04-29 ENCOUNTER — APPOINTMENT (OUTPATIENT)
Dept: GENERAL RADIOLOGY | Age: 51
End: 2021-04-29
Attending: PAIN MEDICINE
Payer: COMMERCIAL

## 2021-04-29 ENCOUNTER — HOSPITAL ENCOUNTER (OUTPATIENT)
Age: 51
Setting detail: OUTPATIENT SURGERY
Discharge: HOME | End: 2021-04-29
Attending: PAIN MEDICINE | Admitting: PAIN MEDICINE
Payer: COMMERCIAL

## 2021-04-29 VITALS
OXYGEN SATURATION: 94 % | HEIGHT: 70 IN | RESPIRATION RATE: 16 BRPM | BODY MASS INDEX: 37.14 KG/M2 | DIASTOLIC BLOOD PRESSURE: 69 MMHG | WEIGHT: 259.4 LBS | SYSTOLIC BLOOD PRESSURE: 113 MMHG | HEART RATE: 72 BPM | TEMPERATURE: 97.7 F

## 2021-04-29 VITALS
OXYGEN SATURATION: 91 % | DIASTOLIC BLOOD PRESSURE: 85 MMHG | RESPIRATION RATE: 17 BRPM | SYSTOLIC BLOOD PRESSURE: 129 MMHG

## 2021-04-29 PROCEDURE — 7100000011 HC PHASE II RECOVERY - ADDTL 15 MIN: Performed by: PAIN MEDICINE

## 2021-04-29 PROCEDURE — 7100000010 HC PHASE II RECOVERY - FIRST 15 MIN: Performed by: PAIN MEDICINE

## 2021-04-29 PROCEDURE — 6360000002 HC RX W HCPCS: Performed by: NURSE ANESTHETIST, CERTIFIED REGISTERED

## 2021-04-29 PROCEDURE — 2580000003 HC RX 258: Performed by: NURSE ANESTHETIST, CERTIFIED REGISTERED

## 2021-04-29 PROCEDURE — 2709999900 HC NON-CHARGEABLE SUPPLY: Performed by: PAIN MEDICINE

## 2021-04-29 PROCEDURE — 2500000003 HC RX 250 WO HCPCS: Performed by: PAIN MEDICINE

## 2021-04-29 PROCEDURE — 64636 DESTROY L/S FACET JNT ADDL: CPT | Performed by: PAIN MEDICINE

## 2021-04-29 PROCEDURE — 3209999900 FLUORO FOR SURGICAL PROCEDURES

## 2021-04-29 PROCEDURE — 6360000002 HC RX W HCPCS: Performed by: PAIN MEDICINE

## 2021-04-29 PROCEDURE — 64635 DESTROY LUMB/SAC FACET JNT: CPT | Performed by: PAIN MEDICINE

## 2021-04-29 PROCEDURE — 3600000056 HC PAIN LEVEL 4 BASE: Performed by: PAIN MEDICINE

## 2021-04-29 PROCEDURE — 3600000057 HC PAIN LEVEL 4 ADDL 15 MIN: Performed by: PAIN MEDICINE

## 2021-04-29 PROCEDURE — 3700000001 HC ADD 15 MINUTES (ANESTHESIA): Performed by: PAIN MEDICINE

## 2021-04-29 PROCEDURE — 3700000000 HC ANESTHESIA ATTENDED CARE: Performed by: PAIN MEDICINE

## 2021-04-29 RX ORDER — SODIUM CHLORIDE 9 MG/ML
INJECTION INTRAVENOUS PRN
Status: DISCONTINUED | OUTPATIENT
Start: 2021-04-29 | End: 2021-04-29 | Stop reason: SDUPTHER

## 2021-04-29 RX ORDER — METHYLPREDNISOLONE ACETATE 80 MG/ML
INJECTION, SUSPENSION INTRA-ARTICULAR; INTRALESIONAL; INTRAMUSCULAR; SOFT TISSUE PRN
Status: DISCONTINUED | OUTPATIENT
Start: 2021-04-29 | End: 2021-04-29 | Stop reason: ALTCHOICE

## 2021-04-29 RX ORDER — BUPIVACAINE HYDROCHLORIDE 2.5 MG/ML
INJECTION, SOLUTION EPIDURAL; INFILTRATION; INTRACAUDAL PRN
Status: DISCONTINUED | OUTPATIENT
Start: 2021-04-29 | End: 2021-04-29 | Stop reason: ALTCHOICE

## 2021-04-29 RX ORDER — FENTANYL CITRATE 50 UG/ML
INJECTION, SOLUTION INTRAMUSCULAR; INTRAVENOUS PRN
Status: DISCONTINUED | OUTPATIENT
Start: 2021-04-29 | End: 2021-04-29 | Stop reason: SDUPTHER

## 2021-04-29 RX ORDER — LIDOCAINE HYDROCHLORIDE 10 MG/ML
INJECTION, SOLUTION EPIDURAL; INFILTRATION; INTRACAUDAL; PERINEURAL PRN
Status: DISCONTINUED | OUTPATIENT
Start: 2021-04-29 | End: 2021-04-29 | Stop reason: ALTCHOICE

## 2021-04-29 RX ORDER — PROPOFOL 10 MG/ML
INJECTION, EMULSION INTRAVENOUS PRN
Status: DISCONTINUED | OUTPATIENT
Start: 2021-04-29 | End: 2021-04-29 | Stop reason: SDUPTHER

## 2021-04-29 RX ADMIN — SODIUM CHLORIDE 5 ML: 9 INJECTION, SOLUTION INTRAMUSCULAR; INTRAVENOUS; SUBCUTANEOUS at 14:15

## 2021-04-29 RX ADMIN — SODIUM CHLORIDE 5 ML: 9 INJECTION, SOLUTION INTRAMUSCULAR; INTRAVENOUS; SUBCUTANEOUS at 14:08

## 2021-04-29 RX ADMIN — FENTANYL CITRATE 50 MCG: 50 INJECTION, SOLUTION INTRAMUSCULAR; INTRAVENOUS at 14:08

## 2021-04-29 RX ADMIN — SODIUM CHLORIDE 5 ML: 9 INJECTION, SOLUTION INTRAMUSCULAR; INTRAVENOUS; SUBCUTANEOUS at 14:07

## 2021-04-29 RX ADMIN — FENTANYL CITRATE 50 MCG: 50 INJECTION, SOLUTION INTRAMUSCULAR; INTRAVENOUS at 14:07

## 2021-04-29 RX ADMIN — PROPOFOL 50 MG: 10 INJECTION, EMULSION INTRAVENOUS at 14:16

## 2021-04-29 RX ADMIN — SODIUM CHLORIDE 5 ML: 9 INJECTION, SOLUTION INTRAMUSCULAR; INTRAVENOUS; SUBCUTANEOUS at 14:16

## 2021-04-29 RX ADMIN — PROPOFOL 50 MG: 10 INJECTION, EMULSION INTRAVENOUS at 14:15

## 2021-04-29 ASSESSMENT — PULMONARY FUNCTION TESTS
PIF_VALUE: 0

## 2021-04-29 ASSESSMENT — PAIN DESCRIPTION - DESCRIPTORS: DESCRIPTORS: ACHING

## 2021-04-29 ASSESSMENT — PAIN - FUNCTIONAL ASSESSMENT: PAIN_FUNCTIONAL_ASSESSMENT: 0-10

## 2021-04-29 NOTE — OP NOTE
Operative Note  Pre-Procedure Note    Patient Name: Jose Salazar   YOB: 1970  Medical Record Number: [id number]  Date: 4/29/21    Indication:  Lower back pain  Consent: On file. Vital Signs:   Vitals:    04/29/21 1244   BP: 126/80   Pulse: 72   Resp: 16   Temp: 97.2 °F (36.2 °C)   SpO2: 94%       Past Medical History:   has a past medical history of Chronic back pain, GERD (gastroesophageal reflux disease), History of kidney stones, Hx of blood clots, Kidney stone, and Lumbar spondylosis. Past Surgical History:   has a past surgical history that includes Ureter stent placement (2006); Lithotripsy (2006); Cystocopy (6/14/2013); Cystocopy (07/29/2013); Cystoscopy (12/23/13); other surgical history (Bilateral, 03/26/2018); pr inj dx/ther agnt paravert facet joint, lumbar/sac, 2nd level (Bilateral, 3/26/2018); pr inj dx/ther agnt paravert facet joint, lumbar/sac, 2nd level (Bilateral, 5/21/2018); Colonoscopy; eye surgery (2007); hernia repair (2007); pr inject rx other periph nerve (Left, 9/28/2018); pr inject rx other periph nerve (Right, 11/30/2018); lumbar nerve block (N/A, 4/5/2019); Injection Procedure For Sacroiliac Joint (Left, 5/17/2019); Injection Procedure For Sacroiliac Joint (Left, 6/27/2019); Lumbar spine surgery (Left, 8/15/2019); Nerve Surgery (Right, 10/31/2019); Pain management procedure (Left, 1/16/2020); Pain management procedure (Right, 3/5/2020); Pain management procedure (Left, 7/2/2020); Pain management procedure (Left, 8/4/2020); Cervical spine surgery (Right, 12/15/2020); Pain management procedure (Left, 1/21/2021); and Pain management procedure (Left, 3/15/2021). Pre-Sedation Documentation and Exam:   Vital signs have been reviewed (see flow sheet for vitals).      Sedation/ Anesthesia Plan:   MAC    Patient is an appropriate candidate for plan of sedation: yes       Preoperative Diagnosis: L-spondylosis     Post-Op Dx: as above     Procedure Performed:  :Radiofrequency ablation of median branches at the levels of L4-5 and L5-S1 right under fluoroscopic guidance      Indication for the Procedure:  The patient has ahistory of chronic low back pain that is not responding well to the conservative treatment.  Patient's pain is mostly axial in nature.  Pain is interfering with the activities of daily living.  Physical examination revealed facet tenderness and facet loading is positive.  Patient had undergone lumbar facet joint injections with pain relief that lasted for only a short period of time and had greater than 70% pain relief with confirmatory median branch blocks.  Hence we decided to do radiofrequency abalation of median branches for long term pain releif.   The procedure and risks  were discussed with the patient and an informed consent was obtained.     Procedure:  Right side   A meaningful communication was kept up with the patient throughout the procedure. The patient is placed in prone position and skin over the back was prepped and draped in sterile manner.  Then using fluoroscopy the junction of the transverse process of the vertebra with the superior process of the facet joint was observed and the view was optimized.  The skin and deep tissues posterior were infiltrated with 10 ml of  1% xylocaine. The RF canula with the 15 mm active tip was introduced through the skin wheal under fluoroscopy guidance such that the tip of the needle lies in the groove of the transverse process with the superior processes of the facet joint.  Then a lateral view of the lumbar spine was obtained to make sure the tip of needle is not in the neural foramen.  Then electric impedence was checked to make sure it is acceptable. Then a sensory stimulus was applied at 50 Hz up to 1 volt and concordant pain symptoms were reproduced. Then a motor stimulus was applied at 2 Hz up to 2 volts and no motor stimulation was seen in lower extremities.  Some multifidus stimulus was seen.  Then after negative aspiration a mixture of depomedrol  80 mg and 0.25% marcaine 1 cc  was injected through the needle. Then a  lesion was done at 80 degrees centigrade for 90 seconds.    For L5 median branch block the junction of the ala of  the sacrum with the superior articular process of the facet joint was taken as a reference point.  For the L4 median branch the junction of the transverse process of L5 with the superolateral possible facet joint was taken as a reference point and for S1 median branch the most lateral and superior aspect of S1 foramina was taken as a reference point,.      EBL-0   Patient's vital signs and neurological status remained stable throughout the procedure and post procedural period.  The patient tolerated the procedure well and was discharged home in stable condition.       Electronically signed by Alvarez Nunn MD on 4/29/21 at 2:06 PM EDT

## 2021-04-29 NOTE — PROGRESS NOTES
1422-Pt arrived to PACU phase 2, pt hooked up to monitor, VSS, pt reminded to take deep breaths, sats 91%. 1435- IV removed  6142- Ride called.    1442- PT discharged walked out to car with this RN

## 2021-04-29 NOTE — H&P
not diaphoretic. HENT:      Head: Normocephalic and atraumatic. Right Ear: External ear normal.      Left Ear: External ear normal.      Nose: Nose normal.      Mouth/Throat:      Mouth: Mucous membranes are moist.      Pharynx: Oropharynx is clear. No oropharyngeal exudate. Eyes:      General: No scleral icterus. Right eye: No discharge. Left eye: No discharge. Conjunctiva/sclera: Conjunctivae normal.      Pupils: Pupils are equal, round, and reactive to light. Neck:      Musculoskeletal: Full passive range of motion without pain, normal range of motion and neck supple. Normal range of motion. No edema, erythema, neck rigidity or muscular tenderness. Thyroid: No thyromegaly. Cardiovascular:      Rate and Rhythm: Normal rate and regular rhythm. Heart sounds: Normal heart sounds. No murmur. No friction rub. No gallop. Pulmonary:      Effort: Pulmonary effort is normal. No respiratory distress. Breath sounds: Normal breath sounds. No wheezing or rales. Chest:      Chest wall: No tenderness. Abdominal:      General: Bowel sounds are normal. There is no distension. Palpations: Abdomen is soft. Tenderness: There is no abdominal tenderness. There is no guarding or rebound. Musculoskeletal:         General: Tenderness present. Right shoulder: Normal.      Left shoulder: Normal.      Right hip: He exhibits tenderness and bony tenderness. Left hip: He exhibits tenderness and bony tenderness. Right knee: Normal.      Left knee: Normal.      Cervical back: Normal.      Thoracic back: He exhibits tenderness. Lumbar back: He exhibits decreased range of motion, tenderness, bony tenderness, pain and spasm. Back:       Right upper leg: He exhibits tenderness. Left upper leg: He exhibits tenderness. Right lower leg: No edema. Left lower leg: No edema. Skin:     General: Skin is warm. Coloration: Skin is not pale. Findings: No erythema or rash. Neurological:      General: No focal deficit present. Mental Status: He is alert and oriented to person, place, and time. Mental status is at baseline. He is not disoriented. Cranial Nerves: No cranial nerve deficit. Sensory: No sensory deficit. Motor: No atrophy or abnormal muscle tone. Coordination: Coordination normal.      Gait: Gait normal.      Deep Tendon Reflexes: Reflexes are normal and symmetric. Babinski sign absent on the right side. Reflex Scores:       Tricep reflexes are 2+ on the right side and 2+ on the left side. Bicep reflexes are 2+ on the right side and 2+ on the left side. Brachioradialis reflexes are 2+ on the right side and 2+ on the left side. Patellar reflexes are 2+ on the right side and 2+ on the left side. Achilles reflexes are 2+ on the right side and 2+ on the left side. Psychiatric:         Attention and Perception: Attention normal. He is attentive. Mood and Affect: Mood normal. Mood is not anxious or depressed. Affect is not labile, blunt, angry or inappropriate. Speech: Speech normal. He is communicative. Speech is not rapid and pressured, delayed, slurred or tangential.         Behavior: Behavior normal. Behavior is not agitated, slowed, aggressive, withdrawn, hyperactive or combative. Thought Content: Thought content normal. Thought content is not paranoid or delusional. Thought content does not include homicidal or suicidal ideation. Thought content does not include homicidal or suicidal plan. Cognition and Memory: Cognition normal. Memory is not impaired. He does not exhibit impaired recent memory or impaired remote memory.          Judgment: Judgment normal. Judgment is not impulsive or inappropriate.         CORETTA test: +   Yeomans test: +   Gaenslen test: +   Assessment:      1. Spondylosis of lumbar region without myelopathy or radiculopathy    2. Bulge of lumbar disc without myelopathy    3. Lumbosacral radiculitis    4. Spondylosis of thoracic region without myelopathy or radiculopathy    5. Thoracic stenosis    6. SI (sacroiliac) pain    7. Mid back pain    8. Chronic pain syndrome       Plan:      · OARRS reviewed. Current MED: 15.00  · Patient was offered naloxone for home. · Discussed long term side effects of medications, tolerance, dependency and addiction. · Previous UDS reviewed  · UDS preformed today for compliance. · Patient told can not receive any pain medications from any other source. · No evidence of abuse, diversion or aberrant behavior. · Medications and/or procedures to improve function and quality of life- patient understanding with this and that may not be pain free  · Discussed with patient about safe storage of medications at home  · Discussed possible weaning of medication dosing dependent on treatment/procedure results. · Discussed with patient about risks with procedure including infection, reaction to medication, increased pain, or bleeding. · Procedure notes reviewed in detail  · Receiving 85% relief of left SI pain from left SI RFA  · Receiving 85% relief of left lower back pain from left L-facet RFA. Now main complaint is pain In right lower back as effects from previous right L-facet RFA from 3/2020 is wearing off. · Plan Right L-facet RFA @ L4-5 and L5-S1 for longer term pain relief. Procedure and risks discussed in detail with patient.   · Needs to be cleared to be off Eliquis for 3 days prior to procedure   · Continue Norco 5/325 TID prn- filled 3/10/2021 and has plenty  · Patient is compliant will order next UDS at next procedural FU.         Meds.  Prescribed:     Encounter Medications    No orders of the defined types were placed in this encounter.         Return for Right L-facet RFA @ L4-5 and L5-S1. , follow up after procedure.

## 2021-04-29 NOTE — ANESTHESIA PRE PROCEDURE
Department of Anesthesiology  Preprocedure Note       Name:  Maxime Garcia   Age:  45 y.o.  :  1970                                          MRN:  [id number]         Date:  2021      Surgeon: Cristo Solomon):  Linda High MD    Procedure: Procedure(s):  Right L-facet RFA @ L4-5 and L5-S1    Medications prior to admission:   Prior to Admission medications    Medication Sig Start Date End Date Taking? Authorizing Provider   HYDROcodone-acetaminophen (NORCO) 5-325 MG per tablet Take 1 tablet by mouth every 8 hours as needed for Pain for up to 30 days. 21 Yes WALT Cheatham CNP   tamsulosin [facility]) 0.4 MG capsule Take 1 capsule by mouth daily 20  Yes WALT Saunders CNP   potassium citrate (UROCIT-K) 10 MEQ (1080 MG) extended release tablet TAKE 1 TABLET BY MOUTH EVERY MORNING WITH BREAKFAST 20  Yes Romana Schwalbe [place], APRN - CNP   allopurinol (ZYLOPRIM) 300 MG tablet TAKE 1 TABLET BY MOUTH EVERY DAY 20  Yes WALT Saunders CNP   traZODone (DESYREL) 50 MG tablet Take 50 mg by mouth nightly   Yes Historical Provider, MD   citalopram (CELEXA) 40 MG tablet Take 40 mg by mouth daily. Yes Historical Provider, MD   busPIRone (BUSPAR) 10 MG tablet Take 30 mg by mouth 2 times daily    Yes Historical Provider, MD   omeprazole (PRILOSEC) 20 MG capsule Take 20 mg by mouth daily. Yes Historical Provider, MD   apixaban (ELIQUIS) 2.5 MG TABS tablet Take 2.5 mg by mouth 2 times daily    Historical Provider, MD   triamcinolone (KENALOG) 0.1 % cream  19   Historical Provider, MD   FLOVENT  MCG/ACT inhaler  19   Historical Provider, MD   ARIPiprazole (ABILIFY) 2 MG tablet Take 2 mg by mouth 2 times daily    Historical Provider, MD       Current medications:    No current facility-administered medications for this encounter. Allergies:     Allergies   Allergen Reactions    Latex Rash    Codeine Hives TYLENOL WITH CODEINE    Nickel Rash    Oxybutynin Chloride [Oxybutynin Chloride] Other (See Comments)     Warm feeling and extreme dry mouth       Problem List:    Patient Active Problem List   Diagnosis Code    Weak urinary stream R39.12    Obstructive sleep apnea G47.33    Snoring R06.83    Sleep disturbance G47.9    Insomnia G47.00    Sleep talking G47.8    Morning headache R51.9    Bruxism F45.8    Restless sleeper G47.9    Poor concentration R41.840    Claustrophobia F40.240    Vivid dream R68.89    GERD (gastroesophageal reflux disease) K21.9    Anxiety F41.9    Panic disorder F41.0    Obesity (BMI 30.0-34. 9) E66.9    Lumbar spondylosis M47.816    Bulging lumbar disc M51.26    Sacroiliac inflammation (HCC) M46.1    Lumbar radiculitis M54.16    Thoracic spinal stenosis M48.04       Past Medical History:        Diagnosis Date    Chronic back pain     GERD (gastroesophageal reflux disease)     History of kidney stones     Hx of blood clots early 2000's & 2013    MUNA LUNGS    Kidney stone     Lumbar spondylosis        Past Surgical History:        Procedure Laterality Date    CERVICAL SPINE SURGERY Right 12/15/2020    Left TESI @ T11 performed by Anton Smith MD at [facility]      last one 2007???    CYSTOSCOPY  6/14/2013    URETEROSCOPY STONE REMOVAL    CYSTOSCOPY  07/29/2013    Left ureteroscopy, Left ureteral stone removal and stent exchange    CYSTOSCOPY  12/23/13    Cystoscopy, Left Optical Internal Ureterotomy, Left Ureteroscopy and Dilated [facility]  2007    lasik    [facility] [place] [facility]    SI MBB #1 left performed by Anton Smith MD at [facility] Left 6/27/2019    Left SI MBB # 2. performed by Anton Smith MD at [address]  2007    left groin    LITHOTRIPSY  2006    LUMBAR NERVE BLOCK N/A 4/5/2019    LUMBAR INTER LAMINAR GUNNAR @ L4 performed by Ryann Grider MD at [address] Left 8/15/2019    Left SI RFA. performed by Ryann Grider MD at [facility] Right 10/31/2019    LUMBAR INTER LAMINAR GUNNAR @ L4 Right performed by Ryann Grider MD at [facility] Bilateral 03/26/2018    Lumbar Facet MBB at L4-5, L5-S1    PAIN MANAGEMENT PROCEDURE Left 1/16/2020    Lumbar RFA left side L4-5,5-S1 performed by Ryann Grider MD at [place] [address] Right 3/5/2020    Lumbar RFA Right side@ L4-5,5-S1 performed by Ryann Grider MD at [place] [address] Left 7/2/2020    Left SI RFA performed by Ryann Grider MD at [place] [address] Left 8/4/2020    Left L-facet RFA @ L4-5 and L5-S1 performed by Ryann Grider MD at [place] [address] Left 1/21/2021    left SI RFA performed by Ryann Grider MD at [place] [address] Left 3/15/2021    Left L-facet RFA @ L4-5 and L5-S1 performed by Ryann Grider MD at [address] [place] [address] DX/THER AGNT PARAVERT FACET JOINT, LUMBAR/SAC, 2ND LEVEL Bilateral 3/26/2018    BILATERAL L-FACET MBB @ L4-5 and L5-S1, performed by Ryann Grider MD at [address] [place] [address] DX/THER AGNT PARAVERT FACET JOINT, LUMBAR/SAC, 2ND LEVEL Bilateral 5/21/2018    Bilateral L-facet MBB @ L4-5, L5-S1. performed by Ryann Grider MD at [address] Left 9/28/2018    LUMBAR RFA @ L4-5, and L5-S1 LEFT SIDE FIRST. Khadar Grad  performed by Ryann Grider MD at [address] Right 11/30/2018    Right L-RFA @ L4-5, and L5-S1 performed by El Hughes MD at [facility]  2006       Social History:    Social History     Tobacco Use    Smoking status: Former Smoker     Packs/day: 1.00     Years: 12.00     Pack years: 12.00     Quit date: 6/6/2005     Years since quitting: 15.9    Smokeless tobacco: Never Used   Substance Use Topics    Alcohol use: No                                Counseling given: Not Answered      Vital Signs (Current):   Vitals:    04/29/21 1244   BP: 126/80   Pulse: 72   Resp: 16   Temp: 97.2 °F (36.2 °C)   TempSrc: Temporal   SpO2: 94%   Weight: 259 lb 6.4 oz (117.7 kg)   Height: 5' 10\" (1.778 m)                                              BP Readings from Last 3 Encounters:   04/29/21 126/80   03/29/21 108/74   03/15/21 108/72       NPO Status: Time of last liquid consumption: 2230                        Time of last solid consumption: 2230                        Date of last liquid consumption: 03/19/21                        Date of last solid food consumption: 04/28/21    BMI:   Wt Readings from Last 3 Encounters:   04/29/21 259 lb 6.4 oz (117.7 kg)   03/29/21 259 lb (117.5 kg)   03/15/21 259 lb 3.2 oz (117.6 kg)     Body mass index is 37.22 kg/m².     CBC:   Lab Results   Component Value Date    WBC 4.5 09/24/2020    WBC 5.7 06/24/2020    RBC 4.90 09/24/2020    HGB 15.3 09/24/2020    HCT 45.0 09/24/2020    MCV 92 09/24/2020    RDW 13.7 09/24/2020     09/24/2020     06/24/2020       CMP:   Lab Results   Component Value Date     09/24/2020    K 4.1 09/24/2020    K 3.8 06/18/2020     09/24/2020    CO2 27 09/24/2020    BUN 12 09/24/2020    CREATININE 0.97 09/24/2020    AGRATIO 2.1 07/27/2019    LABGLOM 71 06/18/2020    GLUCOSE 96 09/24/2020    PROT 6.6 09/24/2020    CALCIUM 9.0 09/24/2020    BILITOT 0.6 09/24/2020    ALKPHOS 86 09/24/2020    ALKPHOS 75 07/27/2019    AST 29 09/24/2020    ALT 32 09/24/2020       POC Tests: No results for input(s): POCGLU, POCNA, POCK, POCCL, POCBUN, POCHEMO, POCHCT in the last 72 hours. Coags:   Lab Results   Component Value Date    PROTIME 20.3 01/28/2021    INR 1.70 01/28/2021    APTT 32.1 06/14/2013       HCG (If Applicable): No results found for: PREGTESTUR, PREGSERUM, HCG, HCGQUANT     ABGs: No results found for: PHART, PO2ART, VUH1COL, MLL5HOJ, BEART, F1WTVVXY     Type & Screen (If Applicable):  No results found for: LABABO, LABRH    Drug/Infectious Status (If Applicable):  No results found for: HIV, HEPCAB    COVID-19 Screening (If Applicable):   Lab Results   Component Value Date    COVID19 Detected 10/16/2020           Anesthesia Evaluation    Airway: Mallampati: II       Mouth opening: > = 3 FB Dental:          Pulmonary:   (+) sleep apnea:                             Cardiovascular:                      Neuro/Psych:   (+) neuromuscular disease:, headaches:, psychiatric history:            GI/Hepatic/Renal:   (+) GERD:,           Endo/Other:                     Abdominal:   (+) obese,         Vascular:                                        Anesthesia Plan      MAC     ASA 2             Anesthetic plan and risks discussed with patient. Plan discussed with CRNA.                   Aixa Floyd MD   4/29/2021

## 2021-05-02 NOTE — PROGRESS NOTES
1041 To recovery via cart. Spont resp. VSS. Denies pain nausea numbness or tingling. Report received from surgical rn.   1100 IV discontinued.  Up to dress self  1110 Discharge to home in stable ambulatory condition with son Pt is A/Ox4, VS monitored- afebrile. Denies pain. LS clear. Voiding. IV Zosyn. Tolerating regular diet- BG monitored. Up independently. Plan for discharge home today on PO abx. Pt slept well.

## 2021-05-06 ENCOUNTER — OFFICE VISIT (OUTPATIENT)
Dept: PHYSICAL MEDICINE AND REHAB | Age: 51
End: 2021-05-06
Payer: COMMERCIAL

## 2021-05-06 VITALS
HEART RATE: 62 BPM | WEIGHT: 259 LBS | HEIGHT: 70 IN | BODY MASS INDEX: 37.08 KG/M2 | DIASTOLIC BLOOD PRESSURE: 68 MMHG | SYSTOLIC BLOOD PRESSURE: 118 MMHG

## 2021-05-06 DIAGNOSIS — M53.3 SI (SACROILIAC) PAIN: ICD-10-CM

## 2021-05-06 DIAGNOSIS — M54.9 MID BACK PAIN: ICD-10-CM

## 2021-05-06 DIAGNOSIS — M47.816 SPONDYLOSIS OF LUMBAR REGION WITHOUT MYELOPATHY OR RADICULOPATHY: Primary | ICD-10-CM

## 2021-05-06 DIAGNOSIS — F11.90 CHRONIC, CONTINUOUS USE OF OPIOIDS: ICD-10-CM

## 2021-05-06 DIAGNOSIS — M47.816 LUMBAR SPONDYLOSIS: ICD-10-CM

## 2021-05-06 DIAGNOSIS — M51.36 BULGE OF LUMBAR DISC WITHOUT MYELOPATHY: ICD-10-CM

## 2021-05-06 DIAGNOSIS — G89.4 CHRONIC PAIN SYNDROME: ICD-10-CM

## 2021-05-06 DIAGNOSIS — M54.17 LUMBOSACRAL RADICULITIS: ICD-10-CM

## 2021-05-06 DIAGNOSIS — M48.04 THORACIC STENOSIS: ICD-10-CM

## 2021-05-06 DIAGNOSIS — M47.814 SPONDYLOSIS OF THORACIC REGION WITHOUT MYELOPATHY OR RADICULOPATHY: ICD-10-CM

## 2021-05-06 PROCEDURE — 99214 OFFICE O/P EST MOD 30 MIN: CPT | Performed by: NURSE PRACTITIONER

## 2021-05-06 RX ORDER — HYDROCODONE BITARTRATE AND ACETAMINOPHEN 5; 325 MG/1; MG/1
1 TABLET ORAL EVERY 8 HOURS PRN
Qty: 90 TABLET | Refills: 0 | Status: SHIPPED | OUTPATIENT
Start: 2021-05-09 | End: 2021-06-07 | Stop reason: SDUPTHER

## 2021-05-06 ASSESSMENT — ENCOUNTER SYMPTOMS: BACK PAIN: 1

## 2021-05-06 NOTE — PROGRESS NOTES
[facility] [address]  Dept: [phone]  Dept Fax: [phone]: [phone]    Visit Date: 5/6/2021    Functionality Assessment/Goals Worksheet     On a scale of 0 (Does not Interfere) to 10 (Completely Interferes)     1. Which number describes how during the past week pain has interfered with       the following:  A. General Activity:  5  B. Mood: 5  C. Walking Ability:  4  D. Normal Work (Includes both work outside the home and housework):  4  E. Relations with Other People:   5  F. Sleep:   6  G. Enjoyment of Life:   6    2. Patient Prefers to Take their Pain Medications:     []  On a regular basis   [x]  Only when necessary    []  Does not take pain medications    3. What are the Patient's Goals/Expectations for Visiting Pain Management? [x]  Learn about my pain    [x]  Receive Medication   []  Physical Therapy     []  Treat Depression   [x]  Receive Injections    []  Treat Sleep   []  Deal with Anxiety and Stress   []  Treat Opoid Dependence/Addiction   []  Other:      HPI:   Desi Yung is a 45 y.o. male is here today for    Chief Complaint: Low back pain, SI pain, mid back pain     HPI   FU from bilateral L-facet RFA, left side from 3/15/2021 and right side from 4/29/2021. receiving 75% to 80% relief of low back pain from L-facet RFA. States still some soreness in right lower back from procedure that is probably masking full relief but overall pain is greatly improved. Low back and SI pain is tolerable at this time. Mid back pain is not too bad. Norco prn is effective in decreasing pain. Taking a little less since procedure  Able to tolerate more activity. Medications reviewed. Patient denies side effects with medications. Patient states he is taking medications as prescribed. Hedenies receiving pain medications from other sources.  He denies any ER visits since last visit. Pain scale with out pain medications or at its worst is 5-6/10. Pain scale with pain medications or at its best is 1-3/10. Last dose of Norco was yesterday  Drug screen reviewed from 2/9/2021 and was appropriate  Pill count was completed today and was appropriate  Patient does have naloxone available at home. Patient has not required use of naloxone at home since last office visit. The patientis allergic to latex; codeine; nickel; and oxybutynin chloride [oxybutynin chloride]. Subjective:      Review of Systems   Constitutional: Negative. HENT: Negative. Musculoskeletal: Positive for arthralgias, back pain and myalgias. Negative for gait problem and joint swelling. Ambulating without assist devices    Skin: Negative. Neurological: Negative for weakness and numbness. Psychiatric/Behavioral: Negative. Objective:     Vitals:    05/06/21 0813   BP: 118/68   Site: Left Upper Arm   Position: Sitting   Pulse: 62   Weight: 259 lb (117.5 kg)   Height: 5' 10\" (1.778 m)       Physical Exam  Vitals signs and nursing note reviewed. Constitutional:       General: He is not in acute distress. Appearance: He is well-developed. He is not diaphoretic. HENT:      Head: Normocephalic and atraumatic. Right Ear: External ear normal.      Left Ear: External ear normal.      Nose: Nose normal.      Mouth/Throat:      Mouth: Mucous membranes are moist.      Pharynx: Oropharynx is clear. No oropharyngeal exudate. Eyes:      General: No scleral icterus. Right eye: No discharge. Left eye: No discharge. Conjunctiva/sclera: Conjunctivae normal.      Pupils: Pupils are equal, round, and reactive to light. Neck:      Musculoskeletal: Full passive range of motion without pain, normal range of motion and neck supple. Normal range of motion. No edema, erythema, neck rigidity or muscular tenderness. Thyroid: No thyromegaly. Cardiovascular:      Rate and Rhythm: Normal rate and regular rhythm. Heart sounds: Normal heart sounds. No murmur. No friction rub. No gallop. Pulmonary:      Effort: Pulmonary effort is normal. No respiratory distress. Breath sounds: Normal breath sounds. No wheezing or rales. Chest:      Chest wall: No tenderness. Abdominal:      General: Bowel sounds are normal. There is no distension. Palpations: Abdomen is soft. Tenderness: There is no abdominal tenderness. There is no guarding or rebound. Musculoskeletal:         General: Tenderness present. Right shoulder: Normal.      Left shoulder: Normal.      Right hip: He exhibits tenderness and bony tenderness. Left hip: He exhibits tenderness and bony tenderness. Right knee: Normal.      Left knee: Normal.      Cervical back: Normal.      Thoracic back: He exhibits tenderness. Lumbar back: He exhibits decreased range of motion, tenderness, bony tenderness, pain and spasm. Back:       Right upper leg: He exhibits tenderness. Left upper leg: He exhibits tenderness. Right lower leg: No edema. Left lower leg: No edema. Skin:     General: Skin is warm. Coloration: Skin is not pale. Findings: No erythema or rash. Neurological:      General: No focal deficit present. Mental Status: He is alert and oriented to person, place, and time. Mental status is at baseline. He is not disoriented. Cranial Nerves: No cranial nerve deficit. Sensory: No sensory deficit. Motor: No atrophy or abnormal muscle tone. Coordination: Coordination normal.      Gait: Gait normal.      Deep Tendon Reflexes: Reflexes are normal and symmetric. Babinski sign absent on the right side. Reflex Scores:       Tricep reflexes are 2+ on the right side and 2+ on the left side. Bicep reflexes are 2+ on the right side and 2+ on the left side.        Brachioradialis reflexes are 2+ on the right to improve function and quality of life- patient understanding with this and that may not be pain free   Discussed with patient about safe storage of medications at home   Discussed possible weaning of medication dosing dependent on treatment/procedure results.  Discussed with patient about risks with procedure including infection, reaction to medication, increased pain, or bleeding.  Procedure notes reviewed in detail   Receiving over 75% to 80% relief of low back pain from bilateral L-facet RFA, still some tenderness from right RFA. · Receiving 85% relief of left SI pain from left SI RFA  · Continues relief from TESI  · Overall pain is tolerable at this time with procedures and medications. · Continue Norco 5/325 TID prn- Ordered refill patient is complaint  · Updated UDS ordered today       Meds. Prescribed:   Orders Placed This Encounter   Medications    HYDROcodone-acetaminophen (NORCO) 5-325 MG per tablet     Sig: Take 1 tablet by mouth every 8 hours as needed for Pain for up to 30 days. Dispense:  90 tablet     Refill:  0     Reduce doses taken as pain becomes manageable       Return in about 2 months (around 7/6/2021), or if symptoms worsen or fail to improve, for follow up  for medications.                Electronically signed by WALT Cheatham CNP on5/6/2021 at 8:52 AM

## 2021-06-07 DIAGNOSIS — G89.4 CHRONIC PAIN SYNDROME: ICD-10-CM

## 2021-06-07 RX ORDER — HYDROCODONE BITARTRATE AND ACETAMINOPHEN 5; 325 MG/1; MG/1
1 TABLET ORAL EVERY 8 HOURS PRN
Qty: 90 TABLET | Refills: 0 | Status: SHIPPED | OUTPATIENT
Start: 2021-06-08 | End: 2021-07-08 | Stop reason: SDUPTHER

## 2021-06-07 NOTE — TELEPHONE ENCOUNTER
OARRS reviewed. UDS: + for Dihydrocodeine, Hydrocodone, Norhydrocodone. Last seen: 5/6/2021.  Follow-up:   Future Appointments   Date Time Provider Saul Cummins   7/8/2021  8:15 AM WALT Sears - CNP [facility] [address] [place] [address]   9/8/2021  7:45 AM WALT Gaxiola CNP [place] [facility] [address]

## 2021-06-28 NOTE — TELEPHONE ENCOUNTER
Jose Salazar called requesting a refill on the following medications:  Requested Prescriptions     Pending Prescriptions Disp Refills    allopurinol (ZYLOPRIM) 300 MG tablet [Pharmacy Med Name: ALLOPURINOL 300 MG TABLET] 90 tablet 3     Sig: TAKE 1 [address] verified:  .[email or web address]      Date of last visit: 09/09/2020  Date of next visit (if applicable): 96/18/8923

## 2021-06-30 RX ORDER — ALLOPURINOL 300 MG/1
TABLET ORAL
Qty: 90 TABLET | Refills: 3 | Status: SHIPPED | OUTPATIENT
Start: 2021-06-30 | End: 2021-09-08 | Stop reason: SDUPTHER

## 2021-07-06 RX ORDER — TAMSULOSIN HYDROCHLORIDE 0.4 MG/1
CAPSULE ORAL
Qty: 90 CAPSULE | Refills: 3 | Status: SHIPPED | OUTPATIENT
Start: 2021-07-06 | End: 2021-09-08 | Stop reason: SDUPTHER

## 2021-07-06 NOTE — TELEPHONE ENCOUNTER
Christin Dinero called requesting a refill on the following medications:  Requested Prescriptions     Pending Prescriptions Disp Refills    tamsulosin (FLOMAX) 0.4 MG capsule [Pharmacy Med Name: TAMSULOSIN HCL 0.4 MG CAPSULE] 90 capsule 3     Sig: TAKE 1 [address] verified:  .[email or web address]      Date of last visit: 09/09/2020  Date of next visit (if applicable): 35/65/4325

## 2021-07-08 ENCOUNTER — OFFICE VISIT (OUTPATIENT)
Dept: PHYSICAL MEDICINE AND REHAB | Age: 51
End: 2021-07-08
Payer: COMMERCIAL

## 2021-07-08 VITALS
SYSTOLIC BLOOD PRESSURE: 118 MMHG | HEIGHT: 70 IN | WEIGHT: 259 LBS | DIASTOLIC BLOOD PRESSURE: 82 MMHG | BODY MASS INDEX: 37.08 KG/M2

## 2021-07-08 DIAGNOSIS — M47.814 SPONDYLOSIS OF THORACIC REGION WITHOUT MYELOPATHY OR RADICULOPATHY: ICD-10-CM

## 2021-07-08 DIAGNOSIS — M53.3 SI (SACROILIAC) PAIN: ICD-10-CM

## 2021-07-08 DIAGNOSIS — F11.90 CHRONIC, CONTINUOUS USE OF OPIOIDS: ICD-10-CM

## 2021-07-08 DIAGNOSIS — M48.04 THORACIC STENOSIS: Primary | ICD-10-CM

## 2021-07-08 DIAGNOSIS — M47.816 SPONDYLOSIS OF LUMBAR REGION WITHOUT MYELOPATHY OR RADICULOPATHY: ICD-10-CM

## 2021-07-08 DIAGNOSIS — M47.816 LUMBAR SPONDYLOSIS: ICD-10-CM

## 2021-07-08 DIAGNOSIS — G89.4 CHRONIC PAIN SYNDROME: ICD-10-CM

## 2021-07-08 DIAGNOSIS — M54.17 LUMBOSACRAL RADICULITIS: ICD-10-CM

## 2021-07-08 DIAGNOSIS — M54.9 MID BACK PAIN: ICD-10-CM

## 2021-07-08 DIAGNOSIS — M51.36 BULGE OF LUMBAR DISC WITHOUT MYELOPATHY: ICD-10-CM

## 2021-07-08 PROCEDURE — 99214 OFFICE O/P EST MOD 30 MIN: CPT | Performed by: NURSE PRACTITIONER

## 2021-07-08 RX ORDER — HYDROCODONE BITARTRATE AND ACETAMINOPHEN 5; 325 MG/1; MG/1
1 TABLET ORAL EVERY 8 HOURS PRN
Qty: 90 TABLET | Refills: 0 | Status: SHIPPED | OUTPATIENT
Start: 2021-07-08 | End: 2021-08-03 | Stop reason: SDUPTHER

## 2021-07-08 RX ORDER — NALOXONE HYDROCHLORIDE 4 MG/.1ML
1 SPRAY NASAL PRN
Qty: 1 EACH | Refills: 0 | Status: SHIPPED | OUTPATIENT
Start: 2021-07-08

## 2021-07-08 ASSESSMENT — ENCOUNTER SYMPTOMS: BACK PAIN: 1

## 2021-07-08 NOTE — PROGRESS NOTES
[facility] [address]  Dept: [phone]  Dept Fax: [phone]: [phone]    Visit Date: 7/8/2021    Functionality Assessment/Goals Worksheet     On a scale of 0 (Does not Interfere) to 10 (Completely Interferes)     1. Which number describes how during the past week pain has interfered with       the following:  A. General Activity:  6  B. Mood: 6  C. Walking Ability:  5  D. Normal Work (Includes both work outside the home and housework):  5  E. Relations with Other People:   6  F. Sleep:   6  G. Enjoyment of Life:   6    2. Patient Prefers to Take their Pain Medications:     []  On a regular basis   [x]  Only when necessary    []  Does not take pain medications    3. What are the Patient's Goals/Expectations for Visiting Pain Management? []  Learn about my pain    [x]  Receive Medication   []  Physical Therapy     []  Treat Depression   [x]  Receive Injections    []  Treat Sleep   []  Deal with Anxiety and Stress   []  Treat Opoid Dependence/Addiction   []  Other:      HPI:   Vinicius Mackey is a 45 y.o. male is here today for    Chief Complaint: Low back pain, SI pain, mid back pain     HPI   2 month FU. Main complaint is pain in mid right side of back- sharp and jabbing pain that is intermittent. Feels pain is increasing as effects from Thorecic GUNNAR wearing off    Low back pain and SI pain remains tolerable from L-facet RFA. [place] prn remains effective in making pain tolerable. Pain increases with bending, lifting, twisting , walking, standing and getting up and down. Medications reviewed. Patient denies side effects with medications. Patient states he is taking medications as prescribed. Hedenies receiving pain medications from other sources. He denies any ER visits since last visit.     Pain scale with out pain medications or at its worst is 6/10.  Pain scale with pain medications or at its best is 2/10. Last dose of Osn[place]rock was yesterday  Drug screen reviewed from 5/6/2021 and was appropriate  Pill count was completed today and was appropriate  Patient does have naloxone available at home. Patient has not required use of naloxone at home since last office visit. The patientis allergic to latex, codeine, nickel, and oxybutynin chloride [oxybutynin chloride]. Subjective:      Review of Systems   Constitutional: Negative. HENT: Negative. Musculoskeletal: Positive for arthralgias, back pain and myalgias. Negative for gait problem and joint swelling. Ambulating without assist devices    Skin: Negative. Neurological: Negative for weakness and numbness. Psychiatric/Behavioral: Negative. Objective:     Vitals:    07/08/21 0818   BP: 118/82   Weight: 259 lb (117.5 kg)   Height: 5' 10\" (1.778 m)       Physical Exam  Vitals and nursing note reviewed. Constitutional:       General: He is not in acute distress. Appearance: He is well-developed. He is not diaphoretic. HENT:      Head: Normocephalic and atraumatic. Right Ear: External ear normal.      Left Ear: External ear normal.      Nose: Nose normal.      Mouth/Throat:      Mouth: Mucous membranes are moist.      Pharynx: Oropharynx is clear. No oropharyngeal exudate. Eyes:      General: No scleral icterus. Right eye: No discharge. Left eye: No discharge. Conjunctiva/sclera: Conjunctivae normal.      Pupils: Pupils are equal, round, and reactive to light. Neck:      Thyroid: No thyromegaly. Cardiovascular:      Rate and Rhythm: Normal rate and regular rhythm. Heart sounds: Normal heart sounds. No murmur heard. No friction rub. No gallop. Pulmonary:      Effort: Pulmonary effort is normal. No respiratory distress. Breath sounds: Normal breath sounds. No wheezing or rales. Chest:      Chest wall: No tenderness.    Abdominal: General: Bowel sounds are normal. There is no distension. Palpations: Abdomen is soft. Tenderness: There is no abdominal tenderness. There is no guarding or rebound. Musculoskeletal:         General: Tenderness present. Right shoulder: Normal.      Left shoulder: Normal.      Cervical back: Full passive range of motion without pain, normal range of motion and neck supple. No edema, erythema or rigidity. No muscular tenderness. Normal range of motion. Thoracic back: Tenderness and bony tenderness present. Decreased range of motion. Lumbar back: Spasms, tenderness and bony tenderness present. Decreased range of motion. Back:       Right hip: Tenderness and bony tenderness present. Left hip: Tenderness and bony tenderness present. Right upper leg: Tenderness present. Left upper leg: Tenderness present. Right knee: Normal.      Left knee: Normal.      Right lower leg: No edema. Left lower leg: No edema. Skin:     General: Skin is warm. Coloration: Skin is not pale. Findings: No erythema or rash. Neurological:      General: No focal deficit present. Mental Status: He is alert and oriented to person, place, and time. Mental status is at baseline. He is not disoriented. Cranial Nerves: No cranial nerve deficit. Sensory: No sensory deficit. Motor: No atrophy or abnormal muscle tone. Coordination: Coordination normal.      Gait: Gait normal.      Deep Tendon Reflexes: Reflexes are normal and symmetric. Babinski sign absent on the right side. Reflex Scores:       Tricep reflexes are 2+ on the right side and 2+ on the left side. Bicep reflexes are 2+ on the right side and 2+ on the left side. Brachioradialis reflexes are 2+ on the right side and 2+ on the left side. Patellar reflexes are 2+ on the right side and 2+ on the left side.        Achilles reflexes are 2+ on the right side and 2+ on the left side.  Psychiatric:         Attention and Perception: Attention normal. He is attentive. Mood and Affect: Mood normal. Mood is not anxious or depressed. Affect is not labile, blunt, angry or inappropriate. Speech: Speech normal. He is communicative. Speech is not rapid and pressured, delayed, slurred or tangential.         Behavior: Behavior normal. Behavior is not agitated, slowed, aggressive, withdrawn, hyperactive or combative. Thought Content: Thought content normal. Thought content is not paranoid or delusional. Thought content does not include homicidal or suicidal ideation. Thought content does not include homicidal or suicidal plan. Cognition and Memory: Cognition normal. Memory is not impaired. He does not exhibit impaired recent memory or impaired remote memory. Judgment: Judgment normal. Judgment is not impulsive or inappropriate. CORETTA test: +   Yeomans test: +   Gaenslen test: +      Assessment:     1. Thoracic stenosis    2. Mid back pain    3. Spondylosis of thoracic region without myelopathy or radiculopathy    4. Spondylosis of lumbar region without myelopathy or radiculopathy    5. Bulge of lumbar disc without myelopathy    6. Lumbosacral radiculitis    7. SI (sacroiliac) pain    8. Lumbar spondylosis    9. Chronic, continuous use of opioids    10. Chronic pain syndrome            Plan:      · OARRS reviewed. Current MED: 15.00  · Patient was offered naloxone for home. · Discussed long term side effects of medications, tolerance, dependency and addiction. · Previous UDS reviewed  · UDS preformed today for compliance. · Patient told can not receive any pain medications from any other source. · No evidence of abuse, diversion or aberrant behavior.    Medications and/or procedures to improve function and quality of life- patient understanding with this and that may not be pain free   Discussed with patient about safe storage of medications at home   Discussed possible weaning of medication dosing dependent on treatment/procedure results.  Discussed with patient about risks with procedure including infection, reaction to medication, increased pain, or bleeding. · Procedure notes reviewed in detail  · Receiving over 75% to 80% relief of low back pain from bilateral L-facet RFA  · Receiving 85% relief of left SI pain from left SI RFA  · TESI has worn off and mid back pain is main complaint   · Plan TESI # 2 @ T10-T11. Procedure and risks discussed in detail with patient  · Needs to be cleared to be off Eliquis for 3 days prior to procedure   · Continue Norco 5/325 TID prn- ordered refill   · Updated UDS ordered today       Meds. Prescribed:   Orders Placed This Encounter   Medications    HYDROcodone-acetaminophen (NORCO) 5-325 MG per tablet     Sig: Take 1 tablet by mouth every 8 hours as needed for Pain for up to 30 days. Dispense:  90 tablet     Refill:  0     Reduce doses taken as pain becomes manageable    naloxone 4 MG/0.1ML LIQD nasal spray     Si spray by Nasal route as needed for Opioid Reversal     Dispense:  1 each     Refill:  0       Return for TESI # 2 @ T10-T11. , follow up after procedure.                Electronically signed by WALT Duff CNP  at 8:42 AM

## 2021-07-20 ENCOUNTER — TELEPHONE (OUTPATIENT)
Dept: PHYSICAL MEDICINE AND REHAB | Age: 51
End: 2021-07-20

## 2021-07-20 NOTE — TELEPHONE ENCOUNTER
Med. Clearance received from Dr. Sindy Gerardo. Scanned into media. Pt. Contacted. Procedure and follow up scheduled. Educated on pre-procedure instructions, also mailed. Verbalized understanding.

## 2021-08-03 DIAGNOSIS — G89.4 CHRONIC PAIN SYNDROME: ICD-10-CM

## 2021-08-03 RX ORDER — HYDROCODONE BITARTRATE AND ACETAMINOPHEN 5; 325 MG/1; MG/1
1 TABLET ORAL EVERY 8 HOURS PRN
Qty: 90 TABLET | Refills: 0 | Status: SHIPPED | OUTPATIENT
Start: 2021-08-07 | End: 2021-09-02 | Stop reason: SDUPTHER

## 2021-08-03 NOTE — TELEPHONE ENCOUNTER
OARRS reviewed. UDS: + for Dihydrocodeine, Hydrocodone, Norhydrocodone,   Last seen: 7/8/2021.  Follow-up:   Future Appointments   Date Time Provider Saul Maria G   9/8/2021  7:45 AM WALT Burrows CNP [facility]   9/20/2021  9:15 AM WALT Delacruz CNP N [facility] AM OFFENEPAT II.RONNIE

## 2021-08-03 NOTE — TELEPHONE ENCOUNTER
Clayton Boyce called requesting a refill on the following medications:  Requested Prescriptions     Pending Prescriptions Disp Refills    HYDROcodone-acetaminophen (NORCO) 5-325 MG per tablet 90 tablet 0     Sig: Take 1 tablet by mouth every 8 hours as needed for Pain for up to 30 days.      Pharmacy verified:  [facility]      Date of last visit: 07-08-21  Date of next visit (if applicable): 8/83/3691

## 2021-08-24 ENCOUNTER — TELEPHONE (OUTPATIENT)
Dept: PHYSICAL MEDICINE AND REHAB | Age: 51
End: 2021-08-24

## 2021-08-24 NOTE — TELEPHONE ENCOUNTER
LVM for pt. regarding procedure cancellation due to provider out of office. Follow up kept for medications. Advised to call office.

## 2021-08-24 NOTE — TELEPHONE ENCOUNTER
Pt. returned call. Informed him of procedure cancellation. Advised to call office in 3-4 weeks regarding rescheduling. Verbalized understanding.

## 2021-08-27 ENCOUNTER — HOSPITAL ENCOUNTER (OUTPATIENT)
Age: 51
Discharge: HOME | End: 2021-08-27
Payer: COMMERCIAL

## 2021-08-27 DIAGNOSIS — N40.1 BENIGN PROSTATIC HYPERPLASIA WITH WEAK URINARY STREAM: ICD-10-CM

## 2021-08-27 DIAGNOSIS — N20.0 KIDNEY STONE: ICD-10-CM

## 2021-08-27 DIAGNOSIS — R39.12 BENIGN PROSTATIC HYPERPLASIA WITH WEAK URINARY STREAM: ICD-10-CM

## 2021-08-27 LAB
ANION GAP SERPL CALCULATED.3IONS-SCNC: 12 MEQ/L (ref 8–16)
BUN BLDV-MCNC: 9 MG/DL (ref 7–22)
CALCIUM SERPL-MCNC: 9.7 MG/DL (ref 8.5–10.5)
CHLORIDE BLD-SCNC: 107 MEQ/L (ref 98–111)
CO2: 24 MEQ/L (ref 23–33)
CREAT SERPL-MCNC: 0.8 MG/DL (ref 0.4–1.2)
GFR SERPL CREATININE-BSD FRML MDRD: > 90 ML/MIN/1.73M2
GLUCOSE BLD-MCNC: 185 MG/DL (ref 70–108)
POTASSIUM SERPL-SCNC: 4 MEQ/L (ref 3.5–5.2)
PROSTATE SPECIFIC ANTIGEN: 0.54 NG/ML (ref 0–1)
SODIUM BLD-SCNC: 143 MEQ/L (ref 135–145)

## 2021-08-27 PROCEDURE — 84153 ASSAY OF PSA TOTAL: CPT

## 2021-08-27 PROCEDURE — 80048 BASIC METABOLIC PNL TOTAL CA: CPT

## 2021-08-27 PROCEDURE — 36415 COLL VENOUS BLD VENIPUNCTURE: CPT

## 2021-09-02 DIAGNOSIS — G89.4 CHRONIC PAIN SYNDROME: ICD-10-CM

## 2021-09-02 RX ORDER — HYDROCODONE BITARTRATE AND ACETAMINOPHEN 5; 325 MG/1; MG/1
1 TABLET ORAL EVERY 8 HOURS PRN
Qty: 90 TABLET | Refills: 0 | Status: SHIPPED | OUTPATIENT
Start: 2021-09-06 | End: 2021-09-29 | Stop reason: SDUPTHER

## 2021-09-02 NOTE — TELEPHONE ENCOUNTER
Esau Cabezas called requesting a refill on the following medications:  Requested Prescriptions     Pending Prescriptions Disp Refills    HYDROcodone-acetaminophen (NORCO) 5-325 MG per tablet 90 tablet 0     Sig: Take 1 tablet by mouth every 8 hours as needed for Pain for up to 30 days. Pharmacy verified: [facility] in [address] [place] [address]  . pv      Date of last visit: 7/08/2021  Date of next visit (if applicable): 1/39/1365

## 2021-09-02 NOTE — TELEPHONE ENCOUNTER
OARRS reviewed. UDS: + for  Dihydrocodeine, Hydrocodone, Norhydrocodone. Last seen: 7/8/2021.  Follow-up:   Future Appointments   Date Time Provider Saul Cummins   9/8/2021  7:45 AM Gussie Phoenix, APRN - CNP [place] [address] [place] [address]   9/20/2021  9:15 AM WALT Love CNP [facility] [address]

## 2021-09-08 ENCOUNTER — OFFICE VISIT (OUTPATIENT)
Dept: UROLOGY | Age: 51
End: 2021-09-08
Payer: COMMERCIAL

## 2021-09-08 VITALS — WEIGHT: 262.6 LBS | BODY MASS INDEX: 37.59 KG/M2 | HEIGHT: 70 IN

## 2021-09-08 DIAGNOSIS — R31.29 MICROSCOPIC HEMATURIA: ICD-10-CM

## 2021-09-08 DIAGNOSIS — N40.1 BENIGN PROSTATIC HYPERPLASIA WITH WEAK URINARY STREAM: Primary | ICD-10-CM

## 2021-09-08 DIAGNOSIS — R39.12 BENIGN PROSTATIC HYPERPLASIA WITH WEAK URINARY STREAM: Primary | ICD-10-CM

## 2021-09-08 LAB
BACTERIA: ABNORMAL
BILIRUBIN URINE: NEGATIVE
BILIRUBIN, POC: NORMAL
BLOOD URINE, POC: NORMAL
BLOOD, URINE: ABNORMAL
CASTS: ABNORMAL /LPF
CASTS: ABNORMAL /LPF
CHARACTER, URINE: CLEAR
CLARITY, POC: CLEAR
COLOR, POC: YELLOW
COLOR: YELLOW
CRYSTALS: ABNORMAL
EPITHELIAL CELLS, UA: ABNORMAL /HPF
GLUCOSE URINE, POC: NORMAL
GLUCOSE, URINE: NEGATIVE MG/DL
KETONES, POC: NORMAL
KETONES, URINE: NEGATIVE
LEUKOCYTE EST, POC: NEGATIVE
LEUKOCYTE ESTERASE, URINE: NEGATIVE
MISCELLANEOUS LAB TEST RESULT: ABNORMAL
NITRITE, POC: NEGATIVE
NITRITE, URINE: NEGATIVE
PH UA: 5 (ref 5–9)
PH, POC: NORMAL
POST VOID RESIDUAL (PVR): 0 ML
PROTEIN UA: NEGATIVE MG/DL
PROTEIN, POC: NORMAL
RBC URINE: ABNORMAL /HPF
RENAL EPITHELIAL, UA: ABNORMAL
SPECIFIC GRAVITY UA: 1.02 (ref 1–1.03)
SPECIFIC GRAVITY, POC: NORMAL
UROBILINOGEN, POC: NORMAL
UROBILINOGEN, URINE: 0.2 EU/DL (ref 0–1)
WBC UA: ABNORMAL /HPF
YEAST: ABNORMAL

## 2021-09-08 PROCEDURE — 51798 US URINE CAPACITY MEASURE: CPT | Performed by: NURSE PRACTITIONER

## 2021-09-08 PROCEDURE — 99213 OFFICE O/P EST LOW 20 MIN: CPT | Performed by: NURSE PRACTITIONER

## 2021-09-08 PROCEDURE — 81002 URINALYSIS NONAUTO W/O SCOPE: CPT | Performed by: NURSE PRACTITIONER

## 2021-09-08 RX ORDER — ALLOPURINOL 300 MG/1
TABLET ORAL
Qty: 90 TABLET | Refills: 3 | Status: SHIPPED | OUTPATIENT
Start: 2021-09-08 | End: 2022-09-14 | Stop reason: SDUPTHER

## 2021-09-08 RX ORDER — POTASSIUM CITRATE 10 MEQ/1
TABLET, EXTENDED RELEASE ORAL
Qty: 90 TABLET | Refills: 3 | Status: SHIPPED | OUTPATIENT
Start: 2021-09-08 | End: 2022-09-14 | Stop reason: SDUPTHER

## 2021-09-08 RX ORDER — TAMSULOSIN HYDROCHLORIDE 0.4 MG/1
CAPSULE ORAL
Qty: 90 CAPSULE | Refills: 3 | Status: SHIPPED | OUTPATIENT
Start: 2021-09-08 | End: 2022-09-14 | Stop reason: SDUPTHER

## 2021-09-08 ASSESSMENT — ENCOUNTER SYMPTOMS
NAUSEA: 0
BACK PAIN: 0
VOMITING: 0
ABDOMINAL PAIN: 0

## 2021-09-08 NOTE — PROGRESS NOTES
[address] [address]  Dept: [phone]  Loc: [phone]    Visit Date: 2021        HPI:     Nya Smallwood is a 45 y.o. male who presents today for:  Chief Complaint   Patient presents with    Benign Prostatic Hypertrophy    1 Year Follow Up     labs prior, PVR       HPI   Pt seen in follow up for kidney stones, BPH. Mr. Aliyah Nevarez has a hx of kidney stones for which he takes allopurinol and potassium citrate and BPH for which he is on Flomax. Notes family hx of prostate cancer in father who \" from old age\". Has chronic left hip pain recently evaluated with CT in the ER at [facility]. CT with stable small nonobstructive left lower pole calculus. Pt notes few weeks ago had urinary frequency and urgency that have since improved. Denies dysuria currently. No hematuria. Current Outpatient Medications   Medication Sig Dispense Refill    HYDROcodone-acetaminophen (NORCO) 5-325 MG per tablet Take 1 tablet by mouth every 8 hours as needed for Pain for up to 30 days. 90 tablet 0    naloxone 4 MG/0.1ML LIQD nasal spray 1 spray by Nasal route as needed for Opioid Reversal 1 each 0    tamsulosin (FLOMAX) 0.4 MG capsule TAKE 1 CAPSULE BY MOUTH EVERY DAY 90 capsule 3    allopurinol (ZYLOPRIM) 300 MG tablet TAKE 1 TABLET BY MOUTH EVERY DAY 90 tablet 3    apixaban (ELIQUIS) 2.5 MG TABS tablet Take 2.5 mg by mouth 2 times daily      potassium citrate (UROCIT-K) 10 MEQ (1080 MG) extended release tablet TAKE 1 TABLET BY MOUTH EVERY MORNING WITH BREAKFAST 90 tablet 3    traZODone (DESYREL) 50 MG tablet Take 50 mg by mouth nightly      triamcinolone (KENALOG) 0.1 % cream       FLOVENT  MCG/ACT inhaler       ARIPiprazole (ABILIFY) 2 MG tablet Take 2 mg by mouth 2 times daily      citalopram (CELEXA) 40 MG tablet Take 40 mg by mouth daily.       busPIRone (BUSPAR) 10 MG tablet Take 30 mg by mouth 2 times daily       omeprazole (PRILOSEC) 20 MG capsule Take 20 mg by mouth daily. No current facility-administered medications for this visit. Past Medical History  Sonali Chand  has a past medical history of Chronic back pain, GERD (gastroesophageal reflux disease), History of kidney stones, Hx of blood clots, Kidney stone, and Lumbar spondylosis. Past Surgical History  The patient  has a past surgical history that includes Ureter stent placement (2006); Lithotripsy (2006); Cystocopy (6/14/2013); Cystocopy (07/29/2013); Cystoscopy (12/23/13); other surgical history (Bilateral, 03/26/2018); pr inj dx/ther agnt paravert facet joint, lumbar/sac, 2nd level (Bilateral, 3/26/2018); pr inj dx/ther agnt paravert facet joint, lumbar/sac, 2nd level (Bilateral, 5/21/2018); Colonoscopy; eye surgery (2007); hernia repair (2007); pr inject rx other periph nerve (Left, 9/28/2018); pr inject rx other periph nerve (Right, 11/30/2018); lumbar nerve block (N/A, 4/5/2019); Injection Procedure For Sacroiliac Joint (Left, 5/17/2019); Injection Procedure For Sacroiliac Joint (Left, 6/27/2019); Lumbar spine surgery (Left, 8/15/2019); Nerve Surgery (Right, 10/31/2019); Pain management procedure (Left, 1/16/2020); Pain management procedure (Right, 3/5/2020); Pain management procedure (Left, 7/2/2020); Pain management procedure (Left, 8/4/2020); Cervical spine surgery (Right, 12/15/2020); Pain management procedure (Left, 1/21/2021); Pain management procedure (Left, 3/15/2021); and Pain management procedure (Right, 4/29/2021). Family History  This patient's family history includes Cancer in his mother; Heart Disease in his father. Social History  Sonali Chand  reports that he quit smoking about 16 years ago. He has a 12.00 pack-year smoking history. He has never used smokeless tobacco. He reports that he does not drink alcohol and does not use drugs.       Subjective:      Review of Systems   Constitutional: Negative for activity change, appetite change, chills, diaphoresis, fatigue, fever and unexpected weight change. Gastrointestinal: Negative for abdominal pain, nausea and vomiting. Genitourinary: Negative for decreased urine volume, difficulty urinating, dysuria, flank pain, frequency, hematuria and urgency. Musculoskeletal: Negative for back pain. Objective:   Ht 5' 10\" (1.778 m)   Wt 262 lb 9.6 oz (119.1 kg)   BMI 37.68 kg/m²     Physical Exam  Vitals reviewed. Constitutional:       General: He is not in acute distress. Appearance: Normal appearance. He is well-developed. He is not ill-appearing or diaphoretic. HENT:      Head: Normocephalic and atraumatic. Right Ear: External ear normal.      Left Ear: External ear normal.      Nose: Nose normal.      Mouth/Throat:      Mouth: Mucous membranes are moist.   Eyes:      General: No scleral icterus. Right eye: No discharge. Left eye: No discharge. Neck:      Vascular: No JVD. Trachea: No tracheal deviation. Pulmonary:      Effort: Pulmonary effort is normal. No respiratory distress. Abdominal:      General: There is no distension. Tenderness: There is no abdominal tenderness. There is no right CVA tenderness or left CVA tenderness. Genitourinary:     Comments: Prostate mildly enlarged but smooth without nodule or induration. Musculoskeletal:         General: Normal range of motion. Neurological:      Mental Status: He is alert and oriented to person, place, and time. Mental status is at baseline. Psychiatric:         Mood and Affect: Mood normal.         Behavior: Behavior normal.         Thought Content:  Thought content normal.         POC  Results for POC orders placed in visit on 09/08/21   POCT Urinalysis no Micro   Result Value Ref Range    Color, UA Yellow     Clarity, UA Clear     Glucose, UA POC      Bilirubin, UA      Ketones, UA      Spec Grav, UA      Blood, UA POC Trace     pH, UA      Protein, UA POC Urobilinogen, UA      Leukocytes, UA Negative     Nitrite, UA Negative          Patients recent PSA values are as follows  Lab Results   Component Value Date    PSA 0.54 2021    PSA 0.30 2012        Recent BUN/Creatinine:  Lab Results   Component Value Date    BUN 9 2021    CREATININE 0.8 2021       CT 21 at [facility]  information found for this patient   Narrative   Ordering Provider: Andrew Pitts          Radiology Department  Patient: Isaac Jeter 89.   :  1970   Sex: [address] [place] [address], [address] [place] [address]  [facility]     [phone]   Unit #:   [id number]       Acct #: [de-identified]       Ordering Phys: Luis Crowder MD            Exam Date: 21     Accession #:  [id number]     Exam:  CT   CT Abd/Pelvis W/O Cont [id number]     Result: See Report            EXAM:  CT ABDOMEN AND PELVIS WITHOUT INTRAVENOUS CONTRAST          CLINICAL INDICATION: 58-year-old male presents to the ED with a chief     complaint of bilateral rib pain and shortness of breath.  Patient states     that the symptoms began 2 days prior to arrival. [facility] denies any mechanism     of injury.  Patient has a history of kidney stones and a history of     pulmonary emboli for which he takes Eliquis.  Patient states that his     current symptoms feel a bit like both of these conditions.  Denies          TECHNIQUE:  Helically acquired images were obtained of the abdomen and     pelvis without intravenous contrast.  This CT exam was performed using one     or more of the following dose reduction techniques:  automated exposure     control, adjustment of the mA and/or kV according to patient size, and/or     use of iterative reconstruction technique.  This report was created using     [employer] report generation technology.          COMPARISON:  None.          FINDINGS:          LOWER THORAX:  Unremarkable.  Lung bases are clear.  No cardiomegaly.  No     significant pericardial effusion.          ABDOMEN:          LIVER:  Fatty infiltration of the liver.          GALLBLADDER AND BILE DUCTS:  Unremarkable.  No calcified gallstones.  No     gallbladder distention or wall edema.  No intra- or extrahepatic biliary     ductal dilation.          PANCREAS:  Unremarkable.  No focal cystic mass.          SPLEEN:  Unremarkable.  Normal size without focal cystic or solid mass.          ADRENALS:  Unremarkable.  No nodules.          KIDNEYS AND URETERS:  Nonobstructing 2 mm calculus in the inferior left     kidney.  Normal renal size and position.          STOMACH AND BOWEL:  Unremarkable.  No stomach or bowel distention.  No     focal inflammatory change.          PELVIS:          APPENDIX:  No evidence of acute appendicitis.          BLADDER:  Unremarkable.          REPRODUCTIVE:  Unremarkable as visualized.  No mass.          ABDOMEN and PELVIS:          INTRAPERITONEAL SPACE:  Unremarkable.  No ascites or other fluid     collection.  No free air.          BONES/JOINTS:  Unremarkable.  No suspicious lytic or blastic abnormality.          SOFT TISSUES:  Operative changes of the left inguinal abdominal wall.     Small fat-containing right inguinal hernia.          VASCULATURE:  Mild atherosclerosis of the abdominal aorta.  Abdominal     aorta is non-dilated.          LYMPH NODES:  Unremarkable.  No enlarged lymph nodes.          IMPRESSION:          Nonobstructing 2 mm calculus in the inferior left kidney.  No     hydronephrosis.                Assessment:   BPH with LUTs  Family hx of prostate cancer--father--70s--\" from old age\"  Small L nonobstructive kidney stone, inferior pole--stable in size at 2 mm    Plan:     Urine dip with trace blood--send for micro and culture. Recent CT with stable 2 mm calc in inferior left kidney. No hydronephrosis or obstructing stones. Continue to monitor at this time. PSA stable. NICOLA without nodule or induration. Repeat PSA in 1 year. Continue allopurinol, flomax, potassium citrate. F/u in 1 year with PVR. PSA and BMP prior to appt.

## 2021-09-09 ENCOUNTER — TELEPHONE (OUTPATIENT)
Dept: UROLOGY | Age: 51
End: 2021-09-09

## 2021-09-09 LAB
ORGANISM: ABNORMAL
URINE CULTURE, ROUTINE: ABNORMAL

## 2021-09-09 NOTE — TELEPHONE ENCOUNTER
----- Message from [facility] WALT RODRIGUEZ - CNP sent at 9/8/2021  4:40 PM EDT -----  Please let pt know urine micro with only 0-2 RBCs/hpf so technically negative for true microscopic blood in the urine which is great.

## 2021-09-10 ENCOUNTER — TELEPHONE (OUTPATIENT)
Dept: UROLOGY | Age: 51
End: 2021-09-10

## 2021-09-10 RX ORDER — SULFAMETHOXAZOLE AND TRIMETHOPRIM 800; 160 MG/1; MG/1
1 TABLET ORAL 2 TIMES DAILY
Qty: 14 TABLET | Refills: 0 | Status: SHIPPED | OUTPATIENT
Start: 2021-09-10 | End: 2021-09-17

## 2021-09-10 NOTE — TELEPHONE ENCOUNTER
Pt's urine culture with small amount of bacteria. Given recent symptoms will treat empirically. Script for Bactrim sent to pharmacy. Please notify pt to  prescription and take to completion. Thank-you.

## 2021-09-20 ENCOUNTER — OFFICE VISIT (OUTPATIENT)
Dept: PHYSICAL MEDICINE AND REHAB | Age: 51
End: 2021-09-20
Payer: COMMERCIAL

## 2021-09-20 VITALS
BODY MASS INDEX: 37.51 KG/M2 | HEIGHT: 70 IN | SYSTOLIC BLOOD PRESSURE: 122 MMHG | DIASTOLIC BLOOD PRESSURE: 80 MMHG | WEIGHT: 262 LBS

## 2021-09-20 DIAGNOSIS — M47.816 SPONDYLOSIS OF LUMBAR REGION WITHOUT MYELOPATHY OR RADICULOPATHY: ICD-10-CM

## 2021-09-20 DIAGNOSIS — M54.17 LUMBOSACRAL RADICULITIS: ICD-10-CM

## 2021-09-20 DIAGNOSIS — M53.3 SI (SACROILIAC) PAIN: Primary | ICD-10-CM

## 2021-09-20 DIAGNOSIS — M47.814 SPONDYLOSIS OF THORACIC REGION WITHOUT MYELOPATHY OR RADICULOPATHY: ICD-10-CM

## 2021-09-20 DIAGNOSIS — M48.04 THORACIC STENOSIS: ICD-10-CM

## 2021-09-20 DIAGNOSIS — M54.9 MID BACK PAIN: ICD-10-CM

## 2021-09-20 DIAGNOSIS — G89.4 CHRONIC PAIN SYNDROME: ICD-10-CM

## 2021-09-20 PROCEDURE — 99214 OFFICE O/P EST MOD 30 MIN: CPT | Performed by: NURSE PRACTITIONER

## 2021-09-20 ASSESSMENT — ENCOUNTER SYMPTOMS: BACK PAIN: 1

## 2021-09-20 NOTE — PROGRESS NOTES
[facility] [address]  Dept: [phone]  Dept Fax: [phone]: [phone]    Visit Date: 9/20/2021    Functionality Assessment/Goals Worksheet     On a scale of 0 (Does not Interfere) to 10 (Completely Interferes)     1. Which number describes how during the past week pain has interfered with       the following:  A. General Activity:  3  B. Mood: 4  C. Walking Ability:  3  D. Normal Work (Includes both work outside the home and housework):  3  E. Relations with Other People:   2  F. Sleep:   2  G. Enjoyment of Life:   2    2. Patient Prefers to Take their Pain Medications:     []  On a regular basis   [x]  Only when necessary    []  Does not take pain medications    3. What are the Patient's Goals/Expectations for Visiting Pain Management? []  Learn about my pain    [x]  Receive Medication   []  Physical Therapy     []  Treat Depression   [x]  Receive Injections    []  Treat Sleep   []  Deal with Anxiety and Stress   []  Treat Opoid Dependence/Addiction   []  Other:      HPI:   Jackie Gardner is a 45 y.o. male is here today for    Chief Complaint: Mid back pain, low back pain, SI pain     HPI   2.5 month FU. Has pain in mid back radiated down into low back and left hip and left SI area- stabbing pain     Sharp stabbing pain in left SI area. Left SI RFA is waning. This is main complaint   Pain increases with bending, lifting, twisting , walking, standing and getting up and down. Norco prn remains effective in decreasing pain to a tolerable level     Medications reviewed. Patient denies side effects with medications. Patient states he is taking medications as prescribed. Hedenies receiving pain medications from other sources. He denies any ER visits since last visit. Pain scale with out pain medications or at its worst is 6-7/10.   Pain scale with pain medications or at its best is 1-2/10. Last dose of Norco was last night  Drug screen reviewed from 7/8/2021 and was appropriate  Pill count was completed today and was appropriate   Patient does have naloxone available at home. Patient has not required use of naloxone at home since last office visit. The patientis allergic to latex, codeine, nickel, and oxybutynin chloride [oxybutynin chloride]. Subjective:      Review of Systems   Constitutional: Negative. HENT: Negative. Musculoskeletal: Positive for arthralgias, back pain and myalgias. Negative for gait problem and joint swelling. Ambulating without assist devices    Skin: Negative. Neurological: Negative for weakness and numbness. Psychiatric/Behavioral: Negative. Objective:     Vitals:    09/20/21 0914   BP: 122/80   Weight: 262 lb (118.8 kg)   Height: 5' 10\" (1.778 m)       Physical Exam  Vitals and nursing note reviewed. Constitutional:       General: He is not in acute distress. Appearance: He is well-developed. He is not diaphoretic. HENT:      Head: Normocephalic and atraumatic. Right Ear: External ear normal.      Left Ear: External ear normal.      Nose: Nose normal.      Mouth/Throat:      Mouth: Mucous membranes are moist.      Pharynx: Oropharynx is clear. No oropharyngeal exudate. Eyes:      General: No scleral icterus. Right eye: No discharge. Left eye: No discharge. Conjunctiva/sclera: Conjunctivae normal.      Pupils: Pupils are equal, round, and reactive to light. Neck:      Thyroid: No thyromegaly. Cardiovascular:      Rate and Rhythm: Normal rate and regular rhythm. Heart sounds: Normal heart sounds. No murmur heard. No friction rub. No gallop. Pulmonary:      Effort: Pulmonary effort is normal. No respiratory distress. Breath sounds: Normal breath sounds. No wheezing or rales. Chest:      Chest wall: No tenderness.    Abdominal:      General: Bowel Achilles reflexes are 2+ on the right side and 2+ on the left side. Psychiatric:         Attention and Perception: Attention normal. He is attentive. Mood and Affect: Mood normal. Mood is not anxious or depressed. Affect is not labile, blunt, angry or inappropriate. Speech: Speech normal. He is communicative. Speech is not rapid and pressured, delayed, slurred or tangential.         Behavior: Behavior normal. Behavior is not agitated, slowed, aggressive, withdrawn, hyperactive or combative. Thought Content: Thought content normal. Thought content is not paranoid or delusional. Thought content does not include homicidal or suicidal ideation. Thought content does not include homicidal or suicidal plan. Cognition and Memory: Cognition normal. Memory is not impaired. He does not exhibit impaired recent memory or impaired remote memory. Judgment: Judgment normal. Judgment is not impulsive or inappropriate. CORETTA test: + left side   Yeomans test: + left side   Gaenslen test: + left side      Assessment:     1. SI (sacroiliac) pain    2. Thoracic stenosis    3. Mid back pain    4. Spondylosis of thoracic region without myelopathy or radiculopathy    5. Spondylosis of lumbar region without myelopathy or radiculopathy    6. Lumbosacral radiculitis    7. Chronic pain syndrome            Plan:      · OARRS reviewed. Current MED: 15.00  · Patient was offered naloxone for home. · Discussed long term side effects of medications, tolerance, dependency and addiction. · Previous UDS reviewed  · UDS preformed today for compliance. · Patient told can not receive any pain medications from any other source. · No evidence of abuse, diversion or aberrant behavior.    Medications and/or procedures to improve function and quality of life- patient understanding with this and that may not be pain free   Discussed with patient about safe storage of medications at home   Discussed possible weaning of medication dosing dependent on treatment/procedure results.  Discussed with patient about risks with procedure including infection, reaction to medication, increased pain, or bleeding. · Procedure notes reviewed in detail  · Left SI RFA is main complaint. Received over 85% relief from previous SI RFA for over 8 months. · Plan Left SI RFA for longer term pain relief. Procedure and riks discussed in detail with patient. · Needs to be cleared to be off Eliquis  prior to procedure if he has not in the past 3 months  · Discussed repeating TESI and L-facet RFA in future   · Continue Norco 5/325 TID prn- Filled 9/6/2021 and hasplenty  · Updated UDS ordered today       Meds. Prescribed:   No orders of the defined types were placed in this encounter. Return for Left SI RFA. , follow up after procedure or in 3 months.                Electronically signed by WALT Arshad CNP on9/20/2021 at 9:34 AM

## 2021-09-29 DIAGNOSIS — G89.4 CHRONIC PAIN SYNDROME: ICD-10-CM

## 2021-09-29 RX ORDER — HYDROCODONE BITARTRATE AND ACETAMINOPHEN 5; 325 MG/1; MG/1
1 TABLET ORAL EVERY 8 HOURS PRN
Qty: 90 TABLET | Refills: 0 | Status: SHIPPED | OUTPATIENT
Start: 2021-10-06 | End: 2021-11-01 | Stop reason: SDUPTHER

## 2021-09-29 NOTE — TELEPHONE ENCOUNTER
Lucianne Fabry called requesting a refill on the following medications:  Requested Prescriptions     Pending Prescriptions Disp Refills    HYDROcodone-acetaminophen (NORCO) 5-325 MG per tablet 90 tablet 0     Sig: Take 1 tablet by mouth every 8 hours as needed for Pain for up to 30 days. [facility] [address] [place] [address]  . pv      Date of last visit: 9/20/2021  Date of next visit (if applicable): 16/42/7801

## 2021-09-29 NOTE — TELEPHONE ENCOUNTER
OARRS reviewed. UDS: + for Hydrocodone. Last seen: 9/20/2021.  Follow-up:   Future Appointments   Date Time Provider Saul Yeboahi   12/20/2021  9:30 AM WALT Mcdonald CNP [facility] [address]   9/14/2022  7:45 AM WALT Kinsey CNP Marlaine Parcel [facility] [address]

## 2021-11-01 DIAGNOSIS — G89.4 CHRONIC PAIN SYNDROME: ICD-10-CM

## 2021-11-01 RX ORDER — HYDROCODONE BITARTRATE AND ACETAMINOPHEN 5; 325 MG/1; MG/1
1 TABLET ORAL EVERY 8 HOURS PRN
Qty: 90 TABLET | Refills: 0 | Status: SHIPPED | OUTPATIENT
Start: 2021-11-05 | End: 2021-11-30 | Stop reason: SDUPTHER

## 2021-11-01 NOTE — TELEPHONE ENCOUNTER
Anuj Dey called requesting a refill on the following medications:  Requested Prescriptions     Pending Prescriptions Disp Refills    HYDROcodone-acetaminophen (NORCO) 5-325 MG per tablet 90 tablet 0     Sig: Take 1 tablet by mouth every 8 hours as needed for Pain for up to 30 days. [facility]   .       Date of last visit: 9/20/2021  Date of next visit (if applicable): 18/23/9612

## 2021-11-23 ENCOUNTER — TELEPHONE (OUTPATIENT)
Dept: PHYSICAL MEDICINE AND REHAB | Age: 51
End: 2021-11-23

## 2021-11-30 ENCOUNTER — TELEPHONE (OUTPATIENT)
Dept: PHYSICAL MEDICINE AND REHAB | Age: 51
End: 2021-11-30

## 2021-11-30 DIAGNOSIS — G89.4 CHRONIC PAIN SYNDROME: ICD-10-CM

## 2021-11-30 RX ORDER — HYDROCODONE BITARTRATE AND ACETAMINOPHEN 5; 325 MG/1; MG/1
1 TABLET ORAL EVERY 8 HOURS PRN
Qty: 90 TABLET | Refills: 0 | Status: SHIPPED | OUTPATIENT
Start: 2021-12-05 | End: 2021-12-30 | Stop reason: SDUPTHER

## 2021-11-30 NOTE — TELEPHONE ENCOUNTER
Carolina Cortez called requesting a refill on the following medications:  Requested Prescriptions     Pending Prescriptions Disp Refills    HYDROcodone-acetaminophen (NORCO) 5-325 MG per tablet 90 tablet 0     Sig: Take 1 tablet by mouth every 8 hours as needed for Pain for up to 30 days.      [facility]      Date of last visit: 9/20/21  Date of next visit (if applicable): 6/31/3149

## 2021-11-30 NOTE — TELEPHONE ENCOUNTER
OARRS reviewed. UDS: + for hydrocodone. Last seen: 9/20/2021.  Follow-up:   Future Appointments   Date Time Provider Saul Cummins   1/18/2022  9:00 AM WALT Davis CNP [facility] [address]   9/14/2022  7:45 AM WALT Barraza CNP [facility] [address]

## 2021-12-07 ENCOUNTER — TELEPHONE (OUTPATIENT)
Dept: PHYSICAL MEDICINE AND REHAB | Age: 51
End: 2021-12-07

## 2021-12-07 NOTE — TELEPHONE ENCOUNTER
Per Insurance authorization: Left SI RFA Auth denied considered as investigational per call received from Sharmila RN : from [address] [facility] # [phone]- ( [id number]). Previously Approved but by error. Informed MDo via message  May approve L5-S1. May approve it for lumbar RFA, not SI RFA. Do you want me to cancel the procedure and you can discuss at follow up? Please advise.

## 2021-12-07 NOTE — TELEPHONE ENCOUNTER
Yes please. Please tell him that is insurance denied this procedure and that they wont cover it and please tell him to call his insurance.

## 2021-12-07 NOTE — TELEPHONE ENCOUNTER
Pt. Called office. Informed of insurance denial and procedure cancellation. Informed to keep follow up appointment to discuss options and Bebo's recommendations to call his insurance company. Verbalized understanding.

## 2021-12-30 DIAGNOSIS — G89.4 CHRONIC PAIN SYNDROME: ICD-10-CM

## 2021-12-30 RX ORDER — HYDROCODONE BITARTRATE AND ACETAMINOPHEN 5; 325 MG/1; MG/1
1 TABLET ORAL EVERY 8 HOURS PRN
Qty: 90 TABLET | Refills: 0 | Status: SHIPPED | OUTPATIENT
Start: 2022-01-04 | End: 2022-01-25 | Stop reason: SDUPTHER

## 2021-12-30 NOTE — TELEPHONE ENCOUNTER
Nikhil Neely called requesting a refill on the following medications:  Requested Prescriptions     Pending Prescriptions Disp Refills    HYDROcodone-acetaminophen (NORCO) 5-325 MG per tablet 90 tablet 0     Sig: Take 1 tablet by mouth every 8 hours as needed for Pain for up to 30 days. Pharmacy verified: [facility] [address]  . pv      Date of last visit: 9/20/2021  Date of next visit (if applicable): 4/50/4116

## 2021-12-30 NOTE — TELEPHONE ENCOUNTER
OARRS reviewed. UDS + for Dihydrocodeine, Hydrocodone, Norhydrocodone, Hydromorphone  Last seen: 9/20/2021.   Follow-up: 1/18/2022 with Denis Valencia

## 2022-01-18 ENCOUNTER — OFFICE VISIT (OUTPATIENT)
Dept: PHYSICAL MEDICINE AND REHAB | Age: 52
End: 2022-01-18
Payer: COMMERCIAL

## 2022-01-18 ENCOUNTER — TELEPHONE (OUTPATIENT)
Dept: PHYSICAL MEDICINE AND REHAB | Age: 52
End: 2022-01-18

## 2022-01-18 VITALS
HEIGHT: 70 IN | WEIGHT: 262 LBS | DIASTOLIC BLOOD PRESSURE: 82 MMHG | BODY MASS INDEX: 37.51 KG/M2 | SYSTOLIC BLOOD PRESSURE: 120 MMHG

## 2022-01-18 DIAGNOSIS — G89.4 CHRONIC PAIN SYNDROME: ICD-10-CM

## 2022-01-18 DIAGNOSIS — M47.816 SPONDYLOSIS OF LUMBAR REGION WITHOUT MYELOPATHY OR RADICULOPATHY: Primary | ICD-10-CM

## 2022-01-18 DIAGNOSIS — M51.36 BULGE OF LUMBAR DISC WITHOUT MYELOPATHY: ICD-10-CM

## 2022-01-18 DIAGNOSIS — M53.3 SI (SACROILIAC) PAIN: ICD-10-CM

## 2022-01-18 DIAGNOSIS — F11.90 CHRONIC, CONTINUOUS USE OF OPIOIDS: ICD-10-CM

## 2022-01-18 DIAGNOSIS — M54.17 LUMBOSACRAL RADICULITIS: ICD-10-CM

## 2022-01-18 PROCEDURE — 99214 OFFICE O/P EST MOD 30 MIN: CPT | Performed by: NURSE PRACTITIONER

## 2022-01-18 ASSESSMENT — ENCOUNTER SYMPTOMS
RESPIRATORY NEGATIVE: 1
BACK PAIN: 1

## 2022-01-18 NOTE — PROGRESS NOTES
is. Has been taking more d/tincreased pain and was educated about that,       Medications reviewed. Patient denies side effects with medications. Patient states he is taking medications as prescribed. Hedenies receiving pain medications from other sources. He had 1 urgent care visist since last visit      Pain scale with out pain medications or at its worst is 8/10. Pain scale with pain medications or at its best is 4-8/10. Last dose of Norco was yesterday   Drug screen reviewed from 9/20/2021 and was appropriate  Pill count completed  today and WNL: Yes      The patientis allergic to latex, codeine, nickel, and oxybutynin chloride [oxybutynin chloride]. Subjective:      Review of Systems   Constitutional: Negative. HENT: Negative. Respiratory: Negative. Musculoskeletal: Positive for arthralgias, back pain and myalgias. Negative for gait problem and joint swelling. Ambulating without assist devices    Skin: Negative. Neurological: Negative for weakness and numbness. Psychiatric/Behavioral: Negative. Objective:     Vitals:    01/18/22 0851   BP: 120/82   Weight: 262 lb (118.8 kg)   Height: 5' 10\" (1.778 m)       Physical Exam  Vitals and nursing note reviewed. Constitutional:       General: He is not in acute distress. Appearance: He is well-developed. He is not diaphoretic. HENT:      Head: Normocephalic and atraumatic. Right Ear: External ear normal.      Left Ear: External ear normal.      Nose: Nose normal.      Mouth/Throat:      Mouth: Mucous membranes are moist.      Pharynx: Oropharynx is clear. No oropharyngeal exudate. Eyes:      General: No scleral icterus. Right eye: No discharge. Left eye: No discharge. Conjunctiva/sclera: Conjunctivae normal.      Pupils: Pupils are equal, round, and reactive to light. Neck:      Thyroid: No thyromegaly. Cardiovascular:      Rate and Rhythm: Normal rate and regular rhythm.       Heart sounds: Normal heart sounds. No murmur heard. No friction rub. No gallop. Pulmonary:      Effort: Pulmonary effort is normal. No respiratory distress. Breath sounds: Normal breath sounds. No wheezing or rales. Chest:      Chest wall: No tenderness. Abdominal:      General: Bowel sounds are normal. There is no distension. Palpations: Abdomen is soft. Tenderness: There is no abdominal tenderness. There is no guarding or rebound. Musculoskeletal:         General: Tenderness present. Right shoulder: Normal.      Left shoulder: Normal.      Cervical back: Full passive range of motion without pain, normal range of motion and neck supple. No edema, erythema or rigidity. No muscular tenderness. Normal range of motion. Thoracic back: Tenderness and bony tenderness present. Normal range of motion. Lumbar back: Spasms, tenderness and bony tenderness present. Decreased range of motion. Negative right straight leg raise test and negative left straight leg raise test.        Back:       Right hip: Tenderness and bony tenderness present. Left hip: Tenderness and bony tenderness present. Right upper leg: Tenderness present. Left upper leg: Tenderness present. Right knee: Normal.      Left knee: Normal.      Right lower leg: No edema. Left lower leg: No edema. Skin:     General: Skin is warm. Coloration: Skin is not pale. Findings: No erythema or rash. Neurological:      General: No focal deficit present. Mental Status: He is alert and oriented to person, place, and time. Mental status is at baseline. He is not disoriented. Cranial Nerves: No cranial nerve deficit. Sensory: No sensory deficit. Motor: No atrophy or abnormal muscle tone. Coordination: Coordination normal.      Gait: Gait normal.      Deep Tendon Reflexes: Reflexes are normal and symmetric. Babinski sign absent on the right side.       Reflex Scores:       Tricep reflexes are 2+ on the right side and 2+ on the left side. Bicep reflexes are 2+ on the right side and 2+ on the left side. Brachioradialis reflexes are 2+ on the right side and 2+ on the left side. Patellar reflexes are 2+ on the right side and 2+ on the left side. Achilles reflexes are 2+ on the right side and 2+ on the left side. Psychiatric:         Attention and Perception: Attention normal. He is attentive. Mood and Affect: Mood normal. Mood is not anxious or depressed. Affect is not labile, blunt, angry or inappropriate. Speech: Speech normal. He is communicative. Speech is not rapid and pressured, delayed, slurred or tangential.         Behavior: Behavior normal. Behavior is not agitated, slowed, aggressive, withdrawn, hyperactive or combative. Thought Content: Thought content normal. Thought content is not paranoid or delusional. Thought content does not include homicidal or suicidal ideation. Thought content does not include homicidal or suicidal plan. Cognition and Memory: Cognition normal. Memory is not impaired. He does not exhibit impaired recent memory or impaired remote memory. Judgment: Judgment normal. Judgment is not impulsive or inappropriate. CORETTA  Patricks test  positive left   Yeoman's  Positive left   Gaenslen's  Positive left          Assessment:     1. Spondylosis of lumbar region without myelopathy or radiculopathy    2. SI (sacroiliac) pain    3. Lumbosacral radiculitis    4. Bulge of lumbar disc without myelopathy    5. Chronic pain syndrome    6. Chronic, continuous use of opioids            Plan:      · OARRS reviewed. Current MED: 15.00  · Patient was offered naloxone for home. · Discussed long term side effects of medications, tolerance, dependency and addiction. · Previous UDS reviewed  · UDS preformed today for compliance. · Patient told can not receive any pain medications from any other source.   · No evidence of abuse, diversion or aberrant behavior.  Medications and/or procedures to improve function and quality of life- patient understanding with this and that may not be pain free   Discussed with patient about safe storage of medications at home   Discussed possible weaning of medication dosing dependent on treatment/procedure results.  Discussed with patient about risks with procedure including infection, reaction to medication, increased pain, or bleeding. · Procedure notes reviewed in detail   Patient had great SI RFA in past with over 85% relief for over 8 months but now his insurance wont cover it.  Plan to repeat bilateral L-facet RFA @ L4-5 and L5-S1 for longer term pain relief. Procedure discussed in detail with patient. · Needs to be cleared to be off Eliquis for 3 days prior to procedure   · Continue Norco 5/325 TID prn- Filled 1/4/2021 a nd still has plenty. Instructed he needs to take as prescribed   · Updated UDS ordered today     Meds. Prescribed:   No orders of the defined types were placed in this encounter. Return for bilateral L-facet RFA @ L4-5 and L5-S1. , follow up after procedure.                Electronically signed by WALT Bryson CNP on1/18/2022 at 9:20 AM

## 2022-01-24 DIAGNOSIS — G89.4 CHRONIC PAIN SYNDROME: ICD-10-CM

## 2022-01-24 NOTE — TELEPHONE ENCOUNTER
Landy Washington called requesting a refill on the following medications:  Requested Prescriptions     Pending Prescriptions Disp Refills    HYDROcodone-acetaminophen (NORCO) 5-325 MG per tablet 90 tablet 0     Sig: Take 1 tablet by mouth every 8 hours as needed for Pain for up to 30 days.      [facility]    Date of last visit: 01/18/2021  Date of next visit (if applicable): 8/92/4725

## 2022-01-25 NOTE — TELEPHONE ENCOUNTER
OARRS reviewed. UDS: + for Hydrocodone, Lexapro, Trazodone. Last seen: 1/18/2022.  Follow-up:   Future Appointments   Date Time Provider Saul Yeboahi   2/28/2022  9:45 AM WALT Singh CNP [facility] [place] [address]   9/14/2022  7:45 AM [facility]ALT Beltre [facility]NP [facility] [place] [address]

## 2022-01-27 RX ORDER — HYDROCODONE BITARTRATE AND ACETAMINOPHEN 5; 325 MG/1; MG/1
1 TABLET ORAL EVERY 8 HOURS PRN
Qty: 90 TABLET | Refills: 0 | Status: SHIPPED | OUTPATIENT
Start: 2022-02-03 | End: 2022-02-28 | Stop reason: SDUPTHER

## 2022-02-02 ENCOUNTER — PREP FOR PROCEDURE (OUTPATIENT)
Dept: PHYSICAL MEDICINE AND REHAB | Age: 52
End: 2022-02-02

## 2022-02-07 ENCOUNTER — ANESTHESIA EVENT (OUTPATIENT)
Dept: OPERATING ROOM | Age: 52
End: 2022-02-07
Payer: COMMERCIAL

## 2022-02-07 ENCOUNTER — HOSPITAL ENCOUNTER (OUTPATIENT)
Age: 52
Setting detail: OUTPATIENT SURGERY
Discharge: HOME | End: 2022-02-07
Attending: PAIN MEDICINE | Admitting: PAIN MEDICINE
Payer: COMMERCIAL

## 2022-02-07 ENCOUNTER — ANESTHESIA (OUTPATIENT)
Dept: OPERATING ROOM | Age: 52
End: 2022-02-07
Payer: COMMERCIAL

## 2022-02-07 ENCOUNTER — APPOINTMENT (OUTPATIENT)
Dept: GENERAL RADIOLOGY | Age: 52
End: 2022-02-07
Attending: PAIN MEDICINE
Payer: COMMERCIAL

## 2022-02-07 VITALS
OXYGEN SATURATION: 91 % | TEMPERATURE: 96.8 F | RESPIRATION RATE: 20 BRPM | DIASTOLIC BLOOD PRESSURE: 86 MMHG | SYSTOLIC BLOOD PRESSURE: 134 MMHG

## 2022-02-07 VITALS
WEIGHT: 253.8 LBS | HEART RATE: 83 BPM | HEIGHT: 70 IN | RESPIRATION RATE: 18 BRPM | DIASTOLIC BLOOD PRESSURE: 64 MMHG | TEMPERATURE: 98.4 F | OXYGEN SATURATION: 92 % | SYSTOLIC BLOOD PRESSURE: 116 MMHG | BODY MASS INDEX: 36.33 KG/M2

## 2022-02-07 PROCEDURE — 7100000011 HC PHASE II RECOVERY - ADDTL 15 MIN: Performed by: PAIN MEDICINE

## 2022-02-07 PROCEDURE — 2709999900 HC NON-CHARGEABLE SUPPLY: Performed by: PAIN MEDICINE

## 2022-02-07 PROCEDURE — 7100000010 HC PHASE II RECOVERY - FIRST 15 MIN: Performed by: PAIN MEDICINE

## 2022-02-07 PROCEDURE — 6360000002 HC RX W HCPCS: Performed by: PAIN MEDICINE

## 2022-02-07 PROCEDURE — 64636 DESTROY L/S FACET JNT ADDL: CPT | Performed by: PAIN MEDICINE

## 2022-02-07 PROCEDURE — 6360000002 HC RX W HCPCS: Performed by: NURSE ANESTHETIST, CERTIFIED REGISTERED

## 2022-02-07 PROCEDURE — 3209999900 FLUORO FOR SURGICAL PROCEDURES

## 2022-02-07 PROCEDURE — 2500000003 HC RX 250 WO HCPCS: Performed by: PAIN MEDICINE

## 2022-02-07 PROCEDURE — 3700000000 HC ANESTHESIA ATTENDED CARE: Performed by: PAIN MEDICINE

## 2022-02-07 PROCEDURE — 64635 DESTROY LUMB/SAC FACET JNT: CPT | Performed by: PAIN MEDICINE

## 2022-02-07 PROCEDURE — 3600000057 HC PAIN LEVEL 4 ADDL 15 MIN: Performed by: PAIN MEDICINE

## 2022-02-07 PROCEDURE — 3700000001 HC ADD 15 MINUTES (ANESTHESIA): Performed by: PAIN MEDICINE

## 2022-02-07 PROCEDURE — 3600000056 HC PAIN LEVEL 4 BASE: Performed by: PAIN MEDICINE

## 2022-02-07 PROCEDURE — 2500000003 HC RX 250 WO HCPCS: Performed by: NURSE ANESTHETIST, CERTIFIED REGISTERED

## 2022-02-07 RX ORDER — BUPIVACAINE HYDROCHLORIDE 2.5 MG/ML
INJECTION, SOLUTION EPIDURAL; INFILTRATION; INTRACAUDAL PRN
Status: DISCONTINUED | OUTPATIENT
Start: 2022-02-07 | End: 2022-02-07 | Stop reason: ALTCHOICE

## 2022-02-07 RX ORDER — LIDOCAINE HYDROCHLORIDE 10 MG/ML
INJECTION, SOLUTION EPIDURAL; INFILTRATION; INTRACAUDAL; PERINEURAL PRN
Status: DISCONTINUED | OUTPATIENT
Start: 2022-02-07 | End: 2022-02-07 | Stop reason: ALTCHOICE

## 2022-02-07 RX ORDER — FENTANYL CITRATE 50 UG/ML
INJECTION, SOLUTION INTRAMUSCULAR; INTRAVENOUS PRN
Status: DISCONTINUED | OUTPATIENT
Start: 2022-02-07 | End: 2022-02-07 | Stop reason: SDUPTHER

## 2022-02-07 RX ORDER — PROPOFOL 10 MG/ML
INJECTION, EMULSION INTRAVENOUS PRN
Status: DISCONTINUED | OUTPATIENT
Start: 2022-02-07 | End: 2022-02-07 | Stop reason: SDUPTHER

## 2022-02-07 RX ORDER — METHYLPREDNISOLONE ACETATE 80 MG/ML
INJECTION, SUSPENSION INTRA-ARTICULAR; INTRALESIONAL; INTRAMUSCULAR; SOFT TISSUE PRN
Status: DISCONTINUED | OUTPATIENT
Start: 2022-02-07 | End: 2022-02-07 | Stop reason: ALTCHOICE

## 2022-02-07 RX ORDER — LIDOCAINE HYDROCHLORIDE 20 MG/ML
INJECTION, SOLUTION EPIDURAL; INFILTRATION; INTRACAUDAL; PERINEURAL PRN
Status: DISCONTINUED | OUTPATIENT
Start: 2022-02-07 | End: 2022-02-07 | Stop reason: SDUPTHER

## 2022-02-07 RX ADMIN — PROPOFOL 100 MG: 10 INJECTION, EMULSION INTRAVENOUS at 12:48

## 2022-02-07 RX ADMIN — LIDOCAINE HYDROCHLORIDE 40 MG: 20 INJECTION, SOLUTION EPIDURAL; INFILTRATION; INTRACAUDAL; PERINEURAL at 12:25

## 2022-02-07 RX ADMIN — FENTANYL CITRATE 100 MCG: 50 INJECTION, SOLUTION INTRAMUSCULAR; INTRAVENOUS at 12:25

## 2022-02-07 ASSESSMENT — PULMONARY FUNCTION TESTS
PIF_VALUE: 0

## 2022-02-07 ASSESSMENT — PAIN SCALES - GENERAL: PAINLEVEL_OUTOF10: 2

## 2022-02-07 ASSESSMENT — PAIN - FUNCTIONAL ASSESSMENT: PAIN_FUNCTIONAL_ASSESSMENT: 0-10

## 2022-02-07 NOTE — ANESTHESIA PRE PROCEDURE
Department of Anesthesiology  Preprocedure Note       Name:  Fernando Henry   Age:  45 y.o.  :  1970                                          MRN:  [id number]         Date:  2022      Surgeon: Anna Solis):  Caroline Sood MD    Procedure: Procedure(s):  bilateral L-facet RFA @ L4-5 and L5-S1    Medications prior to admission:   Prior to Admission medications    Medication Sig Start Date End Date Taking? Authorizing Provider   HYDROcodone-acetaminophen (NORCO) 5-325 MG per tablet Take 1 tablet by mouth every 8 hours as needed for Pain for up to 30 days. 2/3/22 3/5/22 Yes Kirk RotundaWALT CNP   potassium citrate (UROCIT-K) 10 MEQ (1080 MG) extended release tablet TAKE 1 TABLET BY MOUTH EVERY MORNING WITH BREAKFAST 21  Yes Maira Organ Overbrook, APRN - CNP   allopurinol (ZYLOPRIM) 300 MG tablet TAKE 1 TABLET BY MOUTH EVERY DAY 21  Yes WALT Dunham CNP   tamsulosin (FLOMAX) 0.4 MG capsule TAKE 1 CAPSULE BY MOUTH EVERY DAY 21  Yes WALT Dunham CNP   traZODone (DESYREL) 50 MG tablet Take 50 mg by mouth nightly   Yes Historical Provider, MD   ARIPiprazole (ABILIFY) 2 MG tablet Take 2 mg by mouth 2 times daily   Yes Historical Provider, MD   citalopram (CELEXA) 40 MG tablet Take 40 mg by mouth daily. Yes Historical Provider, MD   busPIRone (BUSPAR) 10 MG tablet Take 30 mg by mouth 2 times daily    Yes Historical Provider, MD   omeprazole (PRILOSEC) 20 MG capsule Take 20 mg by mouth daily.      Yes Historical Provider, MD   naloxone 4 MG/0.1ML LIQD nasal spray 1 spray by Nasal route as needed for Opioid Reversal 21   WALT Currie CNP   apixaban (ELIQUIS) 2.5 MG TABS tablet Take 2.5 mg by mouth 2 times daily    Historical Provider, MD   triamcinolone (KENALOG) 0.1 % cream  19   Historical Provider, MD   [address] [place] [address]  MCG/ACT inhaler  19   Historical Provider, MD       Current medications:    No current facility-administered medications for this encounter. Allergies: Allergies   Allergen Reactions    Latex Rash    Codeine Hives     TYLENOL WITH CODEINE    Nickel Rash    Oxybutynin Chloride [Oxybutynin Chloride] Other (See Comments)     Warm feeling and extreme dry mouth       Problem List:    Patient Active Problem List   Diagnosis Code    Weak urinary stream R39.12    Obstructive sleep apnea G47.33    Snoring R06.83    Sleep disturbance G47.9    Insomnia G47.00    Sleep talking G47.8    Morning headache R51.9    Bruxism F45.8    Restless sleeper G47.9    Poor concentration R41.840    Claustrophobia F40.240    Vivid dream R68.89    GERD (gastroesophageal reflux disease) K21.9    Anxiety F41.9    Panic disorder F41.0    Obesity (BMI 30.0-34. 9) E66.9    Lumbar spondylosis M47.816    Bulging lumbar disc M51.26    Sacroiliac inflammation (HCC) M46.1    Lumbar radiculitis M54.16    Thoracic spinal stenosis M48.04       Past Medical History:        Diagnosis Date    Chronic back pain     GERD (gastroesophageal reflux disease)     History of kidney stones     Hx of blood clots early 2000's & 2013    MUNA LUNGS    Kidney stone     Lumbar spondylosis        Past Surgical History:        Procedure Laterality Date    CERVICAL SPINE SURGERY Right 12/15/2020    Left TESI @ T11 performed by Melvin Gonzales MD at [address] [place] [address]      last one 2007???    CYSTOSCOPY  6/14/2013    URETEROSCOPY STONE REMOVAL    CYSTOSCOPY  07/29/2013    Left ureteroscopy, Left ureteral stone removal and stent exchange    CYSTOSCOPY  12/23/13    Cystoscopy, Left Optical Internal Ureterotomy, Left Ureteroscopy and Dilated UPJ Oscar Romero  2007    lasik    [facility] Left 5/17/2019    SI MBB #1 left performed by Melvin Gonzales MD at [address] [place] [address] 6/27/2019    Left SI MBB # 2. performed by Daylin Reyes MD at [address]  2007    left groin    LITHOTRIPSY  2006    LUMBAR NERVE BLOCK N/A 4/5/2019    LUMBAR INTER LAMINAR GUNNAR @ L4 performed by Daylin Reyes MD at [address] SURGERY Left 8/15/2019    Left SI RFA.  performed by Daylin Reyes MD at [facility] 98 Right 10/31/2019    LUMBAR INTER LAMINAR GUNNAR @ L4 Right performed by Daylin Reyes MD at [facility] 104 Bilateral 03/26/2018    Lumbar Facet MBB at L4-5, L5-S1    PAIN MANAGEMENT PROCEDURE Left 1/16/2020    Lumbar RFA left side L4-5,5-S1 performed by Daylin Reyes MD at [facility] 113 Right 3/5/2020    Lumbar RFA Right side@ L4-5,5-S1 performed by Daylin Reyes MD at [facility] 113 Left 7/2/2020    Left SI RFA performed by Daylin Reyes MD at [facility] 113 Left 8/4/2020    Left L-facet RFA @ L4-5 and L5-S1 performed by Daylin Reyes MD at [facility] 113 Left 1/21/2021    left SI RFA performed by Daylin Reyes MD at [place] [address] Left 3/15/2021    Left L-facet RFA @ L4-5 and L5-S1 performed by Daylin Reyes MD at [place] [address] Right 4/29/2021    Right L-facet RFA @ L4-5 and L5-S1 performed by Daylin Reyes MD at [address] [place] [address] DX/THER AGNT PARAVERT FACET JOINT, LUMBAR/SAC, 2ND LEVEL Bilateral 3/26/2018    BILATERAL L-FACET MBB @ L4-5 and L5-S1, performed by Daylin Reyes MD at [address] [place] [address] DX/THER AGNT PARAVERT FACET JOINT, LUMBAR/SAC, 2ND LEVEL Bilateral 5/21/2018    Bilateral L-facet MBB @ L4-5, L5-S1. performed by Toni Bey MD Adrianna at [address] Left 2018    LUMBAR RFA @ L4-5, and L5-S1 LEFT SIDE FIRST. Linwood Seymour performed by Sherly Araujo MD at [address] Right 2018    Right L-RFA @ L4-5, and L5-S1 performed by Sherly Araujo MD at [address] [place] [address]         Social History:    Social History     Tobacco Use    Smoking status: Former Smoker     Packs/day: 1.00     Years: 12.00     Pack years: 12.00     Quit date: 2005     Years since quittin.6    Smokeless tobacco: Never Used   Substance Use Topics    Alcohol use: No                                Counseling given: Not Answered      Vital Signs (Current):   Vitals:    22 1150   BP: (!) 133/91   Pulse: 82   Resp: 16   Temp: 97.2 °F (36.2 °C)   TempSrc: Skin   SpO2: 94%   Weight: 253 lb 12.8 oz (115.1 kg)   Height: 5' 10\" (1.778 m)                                              BP Readings from Last 3 Encounters:   22 (!) 133/91   22 120/82   21 122/80       NPO Status: Time of last liquid consumption: 2200                        Time of last solid consumption: 1800                        Date of last liquid consumption: 22                        Date of last solid food consumption: 22    BMI:   Wt Readings from Last 3 Encounters:   22 253 lb 12.8 oz (115.1 kg)   22 262 lb (118.8 kg)   21 262 lb (118.8 kg)     Body mass index is 36.42 kg/m².     CBC:   Lab Results   Component Value Date    WBC 7.8 2021    WBC 8.4 2021    RBC 4.63 2021    HGB 14.1 2021    HCT 43.2 2021    MCV 93 2021    RDW 13.3 2021     2021     2021       CMP:   Lab Results   Component Value Date     2021    K 4.1 2021    K 3.8 2020     2021    CO2 30 2021    BUN 12 2021    CREATININE 1.11 09/08/2021    AGRATIO 2.1 07/27/2019    LABGLOM >90 08/27/2021    GLUCOSE 109 09/08/2021    PROT 6.6 09/08/2021    CALCIUM 9.3 09/08/2021    BILITOT 1.0 09/08/2021    ALKPHOS 113 09/08/2021    ALKPHOS 75 07/27/2019    AST 61 09/08/2021    ALT 53 09/08/2021       POC Tests: No results for input(s): POCGLU, POCNA, POCK, POCCL, POCBUN, POCHEMO, POCHCT in the last 72 hours. Coags:   Lab Results   Component Value Date    PROTIME 12.5 08/02/2021    INR 1.09 08/02/2021    APTT 32.1 06/14/2013       HCG (If Applicable): No results found for: PREGTESTUR, PREGSERUM, HCG, HCGQUANT     ABGs: No results found for: PHART, PO2ART, FPN9IKN, IQQ3SBX, BEART, P7GOISUZ     Type & Screen (If Applicable):  No results found for: LABABO, LABRH    Drug/Infectious Status (If Applicable):  No results found for: HIV, HEPCAB    COVID-19 Screening (If Applicable):   Lab Results   Component Value Date    COVID19 Detected 10/16/2020           Anesthesia Evaluation  Patient summary reviewed and Nursing notes reviewed no history of anesthetic complications:   Airway: Mallampati: II        Dental:          Pulmonary: breath sounds clear to auscultation  (+) sleep apnea:                             Cardiovascular:  Exercise tolerance: no interval change,           Rhythm: regular  Rate: normal                    Neuro/Psych:   (+) neuromuscular disease:, headaches:, psychiatric history:            GI/Hepatic/Renal:   (+) GERD:,           Endo/Other:    (+) : arthritis:., .                 Abdominal:             Vascular: Other Findings:             Anesthesia Plan      MAC     ASA 2       Induction: intravenous. Anesthetic plan and risks discussed with patient. Plan discussed with CRNA.                   Abraham Kilgore DO   2/7/2022

## 2022-02-07 NOTE — H&P
H&P    Patient here with complaints of pain in low back across at belt line and into left Si area- constant stabbing, pressure pain and also stiff and aching. Feels that L-facet RFA has worn off      Insurance denied left SI RFA that was ordered last visit  Has intermittent pain down left leg posteriorly to foot, denies today when gets it feels \"like a bunch of needles going down my leg\".      Pain increases with bending, lifting, twisting , walking, standing, stairs, getting up and down and housework or working at job, weather changes     Had 1 urgent care visit on [place] even and was given amoxicillin for URI- states that with that Cephus Combe was not as effective. Normally it is. Has been taking more d/tincreased pain and was educated about that,         Medications reviewed. Patient denies side effects with medications. Patient states he is taking medications as prescribed. Hedenies receiving pain medications from other sources. He had 1 urgent care visist since last visit       Pain scale with out pain medications or at its worst is 8/10. Pain scale with pain medications or at its best is 4-8/10. Last dose of Norco was yesterday   Drug screen reviewed from 9/20/2021 and was appropriate  Pill count completed  today and WNL: Yes        The patientis allergic to latex, codeine, nickel, and oxybutynin chloride [oxybutynin chloride].       Subjective:      Review of Systems   Constitutional: Negative. HENT: Negative. Respiratory: Negative. Musculoskeletal: Positive for arthralgias, back pain and myalgias. Negative for gait problem and joint swelling. Ambulating without assist devices    Skin: Negative. Neurological: Negative for weakness and numbness. Psychiatric/Behavioral: Negative.          Objective:      Vitals       Vitals:     01/18/22 0851   BP: 120/82   Weight: 262 lb (118.8 kg)   Height: 5' 10\" (1.778 m)            Physical Exam  Vitals and nursing note reviewed.    Constitutional: General: He is not in acute distress. Appearance: He is well-developed. He is not diaphoretic. HENT:      Head: Normocephalic and atraumatic. Right Ear: External ear normal.      Left Ear: External ear normal.      Nose: Nose normal.      Mouth/Throat:      Mouth: Mucous membranes are moist.      Pharynx: Oropharynx is clear. No oropharyngeal exudate. Eyes:      General: No scleral icterus. Right eye: No discharge. Left eye: No discharge. Conjunctiva/sclera: Conjunctivae normal.      Pupils: Pupils are equal, round, and reactive to light. Neck:      Thyroid: No thyromegaly. Cardiovascular:      Rate and Rhythm: Normal rate and regular rhythm. Heart sounds: Normal heart sounds. No murmur heard. No friction rub. No gallop. Pulmonary:      Effort: Pulmonary effort is normal. No respiratory distress. Breath sounds: Normal breath sounds. No wheezing or rales. Chest:      Chest wall: No tenderness. Abdominal:      General: Bowel sounds are normal. There is no distension. Palpations: Abdomen is soft. Tenderness: There is no abdominal tenderness. There is no guarding or rebound. Musculoskeletal:         General: Tenderness present. Right shoulder: Normal.      Left shoulder: Normal.      Cervical back: Full passive range of motion without pain, normal range of motion and neck supple. No edema, erythema or rigidity. No muscular tenderness. Normal range of motion. Thoracic back: Tenderness and bony tenderness present. Normal range of motion. Lumbar back: Spasms, tenderness and bony tenderness present. Decreased range of motion. Negative right straight leg raise test and negative left straight leg raise test.        Back:       Right hip: Tenderness and bony tenderness present. Left hip: Tenderness and bony tenderness present. Right upper leg: Tenderness present. Left upper leg: Tenderness present.       Right knee: Normal. Left knee: Normal.      Right lower leg: No edema. Left lower leg: No edema. Skin:     General: Skin is warm. Coloration: Skin is not pale. Findings: No erythema or rash. Neurological:      General: No focal deficit present. Mental Status: He is alert and oriented to person, place, and time. Mental status is at baseline. He is not disoriented. Cranial Nerves: No cranial nerve deficit. Sensory: No sensory deficit. Motor: No atrophy or abnormal muscle tone. Coordination: Coordination normal.      Gait: Gait normal.      Deep Tendon Reflexes: Reflexes are normal and symmetric. Babinski sign absent on the right side. Reflex Scores:       Tricep reflexes are 2+ on the right side and 2+ on the left side. Bicep reflexes are 2+ on the right side and 2+ on the left side. Brachioradialis reflexes are 2+ on the right side and 2+ on the left side. Patellar reflexes are 2+ on the right side and 2+ on the left side. Achilles reflexes are 2+ on the right side and 2+ on the left side. Psychiatric:         Attention and Perception: Attention normal. He is attentive. Mood and Affect: Mood normal. Mood is not anxious or depressed. Affect is not labile, blunt, angry or inappropriate. Speech: Speech normal. He is communicative. Speech is not rapid and pressured, delayed, slurred or tangential.         Behavior: Behavior normal. Behavior is not agitated, slowed, aggressive, withdrawn, hyperactive or combative. Thought Content: Thought content normal. Thought content is not paranoid or delusional. Thought content does not include homicidal or suicidal ideation. Thought content does not include homicidal or suicidal plan. Cognition and Memory: Cognition normal. Memory is not impaired. He does not exhibit impaired recent memory or impaired remote memory. Judgment: Judgment normal. Judgment is not impulsive or inappropriate.       CORETTA  Patricks test  positive left   Yeoman's  Positive left   Gaenslen's  Positive left         Assessment:      1. Spondylosis of lumbar region without myelopathy or radiculopathy    2. SI (sacroiliac) pain    3. Lumbosacral radiculitis    4. Bulge of lumbar disc without myelopathy    5. Chronic pain syndrome    6. Chronic, continuous use of opioids       Plan:      · OARRS reviewed. Current MED: 15.00  · Patient was offered naloxone for home. · Discussed long term side effects of medications, tolerance, dependency and addiction. · Previous UDS reviewed  · UDS preformed today for compliance. · Patient told can not receive any pain medications from any other source. · No evidence of abuse, diversion or aberrant behavior. · Medications and/or procedures to improve function and quality of life- patient understanding with this and that may not be pain free  · Discussed with patient about safe storage of medications at home  · Discussed possible weaning of medication dosing dependent on treatment/procedure results. · Discussed with patient about risks with procedure including infection, reaction to medication, increased pain, or bleeding. · Procedure notes reviewed in detail  · Patient had great SI RFA in past with over 85% relief for over 8 months but now his insurance wont cover it. · Plan to repeat bilateral L-facet RFA @ L4-5 and L5-S1 for longer term pain relief. Procedure discussed in detail with patient. · Needs to be cleared to be off Eliquis for 3 days prior to procedure   · Continue Norco 5/325 TID prn- Filled 1/4/2021 a nd still has plenty.  Instructed he needs to take as prescribed   · Updated UDS ordered today              Return for bilateral L-facet RFA @ L4-5 and L5-S1. , follow up after procedure.

## 2022-02-07 NOTE — ANESTHESIA POSTPROCEDURE EVALUATION
Department of Anesthesiology  Postprocedure Note    Patient: Nnamdi Do  MRN: [id number]  YOB: 1970  Date of evaluation: 2/7/2022  Time:  1:16 PM     Procedure Summary     Date: 02/07/22 Room / Location: [address] [place] [address] 03 / Ember Hamm    Anesthesia Start: 5332 Anesthesia Stop: 5451    Procedure: bilateral L-facet RFA @ L4-5 and L5-S1 (Bilateral Back) Diagnosis: (lumbar spondylosis)    Surgeons: Liudmila Grant MD Responsible Provider: Armand Hatchet, DO    Anesthesia Type: MAC ASA Status: 2          Anesthesia Type: MAC    Angelina Phase I:      Angelina Phase II: Angelina Score: 10    Last vitals: Reviewed and per EMR flowsheets.        Anesthesia Post Evaluation    Patient location during evaluation: bedside  Patient participation: complete - patient participated  Level of consciousness: awake  Airway patency: patent  Nausea & Vomiting: no nausea  Complications: no  Cardiovascular status: hemodynamically stable  Respiratory status: acceptable  Hydration status: stable

## 2022-02-07 NOTE — PROGRESS NOTES
1257 Patient to phase 2 from surgery, report from [facility]. Patient alert, oriented, appropriate. Denies any pain at this time. Injection site clean, dry, intact. Vitals stable, patient on room air. 1300 Snack and drink given per patient preference. Call light in reach. 1325 IV taken out and dressing applied with no complications.  called and instructed to pull up to discharge doors.    1335 Patient walked to discharge doors, all questions answered regarding AVS.

## 2022-02-07 NOTE — H&P
[address] [place] [address]  History and Physical Update    Pt Name: Keven Conway  MRN: [id number]  YOB: 1970  Date of evaluation: 2/7/2022      I have examined the patient and reviewed the H&P/Consult and there are no changes to the patient or plans.         Electronically signed by Olya Reed MD on 2/7/2022 at 12:16 PM

## 2022-02-07 NOTE — OP NOTE
Pre-Procedure Note    Patient Name: Jorge A Balderas   YOB: 1970  Medical Record Number: [id number]  Date: 2/7/22    Indication: Lower back pain  Consent: On file. Vital Signs:   Vitals:    02/07/22 1150   BP: (!) 133/91   Pulse: 82   Resp: 16   Temp: 97.2 °F (36.2 °C)   SpO2: 94%       Past Medical History:   has a past medical history of Chronic back pain, GERD (gastroesophageal reflux disease), History of kidney stones, Hx of blood clots, Kidney stone, and Lumbar spondylosis. Past Surgical History:   has a past surgical history that includes Ureter stent placement (2006); Lithotripsy (2006); Cystocopy (6/14/2013); Cystocopy (07/29/2013); Cystoscopy (12/23/13); other surgical history (Bilateral, 03/26/2018); pr inj dx/ther agnt paravert facet joint, lumbar/sac, 2nd level (Bilateral, 3/26/2018); pr inj dx/ther agnt paravert facet joint, lumbar/sac, 2nd level (Bilateral, 5/21/2018); Colonoscopy; eye surgery (2007); hernia repair (2007); pr inject rx other periph nerve (Left, 9/28/2018); pr inject rx other periph nerve (Right, 11/30/2018); lumbar nerve block (N/A, 4/5/2019); Injection Procedure For Sacroiliac Joint (Left, 5/17/2019); Injection Procedure For Sacroiliac Joint (Left, 6/27/2019); Lumbar spine surgery (Left, 8/15/2019); Nerve Surgery (Right, 10/31/2019); Pain management procedure (Left, 1/16/2020); Pain management procedure (Right, 3/5/2020); Pain management procedure (Left, 7/2/2020); Pain management procedure (Left, 8/4/2020); Cervical spine surgery (Right, 12/15/2020); Pain management procedure (Left, 1/21/2021); Pain management procedure (Left, 3/15/2021); and Pain management procedure (Right, 4/29/2021). Pre-Sedation Documentation and Exam:   Vital signs have been reviewed (see flow sheet for vitals).      Sedation/ Anesthesia Plan:   MAC    Patient is an appropriate candidate for plan of sedation: yes        Preoperative Diagnosis: L-spondylosis     Post-Op Dx: as above     Procedure Performed:  :Radiofrequency ablation of median branches at the levels of L4-5 and L5-S1 bilateral under fluoroscopic guidance      Indication for the Procedure:  The patient has ahistory of chronic low back pain that is not responding well to the conservative treatment.  Patient's pain is mostly axial in nature.  Pain is interfering with the activities of daily living.  Physical examination revealed facet tenderness and facet loading is positive.  Patient had undergone lumbar facet joint injections with pain relief that lasted for only a short period of time and had greater than 70% pain relief with confirmatory median branch blocks.  Hence we decided to do radiofrequency abalation of median branches for long term pain releif.   The procedure and risks  were discussed with the patient and an informed consent was obtained.     Procedure:     A meaningful communication was kept up with the patient throughout the procedure. The patient is placed in prone position and skin over the back was prepped and draped in sterile manner.  Then using fluoroscopy the junction of the transverse process of the vertebra with the superior process of the facet joint was observed and the view was optimized.  The skin and deep tissues posterior were infiltrated with 20 ml of  1% xylocaine. The RF canula with the 15 mm active tip was introduced through the skin wheal under fluoroscopy guidance such that the tip of the needle lies in the groove of the transverse process with the superior processes of the facet joint.  Then a lateral view of the lumbar spine was obtained to make sure the tip of needle is not in the neural foramen.  Then electric impedence was checked to make sure it is acceptable. Then a sensory stimulus was applied at 50 Hz up to 1 volt and concordant pain symptoms were reproduced.  Then a motor stimulus was applied at 2 Hz up to 2 volts and no motor stimulation was seen in lower extremities.  Some multifidus stimulus was seen.  Then after negative aspiration a mixture of depomedrol  80 mg and 0.25% marcaine 1 cc  was injected through the needle. Then a  lesion was done at 80 degrees centigrade for 90 seconds. For L5 median branch block the junction of the ala of  the sacrum with the superior articular process of the facet joint was taken as a reference point.  For the L4 median branch the junction of the transverse process of L5 with the superolateral possible facet joint was taken as a reference point and for S1 median branch the most lateral and superior aspect of S1 foramina was taken as a reference point,.      EBL-0   Patient's vital signs and neurological status remained stable throughout the procedure and post procedural period.  The patient tolerated the procedure well and was discharged home in stable condition.       Electronically signed by Xenia Odom MD on 2/7/22 at 12:26 PM EST

## 2022-02-28 DIAGNOSIS — G89.4 CHRONIC PAIN SYNDROME: ICD-10-CM

## 2022-02-28 RX ORDER — HYDROCODONE BITARTRATE AND ACETAMINOPHEN 5; 325 MG/1; MG/1
1 TABLET ORAL EVERY 8 HOURS PRN
Qty: 90 TABLET | Refills: 0 | Status: SHIPPED | OUTPATIENT
Start: 2022-03-05 | End: 2022-04-04 | Stop reason: SDUPTHER

## 2022-02-28 NOTE — TELEPHONE ENCOUNTER
OARRS reviewed. UDS: + for  Hydrocodone, Trazodone, Escitalopram/Citalopram -consistent. Last seen: 1/18/2022.  Follow-up: 3/7/2022

## 2022-02-28 NOTE — TELEPHONE ENCOUNTER
Jorge A Balderas called requesting a refill on the following medications:  Requested Prescriptions     Pending Prescriptions Disp Refills    HYDROcodone-acetaminophen (NORCO) 5-325 MG per tablet 90 tablet 0     Sig: Take 1 tablet by mouth every 8 hours as needed for Pain for up to 30 days. Pharmacy verified: [facility] in [address] [place] [address]   . pv      Date of last visit: 1/18/2022  Date of next visit (if applicable): 6/2/3467

## 2022-03-07 ENCOUNTER — OFFICE VISIT (OUTPATIENT)
Dept: PHYSICAL MEDICINE AND REHAB | Age: 52
End: 2022-03-07
Payer: COMMERCIAL

## 2022-03-07 VITALS
HEIGHT: 70 IN | BODY MASS INDEX: 36.22 KG/M2 | WEIGHT: 253 LBS | DIASTOLIC BLOOD PRESSURE: 90 MMHG | SYSTOLIC BLOOD PRESSURE: 124 MMHG

## 2022-03-07 DIAGNOSIS — M54.9 MID BACK PAIN: ICD-10-CM

## 2022-03-07 DIAGNOSIS — G89.4 CHRONIC PAIN SYNDROME: ICD-10-CM

## 2022-03-07 DIAGNOSIS — M47.816 SPONDYLOSIS OF LUMBAR REGION WITHOUT MYELOPATHY OR RADICULOPATHY: Primary | ICD-10-CM

## 2022-03-07 DIAGNOSIS — M51.36 BULGE OF LUMBAR DISC WITHOUT MYELOPATHY: ICD-10-CM

## 2022-03-07 DIAGNOSIS — F11.90 CHRONIC, CONTINUOUS USE OF OPIOIDS: ICD-10-CM

## 2022-03-07 DIAGNOSIS — M53.3 SI (SACROILIAC) PAIN: ICD-10-CM

## 2022-03-07 DIAGNOSIS — M48.04 THORACIC STENOSIS: ICD-10-CM

## 2022-03-07 DIAGNOSIS — M54.17 LUMBOSACRAL RADICULITIS: ICD-10-CM

## 2022-03-07 PROCEDURE — 99214 OFFICE O/P EST MOD 30 MIN: CPT | Performed by: NURSE PRACTITIONER

## 2022-03-07 RX ORDER — CYCLOBENZAPRINE HCL 5 MG
5 TABLET ORAL 2 TIMES DAILY PRN
Qty: 30 TABLET | Refills: 0 | Status: SHIPPED | OUTPATIENT
Start: 2022-03-07 | End: 2022-03-17

## 2022-03-07 RX ORDER — RIVAROXABAN 20 MG/1
TABLET, FILM COATED ORAL
COMMUNITY
Start: 2022-03-01

## 2022-03-07 ASSESSMENT — ENCOUNTER SYMPTOMS
RESPIRATORY NEGATIVE: 1
BACK PAIN: 1

## 2022-03-07 NOTE — PROGRESS NOTES
901 [facility] [address]  Dept: [phone]  Dept Fax: [phone]: [phone]    Visit Date: 3/7/2022    Functionality Assessment/Goals Worksheet     On a scale of 0 (Does not Interfere) to 10 (Completely Interferes)     1. Which number describes how during the past week pain has interfered with       the following:  A. General Activity:  4  B. Mood: 7  C. Walking Ability:  6  D. Normal Work (Includes both work outside the home and housework):  4  E. Relations with Other People:   4  F. Sleep:   6  G. Enjoyment of Life:   4    2. Patient Prefers to Take their Pain Medications:     []  On a regular basis   [x]  Only when necessary    []  Does not take pain medications    3. What are the Patient's Goals/Expectations for Visiting Pain Management? []  Learn about my pain    [x]  Receive Medication   []  Physical Therapy     []  Treat Depression   [x]  Receive Injections    []  Treat Sleep   [x]  Deal with Anxiety and Stress   []  Treat Opoid Dependence/Addiction   []  Other:      HPI:   Rebecca Tian is a 45 y.o. male is here today for    Chief Complaint: Low back pain, SI pain, mid back pain     HPI   FU from bilateral L-facet RFA from 2/7/2022. Receiving about 85% relief of pain in low back from bilateral L-facet RFA. Pain is much better in lumbar spine. Main complaint is pain in left SI area/ buttocks sharp and stabbing pain which is up and down in intensity and worse with movement and activity. Getting a lot of spasms in this area     Has complaints in mid back yina side as he feels that his previous thoracic epidural might be wearing off. Denies any radicular pain today   Pain increases with bending, lifting, twisting , walking, standing, getting up and down and housework or working at job. Norco prn continues to help. Medications reviewed. Patient denies side effects with medications. Patient states he is taking medications as prescribed. Hedenies receiving pain medications from other sources. He denies any ER visits since last visit. Pain scale with out pain medications or at its worst is 7/10. Pain scale with pain medications or at its best is 3/10. Last dose of Norco was last night    Drug screen reviewed from 1/18/2022 and was appropriate  Pill count completed  today and WNL: Yes      The patientis allergic to latex, codeine, nickel, and oxybutynin chloride [oxybutynin chloride]. Subjective:      Review of Systems   Constitutional: Negative. HENT: Negative. Respiratory: Negative. Musculoskeletal: Positive for arthralgias, back pain and myalgias. Negative for gait problem and joint swelling. Ambulating without assist devices    Skin: Negative. Neurological: Negative for weakness and numbness. Psychiatric/Behavioral: Negative for decreased concentration. The patient is nervous/anxious. Objective:     Vitals:    03/07/22 0732   BP: (!) 124/90   Weight: 253 lb (114.8 kg)   Height: 5' 10\" (1.778 m)       Physical Exam  Vitals and nursing note reviewed. Constitutional:       General: He is not in acute distress. Appearance: He is well-developed. He is not diaphoretic. HENT:      Head: Normocephalic and atraumatic. Right Ear: External ear normal.      Left Ear: External ear normal.      Nose: Nose normal.      Mouth/Throat:      Mouth: Mucous membranes are moist.      Pharynx: Oropharynx is clear. No oropharyngeal exudate. Eyes:      General: No scleral icterus. Right eye: No discharge. Left eye: No discharge. Conjunctiva/sclera: Conjunctivae normal.      Pupils: Pupils are equal, round, and reactive to light. Neck:      Thyroid: No thyromegaly. Cardiovascular:      Rate and Rhythm: Normal rate and regular rhythm. Heart sounds: Normal heart sounds. No murmur heard.   No friction rub. No gallop. Pulmonary:      Effort: Pulmonary effort is normal. No respiratory distress. Breath sounds: Normal breath sounds. No wheezing or rales. Chest:      Chest wall: No tenderness. Abdominal:      General: Bowel sounds are normal. There is no distension. Palpations: Abdomen is soft. Tenderness: There is no abdominal tenderness. There is no guarding or rebound. Musculoskeletal:         General: Tenderness present. Right shoulder: Normal.      Left shoulder: Normal.      Cervical back: Full passive range of motion without pain, normal range of motion and neck supple. No edema, erythema or rigidity. No muscular tenderness. Normal range of motion. Thoracic back: Tenderness and bony tenderness present. Decreased range of motion. Lumbar back: Spasms, tenderness and bony tenderness present. Decreased range of motion. Negative right straight leg raise test and negative left straight leg raise test.        Back:       Right hip: Tenderness and bony tenderness present. Left hip: Tenderness and bony tenderness present. Right upper leg: Tenderness present. Left upper leg: Tenderness present. Right knee: Normal.      Left knee: Normal.      Right lower leg: No edema. Left lower leg: No edema. Skin:     General: Skin is warm. Coloration: Skin is not pale. Findings: No erythema or rash. Neurological:      General: No focal deficit present. Mental Status: He is alert and oriented to person, place, and time. Mental status is at baseline. He is not disoriented. Cranial Nerves: No cranial nerve deficit. Sensory: No sensory deficit. Motor: No weakness, atrophy or abnormal muscle tone. Coordination: Coordination normal.      Gait: Gait normal.      Deep Tendon Reflexes: Reflexes are normal and symmetric. Babinski sign absent on the right side.       Reflex Scores:       Tricep reflexes are 2+ on the right side and 2+ on the left side. Bicep reflexes are 2+ on the right side and 2+ on the left side. Brachioradialis reflexes are 2+ on the right side and 2+ on the left side. Patellar reflexes are 2+ on the right side and 2+ on the left side. Achilles reflexes are 2+ on the right side and 2+ on the left side. Psychiatric:         Attention and Perception: Attention normal. He is attentive. Mood and Affect: Mood normal. Mood is not anxious or depressed. Affect is not labile, blunt, angry or inappropriate. Speech: Speech normal. He is communicative. Speech is not rapid and pressured, delayed, slurred or tangential.         Behavior: Behavior normal. Behavior is not agitated, slowed, aggressive, withdrawn, hyperactive or combative. Thought Content: Thought content normal. Thought content is not paranoid or delusional. Thought content does not include homicidal or suicidal ideation. Thought content does not include homicidal or suicidal plan. Cognition and Memory: Cognition normal. Memory is not impaired. He does not exhibit impaired recent memory or impaired remote memory. Judgment: Judgment normal. Judgment is not impulsive or inappropriate. CORETTA  Patricks test  Positive left   Yeoman's  or Gaenslen's positive left          Assessment:     1. Spondylosis of lumbar region without myelopathy or radiculopathy    2. Lumbosacral radiculitis    3. Bulge of lumbar disc without myelopathy    4. SI (sacroiliac) pain    5. Thoracic stenosis    6. Mid back pain    7. Chronic pain syndrome    8. Chronic, continuous use of opioids            Plan:      · OARRS reviewed. Current MED: 15.00  · Patient was offered naloxone for home. · Discussed long term side effects of medications, tolerance, dependency and addiction. · Previous UDS reviewed  · UDS preformed today for compliance. · Patient told can not receive any pain medications from any other source.   · No evidence of abuse, diversion or aberrant behavior.  Medications and/or procedures to improve function and quality of life- patient understanding with this and that may not be pain free   Discussed with patient about safe storage of medications at home   Discussed possible weaning of medication dosing dependent on treatment/procedure results.  Discussed with patient about risks with procedure including infection, reaction to medication, increased pain, or bleeding.  Procedure notes received in detail    Receiving 85% relief of low back pain from L-facet RFA.  Continue Norco 5/325 mg tabs TID prn- filled 3/5/2022   Trial flexeril 5 mg TID prn for muscle spasms- 10 days trial       Meds. Prescribed:   Orders Placed This Encounter   Medications    cyclobenzaprine (FLEXERIL) 5 MG tablet     Sig: Take 1 tablet by mouth 2 times daily as needed for Muscle spasms     Dispense:  30 tablet     Refill:  0       Return in about 2 months (around 5/7/2022), or if symptoms worsen or fail to improve, for follow up  for medications.                Electronically signed by WALT Ibrahim CNP on3/7/2022 at 7:50 AM

## 2022-03-14 ENCOUNTER — TELEPHONE (OUTPATIENT)
Dept: PHYSICAL MEDICINE AND REHAB | Age: 52
End: 2022-03-14

## 2022-03-14 NOTE — TELEPHONE ENCOUNTER
Patient wants to know if Michael Art will change his flexeril dosage  to 10MG.  Please advise patient

## 2022-03-14 NOTE — TELEPHONE ENCOUNTER
Called pt. And instructed that ok to increase Baclofen from 5mg bid prn to 10mg tid prn as per Yuri Garrett NP. Also advised with next refill to let staff know of change. Pt. Verbalized understanding.

## 2022-03-14 NOTE — TELEPHONE ENCOUNTER
Patient is calling in refill request for this medication. He advised that the dosage was changed from 5 mg BID to 10 mg TID.     Lance Kuhn called requesting a refill on the following medications:  Requested Prescriptions     Pending Prescriptions Disp Refills    cyclobenzaprine (FLEXERIL) 5 MG tablet 30 tablet 0     Sig: Take 1 tablet by mouth 2 times daily as needed for Muscle spasms     Pharmacy verified:  .[email or web address]  [facility]/pharmacy [address] [phone]      Date of last visit: 03/07/2022  Date of next visit (if applicable): 8/9/4418

## 2022-03-14 NOTE — TELEPHONE ENCOUNTER
Patient is calling in refill request for this medication. He advised that the dosage was changed from 5 mg BID to 10 mg TID.     Josefina Acosta called requesting a refill on the following medications:  Requested Prescriptions     Pending Prescriptions Disp Refills    cyclobenzaprine (FLEXERIL) 5 MG tablet 30 tablet 0     Sig: Take 1 tablet by mouth 2 times daily as needed for Muscle spasms     Pharmacy verified:  .[email or web address]  [facility]/pharmacy [address] [place], [place]  Ike Martinez [phone]      Date of last visit: 03/07/2022  Date of next visit (if applicable): 5/1/9123

## 2022-03-14 NOTE — TELEPHONE ENCOUNTER
That is okay can trial 10 mg TID prn for spasms.  It can cause some sedation so if any issues can change back

## 2022-03-15 RX ORDER — CYCLOBENZAPRINE HCL 10 MG
10 TABLET ORAL 3 TIMES DAILY PRN
Qty: 30 TABLET | Refills: 0 | Status: SHIPPED | OUTPATIENT
Start: 2022-03-15 | End: 2022-03-29 | Stop reason: SDUPTHER

## 2022-03-15 NOTE — TELEPHONE ENCOUNTER
OARRS reviewed. UDS: + for  Hydrocodone, celexa and trazadone  Last seen: 3/7/2022.  Follow-up:   Future Appointments   Date Time Provider Saul Cummins   5/9/2022  7:45 AM WALT Hall CNP [facility] [address] [place] [address]   9/14/2022  7:45 AM WALT Vickers CNP [address]

## 2022-03-24 RX ORDER — CYCLOBENZAPRINE HCL 10 MG
10 TABLET ORAL 3 TIMES DAILY PRN
Qty: 30 TABLET | Refills: 0 | OUTPATIENT
Start: 2022-03-24 | End: 2022-04-23

## 2022-03-29 RX ORDER — CYCLOBENZAPRINE HCL 10 MG
10 TABLET ORAL 3 TIMES DAILY PRN
Qty: 90 TABLET | Refills: 0 | Status: SHIPPED | OUTPATIENT
Start: 2022-03-29 | End: 2022-04-25

## 2022-03-29 NOTE — TELEPHONE ENCOUNTER
OARRS reviewed. UDS: + for  .trazadone celexa and trazadone   Last seen: 3/7/2022.  Follow-up:   Future Appointments   Date Time Provider Saul Cummins   5/9/2022  7:45 AM WALT Crowder CNP [facility] [address]   9/14/2022  7:45 AM WALT Caceres CNP [address] [facility] [address]   script changed as per note 3/14 to 10mg tid

## 2022-04-04 DIAGNOSIS — G89.4 CHRONIC PAIN SYNDROME: ICD-10-CM

## 2022-04-04 RX ORDER — HYDROCODONE BITARTRATE AND ACETAMINOPHEN 5; 325 MG/1; MG/1
1 TABLET ORAL EVERY 8 HOURS PRN
Qty: 90 TABLET | Refills: 0 | Status: SHIPPED | OUTPATIENT
Start: 2022-04-04 | End: 2022-05-02 | Stop reason: SDUPTHER

## 2022-04-04 NOTE — TELEPHONE ENCOUNTER
Ninoska Olguin called requesting a refill on the following medications:  Requested Prescriptions     Pending Prescriptions Disp Refills    HYDROcodone-acetaminophen (NORCO) 5-325 MG per tablet 90 tablet 0     Sig: Take 1 tablet by mouth every 8 hours as needed for Pain for up to 30 days.      [facility]   .pv      Date of last visit:   Date of next visit (if applicable): 1/9/0448

## 2022-04-04 NOTE — TELEPHONE ENCOUNTER
OARRS reviewed. UDS: + for  Hydrocodone, Trazodone, Escitalopram/Citalopram -consistent. Last seen: 3/7/2022.  Follow-up: 5/9/2022

## 2022-04-25 RX ORDER — CYCLOBENZAPRINE HCL 10 MG
10 TABLET ORAL 3 TIMES DAILY PRN
Qty: 90 TABLET | Refills: 0 | Status: SHIPPED | OUTPATIENT
Start: 2022-04-28 | End: 2022-05-23

## 2022-05-02 DIAGNOSIS — G89.4 CHRONIC PAIN SYNDROME: ICD-10-CM

## 2022-05-02 RX ORDER — HYDROCODONE BITARTRATE AND ACETAMINOPHEN 5; 325 MG/1; MG/1
1 TABLET ORAL EVERY 8 HOURS PRN
Qty: 90 TABLET | Refills: 0 | Status: SHIPPED | OUTPATIENT
Start: 2022-05-04 | End: 2022-06-03 | Stop reason: SDUPTHER

## 2022-05-02 NOTE — TELEPHONE ENCOUNTER
OARRS reviewed. UDS: + for  Trazodone, Hydrocodone, Escitalopram/Citalopram -consistent. Last seen: 3/7/2022.  Follow-up: 5/9/2022

## 2022-05-02 NOTE — TELEPHONE ENCOUNTER
Zay Garcia called requesting a refill on the following medications:  Requested Prescriptions     Pending Prescriptions Disp Refills    HYDROcodone-acetaminophen (NORCO) 5-325 MG per tablet 90 tablet 0     Sig: Take 1 tablet by mouth every 8 hours as needed for Pain for up to 30 days.      Pharmacy verified:  .pv  [id number] [address] [phone] Epi Chatman [phone]      Date of last visit: 03/07/2022  Date of next visit (if applicable): 4/3/1695

## 2022-05-09 ENCOUNTER — OFFICE VISIT (OUTPATIENT)
Dept: PHYSICAL MEDICINE AND REHAB | Age: 52
End: 2022-05-09
Payer: COMMERCIAL

## 2022-05-09 VITALS
BODY MASS INDEX: 36.22 KG/M2 | WEIGHT: 253 LBS | HEIGHT: 70 IN | DIASTOLIC BLOOD PRESSURE: 90 MMHG | SYSTOLIC BLOOD PRESSURE: 118 MMHG

## 2022-05-09 DIAGNOSIS — M47.816 SPONDYLOSIS OF LUMBAR REGION WITHOUT MYELOPATHY OR RADICULOPATHY: Primary | ICD-10-CM

## 2022-05-09 DIAGNOSIS — G89.4 CHRONIC PAIN SYNDROME: ICD-10-CM

## 2022-05-09 DIAGNOSIS — M54.9 MID BACK PAIN: ICD-10-CM

## 2022-05-09 DIAGNOSIS — F11.90 CHRONIC, CONTINUOUS USE OF OPIOIDS: ICD-10-CM

## 2022-05-09 DIAGNOSIS — M53.3 SI (SACROILIAC) PAIN: ICD-10-CM

## 2022-05-09 DIAGNOSIS — M51.36 BULGE OF LUMBAR DISC WITHOUT MYELOPATHY: ICD-10-CM

## 2022-05-09 DIAGNOSIS — M54.17 LUMBOSACRAL RADICULITIS: ICD-10-CM

## 2022-05-09 PROCEDURE — 99214 OFFICE O/P EST MOD 30 MIN: CPT | Performed by: NURSE PRACTITIONER

## 2022-05-09 ASSESSMENT — ENCOUNTER SYMPTOMS
RESPIRATORY NEGATIVE: 1
BACK PAIN: 1

## 2022-05-09 NOTE — PROGRESS NOTES
901 [facility] [address]  Dept: [phone]  Dept Fax: [phone]: [phone]    Visit Date: 5/9/2022    Functionality Assessment/Goals Worksheet     On a scale of 0 (Does not Interfere) to 10 (Completely Interferes)     1. Which number describes how during the past week pain has interfered with       the following:  A. General Activity:  3  B. Mood: 4  C. Walking Ability:  3  D. Normal Work (Includes both work outside the home and housework):  3  E. Relations with Other People:   2  F. Sleep:   3  G. Enjoyment of Life:   3    2. Patient Prefers to Take their Pain Medications:     []  On a regular basis   [x]  Only when necessary    []  Does not take pain medications    3. What are the Patient's Goals/Expectations for Visiting Pain Management? [x]  Learn about my pain    [x]  Receive Medication   []  Physical Therapy     []  Treat Depression   [x]  Receive Injections    []  Treat Sleep   []  Deal with Anxiety and Stress   []  Treat Opoid Dependence/Addiction   []  Other:      HPI:   Perla Aguilar is a 45 y.o. male is here today for    Chief Complaint: low back pain, SI pain   HPI   2 month FU. Main complaint today is pain in left SI area/ buttocks- sharp aching and periods of pressure and stabbing pains. Pain up and down but overall tolerable with medications. States spasms are better controlled with increase in prn Flexeril     Low back pain remains tolerable from L-facet RFA. Not having much pain in mid back today \"better than what it was\"    Pain increases with bending, lifting, walking, standing, getting up and down and laying. Medications remain effective     Medications reviewed. Patient denies side effects with medications. Patient states he is taking medications as prescribed. Hedenies receiving pain medications from other sources.  He denies any ER visits since last visit. Pain scale with out pain medications or at its worst is 7-8/10. 2/10 today  Pain scale with pain medications or at its best is 2/10. Last dose of [place] was Saturday night states bettr pain control lately   Drug screen reviewed from 3/7/2022 and was appropriate  Pill count completed  today and WNL: Yes      The patientis allergic to latex, codeine, nickel, and oxybutynin chloride [oxybutynin chloride]. Subjective:      Review of Systems   Constitutional: Negative. HENT: Negative. Respiratory: Negative. Musculoskeletal: Positive for arthralgias, back pain and myalgias. Negative for gait problem and joint swelling. Ambulating without assist devices    Skin: Negative. Neurological: Negative for weakness and numbness. Psychiatric/Behavioral: Negative for decreased concentration and sleep disturbance. The patient is not nervous/anxious. Objective:     Vitals:    05/09/22 0742   BP: (!) 118/90   Site: Left Upper Arm   Position: Sitting   Weight: 253 lb (114.8 kg)   Height: 5' 10\" (1.778 m)       Physical Exam  Vitals and nursing note reviewed. Constitutional:       General: He is not in acute distress. Appearance: He is well-developed. He is not diaphoretic. HENT:      Head: Normocephalic and atraumatic. Right Ear: External ear normal.      Left Ear: External ear normal.      Nose: Nose normal.      Mouth/Throat:      Mouth: Mucous membranes are moist.      Pharynx: Oropharynx is clear. No oropharyngeal exudate. Eyes:      General: No scleral icterus. Right eye: No discharge. Left eye: No discharge. Conjunctiva/sclera: Conjunctivae normal.      Pupils: Pupils are equal, round, and reactive to light. Neck:      Thyroid: No thyromegaly. Cardiovascular:      Rate and Rhythm: Normal rate and regular rhythm. Heart sounds: Normal heart sounds. No murmur heard. No friction rub. No gallop.     Pulmonary:      Effort: Pulmonary effort is normal. No respiratory distress. Breath sounds: Normal breath sounds. No wheezing or rales. Chest:      Chest wall: No tenderness. Abdominal:      General: Bowel sounds are normal. There is no distension. Palpations: Abdomen is soft. Tenderness: There is no abdominal tenderness. There is no guarding or rebound. Musculoskeletal:         General: Tenderness present. Right shoulder: Normal.      Left shoulder: Normal.      Cervical back: Full passive range of motion without pain, normal range of motion and neck supple. No edema, erythema or rigidity. No muscular tenderness. Normal range of motion. Thoracic back: Tenderness and bony tenderness present. Decreased range of motion. Lumbar back: Spasms, tenderness and bony tenderness present. Decreased range of motion. Negative right straight leg raise test and negative left straight leg raise test.        Back:       Right hip: Tenderness and bony tenderness present. Left hip: Tenderness and bony tenderness present. Right upper leg: Tenderness present. Left upper leg: Tenderness present. Right knee: Normal.      Left knee: Normal.      Right lower leg: No edema. Left lower leg: No edema. Skin:     General: Skin is warm. Coloration: Skin is not pale. Findings: No erythema or rash. Neurological:      General: No focal deficit present. Mental Status: He is alert and oriented to person, place, and time. Mental status is at baseline. He is not disoriented. Cranial Nerves: No cranial nerve deficit. Sensory: No sensory deficit. Motor: No weakness, atrophy or abnormal muscle tone. Coordination: Coordination normal.      Gait: Gait normal.      Deep Tendon Reflexes: Reflexes are normal and symmetric. Babinski sign absent on the right side. Reflex Scores:       Tricep reflexes are 2+ on the right side and 2+ on the left side.        Bicep reflexes are 2+ on the right side and 2+ on the left side. Brachioradialis reflexes are 2+ on the right side and 2+ on the left side. Patellar reflexes are 2+ on the right side and 2+ on the left side. Achilles reflexes are 2+ on the right side and 2+ on the left side. Psychiatric:         Attention and Perception: Attention normal. He is attentive. Mood and Affect: Mood normal. Mood is not anxious or depressed. Affect is not labile, blunt, angry or inappropriate. Speech: Speech normal. He is communicative. Speech is not rapid and pressured, delayed, slurred or tangential.         Behavior: Behavior normal. Behavior is not agitated, slowed, aggressive, withdrawn, hyperactive or combative. Thought Content: Thought content normal. Thought content is not paranoid or delusional. Thought content does not include homicidal or suicidal ideation. Thought content does not include homicidal or suicidal plan. Cognition and Memory: Cognition normal. Memory is not impaired. He does not exhibit impaired recent memory or impaired remote memory. Judgment: Judgment normal. Judgment is not impulsive or inappropriate.          CORETTA  Patricks test  Positive left   Yeoman's  or Gaenslen's positive left        Assessment:     1. Spondylosis of lumbar region without myelopathy or radiculopathy    2. Lumbosacral radiculitis    3. Bulge of lumbar disc without myelopathy    4. SI (sacroiliac) pain    5. Mid back pain    6. Chronic pain syndrome    7. Chronic, continuous use of opioids            Plan:      · OARRS reviewed. Current MED: 15.00  · Patient was offered naloxone for home. · Discussed long term side effects of medications, tolerance, dependency and addiction. · Previous UDS reviewed  · UDS preformed today for compliance. · Patient told can not receive any pain medications from any other source. · No evidence of abuse, diversion or aberrant behavior.    Medications and/or procedures to improve function and quality of life- patient understanding with this and that may not be pain free   Discussed with patient about safe storage of medications at home   Discussed possible weaning of medication dosing dependent on treatment/procedure results.  Discussed with patient about risks with procedure including infection, reaction to medication, increased pain, or bleeding. · Procedure notes reviewed  · Continues to receive over 85% relief of low back pain from L-facet RFA. · Medications remain effective: Continue Norco 5/325 mg tabs TID prn- filled 5/4/2022  · Continue flexeril 10 mg TID prn for muscle spasms- filled 4/28/2022      Meds. Prescribed:   No orders of the defined types were placed in this encounter. Return in about 2 months (around 7/9/2022), or if symptoms worsen or fail to improve, for follow up  for medications.                Electronically signed by WALT Casey CNP on5/9/2022 at 7:52 AM

## 2022-05-23 RX ORDER — CYCLOBENZAPRINE HCL 10 MG
10 TABLET ORAL 3 TIMES DAILY PRN
Qty: 90 TABLET | Refills: 0 | Status: SHIPPED | OUTPATIENT
Start: 2022-05-28 | End: 2022-06-28 | Stop reason: SDUPTHER

## 2022-05-23 NOTE — TELEPHONE ENCOUNTER
OARRS reviewed. UDS: + for  Escitalopram/Citalopram, Hydrocodone -consistent. Last seen: 5/9/2022.  Follow-up: 7/11/2022

## 2022-06-01 DIAGNOSIS — G89.4 CHRONIC PAIN SYNDROME: ICD-10-CM

## 2022-06-01 NOTE — TELEPHONE ENCOUNTER
Lucianne Fabry called requesting a refill on the following medications:  Requested Prescriptions     Pending Prescriptions Disp Refills    HYDROcodone-acetaminophen (NORCO) 5-325 MG per tablet 90 tablet 0     Sig: Take 1 tablet by mouth every 8 hours as needed for Pain for up to 30 days. Pharmacy verified:[facility] [address] [facility]  . pv      Date of last visit: 5-9-2022  Date of next visit (if applicable): 0/87/3208

## 2022-06-03 RX ORDER — HYDROCODONE BITARTRATE AND ACETAMINOPHEN 5; 325 MG/1; MG/1
1 TABLET ORAL EVERY 8 HOURS PRN
Qty: 90 TABLET | Refills: 0 | Status: SHIPPED | OUTPATIENT
Start: 2022-06-03 | End: 2022-06-28 | Stop reason: SDUPTHER

## 2022-06-03 NOTE — TELEPHONE ENCOUNTER
OARRS reviewed. UDS: + for  Escitalopram/Citalopram, Hydrocodone -consistent. + for    Last seen: 5/9/2022.  Follow-up: 7/11/2022

## 2022-06-18 DIAGNOSIS — Z51.81 THERAPEUTIC DRUG MONITORING: Primary | ICD-10-CM

## 2022-06-20 RX ORDER — CYCLOBENZAPRINE HCL 10 MG
10 TABLET ORAL 3 TIMES DAILY PRN
Qty: 90 TABLET | Refills: 0 | OUTPATIENT
Start: 2022-06-20 | End: 2022-07-20

## 2022-06-20 RX ORDER — POTASSIUM CITRATE 10 MEQ/1
TABLET, EXTENDED RELEASE ORAL
Qty: 90 TABLET | Refills: 3 | OUTPATIENT
Start: 2022-06-20

## 2022-06-20 NOTE — TELEPHONE ENCOUNTER
Anuj Dey called requesting a refill on the following medications:  Requested Prescriptions     Pending Prescriptions Disp Refills    potassium citrate (UROCIT-K) 10 MEQ (1080 MG) extended release tablet [Pharmacy Med Name: POTASSIUM CITRATE ER 10 MEQ TB] 90 tablet 3     Sig: TAKE 1 TABLET BY MOUTH [address] [facility] verified:  .pv      Date of last visit: 09/08/2021  Date of next visit (if applicable): 36/14/8940

## 2022-06-20 NOTE — TELEPHONE ENCOUNTER
OARRS reviewed. UDS: + for  Escitalopram/Citalopram, Hydrocodone -consistent. + for  Cyclobenzaprine -inconsistent  Last seen: 5/9/2022.  Follow-up: 7/11/2022

## 2022-06-24 ENCOUNTER — HOSPITAL ENCOUNTER (OUTPATIENT)
Age: 52
Discharge: HOME | End: 2022-06-24
Payer: COMMERCIAL

## 2022-06-24 DIAGNOSIS — N40.1 BENIGN PROSTATIC HYPERPLASIA WITH WEAK URINARY STREAM: ICD-10-CM

## 2022-06-24 DIAGNOSIS — R39.12 BENIGN PROSTATIC HYPERPLASIA WITH WEAK URINARY STREAM: ICD-10-CM

## 2022-06-24 LAB
ANION GAP SERPL CALCULATED.3IONS-SCNC: 13 MEQ/L (ref 8–16)
BUN BLDV-MCNC: 8 MG/DL (ref 7–22)
CALCIUM SERPL-MCNC: 9.2 MG/DL (ref 8.5–10.5)
CHLORIDE BLD-SCNC: 100 MEQ/L (ref 98–111)
CO2: 21 MEQ/L (ref 23–33)
CREAT SERPL-MCNC: 0.6 MG/DL (ref 0.4–1.2)
GFR SERPL CREATININE-BSD FRML MDRD: > 90 ML/MIN/1.73M2
GLUCOSE BLD-MCNC: 403 MG/DL (ref 70–108)
POTASSIUM SERPL-SCNC: 3.9 MEQ/L (ref 3.5–5.2)
PROSTATE SPECIFIC ANTIGEN: 0.6 NG/ML (ref 0–1)
SODIUM BLD-SCNC: 134 MEQ/L (ref 135–145)

## 2022-06-24 PROCEDURE — 80048 BASIC METABOLIC PNL TOTAL CA: CPT

## 2022-06-24 PROCEDURE — 36415 COLL VENOUS BLD VENIPUNCTURE: CPT

## 2022-06-24 PROCEDURE — 84153 ASSAY OF PSA TOTAL: CPT

## 2022-06-27 DIAGNOSIS — G89.4 CHRONIC PAIN SYNDROME: ICD-10-CM

## 2022-06-27 RX ORDER — CYCLOBENZAPRINE HCL 5 MG
5 TABLET ORAL 2 TIMES DAILY PRN
Qty: 30 TABLET | Refills: 0 | OUTPATIENT
Start: 2022-06-27 | End: 2022-07-07

## 2022-06-27 NOTE — TELEPHONE ENCOUNTER
Susan Saldaña called requesting a refill on the following medications:  Requested Prescriptions     Pending Prescriptions Disp Refills    HYDROcodone-acetaminophen (NORCO) 5-325 MG per tablet 90 tablet 0     Sig: Take 1 tablet by mouth every 8 hours as needed for Pain for up to 30 days.     cyclobenzaprine (FLEXERIL) 10 MG tablet 90 tablet 0     Sig: Take 1 tablet by mouth 3 times daily as needed for Muscle spasms     Pharmacy verified:  [facility] [address] [facility]    Date of last visit: 05-09-22  Date of next visit (if applicable): 2/06/4083

## 2022-06-28 RX ORDER — CYCLOBENZAPRINE HCL 10 MG
10 TABLET ORAL 3 TIMES DAILY PRN
Qty: 90 TABLET | Refills: 0 | Status: SHIPPED | OUTPATIENT
Start: 2022-06-28 | End: 2022-07-28 | Stop reason: SDUPTHER

## 2022-06-28 RX ORDER — HYDROCODONE BITARTRATE AND ACETAMINOPHEN 5; 325 MG/1; MG/1
1 TABLET ORAL EVERY 8 HOURS PRN
Qty: 90 TABLET | Refills: 0 | Status: SHIPPED | OUTPATIENT
Start: 2022-07-03 | End: 2022-08-04 | Stop reason: SDUPTHER

## 2022-06-28 NOTE — TELEPHONE ENCOUNTER
OARRS reviewed. UDS: + for Lexapro, Hydrocodone, Flexeril,   Last seen: 5/9/2022.  Follow-up:   Future Appointments   Date Time Provider Saul Cummins   7/11/2022  7:45 AM WALT Silva - CNP [facility] [place]   9/14/2022  8:00 AM Rosa Swan, [address] [facility]

## 2022-07-11 ENCOUNTER — OFFICE VISIT (OUTPATIENT)
Dept: PHYSICAL MEDICINE AND REHAB | Age: 52
End: 2022-07-11
Payer: COMMERCIAL

## 2022-07-11 VITALS
HEIGHT: 70 IN | DIASTOLIC BLOOD PRESSURE: 86 MMHG | WEIGHT: 253 LBS | SYSTOLIC BLOOD PRESSURE: 118 MMHG | BODY MASS INDEX: 36.22 KG/M2

## 2022-07-11 DIAGNOSIS — M53.3 SI (SACROILIAC) PAIN: ICD-10-CM

## 2022-07-11 DIAGNOSIS — F11.90 CHRONIC, CONTINUOUS USE OF OPIOIDS: ICD-10-CM

## 2022-07-11 DIAGNOSIS — M48.04 THORACIC STENOSIS: ICD-10-CM

## 2022-07-11 DIAGNOSIS — M54.17 LUMBOSACRAL RADICULITIS: ICD-10-CM

## 2022-07-11 DIAGNOSIS — M47.816 SPONDYLOSIS OF LUMBAR REGION WITHOUT MYELOPATHY OR RADICULOPATHY: Primary | ICD-10-CM

## 2022-07-11 DIAGNOSIS — M54.9 MID BACK PAIN: ICD-10-CM

## 2022-07-11 DIAGNOSIS — G89.4 CHRONIC PAIN SYNDROME: ICD-10-CM

## 2022-07-11 DIAGNOSIS — M51.36 BULGE OF LUMBAR DISC WITHOUT MYELOPATHY: ICD-10-CM

## 2022-07-11 PROCEDURE — 99214 OFFICE O/P EST MOD 30 MIN: CPT | Performed by: NURSE PRACTITIONER

## 2022-07-11 ASSESSMENT — ENCOUNTER SYMPTOMS
RESPIRATORY NEGATIVE: 1
GASTROINTESTINAL NEGATIVE: 1
BACK PAIN: 1

## 2022-07-11 NOTE — PROGRESS NOTES
[address] [facility] [address]  Dept: [phone]  Dept Fax: [phone]: [phone]    Visit Date: 7/11/2022    Functionality Assessment/Goals Worksheet     On a scale of 0 (Does not Interfere) to 10 (Completely Interferes)     1. Which number describes how during the past week pain has interfered with       the following:  A. General Activity:  4  B. Mood: 4  C. Walking Ability:  5  D. Normal Work (Includes both work outside the home and housework):  6  E. Relations with Other People:   5  F. Sleep:   4  G. Enjoyment of Life:   4    2. Patient Prefers to Take their Pain Medications:     []  On a regular basis   [x]  Only when necessary    []  Does not take pain medications    3. What are the Patient's Goals/Expectations for Visiting Pain Management? [x]  Learn about my pain    [x]  Receive Medication   []  Physical Therapy     []  Treat Depression   []  Receive Injections    []  Treat Sleep   []  Deal with Anxiety and Stress   []  Treat Opoid Dependence/Addiction   []  Other:      HPI:   Shavonne Lopez is a 45 y.o. male is here today for    Chief Complaint: Low back pain, SI pain     HPI   2 month FU. Continues to have pain in low back across and into Si area/ buttocks- constant aching pain with periods of sharp and stabbing pain. Pain is more bothersome in SI area. Still receiving relief of low back pain from bilateral L-facet RFA  Has been on shut down from work since July 4th so pain not as bad when not working. Pain increases with bending, lifting, twisting , walking, standing, getting up and down and housework or working at job. Pain medications remain effective in decraesing pain         Medications reviewed. Patient denies side effects with medications. Patient states he is taking medications as prescribed.  Hedenies receiving pain medications from other sources. He denies any ER visits since last visit. Pain scale with out pain medications or at its worst is 6-7/10. Pain scale with pain medications or at its best is 2/10. Last dose of Mazama was last night   Drug screen reviewed from 5/9/2022 and was appropriate  Pill count completed  today and WNL: Yes      The patientis allergic to latex, codeine, nickel, and oxybutynin chloride [oxybutynin chloride]. Subjective:      Review of Systems   Constitutional: Negative. HENT: Negative. Respiratory: Negative. Cardiovascular: Negative. Gastrointestinal: Negative. Musculoskeletal: Positive for arthralgias, back pain and myalgias. Negative for gait problem and joint swelling. Ambulating without assist devices    Skin: Negative. Neurological: Negative for weakness and numbness. Psychiatric/Behavioral: Positive for decreased concentration. Negative for sleep disturbance. The patient is not nervous/anxious. Objective:     Vitals:    07/11/22 0735   BP: 118/86   Site: Left Upper Arm   Position: Sitting   Weight: 253 lb (114.8 kg)   Height: 5' 10\" (1.778 m)       Physical Exam  Vitals and nursing note reviewed. Constitutional:       General: He is not in acute distress. Appearance: He is well-developed. He is not diaphoretic. HENT:      Head: Normocephalic and atraumatic. Right Ear: External ear normal.      Left Ear: External ear normal.      Nose: Nose normal.      Mouth/Throat:      Mouth: Mucous membranes are moist.      Pharynx: Oropharynx is clear. No oropharyngeal exudate. Eyes:      General: No scleral icterus. Right eye: No discharge. Left eye: No discharge. Conjunctiva/sclera: Conjunctivae normal.      Pupils: Pupils are equal, round, and reactive to light. Neck:      Thyroid: No thyromegaly. Cardiovascular:      Rate and Rhythm: Normal rate and regular rhythm. Heart sounds: Normal heart sounds. No murmur heard.   No friction rub. No gallop. Pulmonary:      Effort: Pulmonary effort is normal. No respiratory distress. Breath sounds: Normal breath sounds. No wheezing or rales. Chest:      Chest wall: No tenderness. Abdominal:      General: Bowel sounds are normal. There is no distension. Palpations: Abdomen is soft. Tenderness: There is no abdominal tenderness. There is no guarding or rebound. Musculoskeletal:         General: Tenderness present. Right shoulder: Normal.      Left shoulder: Normal.      Cervical back: Full passive range of motion without pain, normal range of motion and neck supple. No edema, erythema or rigidity. No muscular tenderness. Normal range of motion. Thoracic back: Tenderness and bony tenderness present. Decreased range of motion. Lumbar back: Spasms, tenderness and bony tenderness present. Decreased range of motion. Negative right straight leg raise test and negative left straight leg raise test.        Back:       Right hip: Tenderness and bony tenderness present. Left hip: Tenderness and bony tenderness present. Right upper leg: Tenderness present. Left upper leg: Tenderness present. Right knee: Normal.      Left knee: Normal.      Right lower leg: No edema. Left lower leg: No edema. Skin:     General: Skin is warm. Coloration: Skin is not pale. Findings: No erythema or rash. Neurological:      General: No focal deficit present. Mental Status: He is alert and oriented to person, place, and time. Mental status is at baseline. He is not disoriented. Cranial Nerves: No cranial nerve deficit. Sensory: No sensory deficit. Motor: No weakness, atrophy or abnormal muscle tone. Coordination: Coordination normal.      Gait: Gait normal.      Deep Tendon Reflexes: Reflexes are normal and symmetric. Babinski sign absent on the right side.       Reflex Scores:       Tricep reflexes are 2+ on the right side and 2+ on the left side. Bicep reflexes are 2+ on the right side and 2+ on the left side. Brachioradialis reflexes are 2+ on the right side and 2+ on the left side. Patellar reflexes are 2+ on the right side and 2+ on the left side. Achilles reflexes are 2+ on the right side and 2+ on the left side. Psychiatric:         Attention and Perception: Attention normal. He is attentive. Mood and Affect: Mood normal. Mood is not anxious or depressed. Affect is not labile, blunt, angry or inappropriate. Speech: Speech normal. He is communicative. Speech is not rapid and pressured, delayed, slurred or tangential.         Behavior: Behavior normal. Behavior is not agitated, slowed, aggressive, withdrawn, hyperactive or combative. Thought Content: Thought content normal. Thought content is not paranoid or delusional. Thought content does not include homicidal or suicidal ideation. Thought content does not include homicidal or suicidal plan. Cognition and Memory: Cognition normal. Memory is not impaired. He does not exhibit impaired recent memory or impaired remote memory. Judgment: Judgment normal. Judgment is not impulsive or inappropriate. CORETTA  Patricks test  positive  Yeoman's  or Gaenslen's positive       Assessment:     1. Spondylosis of lumbar region without myelopathy or radiculopathy    2. Lumbosacral radiculitis    3. Bulge of lumbar disc without myelopathy    4. SI (sacroiliac) pain    5. Chronic pain syndrome    6. Mid back pain    7. Thoracic stenosis    8. Chronic, continuous use of opioids            Plan:      · OARRS reviewed. Current MED: 15.00  · Patient was offered naloxone for home. · Discussed long term side effects of medications, tolerance, dependency and addiction. · Previous UDS reviewed  · UDS preformed today for compliance. · Patient told can not receive any pain medications from any other source.   · No evidence of abuse, diversion or aberrant behavior.  Medications and/or procedures to improve function and quality of life- patient understanding with this and that may not be pain free   Discussed with patient about safe storage of medications at home   Discussed possible weaning of medication dosing dependent on treatment/procedure results.  Discussed with patient about risks with procedure including infection, reaction to medication, increased pain, or bleeding. · Procedure notes reviewed  · Continues to receive over 80% relief of low back pain from L-facet RFA. · Medications remain effective: Continue Norco 5/325 mg tabs TID prn- filled 7/3/2022  · Continue flexeril 10 mg TID prn for muscle spasms- filled 6/28/2022      Meds. Prescribed:   No orders of the defined types were placed in this encounter. Return in about 2 months (around 9/11/2022), or if symptoms worsen or fail to improve, for follow up  for medications.                Electronically signed by WALT Serrano CNP on7/11/2022 at 7:58 AM

## 2022-07-25 NOTE — TELEPHONE ENCOUNTER
Cassie Chao called requesting a refill on the following medications:  Requested Prescriptions     Pending Prescriptions Disp Refills    cyclobenzaprine (FLEXERIL) 10 MG tablet 90 tablet 0     Sig: Take 1 tablet by mouth 3 times daily as needed for Muscle spasms     Pharm[facility]      Date of last visit: 07-11-22  Date of next visit (if applicable): 0/51/2955

## 2022-07-27 DIAGNOSIS — G89.4 CHRONIC PAIN SYNDROME: ICD-10-CM

## 2022-07-28 RX ORDER — CYCLOBENZAPRINE HCL 10 MG
10 TABLET ORAL 3 TIMES DAILY PRN
Qty: 90 TABLET | Refills: 0 | Status: SHIPPED | OUTPATIENT
Start: 2022-07-28 | End: 2022-08-23 | Stop reason: SDUPTHER

## 2022-07-28 NOTE — TELEPHONE ENCOUNTER
OARRS reviewed. UDS: + for Cyclobenzaprine, Hydrocodone, Buspirone, Citalopram   Last seen: 7/11/2022.  Follow-up: 9/12/22  Future Appointments   Date Time Provider Saul Cummins   9/12/2022  7:45 AM WALT Hernandez - CNP [facility] - [facility]   9/14/2022  8:00 AM Kathryn Dennis, [address] [facility]

## 2022-08-04 RX ORDER — HYDROCODONE BITARTRATE AND ACETAMINOPHEN 5; 325 MG/1; MG/1
1 TABLET ORAL EVERY 8 HOURS PRN
Qty: 90 TABLET | Refills: 0 | Status: SHIPPED | OUTPATIENT
Start: 2022-08-04 | End: 2022-09-01 | Stop reason: SDUPTHER

## 2022-08-04 NOTE — TELEPHONE ENCOUNTER
OARRS reviewed. UDS: + for  Cyclobenzaprine, Hydrocodone, Buspirone, Citalopram/Escitalopram -consistent. Last seen: 7/11/2022.  Follow-up: 9/12/2022

## 2022-08-22 NOTE — TELEPHONE ENCOUNTER
Emiliano Fabry called requesting a refill on the following medications:  Requested Prescriptions     Pending Prescriptions Disp Refills    cyclobenzaprine (FLEXERIL) 10 MG tablet 90 tablet 0     Sig: Take 1 tablet by mouth 3 times daily as needed for Muscle spasms     [facility]      Date of last visit:   Date of next visit (if applicable): 2/77/5878

## 2022-08-23 RX ORDER — CYCLOBENZAPRINE HCL 10 MG
10 TABLET ORAL 3 TIMES DAILY PRN
Qty: 90 TABLET | Refills: 0 | Status: SHIPPED | OUTPATIENT
Start: 2022-08-27 | End: 2022-09-21 | Stop reason: SDUPTHER

## 2022-08-23 NOTE — TELEPHONE ENCOUNTER
OARRS reviewed. UDS: + for Flexeril, Hydrocodone, Buspar, Lexapro. Last seen: 7/11/2022.  Follow-up:   Future Appointments   Date Time Provider Saul Cummins   9/12/2022  7:45 AM WALT Bonilla - CNP [facility] - [address] [facility]   9/14/2022  8:00 AM Gavin Reyes, [address] [facility]

## 2022-08-30 DIAGNOSIS — G89.4 CHRONIC PAIN SYNDROME: ICD-10-CM

## 2022-08-30 NOTE — TELEPHONE ENCOUNTER
Reynold Baumgarten called requesting a refill on the following medications:  Requested Prescriptions     Pending Prescriptions Disp Refills    HYDROcodone-acetaminophen (NORCO) 5-325 MG per tablet 90 tablet 0     Sig: Take 1 tablet by mouth every 8 hours as needed for Pain for up to 30 days. [facility] [address] [place] [address]  . [facility]v      Date of last visit: 7/11/2022  Date of next visit (if applicable): 3/10/3363

## 2022-09-01 RX ORDER — HYDROCODONE BITARTRATE AND ACETAMINOPHEN 5; 325 MG/1; MG/1
1 TABLET ORAL EVERY 8 HOURS PRN
Qty: 90 TABLET | Refills: 0 | Status: SHIPPED | OUTPATIENT
Start: 2022-09-03 | End: 2022-09-30 | Stop reason: SDUPTHER

## 2022-09-14 ENCOUNTER — OFFICE VISIT (OUTPATIENT)
Dept: UROLOGY | Age: 52
End: 2022-09-14
Payer: COMMERCIAL

## 2022-09-14 VITALS
HEART RATE: 76 BPM | SYSTOLIC BLOOD PRESSURE: 130 MMHG | DIASTOLIC BLOOD PRESSURE: 83 MMHG | WEIGHT: 245 LBS | HEIGHT: 70 IN | BODY MASS INDEX: 35.07 KG/M2

## 2022-09-14 DIAGNOSIS — N20.0 KIDNEY STONE: ICD-10-CM

## 2022-09-14 DIAGNOSIS — N40.1 BENIGN PROSTATIC HYPERPLASIA WITH WEAK URINARY STREAM: Primary | ICD-10-CM

## 2022-09-14 DIAGNOSIS — R39.12 BENIGN PROSTATIC HYPERPLASIA WITH WEAK URINARY STREAM: Primary | ICD-10-CM

## 2022-09-14 LAB
BILIRUBIN URINE: NEGATIVE
BLOOD URINE, POC: NEGATIVE
CHARACTER, URINE: CLEAR
COLOR, URINE: YELLOW
GLUCOSE URINE: 500 MG/DL
KETONES, URINE: NEGATIVE
LEUKOCYTE CLUMPS, URINE: NEGATIVE
NITRITE, URINE: NEGATIVE
PH, URINE: 6 (ref 5–9)
POST VOID RESIDUAL (PVR): 33 ML
PROTEIN, URINE: NEGATIVE MG/DL
SPECIFIC GRAVITY, URINE: 1.01 (ref 1–1.03)
UROBILINOGEN, URINE: 0.2 EU/DL (ref 0–1)

## 2022-09-14 PROCEDURE — 81003 URINALYSIS AUTO W/O SCOPE: CPT | Performed by: NURSE PRACTITIONER

## 2022-09-14 PROCEDURE — 51798 US URINE CAPACITY MEASURE: CPT | Performed by: NURSE PRACTITIONER

## 2022-09-14 PROCEDURE — 99214 OFFICE O/P EST MOD 30 MIN: CPT | Performed by: NURSE PRACTITIONER

## 2022-09-14 RX ORDER — POTASSIUM CITRATE 10 MEQ/1
TABLET, EXTENDED RELEASE ORAL
Qty: 90 TABLET | Refills: 3 | Status: SHIPPED | OUTPATIENT
Start: 2022-09-14

## 2022-09-14 RX ORDER — ALLOPURINOL 300 MG/1
TABLET ORAL
Qty: 90 TABLET | Refills: 3 | Status: SHIPPED | OUTPATIENT
Start: 2022-09-14

## 2022-09-14 RX ORDER — TAMSULOSIN HYDROCHLORIDE 0.4 MG/1
CAPSULE ORAL
Qty: 90 CAPSULE | Refills: 3 | Status: SHIPPED | OUTPATIENT
Start: 2022-09-14

## 2022-09-14 RX ORDER — GLIMEPIRIDE 2 MG/1
TABLET ORAL
COMMUNITY
Start: 2022-08-26

## 2022-09-14 ASSESSMENT — ENCOUNTER SYMPTOMS
ABDOMINAL PAIN: 0
VOMITING: 0
NAUSEA: 0
BACK PAIN: 0

## 2022-09-14 NOTE — PROGRESS NOTES
[address] [facility].  [address]  [facility] [address]  Dept: [phone]  Loc: [phone]    Visit Date: 2022        HPI:     Yrn Calderon is a 45 y.o. male who presents today for:  Chief Complaint   Patient presents with    Benign Prostatic Hypertrophy       HPI  Pt seen in follow up for BPH, kidney stones. Mr. Noreen Cordon has a hx of kidney stones for which he takes allopurinol and potassium citrate. Last CT imaging 2021 with stable small nonobstructive left lower pole calculus. Stone analysis  with stone composed of 90 % calcium oxalate and 10% calcium phosphate. He also has a hx of BPH for which he is on Flomax. Notes family hx of prostate cancer in father who \" from old age\". PSA 22 0.6. Trae Avendano reports he is doing well. Denies any pain, dysuria, hematuria. No significant change in LUTS. Nocturia only 1 x per night. Current Outpatient Medications   Medication Sig Dispense Refill    glimepiride (AMARYL) 2 MG tablet TAKE 1 TABLET BY MOUTH IN THE MORNING      HYDROcodone-acetaminophen (NORCO) 5-325 MG per tablet Take 1 tablet by mouth every 8 hours as needed for Pain for up to 30 days.  90 tablet 0    cyclobenzaprine (FLEXERIL) 10 MG tablet Take 1 tablet by mouth 3 times daily as needed for Muscle spasms 90 tablet 0    metFORMIN (GLUCOPHAGE) 500 MG tablet TAKE 1 TABLET BY MOUTH TWICE A DAY WITH MORNING AND EVENING MEALS      XARELTO 20 MG TABS tablet TAKE 1 TABLET BY MOUTH EVERY DAY WITH EVENING MEAL      potassium citrate (UROCIT-K) 10 MEQ (1080 MG) extended release tablet TAKE 1 TABLET BY MOUTH EVERY MORNING WITH BREAKFAST 90 tablet 3    allopurinol (ZYLOPRIM) 300 MG tablet TAKE 1 TABLET BY MOUTH EVERY DAY 90 tablet 3    tamsulosin (FLOMAX) 0.4 MG capsule TAKE 1 CAPSULE BY MOUTH EVERY DAY 90 capsule 3    naloxone 4 MG/0.1ML LIQD nasal spray 1 spray by Nasal route as needed for Opioid Reversal 1 each 0    traZODone (DESYREL) 50 MG tablet Take 50 mg by mouth nightly      triamcinolone (KENALOG) 0.1 % cream       FLOVENT  MCG/ACT inhaler       ARIPiprazole (ABILIFY) 2 MG tablet Take 2 mg by mouth 2 times daily      citalopram (CELEXA) 40 MG tablet Take 40 mg by mouth daily. busPIRone (BUSPAR) 10 MG tablet Take 30 mg by mouth 2 times daily       omeprazole (PRILOSEC) 20 MG capsule Take 20 mg by mouth daily. No current facility-administered medications for this visit. Past Medical History  Luis Eduardo Best  has a past medical history of Chronic back pain, GERD (gastroesophageal reflux disease), History of kidney stones, Hx of blood clots, Kidney stone, and Lumbar spondylosis. Past Surgical History  The patient  has a past surgical history that includes Ureter stent placement (2006); Lithotripsy (2006); Cystocopy (6/14/2013); Cystocopy (07/29/2013); Cystoscopy (12/23/13); other surgical history (Bilateral, 03/26/2018); pr inj dx/ther agnt paravert facet joint, lumbar/sac, 2nd level (Bilateral, 3/26/2018); pr inj dx/ther agnt paravert facet joint, lumbar/sac, 2nd level (Bilateral, 5/21/2018); Colonoscopy; eye surgery (2007); hernia repair (2007); pr inject rx other periph nerve (Left, 9/28/2018); pr inject rx other periph nerve (Right, 11/30/2018); lumbar nerve block (N/A, 4/5/2019); Injection Procedure For Sacroiliac Joint (Left, 5/17/2019); Injection Procedure For Sacroiliac Joint (Left, 6/27/2019); Lumbar spine surgery (Left, 8/15/2019); Nerve Surgery (Right, 10/31/2019); Pain management procedure (Left, 1/16/2020); Pain management procedure (Right, 3/5/2020); Pain management procedure (Left, 7/2/2020); Pain management procedure (Left, 8/4/2020); Cervical spine surgery (Right, 12/15/2020); Pain management procedure (Left, 1/21/2021); Pain management procedure (Left, 3/15/2021); Pain management procedure (Right, 4/29/2021); and Pain management procedure (Bilateral, 2/7/2022).     Family History  This patient's family history includes Cancer in his mother; Heart Disease in his father. Social History  Quinn Barth  reports that he quit smoking about 17 years ago. His smoking use included cigarettes. He has a 12.00 pack-year smoking history. He has never used smokeless tobacco. He reports that he does not drink alcohol and does not use drugs. Subjective:      Review of Systems   Constitutional:  Negative for activity change, appetite change, chills, diaphoresis, fatigue, fever and unexpected weight change. Gastrointestinal:  Negative for abdominal pain, nausea and vomiting. Genitourinary:  Negative for decreased urine volume, difficulty urinating, dysuria, flank pain, frequency, hematuria and urgency. Musculoskeletal:  Negative for back pain. Objective:   /83   Pulse 76   Ht 5' 10\" (1.778 m)   Wt 245 lb (111.1 kg)   BMI 35.15 kg/m²     Physical Exam  Vitals reviewed. Constitutional:       General: He is not in acute distress. Appearance: Normal appearance. He is well-developed. He is not ill-appearing or diaphoretic. HENT:      Head: Normocephalic and atraumatic. Right Ear: External ear normal.      Left Ear: External ear normal.      Nose: Nose normal.      Mouth/Throat:      Mouth: Mucous membranes are moist.   Eyes:      General: No scleral icterus. Right eye: No discharge. Left eye: No discharge. Neck:      Vascular: No JVD. Trachea: No tracheal deviation. Cardiovascular:      Rate and Rhythm: Normal rate and regular rhythm. Heart sounds: Normal heart sounds. Pulmonary:      Effort: Pulmonary effort is normal. No respiratory distress. Breath sounds: Normal breath sounds. Abdominal:      General: There is no distension. Tenderness: There is no abdominal tenderness. There is no right CVA tenderness or left CVA tenderness.    Genitourinary:     Comments: Prostate mildly enlarged but soft and smooth without nodule or induration. Musculoskeletal:         General: Normal range of motion. Neurological:      Mental Status: He is alert and oriented to person, place, and time. Mental status is at baseline. Psychiatric:         Mood and Affect: Mood normal.         Behavior: Behavior normal.         Thought Content: Thought content normal.       POC  Results for POC orders placed in visit on 22   POCT Urinalysis No Micro (Auto)   Result Value Ref Range    Glucose, Ur 500 (A) NEGATIVE mg/dl    Bilirubin Urine Negative     Ketones, Urine Negative NEGATIVE    Specific Gravity, Urine 1.015 1.002 - 1.030    Blood, UA POC Negative NEGATIVE    pH, Urine 6.00 5.0 - 9.0    Protein, Urine Negative NEGATIVE mg/dl    Urobilinogen, Urine 0.20 0.0 - 1.0 eu/dl    Nitrite, Urine Negative NEGATIVE    Leukocyte Clumps, Urine Negative NEGATIVE    Color, Urine Yellow [id number]-STRAW    Character, Urine Clear [id number].[id number]   poct post void residual   Result Value Ref Range    post void residual 33 ml         Patients recent PSA values are as follows  Lab Results   Component Value Date    PSA 0.60 2022    PSA 0.54 2021    PSA 0.30 2012        Recent BUN/Creatinine:  Lab Results   Component Value Date/Time    BUN 8 2022 09:39 AM    CREATININE 0.6 2022 09:39 AM       Assessment:   BPH with LUTs  Family hx of prostate cancer--father--70s--\" from old age\"  Small L nonobstructive kidney stone, inferior pole. Plan:     LUTS stable. PSA and BMP reviewed with Vladimir Son and stable currently. NICOLA without nodule or induration. Continue allopurinol, potassium citrate, flomax. Refills sent to pharmacy. KUB, PSA, BMP in 1 year with appt to review results.

## 2022-09-21 ENCOUNTER — TELEPHONE (OUTPATIENT)
Dept: PHYSICAL MEDICINE AND REHAB | Age: 52
End: 2022-09-21

## 2022-09-21 ENCOUNTER — OFFICE VISIT (OUTPATIENT)
Dept: PHYSICAL MEDICINE AND REHAB | Age: 52
End: 2022-09-21
Payer: COMMERCIAL

## 2022-09-21 VITALS
SYSTOLIC BLOOD PRESSURE: 120 MMHG | HEIGHT: 70 IN | DIASTOLIC BLOOD PRESSURE: 84 MMHG | BODY MASS INDEX: 35.07 KG/M2 | WEIGHT: 245 LBS

## 2022-09-21 DIAGNOSIS — M47.816 SPONDYLOSIS OF LUMBAR REGION WITHOUT MYELOPATHY OR RADICULOPATHY: Primary | ICD-10-CM

## 2022-09-21 DIAGNOSIS — M54.9 MID BACK PAIN: ICD-10-CM

## 2022-09-21 DIAGNOSIS — M54.50 CHRONIC BILATERAL LOW BACK PAIN WITHOUT SCIATICA: ICD-10-CM

## 2022-09-21 DIAGNOSIS — F11.90 CHRONIC, CONTINUOUS USE OF OPIOIDS: ICD-10-CM

## 2022-09-21 DIAGNOSIS — G89.29 CHRONIC BILATERAL LOW BACK PAIN WITHOUT SCIATICA: ICD-10-CM

## 2022-09-21 DIAGNOSIS — M51.36 BULGE OF LUMBAR DISC WITHOUT MYELOPATHY: ICD-10-CM

## 2022-09-21 DIAGNOSIS — G89.4 CHRONIC PAIN SYNDROME: ICD-10-CM

## 2022-09-21 PROCEDURE — 99214 OFFICE O/P EST MOD 30 MIN: CPT | Performed by: NURSE PRACTITIONER

## 2022-09-21 RX ORDER — CYCLOBENZAPRINE HCL 10 MG
10 TABLET ORAL 3 TIMES DAILY PRN
Qty: 90 TABLET | Refills: 0 | Status: SHIPPED | OUTPATIENT
Start: 2022-09-26 | End: 2022-10-26

## 2022-09-21 ASSESSMENT — ENCOUNTER SYMPTOMS
RESPIRATORY NEGATIVE: 1
GASTROINTESTINAL NEGATIVE: 1
BACK PAIN: 1

## 2022-09-21 NOTE — PROGRESS NOTES
[address] [facility] [address]  Dept: [phone]  Dept Fax: [phone]: [phone]    Visit Date: 9/21/2022    Functionality Assessment/Goals Worksheet     On a scale of 0 (Does not Interfere) to 10 (Completely Interferes)     1. Which number describes how during the past week pain has interfered with       the following:  A. General Activity:  3  B. Mood: 4  C. Walking Ability:  6  D. Normal Work (Includes both work outside the home and housework):  7  E. Relations with Other People:   4  F. Sleep:   5  G. Enjoyment of Life:   6    2. Patient Prefers to Take their Pain Medications:     []  On a regular basis   [x]  Only when necessary    []  Does not take pain medications    3. What are the Patient's Goals/Expectations for Visiting Pain Management? []  Learn about my pain    [x]  Receive Medication   []  Physical Therapy     []  Treat Depression   [x]  Receive Injections    []  Treat Sleep   []  Deal with Anxiety and Stress   []  Treat Opoid Dependence/Addiction   []  Other:      HPI:   Majo Jarrell is a 45 y.o. male is here today for    Chief Complaint: Low back pain    HPI   2.5 month FU. Patient here with complaints of pain in low back that has been increasing the past couple weeks as the effects from his L-facet RFA is waning. Pain all axial in low back with some pain in SI area- sharp, pinching aching pain worse in left lower back. Increases with increased activity. Pain increases with bending, lifting, twisting , walking, standing, and housework or working at job. Norco prn and flexeril remains effective in decreasing pain to a tolerable level. Denies any radicular pain   Medications reviewed. Patient denies side effects with medications. Patient states he is taking medications as prescribed.  Hedenies receiving pain medications from other sources. He denies any ER visits since last visit. Pain scale with out pain medications or at its worst is 7/10. Pain scale with pain medications or at its best is 3/10. Last dose of [place] was last night  Drug screen reviewed from 7/11/2022 and was appropriate  Pill count completed  today and WNL: Yes      The patientis allergic to latex, codeine, nickel, and oxybutynin chloride [oxybutynin chloride]. Subjective:      Review of Systems   Constitutional: Negative. HENT: Negative. Respiratory: Negative. Cardiovascular: Negative. Negative for chest pain and leg swelling. Gastrointestinal: Negative. Musculoskeletal:  Positive for arthralgias, back pain and myalgias. Negative for gait problem and joint swelling. Ambulating without assist devices    Skin: Negative. Neurological:  Negative for weakness and numbness. Psychiatric/Behavioral:  Negative for decreased concentration and sleep disturbance. The patient is not nervous/anxious. Objective:     Vitals:    09/21/22 0842   BP: 120/84   Weight: 245 lb (111.1 kg)   Height: 5' 10\" (1.778 m)       Physical Exam  Vitals and nursing note reviewed. Constitutional:       General: He is not in acute distress. Appearance: He is well-developed. He is not diaphoretic. HENT:      Head: Normocephalic and atraumatic. Right Ear: External ear normal.      Left Ear: External ear normal.      Nose: Nose normal.      Mouth/Throat:      Mouth: Mucous membranes are moist.      Pharynx: Oropharynx is clear. No oropharyngeal exudate. Eyes:      General: No scleral icterus. Right eye: No discharge. Left eye: No discharge. Conjunctiva/sclera: Conjunctivae normal.      Pupils: Pupils are equal, round, and reactive to light. Neck:      Thyroid: No thyromegaly. Cardiovascular:      Rate and Rhythm: Normal rate and regular rhythm. Heart sounds: Normal heart sounds. No murmur heard. No friction rub. No gallop. Pulmonary:      Effort: Pulmonary effort is normal. No respiratory distress. Breath sounds: Normal breath sounds. No wheezing or rales. Chest:      Chest wall: No tenderness. Abdominal:      General: Bowel sounds are normal. There is no distension. Palpations: Abdomen is soft. Tenderness: There is no abdominal tenderness. There is no guarding or rebound. Musculoskeletal:         General: Tenderness present. Right shoulder: Normal.      Left shoulder: Normal.      Cervical back: Full passive range of motion without pain, normal range of motion and neck supple. No edema, erythema or rigidity. No muscular tenderness. Normal range of motion. Thoracic back: No tenderness or bony tenderness. Normal range of motion. Lumbar back: Spasms, tenderness and bony tenderness present. Decreased range of motion. Negative right straight leg raise test and negative left straight leg raise test.        Back:       Right hip: Tenderness and bony tenderness present. Left hip: Tenderness and bony tenderness present. Right upper leg: Tenderness present. Left upper leg: Tenderness present. Right knee: Normal.      Left knee: Normal.      Right lower leg: No edema. Left lower leg: No edema. Skin:     General: Skin is warm. Coloration: Skin is not pale. Findings: No erythema or rash. Neurological:      General: No focal deficit present. Mental Status: He is alert and oriented to person, place, and time. Mental status is at baseline. He is not disoriented. Cranial Nerves: No cranial nerve deficit. Sensory: No sensory deficit. Motor: No weakness, atrophy or abnormal muscle tone. Coordination: Coordination normal.      Gait: Gait normal.      Deep Tendon Reflexes: Reflexes are normal and symmetric. Babinski sign absent on the right side. Reflex Scores:       Tricep reflexes are 2+ on the right side and 2+ on the left side.        Bicep reflexes are 2+ on the right side and 2+ on the left side. Brachioradialis reflexes are 2+ on the right side and 2+ on the left side. Patellar reflexes are 2+ on the right side and 2+ on the left side. Achilles reflexes are 2+ on the right side and 2+ on the left side. Psychiatric:         Attention and Perception: Attention normal. He is attentive. Mood and Affect: Mood normal. Mood is not anxious or depressed. Affect is not labile, blunt, angry or inappropriate. Speech: Speech normal. He is communicative. Speech is not rapid and pressured, delayed, slurred or tangential.         Behavior: Behavior normal. Behavior is not agitated, slowed, aggressive, withdrawn, hyperactive or combative. Thought Content: Thought content normal. Thought content is not paranoid or delusional. Thought content does not include homicidal or suicidal ideation. Thought content does not include homicidal or suicidal plan. Cognition and Memory: Cognition normal. Memory is not impaired. He does not exhibit impaired recent memory or impaired remote memory. Judgment: Judgment normal. Judgment is not impulsive or inappropriate. CORETTA  Patricks test  positive  Yeoman's  or Gaenslen's positive         Assessment:     1. Spondylosis of lumbar region without myelopathy or radiculopathy    2. Chronic bilateral low back pain without sciatica    3. Bulge of lumbar disc without myelopathy    4. Mid back pain    5. Chronic pain syndrome    6. Chronic, continuous use of opioids            Plan:      OARRS reviewed. Current MED: 15.00  Patient was offered naloxone for home. Discussed long term side effects of medications, tolerance, dependency and addiction. Previous UDS reviewed  UDS preformed today for compliance. Patient told can not receive any pain medications from any other source. No evidence of abuse, diversion or aberrant behavior.   Medications and/or procedures to improve function and quality of life- patient understanding with this and that may not be pain free  Discussed with patient about safe storage of medications at home  Discussed possible weaning of medication dosing dependent on treatment/procedure results. Discussed with patient about risks with procedure including infection, reaction to medication, increased pain, or bleeding. Procedure notes reviewed in detail   Received over 80% relief of low back pain from previous L-facet RFA for 7 months waning now. Plan to repeat bilateral L-facet RFA @ L4-5 and L5-S1 for longer term pain relief. Procedure discussed in detail with patient. Needs to be cleared to be off Herminia Haymaker for 5 days prior to procedure   Medications remain effective: Continue Norco 5/325 mg tabs TID prn- filled 9/3/2022 has plenty   Continue flexeril 10 mg TID prn for muscle spasms- ordered refill   Is compliant       Meds. Prescribed:   Orders Placed This Encounter   Medications    cyclobenzaprine (FLEXERIL) 10 MG tablet     Sig: Take 1 tablet by mouth 3 times daily as needed for Muscle spasms     Dispense:  90 tablet     Refill:  0         Return for bilateral L-facet RFA @ L4-5 and L5-S1. , follow up after procedure.                Electronically signed by WALT Mansfield CNP on9/21/2022 at 9:07 AM

## 2022-09-27 DIAGNOSIS — G89.4 CHRONIC PAIN SYNDROME: ICD-10-CM

## 2022-09-27 NOTE — TELEPHONE ENCOUNTER
Beatrice Toro called requesting a refill on the following medications:  Requested Prescriptions     Pending Prescriptions Disp Refills    HYDROcodone-acetaminophen (NORCO) 5-325 MG per tablet 90 tablet 0     Sig: Take 1 tablet by mouth every 8 hours as needed for Pain for up to 30 days.      Pharmacy verified:  [facility] [address] [facility]      Date of last visit: 09-21-22  Date of next visit (if applicable): Visit date not found

## 2022-09-30 RX ORDER — HYDROCODONE BITARTRATE AND ACETAMINOPHEN 5; 325 MG/1; MG/1
1 TABLET ORAL EVERY 8 HOURS PRN
Qty: 90 TABLET | Refills: 0 | Status: SHIPPED | OUTPATIENT
Start: 2022-10-03 | End: 2022-11-04 | Stop reason: SDUPTHER

## 2022-10-17 NOTE — TELEPHONE ENCOUNTER
Rukhsana Salguero called requesting a refill on the following medications:  Requested Prescriptions     Pending Prescriptions Disp Refills    cyclobenzaprine (FLEXERIL) 10 MG tablet 90 tablet 0     Sig: Take 1 tablet by mouth 3 times daily as needed for Muscle spasms     Pharmacy verified: [facility] in [address] [place] [address]  . pv      Date of last visit: 9/21/2022  Date of next visit (if applicable): 18/9/6856

## 2022-10-18 RX ORDER — CYCLOBENZAPRINE HCL 10 MG
10 TABLET ORAL 3 TIMES DAILY PRN
Qty: 90 TABLET | Refills: 0 | OUTPATIENT
Start: 2022-10-18 | End: 2022-11-17

## 2022-10-18 NOTE — TELEPHONE ENCOUNTER
OARRS reviewed. UDS: + for  Cyclobenzaprine, Citalopram/Escitalopram, Buspirone -consistent. Negative for Hydrocodone. Last seen: 9/21/2022.  Follow-up: 12/5/2022

## 2022-10-25 RX ORDER — CYCLOBENZAPRINE HCL 10 MG
10 TABLET ORAL 3 TIMES DAILY PRN
Qty: 90 TABLET | Refills: 0 | Status: SHIPPED | OUTPATIENT
Start: 2022-10-26 | End: 2022-11-21 | Stop reason: SDUPTHER

## 2022-11-02 DIAGNOSIS — G89.4 CHRONIC PAIN SYNDROME: ICD-10-CM

## 2022-11-02 NOTE — TELEPHONE ENCOUNTER
Kendra Patient called requesting a refill on the following medications:  Requested Prescriptions     Pending Prescriptions Disp Refills    HYDROcodone-acetaminophen (NORCO) 5-325 MG per tablet 90 tablet 0     Sig: Take 1 tablet by mouth every 8 hours as needed for Pain for up to 30 days. Pharmacy verified: [facility] in [address] [place] [address]  . pv      Date of last visit: 9/21/2022  Date of next visit (if applicable): 69/9/2568

## 2022-11-04 RX ORDER — HYDROCODONE BITARTRATE AND ACETAMINOPHEN 5; 325 MG/1; MG/1
1 TABLET ORAL EVERY 8 HOURS PRN
Qty: 90 TABLET | Refills: 0 | Status: SHIPPED | OUTPATIENT
Start: 2022-11-04 | End: 2022-11-29 | Stop reason: SDUPTHER

## 2022-11-07 NOTE — TELEPHONE ENCOUNTER
Pt .called and advised that his urine sscreen needs to show hydrocodone and it did not with last check. Reports he was taking carlos alberto lot less d/t being on vacation.

## 2022-11-09 ENCOUNTER — PREP FOR PROCEDURE (OUTPATIENT)
Dept: PHYSICAL MEDICINE AND REHAB | Age: 52
End: 2022-11-09

## 2022-11-14 NOTE — DISCHARGE INSTRUCTIONS
ANESTHESIA INSTRUCTIONS FOLLOWING SURGERY        Since you may experience some intermittent light-headedness for the next several hours, we suggest you plan on bed rest or quiet relaxation this evening. You must have a friend or relative stay with you tonight. Because of the sedation you have received, it is recommended that you do not drive a motor vehicle, operate any kind of machinery, or sign any contractual agreement for 24 hours following the procedure. You should not take alcoholic beverages tonight and only take sleeping medication that has been specifically prescribed for you by your physician. Call office [phone] if you have:  Temperature greater than 100.4  Persistent nausea and vomiting  Severe uncontrolled pain  Redness, tenderness, or signs of infection (pain, swelling, redness, odor or green/yellow discharge around the site)  Difficulty breathing, headache or visual disturbances  Hives  Persistent dizziness or light-headedness  Extreme fatigue  Any other questions or concerns you may have after discharge    In an emergency, call 911 or go to an Emergency Department at a nearby hospital    It is important to bring a complete, current list of your medications to any medical appointments or hospitalizations. REMINDER:   Carry a list of your medications and allergies with you at all times  Call your pharmacy at least 1 week in advance to refill prescriptions    Diet: Resume your usual diet. Good nutrition promotes healing. Increase fluid intake. Activity: Rest for 24 hrs then resume normal activity. HOME MEDICATIONS:      If on blood thinning products such as; Aspirin, NSAIDS, Plavix, Coumadin, Xarelto, Fish Oil, Multi-Vitamins or Herbal Supplements restart in 24 hours      Restart Metformin in 48 hours if you had procedure with dye. Restart Metformin in 24 hours if no dye used during procedure.         Education Materials Received: {yes/no:629911}  Belongings Returned: {yes/no:336703}          I understand and acknowledge receipt of the above instructions. Patient or Guardian Signature                                                         Date/Time                                                                                                                                            Physician's or R.N.'s Signature                                                                  Date/Time      The discharge instructions have been reviewed with the patient and/or Guardian. Patient and/or Guardian signed and retained a printed copy. Call office [phone] if you have:  Temperature greater than 100.4  Persistent nausea and vomiting  Severe uncontrolled pain  Redness, tenderness, or signs of infection (pain, swelling, redness, odor or green/yellow discharge around the site)  Difficulty breathing, headache or visual disturbances  Hives  Persistent dizziness or light-headedness  Extreme fatigue  Any other questions or concerns you may have after discharge    In an emergency, call 911 or go to an Emergency Department at a nearby hospital    It is important to bring a complete, current list of your medications to any medical appointments or hospitalizations. REMINDER:   Carry a list of your medications and allergies with you at all times  Call your pharmacy at least 1 week in advance to refill prescriptions    Diet: Resume your usual diet. Good nutrition promotes healing. Increase fluid intake. Activity: Rest for 24 hrs then resume normal activity. Education Materials Received: {yes/no:600794}  Belongings Returned: {yes/no:[id number]}          I understand and acknowledge receipt of the above instructions. Patient or Guardian Signature                                                         Date/Time                                                                                                                                            Physician's or R.N.'s Signature                                                                  Date/Time      The discharge instructions have been reviewed with the patient and/or Guardian. Patient and/or Guardian signed and retained a printed copy.

## 2022-11-15 ENCOUNTER — ANESTHESIA (OUTPATIENT)
Dept: OPERATING ROOM | Age: 52
End: 2022-11-15
Payer: COMMERCIAL

## 2022-11-15 ENCOUNTER — HOSPITAL ENCOUNTER (OUTPATIENT)
Age: 52
Setting detail: OUTPATIENT SURGERY
Discharge: HOME | End: 2022-11-15
Attending: PAIN MEDICINE | Admitting: PAIN MEDICINE
Payer: COMMERCIAL

## 2022-11-15 ENCOUNTER — ANESTHESIA EVENT (OUTPATIENT)
Dept: OPERATING ROOM | Age: 52
End: 2022-11-15
Payer: COMMERCIAL

## 2022-11-15 ENCOUNTER — APPOINTMENT (OUTPATIENT)
Dept: GENERAL RADIOLOGY | Age: 52
End: 2022-11-15
Attending: PAIN MEDICINE
Payer: COMMERCIAL

## 2022-11-15 VITALS
WEIGHT: 244.8 LBS | HEART RATE: 89 BPM | BODY MASS INDEX: 35.05 KG/M2 | OXYGEN SATURATION: 92 % | RESPIRATION RATE: 16 BRPM | DIASTOLIC BLOOD PRESSURE: 70 MMHG | HEIGHT: 70 IN | TEMPERATURE: 97.5 F | SYSTOLIC BLOOD PRESSURE: 115 MMHG

## 2022-11-15 LAB — GLUCOSE BLD-MCNC: 151 MG/DL (ref 70–108)

## 2022-11-15 PROCEDURE — 7100000011 HC PHASE II RECOVERY - ADDTL 15 MIN: Performed by: PAIN MEDICINE

## 2022-11-15 PROCEDURE — 7100000010 HC PHASE II RECOVERY - FIRST 15 MIN: Performed by: PAIN MEDICINE

## 2022-11-15 PROCEDURE — 3700000000 HC ANESTHESIA ATTENDED CARE: Performed by: PAIN MEDICINE

## 2022-11-15 PROCEDURE — 2709999900 HC NON-CHARGEABLE SUPPLY: Performed by: PAIN MEDICINE

## 2022-11-15 PROCEDURE — 3600000056 HC PAIN LEVEL 4 BASE: Performed by: PAIN MEDICINE

## 2022-11-15 PROCEDURE — 82948 REAGENT STRIP/BLOOD GLUCOSE: CPT

## 2022-11-15 PROCEDURE — 64635 DESTROY LUMB/SAC FACET JNT: CPT | Performed by: PAIN MEDICINE

## 2022-11-15 PROCEDURE — 2500000003 HC RX 250 WO HCPCS: Performed by: PAIN MEDICINE

## 2022-11-15 PROCEDURE — 3600000057 HC PAIN LEVEL 4 ADDL 15 MIN: Performed by: PAIN MEDICINE

## 2022-11-15 PROCEDURE — 3209999900 FLUORO FOR SURGICAL PROCEDURES

## 2022-11-15 PROCEDURE — 64636 DESTROY L/S FACET JNT ADDL: CPT | Performed by: PAIN MEDICINE

## 2022-11-15 PROCEDURE — 3700000001 HC ADD 15 MINUTES (ANESTHESIA): Performed by: PAIN MEDICINE

## 2022-11-15 PROCEDURE — 6360000002 HC RX W HCPCS: Performed by: STUDENT IN AN ORGANIZED HEALTH CARE EDUCATION/TRAINING PROGRAM

## 2022-11-15 RX ORDER — FENTANYL CITRATE 50 UG/ML
INJECTION, SOLUTION INTRAMUSCULAR; INTRAVENOUS PRN
Status: DISCONTINUED | OUTPATIENT
Start: 2022-11-15 | End: 2022-11-15 | Stop reason: SDUPTHER

## 2022-11-15 RX ORDER — LIDOCAINE HYDROCHLORIDE 10 MG/ML
INJECTION, SOLUTION EPIDURAL; INFILTRATION; INTRACAUDAL; PERINEURAL PRN
Status: DISCONTINUED | OUTPATIENT
Start: 2022-11-15 | End: 2022-11-15 | Stop reason: ALTCHOICE

## 2022-11-15 RX ORDER — BUPIVACAINE HYDROCHLORIDE 2.5 MG/ML
INJECTION, SOLUTION EPIDURAL; INFILTRATION; INTRACAUDAL PRN
Status: DISCONTINUED | OUTPATIENT
Start: 2022-11-15 | End: 2022-11-15 | Stop reason: ALTCHOICE

## 2022-11-15 RX ADMIN — FENTANYL CITRATE 100 MCG: 50 INJECTION, SOLUTION INTRAMUSCULAR; INTRAVENOUS at 08:13

## 2022-11-15 RX ADMIN — PROPOFOL 100 MG: 10 INJECTION, EMULSION INTRAVENOUS at 08:35

## 2022-11-15 ASSESSMENT — PAIN DESCRIPTION - DESCRIPTORS: DESCRIPTORS: ACHING

## 2022-11-15 ASSESSMENT — PAIN SCALES - GENERAL: PAINLEVEL_OUTOF10: 0

## 2022-11-15 ASSESSMENT — PAIN - FUNCTIONAL ASSESSMENT: PAIN_FUNCTIONAL_ASSESSMENT: 0-10

## 2022-11-15 NOTE — H&P
H&P    Patient here with complaints of pain in low back that has been increasing the past couple weeks as the effects from his L-facet RFA is waning. Pain all axial in low back with some pain in SI area- sharp, pinching aching pain worse in left lower back. Increases with increased activity. Pain increases with bending, lifting, twisting , walking, standing, and housework or working at job. Norco prn and flexeril remains effective in decreasing pain to a tolerable level. Denies any radicular pain   Medications reviewed. Patient denies side effects with medications. Patient states he is taking medications as prescribed. Hedenies receiving pain medications from other sources. He denies any ER visits since last visit. Pain scale with out pain medications or at its worst is 7/10. Pain scale with pain medications or at its best is 3/10. Last dose of [place] was last night  Drug screen reviewed from 7/11/2022 and was appropriate  Pill count completed  today and WNL: Yes        The patientis allergic to latex, codeine, nickel, and oxybutynin chloride [oxybutynin chloride]. Subjective:      Review of Systems   Constitutional: Negative. HENT: Negative. Respiratory: Negative. Cardiovascular: Negative. Negative for chest pain and leg swelling. Gastrointestinal: Negative. Musculoskeletal:  Positive for arthralgias, back pain and myalgias. Negative for gait problem and joint swelling. Ambulating without assist devices    Skin: Negative. Neurological:  Negative for weakness and numbness. Psychiatric/Behavioral:  Negative for decreased concentration and sleep disturbance. The patient is not nervous/anxious. Objective:      Vitals       Vitals:     09/21/22 0842   BP: 120/84   Weight: 245 lb (111.1 kg)   Height: 5' 10\" (1.778 m)            Physical Exam  Vitals and nursing note reviewed. Constitutional:       General: He is not in acute distress.      Appearance: He is well-developed. He is not diaphoretic. HENT:      Head: Normocephalic and atraumatic. Right Ear: External ear normal.      Left Ear: External ear normal.      Nose: Nose normal.      Mouth/Throat:      Mouth: Mucous membranes are moist.      Pharynx: Oropharynx is clear. No oropharyngeal exudate. Eyes:      General: No scleral icterus. Right eye: No discharge. Left eye: No discharge. Conjunctiva/sclera: Conjunctivae normal.      Pupils: Pupils are equal, round, and reactive to light. Neck:      Thyroid: No thyromegaly. Cardiovascular:      Rate and Rhythm: Normal rate and regular rhythm. Heart sounds: Normal heart sounds. No murmur heard. No friction rub. No gallop. Pulmonary:      Effort: Pulmonary effort is normal. No respiratory distress. Breath sounds: Normal breath sounds. No wheezing or rales. Chest:      Chest wall: No tenderness. Abdominal:      General: Bowel sounds are normal. There is no distension. Palpations: Abdomen is soft. Tenderness: There is no abdominal tenderness. There is no guarding or rebound. Musculoskeletal:         General: Tenderness present. Right shoulder: Normal.      Left shoulder: Normal.      Cervical back: Full passive range of motion without pain, normal range of motion and neck supple. No edema, erythema or rigidity. No muscular tenderness. Normal range of motion. Thoracic back: No tenderness or bony tenderness. Normal range of motion. Lumbar back: Spasms, tenderness and bony tenderness present. Decreased range of motion. Negative right straight leg raise test and negative left straight leg raise test.        Back:     Right hip: Tenderness and bony tenderness present. Left hip: Tenderness and bony tenderness present. Right upper leg: Tenderness present. Left upper leg: Tenderness present. Right knee: Normal.      Left knee: Normal.      Right lower leg: No edema.       Left lower leg: No edema. Skin:     General: Skin is warm. Coloration: Skin is not pale. Findings: No erythema or rash. Neurological:      General: No focal deficit present. Mental Status: He is alert and oriented to person, place, and time. Mental status is at baseline. He is not disoriented. Cranial Nerves: No cranial nerve deficit. Sensory: No sensory deficit. Motor: No weakness, atrophy or abnormal muscle tone. Coordination: Coordination normal.      Gait: Gait normal.      Deep Tendon Reflexes: Reflexes are normal and symmetric. Babinski sign absent on the right side. Reflex Scores:       Tricep reflexes are 2+ on the right side and 2+ on the left side. Bicep reflexes are 2+ on the right side and 2+ on the left side. Brachioradialis reflexes are 2+ on the right side and 2+ on the left side. Patellar reflexes are 2+ on the right side and 2+ on the left side. Achilles reflexes are 2+ on the right side and 2+ on the left side. Psychiatric:         Attention and Perception: Attention normal. He is attentive. Mood and Affect: Mood normal. Mood is not anxious or depressed. Affect is not labile, blunt, angry or inappropriate. Speech: Speech normal. He is communicative. Speech is not rapid and pressured, delayed, slurred or tangential.         Behavior: Behavior normal. Behavior is not agitated, slowed, aggressive, withdrawn, hyperactive or combative. Thought Content: Thought content normal. Thought content is not paranoid or delusional. Thought content does not include homicidal or suicidal ideation. Thought content does not include homicidal or suicidal plan. Cognition and Memory: Cognition normal. Memory is not impaired. He does not exhibit impaired recent memory or impaired remote memory. Judgment: Judgment normal. Judgment is not impulsive or inappropriate.       CORETTA  Patricks test  positive  Yeoman's  or Gaenslen's positive        Assessment:      1. Spondylosis of lumbar region without myelopathy or radiculopathy    2. Chronic bilateral low back pain without sciatica    3. Bulge of lumbar disc without myelopathy    4. Mid back pain    5. Chronic pain syndrome    6. Chronic, continuous use of opioids       Plan:      OARRS reviewed. Current MED: 15.00  Patient was offered naloxone for home. Discussed long term side effects of medications, tolerance, dependency and addiction. Previous UDS reviewed  UDS preformed today for compliance. Patient told can not receive any pain medications from any other source. No evidence of abuse, diversion or aberrant behavior. Medications and/or procedures to improve function and quality of life- patient understanding with this and that may not be pain free  Discussed with patient about safe storage of medications at home  Discussed possible weaning of medication dosing dependent on treatment/procedure results. Discussed with patient about risks with procedure including infection, reaction to medication, increased pain, or bleeding. Procedure notes reviewed in detail   Received over 80% relief of low back pain from previous L-facet RFA for 7 months waning now. Plan to repeat bilateral L-facet RFA @ L4-5 and L5-S1 for longer term pain relief. Procedure discussed in detail with patient. Needs to be cleared to be off Tempie Muslim for 5 days prior to procedure   Medications remain effective: Continue Norco 5/325 mg tabs TID prn- filled 9/3/2022 has plenty   Continue flexeril 10 mg TID prn for muscle spasms- ordered refill   Is compliant               Return for bilateral L-facet RFA @ L4-5 and L5-S1. , follow up after procedure.

## 2022-11-15 NOTE — ANESTHESIA PRE PROCEDURE
Department of Anesthesiology  Preprocedure Note       Name:  Nani Naranjo   Age:  47 y.o.  :  1970                                          MRN:  [id number]         Date:  11/15/2022      Surgeon: Jessica Suazo):  Mike Smart MD    Procedure: Procedure(s):  bilateral Lumbar facet Radiofrequeny Ablation Lumbar 4-5, 5-Sacral 1    Medications prior to admission:   Prior to Admission medications    Medication Sig Start Date End Date Taking? Authorizing Provider   HYDROcodone-acetaminophen (NORCO) 5-325 MG per tablet Take 1 tablet by mouth every 8 hours as needed for Pain for up to 30 days.  22  Donaldthehector Athens, APRN - CNP   cyclobenzaprine (FLEXERIL) 10 MG tablet Take 1 tablet by mouth 3 times daily as needed for Muscle spasms 10/26/22 11/25/22  WALT Tinsley CNP   glimepiride (AMARYL) 2 MG tablet TAKE 1 TABLET BY MOUTH IN THE MORNING 22   Historical Provider, MD   tamsulosin (FLOMAX) 0.4 MG capsule TAKE 1 CAPSULE BY MOUTH EVERY DAY 22   WALT Perrin CNP   allopurinol (ZYLOPRIM) 300 MG tablet TAKE 1 TABLET BY MOUTH EVERY DAY 22   WALT Perrin CNP   potassium citrate (UROCIT-K) 10 MEQ (1080 MG) extended release tablet TAKE 1 TABLET BY MOUTH EVERY MORNING WITH BREAKFAST 22   WALT Perrin CNP   metFORMIN (GLUCOPHAGE) 500 MG tablet TAKE 1 TABLET BY MOUTH TWICE A DAY WITH MORNING AND EVENING MEALS 22   Historical Provider, MD   XARELTO 20 MG TABS tablet TAKE 1 TABLET BY MOUTH EVERY DAY WITH EVENING MEAL 3/1/22   Historical Provider, MD   naloxone 4 MG/0.1ML LIQD nasal spray 1 spray by Nasal route as needed for Opioid Reversal 21   WALT Tinsley CNP   traZODone (DESYREL) 50 MG tablet Take 50 mg by mouth nightly  Patient not taking: Reported on 11/15/2022    Historical Provider, MD   triamcinolone (KENALOG) 0.1 % cream  19   Historical Provider, MD   FLOVENT  MCG/ACT inhaler  8/21/19   Historical Provider, MD   ARIPiprazole (ABILIFY) 2 MG tablet Take 2 mg by mouth 2 times daily    Historical Provider, MD   citalopram (CELEXA) 40 MG tablet Take 40 mg by mouth daily. Historical Provider, MD   busPIRone (BUSPAR) 10 MG tablet Take 30 mg by mouth 2 times daily     Historical Provider, MD   omeprazole (PRILOSEC) 20 MG capsule Take 20 mg by mouth daily. Historical Provider, MD       Current medications:    No current outpatient medications on file. No current facility-administered medications for this visit. Allergies: Allergies   Allergen Reactions    Latex Rash    Codeine Hives     TYLENOL WITH CODEINE    Nickel Rash    Oxybutynin Chloride [Oxybutynin Chloride] Other (See Comments)     Warm feeling and extreme dry mouth       Problem List:    Patient Active Problem List   Diagnosis Code    Weak urinary stream R39.12    Obstructive sleep apnea G47.33    Snoring R06.83    Sleep disturbance G47.9    Insomnia G47.00    Sleep talking G47.8    Morning headache R51.9    Bruxism F45.8    Restless sleeper G47.9    Poor concentration R41.840    Claustrophobia F40.240    Vivid dream R68.89    GERD (gastroesophageal reflux disease) K21.9    Anxiety F41.9    Panic disorder F41.0    Obesity (BMI 30.0-34. 9) E66.9    Lumbar spondylosis M47.816    Bulging lumbar disc M51.36    Sacroiliac inflammation (HCC) M46.1    Lumbar radiculitis M54.16    Thoracic spinal stenosis M48.04       Past Medical History:        Diagnosis Date    Chronic back pain     GERD (gastroesophageal reflux disease)     History of kidney stones     Hx of blood clots early 2000's & 2013    MUNA LUNGS    Kidney stone     Lumbar spondylosis        Past Surgical History:        Procedure Laterality Date    CERVICAL SPINE SURGERY Right 12/15/2020    Left TESI @ T11 performed by Amber Fam MD at [facility]      last one 2007???    CYSTOSCOPY 6/14/2013    URETEROSCOPY STONE REMOVAL    CYSTOSCOPY  07/29/2013    Left ureteroscopy, Left ureteral stone removal and stent exchange    CYSTOSCOPY  12/23/13    Cystoscopy, Left Optical Internal Ureterotomy, Left Ureteroscopy and Dilated [facility]  2007    lasik    [address] [facility] [place] Left 5/17/2019    SI MBB #1 left performed by Lydia Horton MD at [address] [place] Left 6/27/2019    Left SI MBB # 2. performed by Lydia Horton MD at [address]  2007    left groin    LITHOTRIPSY  2006    LUMBAR NERVE BLOCK N/A 4/5/2019    LUMBAR INTER LAMINAR GUNNAR @ L4 performed by Lydia Horton MD at [address] [facility] SURGERY Left 8/15/2019    Left SI RFA.  performed by Lydia Horton MD at [facility] 98 Right 10/31/2019    LUMBAR INTER LAMINAR GUNNAR @ L4 Right performed by Lydia Horton MD at [facility] 104 Bilateral 03/26/2018    Lumbar Facet MBB at L4-5, L5-S1    PAIN MANAGEMENT PROCEDURE Left 1/16/2020    Lumbar RFA left side L4-5,5-S1 performed by Lydia Horton MD at [facility] [address] Right 3/5/2020    Lumbar RFA Right side@ L4-5,5-S1 performed by Lydia Horton MD at [place] [address] Left 7/2/2020    Left SI RFA performed by Lydia Horton MD at [place] [address] Left 8/4/2020    Left L-facet RFA @ L4-5 and L5-S1 performed by Lydia Horton MD at [facility] [address] Left 1/21/2021    left SI RFA performed by Lydia Horton MD at [place] [address] Left 3/15/2021    Left L-facet RFA @ L4-5 and L5-S1 performed by Lydia Horton MD at [facility] OR    PAIN MANAGEMENT PROCEDURE Right 2021    Right L-facet RFA @ L4-5 and L5-S1 performed by Dan Kehr, MD at [facility] Bilateral 2022    bilateral L-facet RFA @ L4-5 and L5-S1 performed by Dan Kehr, MD at [address] [place] [address] DX/THER AGNT PARAVERT FACET JOINT, LUMBAR/SAC, 2ND LEVEL Bilateral 3/26/2018    BILATERAL L-FACET MBB @ L4-5 and L5-S1, performed by Dan Kehr, MD at [address] [place] [address] DX/THER AGNT PARAVERT FACET JOINT, LUMBAR/SAC, 2ND LEVEL Bilateral 2018    Bilateral L-facet MBB @ L4-5, L5-S1. performed by Dan Kehr, MD at [address] Left 2018    LUMBAR RFA @ L4-5, and L5-S1 LEFT SIDE FIRST. Stephan Fieldn performed by Dan Kehr, MD at [address] Right 2018    Right L-RFA @ L4-5, and L5-S1 performed by Dan Kehr, MD at [address] [place] [address]         Social History:    Social History     Tobacco Use    Smoking status: Former     Packs/day: 1.00     Years: 12.00     Pack years: 12.00     Types: Cigarettes     Quit date: 2005     Years since quittin.4    Smokeless tobacco: Never   Substance Use Topics    Alcohol use: No                                Counseling given: Not Answered      Vital Signs (Current): There were no vitals filed for this visit.                                            BP Readings from Last 3 Encounters:   11/15/22 129/88   22 120/84   22 130/83       NPO Status:                                                                                 BMI:   Wt Readings from Last 3 Encounters:   11/15/22 244 lb 12.8 oz (111 kg)   22 245 lb (111.1 kg)   22 245 lb (111.1 kg)     There is no height or weight on file to calculate BMI.    CBC:   Lab Results   Component Value Date/Time    WBC 7.3 10/19/2022 09:30 AM    WBC 8.4 08/02/2021 03:04 PM    RBC 5.13 10/19/2022 09:30 AM    HGB 14.4 10/19/2022 09:30 AM    HCT 44.2 10/19/2022 09:30 AM    MCV 86.2 10/19/2022 09:30 AM    RDW 13.1 10/19/2022 09:30 AM    PLT SEE NOTE 10/19/2022 09:30 AM     08/02/2021 03:04 PM       CMP:   Lab Results   Component Value Date/Time     10/19/2022 09:30 AM    K 5.0 10/19/2022 09:30 AM    K 3.8 06/18/2020 07:50 PM     10/19/2022 09:30 AM    CO2 29 10/19/2022 09:30 AM    BUN 14 10/19/2022 09:30 AM    CREATININE 0.75 10/19/2022 09:30 AM    AGRATIO 2.1 07/27/2019 08:56 AM    LABGLOM >90 06/24/2022 09:39 AM    GLUCOSE 123 10/19/2022 09:30 AM    PROT 7.6 10/19/2022 09:30 AM    CALCIUM 9.6 10/19/2022 09:30 AM    BILITOT 0.7 10/19/2022 09:30 AM    ALKPHOS 221 10/19/2022 09:30 AM    ALKPHOS 75 07/27/2019 08:56 AM    AST 73 10/19/2022 09:30 AM    ALT 59 10/19/2022 09:30 AM       POC Tests: No results for input(s): POCGLU, POCNA, POCK, POCCL, POCBUN, POCHEMO, POCHCT in the last 72 hours.     Coags:   Lab Results   Component Value Date/Time    PROTIME 12.5 08/02/2021 03:34 PM    INR 1.09 08/02/2021 03:34 PM    APTT 32.1 06/14/2013 06:11 AM       HCG (If Applicable): No results found for: PREGTESTUR, PREGSERUM, HCG, HCGQUANT     ABGs: No results found for: PHART, PO2ART, PAQ4WHI, RLE9ERH, BEART, V9TAJFFY     Type & Screen (If Applicable):  No results found for: LABABO, LABRH    Drug/Infectious Status (If Applicable):  No results found for: HIV, HEPCAB    COVID-19 Screening (If Applicable):   Lab Results   Component Value Date/Time    COVID19 Detected 10/16/2020 05:11 PM           Anesthesia Evaluation  Patient summary reviewed and Nursing notes reviewed no history of anesthetic complications:   Airway: Mallampati: II          Dental:          Pulmonary: breath sounds clear to auscultation  (+) sleep apnea:                             Cardiovascular:  Exercise tolerance: no interval change,           Rhythm: regular  Rate: normal                    Neuro/Psych:   (+) neuromuscular disease:, headaches:, psychiatric history:            GI/Hepatic/Renal:   (+) GERD:,           Endo/Other:    (+) : arthritis:., .                 Abdominal:             Vascular: Other Findings:             Anesthesia Plan      MAC     ASA 2       Induction: intravenous. Anesthetic plan and risks discussed with patient.                         Corky Agrawal DO   11/15/2022

## 2022-11-15 NOTE — OP NOTE
Pre-Procedure Note    Patient Name: Salina Stevens   YOB: 1970  Medical Record Number: [id number]  Date: 11/15/22    Indication: Lower back pain    Consent: On file. Vital Signs:   Vitals:    11/15/22 0741   BP: 129/88   Pulse: 98   Resp: 20   Temp: 98.8 °F (37.1 °C)   SpO2: 94%       Past Medical History:   has a past medical history of Chronic back pain, GERD (gastroesophageal reflux disease), History of kidney stones, Hx of blood clots, Kidney stone, and Lumbar spondylosis. Past Surgical History:   has a past surgical history that includes Ureter stent placement (2006); Lithotripsy (2006); Cystocopy (6/14/2013); Cystocopy (07/29/2013); Cystoscopy (12/23/13); other surgical history (Bilateral, 03/26/2018); pr inj dx/ther agnt paravert facet joint, lumbar/sac, 2nd level (Bilateral, 3/26/2018); pr inj dx/ther agnt paravert facet joint, lumbar/sac, 2nd level (Bilateral, 5/21/2018); Colonoscopy; eye surgery (2007); hernia repair (2007); pr inject rx other periph nerve (Left, 9/28/2018); pr inject rx other periph nerve (Right, 11/30/2018); lumbar nerve block (N/A, 4/5/2019); Injection Procedure For Sacroiliac Joint (Left, 5/17/2019); Injection Procedure For Sacroiliac Joint (Left, 6/27/2019); Lumbar spine surgery (Left, 8/15/2019); Nerve Surgery (Right, 10/31/2019); Pain management procedure (Left, 1/16/2020); Pain management procedure (Right, 3/5/2020); Pain management procedure (Left, 7/2/2020); Pain management procedure (Left, 8/4/2020); Cervical spine surgery (Right, 12/15/2020); Pain management procedure (Left, 1/21/2021); Pain management procedure (Left, 3/15/2021); Pain management procedure (Right, 4/29/2021); and Pain management procedure (Bilateral, 2/7/2022). Pre-Sedation Documentation and Exam:   Vital signs have been reviewed (see flow sheet for vitals).      Sedation/ Anesthesia Plan:   MAC    Patient is an appropriate candidate for plan of sedation: yes    Preoperative Diagnosis: L-spondylosis     Post-Op Dx: as above     Procedure Performed:  :Radiofrequency ablation of median branches at the levels of L4-5 and L5-S1 bilateral under fluoroscopic guidance      Indication for the Procedure: The patient has ahistory of chronic low back pain that is not responding well to the conservative treatment. Patient's pain is mostly axial in nature. Pain is interfering with the activities of daily living. Physical examination revealed facet tenderness and facet loading is positive. Patient had undergone lumbar facet joint injections with pain relief that lasted for only a short period of time and had greater than 70% pain relief with confirmatory median branch blocks. Hence we decided to do radiofrequency abalation of median branches for long term pain releif. The procedure and risks  were discussed with the patient and an informed consent was obtained. Procedure:     A meaningful communication was kept up with the patient throughout the procedure. The patient is placed in prone position and skin over the back was prepped and draped in sterile manner. Then using fluoroscopy the junction of the transverse process of the vertebra with the superior process of the facet joint was observed and the view was optimized. The skin and deep tissues posterior were infiltrated with 20 ml of  1% xylocaine. The RF canula with the 15 mm active tip was introduced through the skin wheal under fluoroscopy guidance such that the tip of the needle lies in the groove of the transverse process with the superior processes of the facet joint. Then a lateral view of the lumbar spine was obtained to make sure the tip of needle is not in the neural foramen. Then electric impedence was checked to make sure it is acceptable. Then a sensory stimulus was applied at 50 Hz up to 1 volt and concordant pain symptoms were reproduced.  Then a motor stimulus was applied at 2 Hz up to 2 volts and no motor stimulation was seen in lower extremities. Some multifidus stimulus was seen. Then after negative aspiration 0.25% marcaine 4 cc  was injected through the needle in divided doses. Then a  lesion was done at 80 degrees centigrade for 90 seconds. For L5 median branch block the junction of the ala of  the sacrum with the superior articular process of the facet joint was taken as a reference point. For the L4 median branch the junction of the transverse process of L5 with the superolateral possible facet joint was taken as a reference point and for S1 median branch the most lateral and superior aspect of S1 foramina was taken as a reference point,. EBL-0   Patient's vital signs and neurological status remained stable throughout the procedure and post procedural period. The patient tolerated the procedure well and was discharged home in stable condition.      Electronically signed by Sarahi Kennedy MD on 11/15/22 at 8:13 AM EST

## 2022-11-15 NOTE — ANESTHESIA POSTPROCEDURE EVALUATION
Department of Anesthesiology  Postprocedure Note    Patient: Javan Favre  MRN: [id number]  YOB: 1970  Date of evaluation: 11/15/2022      Procedure Summary     Date: 11/15/22 Room / [facility] OR 03 / [facility]    Anesthesia Start: 4751 Anesthesia Stop: 6518    Procedure: bilateral Lumbar facet Radiofrequeny Ablation Lumbar 4-5, 5-Sacral 1 (Bilateral) Diagnosis:       Spondylosis of lumbar region without myelopathy or radiculopathy      (Spondylosis of lumbar region without myelopathy or radiculopathy [M47.816])    Surgeons: Farideh Nguyễn MD Responsible Provider: Valencia Lance DO    Anesthesia Type: MAC ASA Status: 2          Anesthesia Type: No value filed.     Angelina Phase I:      Angelina Phase II:        Anesthesia Post Evaluation    Patient location during evaluation: bedside  Patient participation: complete - patient participated  Level of consciousness: awake and alert  Airway patency: patent  Nausea & Vomiting: no vomiting and no nausea  Complications: no  Cardiovascular status: hemodynamically stable  Respiratory status: acceptable  Hydration status: stable

## 2022-11-15 NOTE — H&P
[address] [place] [address]  History and Physical Update    Pt Name: Ramonita Bills  MRN: [id number]  YOB: 1970  Date of evaluation: 11/15/2022      I have examined the patient and reviewed the H&P/Consult and there are no changes to the patient or plans.         Electronically signed by Jayjay Alvarado MD on 11/15/2022 at 8:12 AM

## 2022-11-21 RX ORDER — CYCLOBENZAPRINE HCL 10 MG
10 TABLET ORAL 3 TIMES DAILY PRN
Qty: 90 TABLET | Refills: 0 | Status: SHIPPED | OUTPATIENT
Start: 2022-11-25 | End: 2022-12-25

## 2022-11-21 NOTE — TELEPHONE ENCOUNTER
OARRS reviewed. UDS: + for  Cyclobenzaprine, Citalopram/Escitalopram, Buspirone. Last seen: 9/21/2022.  Follow-up:   Future Appointments   Date Time Provider Saul Cummins   12/12/2022  8:00 AM Tamika Lewis, WALT - CNP [facility]   9/13/2023  8:30 AM Mohsen Schafer, [address]

## 2022-11-21 NOTE — TELEPHONE ENCOUNTER
Clydene Morning called requesting a refill on the following medications:  Requested Prescriptions     Pending Prescriptions Disp Refills    cyclobenzaprine (FLEXERIL) 10 MG tablet 90 tablet 0     Sig: Take 1 tablet by mouth 3 times daily as needed for Muscle spasms     Pharmacy verified:[facility] [place]  .jenise      Date of last visit: 9/21/22  Date of next visit (if applicable):12/5/22

## 2022-11-29 DIAGNOSIS — G89.4 CHRONIC PAIN SYNDROME: ICD-10-CM

## 2022-11-29 RX ORDER — HYDROCODONE BITARTRATE AND ACETAMINOPHEN 5; 325 MG/1; MG/1
1 TABLET ORAL EVERY 8 HOURS PRN
Qty: 90 TABLET | Refills: 0 | Status: SHIPPED | OUTPATIENT
Start: 2022-12-04 | End: 2023-01-03

## 2022-11-29 NOTE — TELEPHONE ENCOUNTER
Nitish Guadalupe called requesting a refill on the following medications:  Requested Prescriptions     Pending Prescriptions Disp Refills    HYDROcodone-acetaminophen (NORCO) 5-325 MG per tablet 90 tablet 0     Sig: Take 1 tablet by mouth every 8 hours as needed for Pain for up to 30 days. [facility] [address] [place] [address]  . pv      Date of last visit: 9/21/2022  Date of next visit (if applicable): 08/37/6613

## 2022-11-29 NOTE — TELEPHONE ENCOUNTER
Patient notified and voiced understanding of renetta's recommendations. Patient states will bring medications to f/u appt on 12/12/2022.

## 2022-11-29 NOTE — TELEPHONE ENCOUNTER
OARRS reviewed. UDS: + for  Cyclobenzaprine, Citalopram/Escitalopram , Buspirone -consistent. Negative for Hydrocodone. Last seen: 9/21/2022.  Follow-up: 12/12/2022

## 2022-11-29 NOTE — TELEPHONE ENCOUNTER
Please ask patient why Celso Lovell was not present in his UDS. Please tell him to being his medications to appointment on 12/12/22. If this happens again I can't prescribe. Needs to be in UDS if taking. My notes states that he took that day.

## 2022-12-12 ENCOUNTER — OFFICE VISIT (OUTPATIENT)
Dept: PHYSICAL MEDICINE AND REHAB | Age: 52
End: 2022-12-12
Payer: COMMERCIAL

## 2022-12-12 VITALS
WEIGHT: 244 LBS | SYSTOLIC BLOOD PRESSURE: 130 MMHG | HEIGHT: 70 IN | BODY MASS INDEX: 34.93 KG/M2 | DIASTOLIC BLOOD PRESSURE: 72 MMHG

## 2022-12-12 DIAGNOSIS — M54.9 MID BACK PAIN: ICD-10-CM

## 2022-12-12 DIAGNOSIS — M54.50 CHRONIC BILATERAL LOW BACK PAIN WITHOUT SCIATICA: ICD-10-CM

## 2022-12-12 DIAGNOSIS — M51.36 BULGE OF LUMBAR DISC WITHOUT MYELOPATHY: ICD-10-CM

## 2022-12-12 DIAGNOSIS — G89.29 CHRONIC BILATERAL LOW BACK PAIN WITHOUT SCIATICA: ICD-10-CM

## 2022-12-12 DIAGNOSIS — G89.4 CHRONIC PAIN SYNDROME: ICD-10-CM

## 2022-12-12 DIAGNOSIS — F11.90 CHRONIC, CONTINUOUS USE OF OPIOIDS: ICD-10-CM

## 2022-12-12 DIAGNOSIS — M47.816 SPONDYLOSIS OF LUMBAR REGION WITHOUT MYELOPATHY OR RADICULOPATHY: Primary | ICD-10-CM

## 2022-12-12 PROCEDURE — 99214 OFFICE O/P EST MOD 30 MIN: CPT | Performed by: NURSE PRACTITIONER

## 2022-12-12 ASSESSMENT — ENCOUNTER SYMPTOMS
EYES NEGATIVE: 1
GASTROINTESTINAL NEGATIVE: 1
RESPIRATORY NEGATIVE: 1
BACK PAIN: 1

## 2022-12-12 NOTE — PROGRESS NOTES
[address] [facility] [address]  Dept: [phone]  Dept Fax: [phone]: [phone]    Visit Date: 12/12/2022    Functionality Assessment/Goals Worksheet     On a scale of 0 (Does not Interfere) to 10 (Completely Interferes)     1. Which number describes how during the past week pain has interfered with       the following:  A. General Activity:  5  B. Mood: 4  C. Walking Ability:  4  D. Normal Work (Includes both work outside the home and housework):  6  E. Relations with Other People:   4  F. Sleep:   4  G. Enjoyment of Life:   4    2. Patient Prefers to Take their Pain Medications:     []  On a regular basis   [x]  Only when necessary    []  Does not take pain medications    3. What are the Patient's Goals/Expectations for Visiting Pain Management? []  Learn about my pain    [x]  Receive Medication   []  Physical Therapy     []  Treat Depression   [x]  Receive Injections    []  Treat Sleep   []  Deal with Anxiety and Stress   []  Treat Opoid Dependence/Addiction   []  Other:      HPI:   Virginia Us is a 47 y.o. male is here today for    Chief Complaint: Low back pain     HPI   Fu from bilateral L-facet RFA from 11/15/2022. States Patient is receiving about 80% relief of low back pain from this injection. Feels that low back pain is a lot more tolerable and states since procedure his pain medications are much more effective. Able to tolerate full activity with less pain. Still some pain in low back pain axial aching and stabbing mainly on left. States he is taking Norco less frequent in the past week as pain is better. Denies any radicular pain   Pain increases with bending, lifting, twisting , walking, standing, getting up and down, and housework or working at job, cold weather changes. Medications reviewed. Patient denies side effects with medications. Patient states he is taking medications as prescribed. Hedenies receiving pain medications from other sources. He denies any ER visits since last visit. Pain scale with out pain medications or at its worst is 6/10. Pain scale with pain medications or at its best is 1/10. Last dose of Norco was last night   Drug screen reviewed from 9/21/2022 and [address] [facility] was not in UDS. We discussed this is first time this has happened. Pill count completed  today and WNL: Yes      The patientis allergic to latex, codeine, nickel, and oxybutynin chloride [oxybutynin chloride]. Subjective:      Review of Systems   Constitutional: Negative. HENT: Negative. Eyes: Negative. Respiratory: Negative. Cardiovascular: Negative. Negative for chest pain and leg swelling. Gastrointestinal: Negative. Musculoskeletal:  Positive for arthralgias, back pain and myalgias. Negative for gait problem and joint swelling. Ambulating without assist devices    Skin: Negative. Neurological:  Negative for weakness and numbness. Psychiatric/Behavioral:  Negative for decreased concentration and sleep disturbance. The patient is not nervous/anxious. Objective:     Vitals:    12/12/22 0751   BP: 130/72   Site: Left Upper Arm   Position: Sitting   Weight: 244 lb (110.7 kg)   Height: 5' 10\" (1.778 m)       Physical Exam  Vitals and nursing note reviewed. Constitutional:       General: He is not in acute distress. Appearance: He is well-developed. He is not diaphoretic. HENT:      Head: Normocephalic and atraumatic. Right Ear: External ear normal.      Left Ear: External ear normal.      Nose: Nose normal.      Mouth/Throat:      Mouth: Mucous membranes are moist.      Pharynx: Oropharynx is clear. No oropharyngeal exudate. Eyes:      General: No scleral icterus. Right eye: No discharge. Left eye: No discharge.       Conjunctiva/sclera: Conjunctivae normal.      Pupils: Pupils are equal, round, and reactive to light. Neck:      Thyroid: No thyromegaly. Cardiovascular:      Rate and Rhythm: Normal rate and regular rhythm. Heart sounds: Normal heart sounds. No murmur heard. No friction rub. No gallop. Pulmonary:      Effort: Pulmonary effort is normal. No respiratory distress. Breath sounds: Normal breath sounds. No wheezing or rales. Chest:      Chest wall: No tenderness. Abdominal:      General: Bowel sounds are normal. There is no distension. Palpations: Abdomen is soft. Tenderness: There is no abdominal tenderness. There is no guarding or rebound. Musculoskeletal:         General: Tenderness present. Right shoulder: Normal.      Left shoulder: Normal.      Cervical back: Full passive range of motion without pain, normal range of motion and neck supple. No edema, erythema or rigidity. No muscular tenderness. Normal range of motion. Thoracic back: No tenderness or bony tenderness. Normal range of motion. Lumbar back: Spasms, tenderness and bony tenderness present. Decreased range of motion. Negative right straight leg raise test and negative left straight leg raise test.        Back:       Right hip: Tenderness and bony tenderness present. Left hip: Tenderness and bony tenderness present. Right upper leg: Tenderness present. Left upper leg: Tenderness present. Right knee: Normal.      Left knee: Normal.      Right lower leg: No edema. Left lower leg: No edema. Skin:     General: Skin is warm. Coloration: Skin is not pale. Findings: No erythema or rash. Neurological:      General: No focal deficit present. Mental Status: He is alert and oriented to person, place, and time. Mental status is at baseline. He is not disoriented. Cranial Nerves: No cranial nerve deficit. Sensory: No sensory deficit. Motor: No weakness, atrophy or abnormal muscle tone.       Coordination: Coordination normal. Gait: Gait normal.      Deep Tendon Reflexes: Reflexes are normal and symmetric. Babinski sign absent on the right side. Reflex Scores:       Tricep reflexes are 2+ on the right side and 2+ on the left side. Bicep reflexes are 2+ on the right side and 2+ on the left side. Brachioradialis reflexes are 2+ on the right side and 2+ on the left side. Patellar reflexes are 2+ on the right side and 2+ on the left side. Achilles reflexes are 2+ on the right side and 2+ on the left side. Psychiatric:         Attention and Perception: Attention normal. He is attentive. Mood and Affect: Mood normal. Mood is not anxious or depressed. Affect is not labile, blunt, angry or inappropriate. Speech: Speech normal. He is communicative. Speech is not rapid and pressured, delayed, slurred or tangential.         Behavior: Behavior normal. Behavior is not agitated, slowed, aggressive, withdrawn, hyperactive or combative. Thought Content: Thought content normal. Thought content is not paranoid or delusional. Thought content does not include homicidal or suicidal ideation. Thought content does not include homicidal or suicidal plan. Cognition and Memory: Cognition normal. Memory is not impaired. He does not exhibit impaired recent memory or impaired remote memory. Judgment: Judgment normal. Judgment is not impulsive or inappropriate. CORETTA  Patricks test  positive  Yeoman's  or Gaenslen's positive       Assessment:     1. Spondylosis of lumbar region without myelopathy or radiculopathy    2. Chronic bilateral low back pain without sciatica    3. Bulge of lumbar disc without myelopathy    4. Mid back pain    5. Chronic pain syndrome    6. Chronic, continuous use of opioids            Plan:      OARRS reviewed. Current MED: 15.00  Patient was offered naloxone for home. Discussed long term side effects of medications, tolerance, dependency and addiction.   Previous UDS reviewed  UDS preformed today for compliance. Patient told can not receive any pain medications from any other source. No evidence of abuse, diversion or aberrant behavior. Medications and/or procedures to improve function and quality of life- patient understanding with this and that may not be pain free  Discussed with patient about safe storage of medications at home  Discussed possible weaning of medication dosing dependent on treatment/procedure results. Discussed with patient about risks with procedure including infection, reaction to medication, increased pain, or bleeding. Procedure notes reviewed in detail   Receiving 80% relief of low back pain from Bilateral L-facet RFA. Continue Norco 5/325 mg tabs TID prn- Filled 10/4/2022. Need to keep an eye on liver enzymes. Instructed to avoid any extra tylenol. Instructed to take less as pain is improved   Continue flexeril 10 mg TID prn for muscle spasms- Has plenty   Discussed UDS results and that medications need to be in UDS or will not prescribe. Updated UDS ordered today. Meds. Prescribed:   No orders of the defined types were placed in this encounter. Return in about 2 months (around 2/12/2023), or if symptoms worsen or fail to improve, for follow up  for medications.                Electronically signed by WALT Mccoy CNP on12/12/2022 at 8:15 AM

## 2022-12-19 RX ORDER — CYCLOBENZAPRINE HCL 10 MG
10 TABLET ORAL 3 TIMES DAILY PRN
Qty: 90 TABLET | Refills: 0 | Status: SHIPPED | OUTPATIENT
Start: 2022-12-19 | End: 2023-01-18

## 2022-12-19 NOTE — TELEPHONE ENCOUNTER
Nani Naranjo called requesting a refill on the following medications:  Requested Prescriptions     Pending Prescriptions Disp Refills    cyclobenzaprine (FLEXERIL) 10 MG tablet 90 tablet 0     Sig: Take 1 tablet by mouth 3 times daily as needed for Muscle spasms     Pharmacy [facility]  [email or web address] [address] [phone]    Date of last visit: 12/12/2022  Date of next visit (if applicable): 7/43/4779

## 2022-12-19 NOTE — TELEPHONE ENCOUNTER
OARRS reviewed. UDS: + for  cyclobenzaprine, citalopram, buspirone COMPLIANT. Hydrocodone NON-COMPLIANT. Last seen: 12/12/2022.  Follow-up:   Future Appointments   Date Time Provider Saul Cummins   2/13/2023  8:30 AM WALT Montez - CNP N [facility]   9/13/2023  8:30 AM Lorena Salas, [address] [facility]

## 2022-12-29 DIAGNOSIS — G89.4 CHRONIC PAIN SYNDROME: ICD-10-CM

## 2022-12-29 RX ORDER — HYDROCODONE BITARTRATE AND ACETAMINOPHEN 5; 325 MG/1; MG/1
1 TABLET ORAL EVERY 8 HOURS PRN
Qty: 90 TABLET | Refills: 0 | Status: SHIPPED | OUTPATIENT
Start: 2023-01-03 | End: 2023-02-02

## 2022-12-29 NOTE — TELEPHONE ENCOUNTER
Michael Damico called requesting a refill on the following medications:  Requested Prescriptions     Pending Prescriptions Disp Refills    HYDROcodone-acetaminophen (NORCO) 5-325 MG per tablet 90 tablet 0     Sig: Take 1 tablet by mouth every 8 hours as needed for Pain for up to 30 days.      [facility]      Date of last visit: 12-12-22  Date of next visit (if applicable): 6/26/3594

## 2022-12-29 NOTE — TELEPHONE ENCOUNTER
OARRS reviewed. UDS: + for Flexeril, Hydrocodone, Buspar, Citalopram.   Last seen: 12/12/2022.  Follow-up:   Future Appointments   Date Time Provider Saul Cummins   2/13/2023  8:30 AM WALT Garcia - CNP N [facility] - [address] [facility]   9/13/2023  8:30 AM [facility] [place], [address] [place] [address]

## 2023-01-12 NOTE — TELEPHONE ENCOUNTER
Po Victor called requesting a refill on the following medications:  Requested Prescriptions     Pending Prescriptions Disp Refills    cyclobenzaprine (FLEXERIL) 10 MG tablet 90 tablet 0     Sig: Take 1 tablet by mouth 3 times daily as needed for Muscle spasms     Pharmacy verified: [facility] [place]  .      Date of last visit: 12/12/2022  Date of next visit (if applicable): 2/13/2023

## 2023-01-13 RX ORDER — CYCLOBENZAPRINE HCL 10 MG
10 TABLET ORAL 3 TIMES DAILY PRN
Qty: 90 TABLET | Refills: 0 | Status: SHIPPED | OUTPATIENT
Start: 2023-01-18 | End: 2023-02-13 | Stop reason: SDUPTHER

## 2023-01-13 NOTE — TELEPHONE ENCOUNTER
OARRS reviewed. UDS: + for  Cyclobenzaprine, Hydrocodone, Buspirone, Citalopram.   Last seen: 12/12/2022.  Follow-up: 2/13/2023

## 2023-01-30 DIAGNOSIS — G89.4 CHRONIC PAIN SYNDROME: ICD-10-CM

## 2023-01-30 NOTE — TELEPHONE ENCOUNTER
Aroldo Castorena called requesting a refill on the following medications:  Requested Prescriptions     Pending Prescriptions Disp Refills    HYDROcodone-acetaminophen (NORCO) 5-325 MG per tablet 90 tablet 0     Sig: Take 1 tablet by mouth every 8 hours as needed for Pain for up to 30 days. [facility]  . pv      Date of last visit:   Date of next visit (if applicable): 5/76/7890

## 2023-01-31 RX ORDER — HYDROCODONE BITARTRATE AND ACETAMINOPHEN 5; 325 MG/1; MG/1
1 TABLET ORAL EVERY 8 HOURS PRN
Qty: 90 TABLET | Refills: 0 | Status: SHIPPED | OUTPATIENT
Start: 2023-02-03 | End: 2023-02-01 | Stop reason: SDUPTHER

## 2023-01-31 NOTE — TELEPHONE ENCOUNTER
OARRS reviewed. UDS: + for  Cyclobenzaprine, Hydrocodone, Buspirone, Lexapro  Last seen: 12/12/2022.  Follow-up:   Future Appointments   Date Time Provider Saul Cummins   2/13/2023  8:30 AM Noe Peñaloza, WALT - CNP N [facility]   9/13/2023  8:30 AM Maxwell Bautista, [address] [facility]

## 2023-02-01 RX ORDER — HYDROCODONE BITARTRATE AND ACETAMINOPHEN 5; 325 MG/1; MG/1
1 TABLET ORAL EVERY 8 HOURS PRN
Qty: 90 TABLET | Refills: 0 | Status: SHIPPED | OUTPATIENT
Start: 2023-02-03 | End: 2023-03-05

## 2023-02-01 NOTE — TELEPHONE ENCOUNTER
Script for Standard Worthington sent already to [facility]- IS out of stock. Canceled and resetting up to [address] per pt.  Request.

## 2023-02-13 ENCOUNTER — OFFICE VISIT (OUTPATIENT)
Dept: PHYSICAL MEDICINE AND REHAB | Age: 53
End: 2023-02-13

## 2023-02-13 VITALS
DIASTOLIC BLOOD PRESSURE: 72 MMHG | WEIGHT: 244 LBS | SYSTOLIC BLOOD PRESSURE: 120 MMHG | HEIGHT: 70 IN | BODY MASS INDEX: 34.93 KG/M2

## 2023-02-13 DIAGNOSIS — G89.4 CHRONIC PAIN SYNDROME: ICD-10-CM

## 2023-02-13 DIAGNOSIS — M54.50 CHRONIC BILATERAL LOW BACK PAIN WITHOUT SCIATICA: ICD-10-CM

## 2023-02-13 DIAGNOSIS — M51.36 BULGE OF LUMBAR DISC WITHOUT MYELOPATHY: ICD-10-CM

## 2023-02-13 DIAGNOSIS — M47.816 SPONDYLOSIS OF LUMBAR REGION WITHOUT MYELOPATHY OR RADICULOPATHY: Primary | ICD-10-CM

## 2023-02-13 DIAGNOSIS — M54.9 MID BACK PAIN: ICD-10-CM

## 2023-02-13 DIAGNOSIS — G89.29 CHRONIC BILATERAL LOW BACK PAIN WITHOUT SCIATICA: ICD-10-CM

## 2023-02-13 RX ORDER — CYCLOBENZAPRINE HCL 10 MG
10 TABLET ORAL 3 TIMES DAILY PRN
Qty: 90 TABLET | Refills: 0 | Status: SHIPPED | OUTPATIENT
Start: 2023-02-13 | End: 2023-03-15

## 2023-02-13 ASSESSMENT — ENCOUNTER SYMPTOMS
EYES NEGATIVE: 1
BACK PAIN: 1
RESPIRATORY NEGATIVE: 1
GASTROINTESTINAL NEGATIVE: 1

## 2023-02-13 NOTE — PROGRESS NOTES
[address] [facility] [address]  Dept: [phone]  Dept Fax: [phone]: [phone]    Visit Date: 2/13/2023    Functionality Assessment/Goals Worksheet     On a scale of 0 (Does not Interfere) to 10 (Completely Interferes)     1. Which number describes how during the past week pain has interfered with       the following:  A. General Activity:  4  B. Mood: 5  C. Walking Ability:  3  D. Normal Work (Includes both work outside the home and housework):  5  E. Relations with Other People:   4  F. Sleep:   3  G. Enjoyment of Life:   5    2. Patient Prefers to Take their Pain Medications:     []  On a regular basis   [x]  Only when necessary    []  Does not take pain medications    3. What are the Patient's Goals/Expectations for Visiting Pain Management? []  Learn about my pain    [x]  Receive Medication   []  Physical Therapy     []  Treat Depression   [x]  Receive Injections    []  Treat Sleep   []  Deal with Anxiety and Stress   []  Treat Opoid Dependence/Addiction   []  Other:      HPI:   Staci Mcbride is a 47 y.o. male is here today for    Chief Complaint: Low back pain     HPI   2 month FU. Continues to have pain in low back intermittent and sharp mainly bothersome on left side. Has ups and downs but overall pain has remained manageable with prn pain medications and still receiving good relief from bilateral L-facet RFA. Taking Norco only prn   Still pain up into mid back sharp aching and stiff  Gets a lot of spasms and states that flexeril helps this   No radicular pain   Pain increases with bending, lifting, twisting , standing, getting up and down, and housework or working at job. States now off on Xeralto and back on Coumadin   Medications reviewed. Patient denies side effects with medications. Patient states he is taking medications as prescribed. Murtaza receiving pain medications from other sources. He denies any ER visits since last visit. Pain scale with out pain medications or at its worst is 7-8/10. Pain scale with pain medications or at its best is 2/10. Last dose of Norco was Last night    Drug screen reviewed from 12/2/2022 and was appropriate  Pill count completed  today and WNL: Yes      The patientis allergic to latex, codeine, nickel, and oxybutynin chloride [oxybutynin chloride]. Subjective:      Review of Systems   Constitutional: Negative. HENT: Negative. Eyes: Negative. Respiratory: Negative. Cardiovascular: Negative. Negative for chest pain and leg swelling. Gastrointestinal: Negative. Musculoskeletal:  Positive for arthralgias, back pain and myalgias. Negative for gait problem and joint swelling. Ambulating without assist devices    Skin: Negative. Neurological:  Negative for weakness and numbness. Psychiatric/Behavioral:  Negative for decreased concentration and sleep disturbance. The patient is not nervous/anxious. Objective:     Vitals:    02/13/23 0815   BP: 120/72   Weight: 244 lb (110.7 kg)   Height: 5' 10\" (1.778 m)       Physical Exam  Vitals and nursing note reviewed. Constitutional:       General: He is not in acute distress. Appearance: Normal appearance. He is well-developed. He is not diaphoretic. HENT:      Head: Normocephalic and atraumatic. Right Ear: External ear normal.      Left Ear: External ear normal.      Nose: Nose normal.      Mouth/Throat:      Mouth: Mucous membranes are moist.      Pharynx: Oropharynx is clear. No oropharyngeal exudate. Eyes:      General: No scleral icterus. Right eye: No discharge. Left eye: No discharge. Conjunctiva/sclera: Conjunctivae normal.      Pupils: Pupils are equal, round, and reactive to light. Neck:      Thyroid: No thyromegaly. Cardiovascular:      Rate and Rhythm: Normal rate and regular rhythm. Pulses: Normal pulses. Heart sounds: Normal heart sounds. No murmur heard. No friction rub. No gallop. Pulmonary:      Effort: Pulmonary effort is normal. No respiratory distress. Breath sounds: Normal breath sounds. No wheezing or rales. Chest:      Chest wall: No tenderness. Abdominal:      General: Abdomen is flat. Bowel sounds are normal. There is no distension. Palpations: Abdomen is soft. Tenderness: There is no abdominal tenderness. There is no guarding or rebound. Musculoskeletal:         General: Tenderness present. Right shoulder: Normal.      Left shoulder: Normal.      Cervical back: Full passive range of motion without pain, normal range of motion and neck supple. No edema, erythema or rigidity. No muscular tenderness. Normal range of motion. Thoracic back: Tenderness and bony tenderness present. Normal range of motion. Lumbar back: Spasms, tenderness and bony tenderness present. Decreased range of motion. Negative right straight leg raise test and negative left straight leg raise test.        Back:       Right hip: Tenderness and bony tenderness present. Left hip: Tenderness and bony tenderness present. Right upper leg: Tenderness present. Left upper leg: Tenderness present. Right knee: Normal.      Left knee: Normal.      Right lower leg: No edema. Left lower leg: No edema. Skin:     General: Skin is warm. Coloration: Skin is not pale. Findings: No erythema or rash. Neurological:      General: No focal deficit present. Mental Status: He is alert and oriented to person, place, and time. Mental status is at baseline. He is not disoriented. Cranial Nerves: No cranial nerve deficit. Sensory: No sensory deficit. Motor: No weakness, atrophy or abnormal muscle tone. Coordination: Coordination normal.      Gait: Gait normal.      Deep Tendon Reflexes: Reflexes are normal and symmetric.  Babinski sign absent on the right side. Reflex Scores:       Tricep reflexes are 2+ on the right side and 2+ on the left side. Bicep reflexes are 2+ on the right side and 2+ on the left side. Brachioradialis reflexes are 2+ on the right side and 2+ on the left side. Patellar reflexes are 2+ on the right side and 2+ on the left side. Achilles reflexes are 2+ on the right side and 2+ on the left side. Psychiatric:         Attention and Perception: Attention normal. He is attentive. Mood and Affect: Mood normal. Mood is not anxious or depressed. Affect is not labile, blunt, angry or inappropriate. Speech: Speech normal. He is communicative. Speech is not rapid and pressured, delayed, slurred or tangential.         Behavior: Behavior normal. Behavior is not agitated, slowed, aggressive, withdrawn, hyperactive or combative. Thought Content: Thought content normal. Thought content is not paranoid or delusional. Thought content does not include homicidal or suicidal ideation. Thought content does not include homicidal or suicidal plan. Cognition and Memory: Cognition normal. Memory is not impaired. He does not exhibit impaired recent memory or impaired remote memory. Judgment: Judgment normal. Judgment is not impulsive or inappropriate. CORETTA  Patricks test negative  Yeoman's  or Gaenslen's negative          Assessment:     1. Spondylosis of lumbar region without myelopathy or radiculopathy    2. Bulge of lumbar disc without myelopathy    3. Mid back pain    4. Chronic bilateral low back pain without sciatica    5. Chronic pain syndrome            Plan:      OARRS reviewed. Current MED: 15.00  Patient was offered naloxone for home. Discussed long term side effects of medications, tolerance, dependency and addiction. Previous UDS reviewed  UDS preformed today for compliance. Patient told can not receive any pain medications from any other source.   No evidence of abuse, diversion or aberrant behavior. Medications and/or procedures to improve function and quality of life- patient understanding with this and that may not be pain free  Discussed with patient about safe storage of medications at home  Discussed possible weaning of medication dosing dependent on treatment/procedure results. Discussed with patient about risks with procedure including infection, reaction to medication, increased pain, or bleeding  Procedure notes reviewed in detail   Continues to receive over 80% or 85% relief of low back pain from bilateral L-facet RFA. Continue Norco 5/325 mg tabs TID prn- Filled 2/3/2023. Need to keep an eye on liver enzymes. Instructed to avoid any extra tylenol. If remains elevated discussed may need to change to oxy IR. Dennis Coventry was changed to coumadin because of this   Continue flexeril 10 mg TID prn for muscle spasms- ordered refill   Is compliant       Meds. Prescribed:   Orders Placed This Encounter   Medications    cyclobenzaprine (FLEXERIL) 10 MG tablet     Sig: Take 1 tablet by mouth 3 times daily as needed for Muscle spasms     Dispense:  90 tablet     Refill:  0         Return in about 2 months (around 4/13/2023), or if symptoms worsen or fail to improve, for follow up  for medications.                Electronically signed by WALT Case CNP on2/13/2023 at 8:29 AM

## 2023-02-27 ENCOUNTER — TELEPHONE (OUTPATIENT)
Dept: PHYSICAL MEDICINE AND REHAB | Age: 53
End: 2023-02-27

## 2023-02-27 DIAGNOSIS — G89.4 CHRONIC PAIN SYNDROME: ICD-10-CM

## 2023-02-27 RX ORDER — HYDROCODONE BITARTRATE AND ACETAMINOPHEN 5; 325 MG/1; MG/1
1 TABLET ORAL EVERY 8 HOURS PRN
Qty: 90 TABLET | Refills: 0 | Status: SHIPPED | OUTPATIENT
Start: 2023-03-05 | End: 2023-04-04

## 2023-02-27 NOTE — TELEPHONE ENCOUNTER
OARRS reviewed. UDS: + for  hydrocodone, buspirone, lexapro, cyclobenzaprine. Last seen: 2/13/2023.  Follow-up:   Future Appointments   Date Time Provider Saul Cummins   4/13/2023  8:45 AM WALT Sommer - CNP N [facility]   9/13/2023  8:30 AM Miller Hein, [address] [facility]

## 2023-02-27 NOTE — TELEPHONE ENCOUNTER
FYI: Patient is stating all the area Pharmacies is saying this medication it is on back order  Norco 5-325MG. Please contact patient to advise for refills. Patient says his refill is not due until 3-3-23 he just wanted to give the office notice.

## 2023-03-08 RX ORDER — CYCLOBENZAPRINE HCL 10 MG
10 TABLET ORAL 3 TIMES DAILY PRN
Qty: 90 TABLET | Refills: 0 | Status: SHIPPED | OUTPATIENT
Start: 2023-03-15 | End: 2023-04-14

## 2023-03-08 NOTE — TELEPHONE ENCOUNTER
Shavonne Lopez called requesting a refill on the following medications:  Requested Prescriptions     Pending Prescriptions Disp Refills    cyclobenzaprine (FLEXERIL) 10 MG tablet 90 tablet 0     Sig: Take 1 tablet by mouth 3 times daily as needed for Muscle spasms     Pharmacy verified:[facility]      Date of last visit: 2-13-23  Date of next visit (if applicable): 8/10/9119

## 2023-03-08 NOTE — TELEPHONE ENCOUNTER
OARRS reviewed. UDS: + for  Cyclobenzaprine, Hydrocodone, Buspirone, Citalopram/Escitalopram.   Last seen: 2/13/2023.  Follow-up: 4/13/2023

## 2023-04-03 DIAGNOSIS — G89.4 CHRONIC PAIN SYNDROME: ICD-10-CM

## 2023-04-03 RX ORDER — HYDROCODONE BITARTRATE AND ACETAMINOPHEN 5; 325 MG/1; MG/1
1 TABLET ORAL EVERY 8 HOURS PRN
Qty: 90 TABLET | Refills: 0 | Status: SHIPPED | OUTPATIENT
Start: 2023-04-05 | End: 2023-05-05

## 2023-04-03 NOTE — TELEPHONE ENCOUNTER
Michael Damico called requesting a refill on the following medications:  Requested Prescriptions     Pending Prescriptions Disp Refills    HYDROcodone-acetaminophen (NORCO) 5-325 MG per tablet 90 tablet 0     Sig: Take 1 tablet by mouth every 8 hours as needed for Pain for up to 30 days. Pharmacy verified: [facility]  . pv      Date of last visit: 2/13/2023  Date of next visit (if applicable): 4/48/4127

## 2023-04-03 NOTE — TELEPHONE ENCOUNTER
OARRS reviewed. UDS: + for  Cyclobenzaprine, Citalopram/Escitalopram, Buspirone, Hydrocodone. Last seen: 2/13/2023.  Follow-up: 4/17/2023

## 2023-04-06 RX ORDER — CYCLOBENZAPRINE HCL 10 MG
10 TABLET ORAL 3 TIMES DAILY PRN
Qty: 90 TABLET | Refills: 0 | OUTPATIENT
Start: 2023-04-06 | End: 2023-05-06

## 2023-04-17 ENCOUNTER — OFFICE VISIT (OUTPATIENT)
Dept: PHYSICAL MEDICINE AND REHAB | Age: 53
End: 2023-04-17
Payer: COMMERCIAL

## 2023-04-17 VITALS
SYSTOLIC BLOOD PRESSURE: 120 MMHG | DIASTOLIC BLOOD PRESSURE: 78 MMHG | HEIGHT: 70 IN | BODY MASS INDEX: 34.93 KG/M2 | WEIGHT: 244 LBS

## 2023-04-17 DIAGNOSIS — M54.9 MID BACK PAIN: ICD-10-CM

## 2023-04-17 DIAGNOSIS — M47.814 SPONDYLOSIS OF THORACIC REGION WITHOUT MYELOPATHY OR RADICULOPATHY: ICD-10-CM

## 2023-04-17 DIAGNOSIS — G89.4 CHRONIC PAIN SYNDROME: ICD-10-CM

## 2023-04-17 DIAGNOSIS — M47.816 SPONDYLOSIS OF LUMBAR REGION WITHOUT MYELOPATHY OR RADICULOPATHY: Primary | ICD-10-CM

## 2023-04-17 DIAGNOSIS — M54.50 CHRONIC BILATERAL LOW BACK PAIN WITHOUT SCIATICA: ICD-10-CM

## 2023-04-17 DIAGNOSIS — G89.29 CHRONIC BILATERAL LOW BACK PAIN WITHOUT SCIATICA: ICD-10-CM

## 2023-04-17 DIAGNOSIS — M53.3 SI (SACROILIAC) PAIN: ICD-10-CM

## 2023-04-17 DIAGNOSIS — F11.90 CHRONIC, CONTINUOUS USE OF OPIOIDS: ICD-10-CM

## 2023-04-17 DIAGNOSIS — M51.36 BULGE OF LUMBAR DISC WITHOUT MYELOPATHY: ICD-10-CM

## 2023-04-17 PROCEDURE — 99214 OFFICE O/P EST MOD 30 MIN: CPT | Performed by: NURSE PRACTITIONER

## 2023-04-17 RX ORDER — WARFARIN SODIUM 2.5 MG/1
TABLET ORAL
COMMUNITY
Start: 2023-02-07

## 2023-04-17 ASSESSMENT — ENCOUNTER SYMPTOMS
BACK PAIN: 1
EYES NEGATIVE: 1
GASTROINTESTINAL NEGATIVE: 1
RESPIRATORY NEGATIVE: 1

## 2023-04-17 NOTE — PROGRESS NOTES
symmetric. Babinski sign absent on the right side. Reflex Scores:       Tricep reflexes are 2+ on the right side and 2+ on the left side. Bicep reflexes are 2+ on the right side and 2+ on the left side. Brachioradialis reflexes are 2+ on the right side and 2+ on the left side. Patellar reflexes are 2+ on the right side and 2+ on the left side. Achilles reflexes are 2+ on the right side and 2+ on the left side. Psychiatric:         Attention and Perception: Attention normal. He is attentive. Mood and Affect: Mood normal. Mood is not anxious or depressed. Affect is not labile, blunt, angry or inappropriate. Speech: Speech normal. He is communicative. Speech is not rapid and pressured, delayed, slurred or tangential.         Behavior: Behavior normal. Behavior is not agitated, slowed, aggressive, withdrawn, hyperactive or combative. Thought Content: Thought content normal. Thought content is not paranoid or delusional. Thought content does not include homicidal or suicidal ideation. Thought content does not include homicidal or suicidal plan. Cognition and Memory: Cognition normal. Memory is not impaired. He does not exhibit impaired recent memory or impaired remote memory. Judgment: Judgment normal. Judgment is not impulsive or inappropriate. CORETTA  Patricks test  negative  Yeoman's  or Gaenslen's negative       Assessment:     1. Spondylosis of lumbar region without myelopathy or radiculopathy    2. Bulge of lumbar disc without myelopathy    3. Chronic bilateral low back pain without sciatica    4. Mid back pain    5. SI (sacroiliac) pain    6. Chronic pain syndrome    7. Chronic, continuous use of opioids    8. Spondylosis of thoracic region without myelopathy or radiculopathy            Plan:      OARRS reviewed. Current MED: 15.00  Patient was offered naloxone for home.    Discussed long term side effects of medications, tolerance, dependency

## 2023-05-01 DIAGNOSIS — G89.4 CHRONIC PAIN SYNDROME: ICD-10-CM

## 2023-05-01 RX ORDER — HYDROCODONE BITARTRATE AND ACETAMINOPHEN 5; 325 MG/1; MG/1
1 TABLET ORAL EVERY 8 HOURS PRN
Qty: 90 TABLET | Refills: 0 | Status: SHIPPED | OUTPATIENT
Start: 2023-05-05 | End: 2023-06-04

## 2023-05-01 NOTE — TELEPHONE ENCOUNTER
Heather Jimenez called requesting a refill on the following medications:  Requested Prescriptions     Pending Prescriptions Disp Refills    HYDROcodone-acetaminophen (NORCO) 5-325 MG per tablet 90 tablet 0     Sig: Take 1 tablet by mouth every 8 hours as needed for Pain for up to 30 days. Pharmacy verified: [facility]  . pv      Date of last visit: 4/17/2023  Date of next visit (if applicable): 1/72/2808

## 2023-05-01 NOTE — TELEPHONE ENCOUNTER
OARRS reviewed. UDS: + for  Citalopram/Escitalopram, Buspirone, Cyclobenzaprine, Hydrocodone. Last seen: 4/17/2023.  Follow-up: 6/19/2023

## 2023-05-09 RX ORDER — CYCLOBENZAPRINE HCL 10 MG
10 TABLET ORAL 3 TIMES DAILY PRN
Qty: 90 TABLET | Refills: 0 | Status: SHIPPED | OUTPATIENT
Start: 2023-05-09 | End: 2023-06-08

## 2023-05-09 NOTE — TELEPHONE ENCOUNTER
Mirlande Nunez called requesting a refill on the following medications:  Requested Prescriptions     Pending Prescriptions Disp Refills    cyclobenzaprine (FLEXERIL) 10 MG tablet 90 tablet 0     Sig: Take 1 tablet by mouth 3 times daily as needed for Muscle spasms     [facility]   [facility]      Date of last visit:   Date of next visit (if applicable): 6/73/7609

## 2023-05-09 NOTE — TELEPHONE ENCOUNTER
OARRS reviewed. UDS: + for  cyclobenzaprine, citalopram, buspirone and hydrocodone. Last seen: 4/17/2023.  Follow-up:   Future Appointments   Date Time Provider Saul Cummins   6/19/2023  8:15 AM WALT Veronica - CNP N [facility] - [facility]   9/13/2023  8:30 AM Radha Thompson, [address] [facility]

## 2023-05-31 DIAGNOSIS — G89.4 CHRONIC PAIN SYNDROME: ICD-10-CM

## 2023-05-31 RX ORDER — HYDROCODONE BITARTRATE AND ACETAMINOPHEN 5; 325 MG/1; MG/1
1 TABLET ORAL EVERY 8 HOURS PRN
Qty: 90 TABLET | Refills: 0 | Status: SHIPPED | OUTPATIENT
Start: 2023-06-05 | End: 2023-07-05

## 2023-05-31 NOTE — TELEPHONE ENCOUNTER
Aixa Moon called requesting a refill on the following medications:  Requested Prescriptions     Pending Prescriptions Disp Refills    HYDROcodone-acetaminophen (NORCO) 5-325 MG per tablet 90 tablet 0     Sig: Take 1 tablet by mouth every 8 hours as needed for Pain for up to 30 days.      Pharmacy verified:  [facility]      Date of last visit: 04-17-23  Date of next visit (if applicable): 1/81/6543

## 2023-05-31 NOTE — TELEPHONE ENCOUNTER
OARRS reviewed. UDS: + for cyclobenzaprine, citalopram, buspirone and hydrocodone as of 2/12/23. Last seen: 4/17/2023.  Follow-up:   Future Appointments   Date Time Provider Saul Cummins   6/19/2023  8:15 AM Reyes Sing, APRN - CNP N [facility] - [facility]   9/13/2023  8:30 AM Lilli Rasheed, [address] [facility]

## 2023-06-05 NOTE — TELEPHONE ENCOUNTER
Talia Shoemaker called requesting a refill on the following medications:  Requested Prescriptions     Pending Prescriptions Disp Refills    cyclobenzaprine (FLEXERIL) 10 MG tablet 90 tablet 0     Sig: Take 1 tablet by mouth 3 times daily as needed for Muscle spasms     Pharmacy verified:[facility] [address] [facility]  .       Date of last visit:4/17/23   Date of next visit (if applicable): 5/59/7476

## 2023-06-06 RX ORDER — CYCLOBENZAPRINE HCL 10 MG
10 TABLET ORAL 3 TIMES DAILY PRN
Qty: 90 TABLET | Refills: 0 | Status: SHIPPED | OUTPATIENT
Start: 2023-06-08 | End: 2023-07-08

## 2023-06-09 DIAGNOSIS — N20.0 KIDNEY STONE: Primary | ICD-10-CM

## 2023-06-09 NOTE — TELEPHONE ENCOUNTER
Yue Dixon called requesting a refill on the following medications:  Requested Prescriptions     Pending Prescriptions Disp Refills    allopurinol (ZYLOPRIM) 300 MG tablet [Pharmacy Med Name: ALLOPURINOL 300 MG TABLET] 90 tablet 3     Sig: TAKE 1 [address]  verified:  .jenise      Date of last visit: 09/14/2022  Date of next visit (if applicable): 7/71/9799

## 2023-06-12 NOTE — TELEPHONE ENCOUNTER
Miguel Arroyo called requesting a refill on the following medications:  Requested Prescriptions     Pending Prescriptions Disp Refills    tamsulosin (FLOMAX) 0.4 MG capsule [Pharmacy Med Name: TAMSULOSIN HCL 0.4 MG CAPSULE] 90 capsule 3     Sig: TAKE 1 [address] [place] verified:  .pv      Date of last visit: 09/14/2022  Date of next visit (if applicable): 1/15/7228

## 2023-06-13 RX ORDER — ALLOPURINOL 300 MG/1
TABLET ORAL
Qty: 90 TABLET | Refills: 3 | Status: SHIPPED | OUTPATIENT
Start: 2023-06-13

## 2023-06-14 RX ORDER — TAMSULOSIN HYDROCHLORIDE 0.4 MG/1
CAPSULE ORAL
Qty: 90 CAPSULE | Refills: 3 | Status: SHIPPED | OUTPATIENT
Start: 2023-06-14

## 2023-06-19 ENCOUNTER — OFFICE VISIT (OUTPATIENT)
Dept: PHYSICAL MEDICINE AND REHAB | Age: 53
End: 2023-06-19
Payer: COMMERCIAL

## 2023-06-19 VITALS
WEIGHT: 244 LBS | HEIGHT: 70 IN | SYSTOLIC BLOOD PRESSURE: 120 MMHG | BODY MASS INDEX: 34.93 KG/M2 | DIASTOLIC BLOOD PRESSURE: 78 MMHG

## 2023-06-19 DIAGNOSIS — M54.9 MID BACK PAIN: ICD-10-CM

## 2023-06-19 DIAGNOSIS — M47.816 SPONDYLOSIS OF LUMBAR REGION WITHOUT MYELOPATHY OR RADICULOPATHY: Primary | ICD-10-CM

## 2023-06-19 DIAGNOSIS — M54.50 CHRONIC BILATERAL LOW BACK PAIN WITHOUT SCIATICA: ICD-10-CM

## 2023-06-19 DIAGNOSIS — M51.36 BULGE OF LUMBAR DISC WITHOUT MYELOPATHY: ICD-10-CM

## 2023-06-19 DIAGNOSIS — G89.4 CHRONIC PAIN SYNDROME: ICD-10-CM

## 2023-06-19 DIAGNOSIS — M53.3 SI (SACROILIAC) PAIN: ICD-10-CM

## 2023-06-19 DIAGNOSIS — F11.90 CHRONIC, CONTINUOUS USE OF OPIOIDS: ICD-10-CM

## 2023-06-19 DIAGNOSIS — G89.29 CHRONIC BILATERAL LOW BACK PAIN WITHOUT SCIATICA: ICD-10-CM

## 2023-06-19 DIAGNOSIS — M47.814 SPONDYLOSIS OF THORACIC REGION WITHOUT MYELOPATHY OR RADICULOPATHY: ICD-10-CM

## 2023-06-19 PROCEDURE — 99214 OFFICE O/P EST MOD 30 MIN: CPT | Performed by: NURSE PRACTITIONER

## 2023-06-19 RX ORDER — SITAGLIPTIN 50 MG/1
50 TABLET, FILM COATED ORAL DAILY
COMMUNITY
Start: 2023-06-03

## 2023-06-19 ASSESSMENT — ENCOUNTER SYMPTOMS
RESPIRATORY NEGATIVE: 1
EYES NEGATIVE: 1
BACK PAIN: 1
GASTROINTESTINAL NEGATIVE: 1

## 2023-06-19 NOTE — PROGRESS NOTES
[facility] [address]  Dept: [phone]  Dept Fax: [phone]: [phone]    Visit Date: 6/19/2023    Functionality Assessment/Goals Worksheet     On a scale of 0 (Does not Interfere) to 10 (Completely Interferes)     1. Which number describes how during the past week pain has interfered with       the following:  A. General Activity:  4  B. Mood: 2  C. Walking Ability:  3  D. Normal Work (Includes both work outside the home and housework):  6  E. Relations with Other People:   3  F. Sleep:   4  G. Enjoyment of Life:   3    2. Patient Prefers to Take their Pain Medications:     []  On a regular basis   [x]  Only when necessary    []  Does not take pain medications    3. What are the Patient's Goals/Expectations for Visiting Pain Management? []  Learn about my pain    [x]  Receive Medication   []  Physical Therapy     []  Treat Depression   [x]  Receive Injections    []  Treat Sleep   []  Deal with Anxiety and Stress   []  Treat Opoid Dependence/Addiction   []  Other:        HPI:   Sawyer Nicholson is a 47 y.o. male is here today for    Chief Complaint: Low back pain, mid back pain     HPI   2 month FU. Continues to have pain in low back states continues to get intermittent sharp and stabbing pains worse left side lately. Overall pain remains controlled with medications and he continues to receive about 80% relief from L-facet RFA     Continues has aching pain in mid back but has remained tolerable. Pain increases with bending, lifting, twisting , walking, standing, getting up and down, and housework or working at job. Prior Injections:   bilateral L-facet RFA from 11/15/2022      Medications reviewed. Patient denies side effects with medications. Patient states he is taking medications as prescribed.  Hedenies receiving pain medications from other

## 2023-06-27 DIAGNOSIS — G89.4 CHRONIC PAIN SYNDROME: ICD-10-CM

## 2023-06-27 RX ORDER — CYCLOBENZAPRINE HCL 10 MG
10 TABLET ORAL 3 TIMES DAILY PRN
Qty: 90 TABLET | Refills: 0 | Status: CANCELLED | OUTPATIENT
Start: 2023-06-27 | End: 2023-07-27

## 2023-06-27 RX ORDER — HYDROCODONE BITARTRATE AND ACETAMINOPHEN 5; 325 MG/1; MG/1
1 TABLET ORAL EVERY 8 HOURS PRN
Qty: 90 TABLET | Refills: 0 | OUTPATIENT
Start: 2023-06-27 | End: 2023-07-27

## 2023-06-27 RX ORDER — CYCLOBENZAPRINE HCL 10 MG
10 TABLET ORAL 3 TIMES DAILY PRN
Qty: 90 TABLET | Refills: 0 | OUTPATIENT
Start: 2023-06-27 | End: 2023-07-27

## 2023-06-27 NOTE — TELEPHONE ENCOUNTER
Karen Michael called requesting a refill on the following medications:  Requested Prescriptions     Pending Prescriptions Disp Refills    cyclobenzaprine (FLEXERIL) 10 MG tablet 90 tablet 0     Sig: Take 1 tablet by mouth 3 times daily as needed for Muscle spasms     [facility]arm[facility] verified[facility]      Date of last visit: 6-19-23  Date of next visit (if applicable): 3/02/4210

## 2023-07-05 RX ORDER — CYCLOBENZAPRINE HCL 10 MG
10 TABLET ORAL 3 TIMES DAILY PRN
Qty: 90 TABLET | Refills: 0 | Status: SHIPPED | OUTPATIENT
Start: 2023-07-08 | End: 2023-08-07

## 2023-07-05 RX ORDER — HYDROCODONE BITARTRATE AND ACETAMINOPHEN 5; 325 MG/1; MG/1
1 TABLET ORAL EVERY 8 HOURS PRN
Qty: 90 TABLET | Refills: 0 | Status: SHIPPED | OUTPATIENT
Start: 2023-07-05 | End: 2023-08-04

## 2023-07-05 NOTE — TELEPHONE ENCOUNTER
Norco an flexeril initially denied as requested refill too soon. Resetting up.  Information as in attached note

## 2023-07-20 ENCOUNTER — TELEPHONE (OUTPATIENT)
Dept: PHYSICAL MEDICINE AND REHAB | Age: 53
End: 2023-07-20

## 2023-07-21 NOTE — TELEPHONE ENCOUNTER
Yamila Ospina called requesting a refill on the following medications:  Requested Prescriptions     Pending Prescriptions Disp Refills    potassium citrate (UROCIT-K) 10 MEQ (1080 MG) extended release tablet [Pharmacy Med Name: POTASSIUM CITRATE ER 10 MEQ TB] 90 tablet 3     Sig: TAKE 1 TABLET BY MOUTH [address] verified:  .pv      Date of last visit:   Date of next visit (if applicable): 6/70/4816

## 2023-08-01 DIAGNOSIS — G89.4 CHRONIC PAIN SYNDROME: ICD-10-CM

## 2023-08-01 RX ORDER — POTASSIUM CITRATE 10 MEQ/1
TABLET, EXTENDED RELEASE ORAL
Qty: 90 TABLET | Refills: 3 | Status: SHIPPED | OUTPATIENT
Start: 2023-08-01

## 2023-08-01 NOTE — TELEPHONE ENCOUNTER
Jo-Ann Fletcher called requesting a refill on the following medications:  Requested Prescriptions     Pending Prescriptions Disp Refills    HYDROcodone-acetaminophen (NORCO) 5-325 MG per tablet 90 tablet 0     Sig: Take 1 tablet by mouth every 8 hours as needed for Pain for up to 30 days. Pharmacy verified: [facility] [address]  . pv      Date of last visit: 6/19/2023  Date of next visit (if applicable): 8/36/1284

## 2023-08-02 RX ORDER — HYDROCODONE BITARTRATE AND ACETAMINOPHEN 5; 325 MG/1; MG/1
1 TABLET ORAL EVERY 8 HOURS PRN
Qty: 90 TABLET | Refills: 0 | Status: SHIPPED | OUTPATIENT
Start: 2023-08-04 | End: 2023-09-03

## 2023-08-02 NOTE — TELEPHONE ENCOUNTER
OARRS reviewed. UDS: + for  Citalopram/Escitalopram, Buspirone, Cyclobenzaprine, Hydrocodone. Last seen: 6/19/2023.  Follow-up: 8/21/2023

## 2023-08-03 RX ORDER — CYCLOBENZAPRINE HCL 10 MG
10 TABLET ORAL 3 TIMES DAILY PRN
Qty: 90 TABLET | Refills: 0 | Status: SHIPPED | OUTPATIENT
Start: 2023-08-03 | End: 2023-09-02

## 2023-08-03 NOTE — TELEPHONE ENCOUNTER
Brian Nguyễn called requesting a refill on the following medications:  Requested Prescriptions     Pending Prescriptions Disp Refills    cyclobenzaprine (FLEXERIL) 10 MG tablet 90 tablet 0     Sig: Take 1 tablet by mouth 3 times daily as needed for Muscle spasms     [facility]      Date of last visit: 06-19-23  Date of next visit (if applicable): 8/13/9284

## 2023-08-03 NOTE — TELEPHONE ENCOUNTER
OARRS reviewed. UDS: n/a  Last seen: 6/19/2023.  Follow-up:   Future Appointments   Date Time Provider [address]   8/21/2023  7:45 AM WALT Bowser - CNP N SRPX Pain [facility] - [place]   9/13/2023  8:30 AM Gustabo Lipoma, [address]

## 2023-08-29 DIAGNOSIS — G89.4 CHRONIC PAIN SYNDROME: ICD-10-CM

## 2023-08-29 RX ORDER — HYDROCODONE BITARTRATE AND ACETAMINOPHEN 5; 325 MG/1; MG/1
1 TABLET ORAL EVERY 8 HOURS PRN
Qty: 90 TABLET | Refills: 0 | Status: SHIPPED | OUTPATIENT
Start: 2023-09-03 | End: 2023-10-03

## 2023-08-29 NOTE — TELEPHONE ENCOUNTER
OARRS reviewed. UDS: + for  Citalopram/Escitalopram, Buspirone, Cyclobenzaprine, Hydrocodone. Last seen: 6/19/2023.  Follow-up: 9/6/2023

## 2023-08-29 NOTE — TELEPHONE ENCOUNTER
Marleny Pang called requesting a refill on the following medications:  Requested Prescriptions     Pending Prescriptions Disp Refills    HYDROcodone-acetaminophen (NORCO) 5-325 MG per tablet 90 tablet 0     Sig: Take 1 tablet by mouth every 8 hours as needed for Pain for up to 30 days. Pharmacy verified:[facility]   . pv      Date of last visit:   Date of next visit (if applicable): 1/6/3057

## 2023-08-30 NOTE — TELEPHONE ENCOUNTER
Brenda Camarillo called requesting a refill on the following medications:  Requested Prescriptions     Pending Prescriptions Disp Refills    cyclobenzaprine (FLEXERIL) 10 MG tablet 90 tablet 0     Sig: Take 1 tablet by mouth 3 times daily as needed for Muscle spasms     [facility]   [facility]      Date of last visit:   Date of next visit (if applicable): 7/7/6687

## 2023-08-31 RX ORDER — CYCLOBENZAPRINE HCL 10 MG
10 TABLET ORAL 3 TIMES DAILY PRN
Qty: 90 TABLET | Refills: 0 | Status: SHIPPED | OUTPATIENT
Start: 2023-09-02 | End: 2023-10-02

## 2023-09-01 ENCOUNTER — HOSPITAL ENCOUNTER (OUTPATIENT)
Age: 53
Discharge: HOME | End: 2023-09-01
Payer: COMMERCIAL

## 2023-09-01 DIAGNOSIS — R39.12 BENIGN PROSTATIC HYPERPLASIA WITH WEAK URINARY STREAM: ICD-10-CM

## 2023-09-01 DIAGNOSIS — N40.1 BENIGN PROSTATIC HYPERPLASIA WITH WEAK URINARY STREAM: ICD-10-CM

## 2023-09-01 LAB
ANION GAP SERPL CALC-SCNC: 14 MEQ/L (ref 8–16)
BUN SERPL-MCNC: 7 MG/DL (ref 7–22)
CALCIUM SERPL-MCNC: 9.7 MG/DL (ref 8.5–10.5)
CHLORIDE SERPL-SCNC: 101 MEQ/L (ref 98–111)
CO2 SERPL-SCNC: 21 MEQ/L (ref 23–33)
CREAT SERPL-MCNC: 0.6 MG/DL (ref 0.4–1.2)
GFR SERPL CREATININE-BSD FRML MDRD: > 60 ML/MIN/1.73M2
GLUCOSE SERPL-MCNC: 403 MG/DL (ref 70–108)
POTASSIUM SERPL-SCNC: 4.2 MEQ/L (ref 3.5–5.2)
PSA SERPL-MCNC: 0.55 NG/ML (ref 0–1)
SODIUM SERPL-SCNC: 136 MEQ/L (ref 135–145)

## 2023-09-01 PROCEDURE — 80048 BASIC METABOLIC PNL TOTAL CA: CPT

## 2023-09-01 PROCEDURE — 36415 COLL VENOUS BLD VENIPUNCTURE: CPT

## 2023-09-01 PROCEDURE — 84153 ASSAY OF PSA TOTAL: CPT

## 2023-09-06 ENCOUNTER — TELEPHONE (OUTPATIENT)
Dept: PHYSICAL MEDICINE AND REHAB | Age: 53
End: 2023-09-06

## 2023-09-06 ENCOUNTER — OFFICE VISIT (OUTPATIENT)
Dept: PHYSICAL MEDICINE AND REHAB | Age: 53
End: 2023-09-06
Payer: COMMERCIAL

## 2023-09-06 VITALS
BODY MASS INDEX: 34.93 KG/M2 | SYSTOLIC BLOOD PRESSURE: 118 MMHG | WEIGHT: 244 LBS | DIASTOLIC BLOOD PRESSURE: 72 MMHG | HEIGHT: 70 IN

## 2023-09-06 DIAGNOSIS — M54.9 MID BACK PAIN: ICD-10-CM

## 2023-09-06 DIAGNOSIS — M51.36 BULGE OF LUMBAR DISC WITHOUT MYELOPATHY: ICD-10-CM

## 2023-09-06 DIAGNOSIS — G89.4 CHRONIC PAIN SYNDROME: ICD-10-CM

## 2023-09-06 DIAGNOSIS — M54.50 CHRONIC BILATERAL LOW BACK PAIN WITHOUT SCIATICA: ICD-10-CM

## 2023-09-06 DIAGNOSIS — G89.29 CHRONIC BILATERAL LOW BACK PAIN WITHOUT SCIATICA: ICD-10-CM

## 2023-09-06 DIAGNOSIS — F11.90 CHRONIC, CONTINUOUS USE OF OPIOIDS: ICD-10-CM

## 2023-09-06 DIAGNOSIS — M47.816 SPONDYLOSIS OF LUMBAR REGION WITHOUT MYELOPATHY OR RADICULOPATHY: Primary | ICD-10-CM

## 2023-09-06 DIAGNOSIS — M47.814 SPONDYLOSIS OF THORACIC REGION WITHOUT MYELOPATHY OR RADICULOPATHY: ICD-10-CM

## 2023-09-06 PROCEDURE — 99214 OFFICE O/P EST MOD 30 MIN: CPT | Performed by: NURSE PRACTITIONER

## 2023-09-06 ASSESSMENT — ENCOUNTER SYMPTOMS
GASTROINTESTINAL NEGATIVE: 1
EYES NEGATIVE: 1
BACK PAIN: 1
RESPIRATORY NEGATIVE: 1

## 2023-09-06 NOTE — PROGRESS NOTES
discharge. Conjunctiva/sclera: Conjunctivae normal.      Pupils: Pupils are equal, round, and reactive to light. Neck:      Thyroid: No thyromegaly. Cardiovascular:      Rate and Rhythm: Normal rate and regular rhythm. Pulses: Normal pulses. Heart sounds: Normal heart sounds. No murmur heard. No friction rub. No gallop. Pulmonary:      Effort: Pulmonary effort is normal. No respiratory distress. Breath sounds: Normal breath sounds. No wheezing or rales. Chest:      Chest wall: No tenderness. Abdominal:      General: Abdomen is flat. Bowel sounds are normal. There is no distension. Palpations: Abdomen is soft. Tenderness: There is no abdominal tenderness. There is no guarding or rebound. Musculoskeletal:         General: Tenderness present. Right shoulder: Normal.      Left shoulder: Normal.      Cervical back: Full passive range of motion without pain, normal range of motion and neck supple. No edema, erythema or rigidity. No muscular tenderness. Normal range of motion. Thoracic back: Tenderness present. No bony tenderness. Normal range of motion. Lumbar back: Spasms, tenderness and bony tenderness present. Normal range of motion. Negative right straight leg raise test and negative left straight leg raise test.        Back:       Right hip: Tenderness and bony tenderness present. Left hip: Tenderness and bony tenderness present. Right upper leg: Tenderness present. Left upper leg: Tenderness present. Right knee: Normal.      Left knee: Normal.      Right lower leg: No edema. Left lower leg: No edema. Skin:     General: Skin is warm. Coloration: Skin is not pale. Findings: No erythema or rash. Neurological:      General: No focal deficit present. Mental Status: He is alert and oriented to person, place, and time. Mental status is at baseline. He is not disoriented. Cranial Nerves: No cranial nerve deficit.

## 2023-09-06 NOTE — TELEPHONE ENCOUNTER
Pt seen Bebo and he ordered a bilateral L-facet RFA @ L4-5 and L5-S1. Does he need to hold coumadin?

## 2023-09-06 NOTE — TELEPHONE ENCOUNTER
Patient just filled Tracey Rued 9/3/2023 so can make adjustment next month when due. I ordered repeat liver function panel. I called patient and can change to Ultram 50 TID prn next refill.  Please change FU in 1 month so I can do this and evaluate him in person

## 2023-09-06 NOTE — TELEPHONE ENCOUNTER
Pt. Called to report that he saw his family dr. Pawel Cuello and they reviewed his liver function panel(in chart from may) and they are questioning if pt.  Should be switched to a different pain med than norco.

## 2023-09-08 ENCOUNTER — HOSPITAL ENCOUNTER (OUTPATIENT)
Age: 53
Discharge: HOME | End: 2023-09-08
Payer: COMMERCIAL

## 2023-09-08 DIAGNOSIS — G89.4 CHRONIC PAIN SYNDROME: ICD-10-CM

## 2023-09-08 DIAGNOSIS — G89.29 CHRONIC BILATERAL LOW BACK PAIN WITHOUT SCIATICA: ICD-10-CM

## 2023-09-08 DIAGNOSIS — M54.50 CHRONIC BILATERAL LOW BACK PAIN WITHOUT SCIATICA: ICD-10-CM

## 2023-09-08 DIAGNOSIS — M51.36 BULGE OF LUMBAR DISC WITHOUT MYELOPATHY: ICD-10-CM

## 2023-09-08 DIAGNOSIS — M47.816 SPONDYLOSIS OF LUMBAR REGION WITHOUT MYELOPATHY OR RADICULOPATHY: ICD-10-CM

## 2023-09-08 DIAGNOSIS — M47.814 SPONDYLOSIS OF THORACIC REGION WITHOUT MYELOPATHY OR RADICULOPATHY: ICD-10-CM

## 2023-09-08 LAB
ALBUMIN SERPL BCG-MCNC: 4 G/DL (ref 3.5–5.1)
ALP SERPL-CCNC: 198 U/L (ref 38–126)
ALT SERPL W/O P-5'-P-CCNC: 28 U/L (ref 11–66)
AST SERPL-CCNC: 39 U/L (ref 5–40)
BILIRUB CONJ SERPL-MCNC: 0.3 MG/DL (ref 0–0.3)
BILIRUB SERPL-MCNC: 0.7 MG/DL (ref 0.3–1.2)
PROT SERPL-MCNC: 6.8 G/DL (ref 6.1–8)

## 2023-09-08 PROCEDURE — 36415 COLL VENOUS BLD VENIPUNCTURE: CPT

## 2023-09-08 PROCEDURE — 80076 HEPATIC FUNCTION PANEL: CPT

## 2023-09-13 ENCOUNTER — OFFICE VISIT (OUTPATIENT)
Dept: UROLOGY | Age: 53
End: 2023-09-13
Payer: COMMERCIAL

## 2023-09-13 ENCOUNTER — TELEPHONE (OUTPATIENT)
Dept: UROLOGY | Age: 53
End: 2023-09-13

## 2023-09-13 VITALS
DIASTOLIC BLOOD PRESSURE: 78 MMHG | HEIGHT: 70 IN | BODY MASS INDEX: 33.07 KG/M2 | SYSTOLIC BLOOD PRESSURE: 134 MMHG | WEIGHT: 231 LBS

## 2023-09-13 DIAGNOSIS — Z51.81 THERAPEUTIC DRUG MONITORING: ICD-10-CM

## 2023-09-13 DIAGNOSIS — N40.1 BENIGN PROSTATIC HYPERPLASIA WITH WEAK URINARY STREAM: ICD-10-CM

## 2023-09-13 DIAGNOSIS — N20.0 KIDNEY STONE: Primary | ICD-10-CM

## 2023-09-13 DIAGNOSIS — R39.12 BENIGN PROSTATIC HYPERPLASIA WITH WEAK URINARY STREAM: ICD-10-CM

## 2023-09-13 LAB
BILIRUBIN URINE: NEGATIVE
BLOOD URINE, POC: NEGATIVE
CHARACTER, URINE: CLEAR
COLOR, URINE: YELLOW
GLUCOSE URINE: 500 MG/DL
KETONES, URINE: NEGATIVE
LEUKOCYTE CLUMPS, URINE: NEGATIVE
NITRITE, URINE: NEGATIVE
PH, URINE: 7 (ref 5–9)
POST VOID RESIDUAL (PVR): 0 ML
PROTEIN, URINE: NEGATIVE MG/DL
SPECIFIC GRAVITY, URINE: 1.02 (ref 1–1.03)
UROBILINOGEN, URINE: 2 EU/DL (ref 0–1)

## 2023-09-13 PROCEDURE — 99214 OFFICE O/P EST MOD 30 MIN: CPT | Performed by: NURSE PRACTITIONER

## 2023-09-13 PROCEDURE — 81003 URINALYSIS AUTO W/O SCOPE: CPT | Performed by: NURSE PRACTITIONER

## 2023-09-13 PROCEDURE — 51798 US URINE CAPACITY MEASURE: CPT | Performed by: NURSE PRACTITIONER

## 2023-09-13 RX ORDER — POTASSIUM CITRATE 10 MEQ/1
10 TABLET, EXTENDED RELEASE ORAL
Qty: 90 TABLET | Refills: 3 | Status: SHIPPED | OUTPATIENT
Start: 2023-09-13

## 2023-09-13 RX ORDER — TAMSULOSIN HYDROCHLORIDE 0.4 MG/1
0.4 CAPSULE ORAL DAILY
Qty: 90 CAPSULE | Refills: 3 | Status: SHIPPED | OUTPATIENT
Start: 2023-09-13

## 2023-09-13 ASSESSMENT — ENCOUNTER SYMPTOMS
NAUSEA: 0
BACK PAIN: 0
VOMITING: 0
ABDOMINAL PAIN: 0

## 2023-09-13 NOTE — PROGRESS NOTES
Pulmonary:      Effort: Pulmonary effort is normal. No respiratory distress. Abdominal:      General: There is no distension. Tenderness: There is no abdominal tenderness. There is no right CVA tenderness or left CVA tenderness. Musculoskeletal:         General: Normal range of motion. Skin:     General: Skin is warm and dry. Neurological:      Mental Status: He is alert and oriented to person, place, and time. Mental status is at baseline. Psychiatric:         Mood and Affect: Mood normal.         Behavior: Behavior normal.         Thought Content: Thought content normal.         POC  No results found for this visit on 23. Patients recent PSA values are as follows  Lab Results   Component Value Date    PSA 0.55 2023    PSA 0.60 2022    PSA 0.54 2021      Recent BUN/Creatinine:  Lab Results   Component Value Date/Time    BUN 7 2023 10:24 AM    CREATININE 0.6 2023 10:24 AM       Assessment:   BPH with LUTs  Family hx of prostate cancer--father--70s--\" from old age\"  Small L nonobstructive kidney stone, inferior pole. (2 mm per CT 2021)    Plan:     Nocturia worsening to 3-4 x per night. Hx of allergy to oxybutynin. Trial Gemtesa 75 mg daily. Script sent to pharmacy. Continue tamsulosin 0.4 mg daily-refill sent to pharmacy. PSA stable and trending down. NICOLA without nodule or induration. Repeat PSA in one year. Reviewed BMP with Aniceto Wesley. Stable. Continue potassium citrate. Refill sent to pharmacy. Continue allopurinol. Check KUB. F/u in 2-3 months with PVR. PSA and BMP in one year.

## 2023-09-15 RX ORDER — VIBEGRON 75 MG/1
75 TABLET, FILM COATED ORAL DAILY
Qty: 28 TABLET | Refills: 0 | Status: CANCELLED | COMMUNITY
Start: 2023-09-15

## 2023-10-02 ENCOUNTER — TELEPHONE (OUTPATIENT)
Dept: PHYSICAL MEDICINE AND REHAB | Age: 53
End: 2023-10-02

## 2023-10-02 ENCOUNTER — OFFICE VISIT (OUTPATIENT)
Dept: PHYSICAL MEDICINE AND REHAB | Age: 53
End: 2023-10-02
Payer: COMMERCIAL

## 2023-10-02 VITALS
SYSTOLIC BLOOD PRESSURE: 130 MMHG | WEIGHT: 231 LBS | HEIGHT: 70 IN | BODY MASS INDEX: 33.07 KG/M2 | DIASTOLIC BLOOD PRESSURE: 72 MMHG

## 2023-10-02 DIAGNOSIS — G89.29 CHRONIC BILATERAL LOW BACK PAIN WITHOUT SCIATICA: ICD-10-CM

## 2023-10-02 DIAGNOSIS — M54.50 CHRONIC BILATERAL LOW BACK PAIN WITHOUT SCIATICA: ICD-10-CM

## 2023-10-02 DIAGNOSIS — M47.814 SPONDYLOSIS OF THORACIC REGION WITHOUT MYELOPATHY OR RADICULOPATHY: ICD-10-CM

## 2023-10-02 DIAGNOSIS — M47.816 SPONDYLOSIS OF LUMBAR REGION WITHOUT MYELOPATHY OR RADICULOPATHY: Primary | ICD-10-CM

## 2023-10-02 DIAGNOSIS — G89.4 CHRONIC PAIN SYNDROME: ICD-10-CM

## 2023-10-02 DIAGNOSIS — M54.9 MID BACK PAIN: ICD-10-CM

## 2023-10-02 DIAGNOSIS — F11.90 CHRONIC, CONTINUOUS USE OF OPIOIDS: ICD-10-CM

## 2023-10-02 DIAGNOSIS — M51.36 BULGE OF LUMBAR DISC WITHOUT MYELOPATHY: ICD-10-CM

## 2023-10-02 PROCEDURE — 99214 OFFICE O/P EST MOD 30 MIN: CPT | Performed by: NURSE PRACTITIONER

## 2023-10-02 RX ORDER — CYCLOBENZAPRINE HCL 10 MG
10 TABLET ORAL 3 TIMES DAILY PRN
Qty: 90 TABLET | Refills: 0 | Status: SHIPPED | OUTPATIENT
Start: 2023-10-02 | End: 2023-11-01

## 2023-10-02 RX ORDER — TRAMADOL HYDROCHLORIDE 50 MG/1
50 TABLET ORAL EVERY 8 HOURS PRN
Qty: 42 TABLET | Refills: 0 | Status: SHIPPED | OUTPATIENT
Start: 2023-10-02 | End: 2023-10-16

## 2023-10-02 ASSESSMENT — ENCOUNTER SYMPTOMS
BACK PAIN: 1
CONSTIPATION: 0
GASTROINTESTINAL NEGATIVE: 1
RESPIRATORY NEGATIVE: 1
ABDOMINAL PAIN: 0
ABDOMINAL DISTENTION: 0
EYES NEGATIVE: 1

## 2023-10-02 NOTE — PROGRESS NOTES
[address] [facility]  Dept: [phone]  Dept Fax: [phone]: [phone]    Visit Date: 10/2/2023    Functionality Assessment/Goals Worksheet     On a scale of 0 (Does not Interfere) to 10 (Completely Interferes)     1. Which number describes how during the past week pain has interfered with       the following:  A. General Activity:  4  B. Mood: 3  C. Walking Ability:  2  D. Normal Work (Includes both work outside the home and housework):  4  E. Relations with Other People:   3  F. Sleep:   2  G. Enjoyment of Life:   4    2. Patient Prefers to Take their Pain Medications:     []  On a regular basis   []  Only when necessary    []  Does not take pain medications    3. What are the Patient's Goals/Expectations for Visiting Pain Management? []  Learn about my pain    []  Receive Medication   []  Physical Therapy     []  Treat Depression   []  Receive Injections    []  Treat Sleep   []  Deal with Anxiety and Stress   []  Treat Opoid Dependence/Addiction   []  Other:        HPI:   Elmer Ortiz is a 46 y.o. male is here today for    Chief Complaint: Low back pain     HPI   1 month FU. Patient reports to continued pain in low back- sharp stabbing pain all axial. Denies any radicular pain. States that pain is increased the more activity he does. Pain increases with bending, lifting, twisting , walking, standing, getting up and down, and housework or working at job. Norco prn and flexeril remains effective in decreasing pain. Here to discuss changes of medications d/t history of elevated liver enzymes in past. Last lab work from earlier this month is normal.        Prior Injections:  bilateral L-facet RFA from 11/15/2022 80% relief       Medications reviewed. Patient denies side effects with medications.  Patient states he is taking medications as

## 2023-10-11 ENCOUNTER — TELEPHONE (OUTPATIENT)
Dept: PHYSICAL MEDICINE AND REHAB | Age: 53
End: 2023-10-11

## 2023-10-11 NOTE — TELEPHONE ENCOUNTER
Pt. Called and you had seen 10/2 and started on 2 week trial of ultram 50mg tid. He is reporting it is not doing anything for pain. Wants to know what he should take?

## 2023-10-11 NOTE — TELEPHONE ENCOUNTER
Not much to offer d/t his liver enzymes as we can't go back on Norco. He is awaiting his procedure so hopefully that will help and then at FU from his procedure can reevaluate

## 2023-10-12 DIAGNOSIS — M54.50 CHRONIC BILATERAL LOW BACK PAIN WITHOUT SCIATICA: ICD-10-CM

## 2023-10-12 DIAGNOSIS — M51.36 BULGE OF LUMBAR DISC WITHOUT MYELOPATHY: ICD-10-CM

## 2023-10-12 DIAGNOSIS — M47.816 SPONDYLOSIS OF LUMBAR REGION WITHOUT MYELOPATHY OR RADICULOPATHY: ICD-10-CM

## 2023-10-12 DIAGNOSIS — G89.29 CHRONIC BILATERAL LOW BACK PAIN WITHOUT SCIATICA: ICD-10-CM

## 2023-10-12 DIAGNOSIS — G89.4 CHRONIC PAIN SYNDROME: ICD-10-CM

## 2023-10-12 RX ORDER — TRAMADOL HYDROCHLORIDE 50 MG/1
50 TABLET ORAL EVERY 8 HOURS PRN
Qty: 90 TABLET | Refills: 0 | Status: SHIPPED | OUTPATIENT
Start: 2023-10-12 | End: 2023-11-13 | Stop reason: SDUPTHER

## 2023-10-12 NOTE — TELEPHONE ENCOUNTER
OARRS reviewed. UDS: + for  hydrocodone,cyclobenzaprine,buspirone, lexapro. Last seen: 10/2/2023.  Follow-up:   Future Appointments   Date Time Provider [address]   12/13/2023  8:15 AM Xiomy Boyd Flournoy, [address]

## 2023-10-12 NOTE — TELEPHONE ENCOUNTER
Lashonda Xiong called requesting a refill on the following medications:  Requested Prescriptions     Pending Prescriptions Disp Refills    traMADol (ULTRAM) 50 MG tablet 42 tablet 0     Sig: Take 1 tablet by mouth every 8 hours as needed for Pain for up to 14 days. Take lowest dose possible to manage pain Max Daily Amount: 150 mg     [facility] [address] [place] [address]  .       Date of last visit: 10/02/2023  Date of next visit (if applicable): Visit date not found

## 2023-10-30 RX ORDER — CYCLOBENZAPRINE HCL 10 MG
10 TABLET ORAL 3 TIMES DAILY PRN
Qty: 90 TABLET | Refills: 0 | Status: SHIPPED | OUTPATIENT
Start: 2023-11-01 | End: 2023-12-01

## 2023-10-30 NOTE — TELEPHONE ENCOUNTER
Sonny Collins called requesting a refill on the following medications:  Requested Prescriptions     Pending Prescriptions Disp Refills    cyclobenzaprine (FLEXERIL) 10 MG tablet 90 tablet 0     Sig: Take 1 tablet by mouth 3 times daily as needed for Muscle spasms     Pharmacy verified:  .pv  [address] [facility] [address] [facility] [address] [facility] [phone]    Date of last visit: 10/02/2023  Date of next visit (if applicable): Visit date not found      Pt waiting to be scheduled for a procedure

## 2023-10-30 NOTE — TELEPHONE ENCOUNTER
OARRS reviewed. UDS: + for  Citalopram/Escitalopram, Buspirone, Cyclobenzaprine, Hydrocodone. Last seen: 10/2/2023.  Follow-up:

## 2023-11-13 DIAGNOSIS — M51.36 BULGE OF LUMBAR DISC WITHOUT MYELOPATHY: ICD-10-CM

## 2023-11-13 DIAGNOSIS — G89.29 CHRONIC BILATERAL LOW BACK PAIN WITHOUT SCIATICA: ICD-10-CM

## 2023-11-13 DIAGNOSIS — M54.50 CHRONIC BILATERAL LOW BACK PAIN WITHOUT SCIATICA: ICD-10-CM

## 2023-11-13 DIAGNOSIS — G89.4 CHRONIC PAIN SYNDROME: ICD-10-CM

## 2023-11-13 DIAGNOSIS — M47.816 SPONDYLOSIS OF LUMBAR REGION WITHOUT MYELOPATHY OR RADICULOPATHY: ICD-10-CM

## 2023-11-13 RX ORDER — TRAMADOL HYDROCHLORIDE 50 MG/1
50 TABLET ORAL EVERY 8 HOURS PRN
Qty: 90 TABLET | Refills: 0 | Status: SHIPPED | OUTPATIENT
Start: 2023-11-15 | End: 2023-12-15

## 2023-11-13 NOTE — TELEPHONE ENCOUNTER
Baljeet España called requesting a refill on the following medications:  Requested Prescriptions     Pending Prescriptions Disp Refills    traMADol (ULTRAM) 50 MG tablet 90 tablet 0     Sig: Take 1 tablet by mouth every 8 hours as needed for Pain for up to 30 days. Take lowest dose possible to manage pain Max Daily Amount: 150 mg     [facility] [id number]      Date of last visit: 10.02.2023  Date of next visit (if applicable): Visit date not found

## 2023-11-27 ENCOUNTER — TELEPHONE (OUTPATIENT)
Dept: PHYSICAL MEDICINE AND REHAB | Age: 53
End: 2023-11-27

## 2023-11-27 NOTE — TELEPHONE ENCOUNTER
Carlene Lockhart called requesting a refill on the following medications:  Requested Prescriptions     Pending Prescriptions Disp Refills    cyclobenzaprine (FLEXERIL) 10 MG tablet 90 tablet 0     Sig: Take 1 tablet by mouth 3 times daily as needed for Muscle spasms     [facility]      Date of last visit:   Date of next visit (if applicable): Visit date not found

## 2023-11-28 RX ORDER — CYCLOBENZAPRINE HCL 10 MG
10 TABLET ORAL 3 TIMES DAILY PRN
Qty: 90 TABLET | Refills: 0 | Status: SHIPPED | OUTPATIENT
Start: 2023-12-01 | End: 2023-12-31

## 2023-12-07 ENCOUNTER — TELEPHONE (OUTPATIENT)
Dept: PHYSICAL MEDICINE AND REHAB | Age: 53
End: 2023-12-07

## 2023-12-11 ENCOUNTER — HOSPITAL ENCOUNTER (OUTPATIENT)
Age: 53
Discharge: HOME | End: 2023-12-11
Payer: COMMERCIAL

## 2023-12-11 ENCOUNTER — HOSPITAL ENCOUNTER (OUTPATIENT)
Dept: GENERAL RADIOLOGY | Age: 53
Discharge: HOME | End: 2023-12-11
Payer: COMMERCIAL

## 2023-12-11 DIAGNOSIS — N20.0 KIDNEY STONE: ICD-10-CM

## 2023-12-11 PROCEDURE — 74018 RADEX ABDOMEN 1 VIEW: CPT

## 2023-12-12 DIAGNOSIS — G89.29 CHRONIC BILATERAL LOW BACK PAIN WITHOUT SCIATICA: ICD-10-CM

## 2023-12-12 DIAGNOSIS — M54.50 CHRONIC BILATERAL LOW BACK PAIN WITHOUT SCIATICA: ICD-10-CM

## 2023-12-12 DIAGNOSIS — M51.36 BULGE OF LUMBAR DISC WITHOUT MYELOPATHY: ICD-10-CM

## 2023-12-12 DIAGNOSIS — M47.816 SPONDYLOSIS OF LUMBAR REGION WITHOUT MYELOPATHY OR RADICULOPATHY: ICD-10-CM

## 2023-12-12 DIAGNOSIS — G89.4 CHRONIC PAIN SYNDROME: ICD-10-CM

## 2023-12-12 NOTE — TELEPHONE ENCOUNTER
Ltanya Sermon called requesting a refill on the following medications:  Requested Prescriptions     Pending Prescriptions Disp Refills    traMADol (ULTRAM) 50 MG tablet 90 tablet 0     Sig: Take 1 tablet by mouth every 8 hours as needed for Pain for up to 30 days. Take lowest dose possible to manage pain Max Daily Amount: 150 mg     [facility] [address] [place] [address]  .       Date of last visit: 10/02/2023  Date of next visit (if applicable): 0/81/4863

## 2023-12-13 ENCOUNTER — OFFICE VISIT (OUTPATIENT)
Dept: UROLOGY | Age: 53
End: 2023-12-13
Payer: COMMERCIAL

## 2023-12-13 VITALS
DIASTOLIC BLOOD PRESSURE: 76 MMHG | HEIGHT: 70 IN | WEIGHT: 221 LBS | SYSTOLIC BLOOD PRESSURE: 102 MMHG | BODY MASS INDEX: 31.64 KG/M2

## 2023-12-13 DIAGNOSIS — N40.1 BENIGN PROSTATIC HYPERPLASIA WITH WEAK URINARY STREAM: Primary | ICD-10-CM

## 2023-12-13 DIAGNOSIS — R39.12 BENIGN PROSTATIC HYPERPLASIA WITH WEAK URINARY STREAM: Primary | ICD-10-CM

## 2023-12-13 LAB — POST VOID RESIDUAL (PVR): 0 ML

## 2023-12-13 PROCEDURE — 99214 OFFICE O/P EST MOD 30 MIN: CPT | Performed by: NURSE PRACTITIONER

## 2023-12-13 PROCEDURE — 51798 US URINE CAPACITY MEASURE: CPT | Performed by: NURSE PRACTITIONER

## 2023-12-13 RX ORDER — TRAMADOL HYDROCHLORIDE 50 MG/1
50 TABLET ORAL EVERY 8 HOURS PRN
Qty: 90 TABLET | Refills: 0 | Status: SHIPPED | OUTPATIENT
Start: 2023-12-14 | End: 2024-01-13

## 2023-12-13 RX ORDER — VENLAFAXINE HYDROCHLORIDE 75 MG/1
CAPSULE, EXTENDED RELEASE ORAL
COMMUNITY
Start: 2023-12-07

## 2023-12-13 RX ORDER — EMPAGLIFLOZIN 25 MG/1
25 TABLET, FILM COATED ORAL EVERY MORNING
COMMUNITY
Start: 2023-12-08

## 2023-12-13 ASSESSMENT — ENCOUNTER SYMPTOMS
NAUSEA: 0
ABDOMINAL PAIN: 0
BACK PAIN: 0
VOMITING: 0

## 2023-12-13 NOTE — TELEPHONE ENCOUNTER
OARRS reviewed. UDS: + for  lexapro, buspirone, cyclobenzaprine, hydrocodone. Last seen: 10/2/2023.  Follow-up: 1/16/24

## 2023-12-13 NOTE — PROGRESS NOTES
[facility]  [address]  [facility] [address]  Dept: [phone]  Loc: [phone]    Visit Date: 2023        HPI:     Perlita Stark is a 46 y.o. male who presents today for:  Chief Complaint   Patient presents with    Benign Prostatic Hypertrophy    Nephrolithiasis       HPI    Pt seen in follow up for BPH, kidney stones. Mr. Arvind Aleman has a hx of kidney stones for which he takes allopurinol and potassium citrate. Last CT imaging 2021 with stable 2 mm nonobstructive left lower pole calculus. Stone analysis  with stone composed of 90% calcium oxalate and 10% calcium phosphate. He also has a hx of BPH for which he is on Flomax. Notes family hx of prostate cancer in father who \" from old age\". PSA 23 0.55. At appt 2023 pt noted worsened nocturia. Started on Gemtesa 75 mg daily. Reports urinary symptoms improved and nocturia down to 1-2 x per night from 3-4 x per night previously. Current Outpatient Medications   Medication Sig Dispense Refill    JARDIANCE 25 MG tablet Take 1 tablet by mouth every morning      venlafaxine (EFFEXOR XR) 75 MG extended release capsule TAKE 1 CAPSULE (75 MG) BY ORAL ROUTE EVERY OTHER DAY X1 WEEK THEN ONCE DAILY WITH FOOD      cyclobenzaprine (FLEXERIL) 10 MG tablet Take 1 tablet by mouth 3 times daily as needed for Muscle spasms 90 tablet 0    traMADol (ULTRAM) 50 MG tablet Take 1 tablet by mouth every 8 hours as needed for Pain for up to 30 days.  Take lowest dose possible to manage pain Max Daily Amount: 150 mg 90 tablet 0    apixaban (ELIQUIS) 5 MG TABS tablet Take 1 tablet by mouth 2 times daily      tamsulosin (FLOMAX) 0.4 MG capsule Take 1 capsule by mouth daily 90 capsule 3    potassium citrate (UROCIT-K) 10 MEQ (1080 MG) extended release tablet Take 1 tablet by mouth daily (with breakfast) 90 tablet 3    vibegron (GEMTESA) 75 MG TABS tablet Take 1

## 2023-12-26 RX ORDER — CYCLOBENZAPRINE HCL 10 MG
10 TABLET ORAL 3 TIMES DAILY PRN
Qty: 90 TABLET | Refills: 0 | Status: SHIPPED | OUTPATIENT
Start: 2023-12-31 | End: 2024-01-30

## 2023-12-26 NOTE — TELEPHONE ENCOUNTER
Kleber Kendrick called requesting a refill on the following medications:  Requested Prescriptions     Pending Prescriptions Disp Refills    cyclobenzaprine (FLEXERIL) 10 MG tablet 90 tablet 0     Sig: Take 1 tablet by mouth 3 times daily as needed for Muscle spasms     Pharmacy verified:[facility] pharmacy   . pv      Date of last visit: 10/2/23   Date of next visit (if applicable): 8/78/0898

## 2023-12-26 NOTE — TELEPHONE ENCOUNTER
OARRS reviewed. UDS: + for  Citalopram/Escitalopram, Buspirone, Cyclobenzaprine, Hydrocodone. Last seen: 10/2/2023.  Follow-up: 1/16/2024

## 2023-12-27 DIAGNOSIS — M47.816 LUMBAR SPONDYLOSIS: Primary | ICD-10-CM

## 2024-01-02 NOTE — DISCHARGE INSTRUCTIONS
ANESTHESIA INSTRUCTIONS FOLLOWING SURGERY        Since you may experience some intermittent light-headedness for the next several hours, we suggest you plan on bed rest or quiet relaxation this evening. You must have a friend or relative stay with you tonight.    Because of the sedation you have received, it is recommended that you do not drive a motor vehicle, operate any kind of machinery, or sign any contractual agreement for 24 hours following the procedure.    You should not take alcoholic beverages tonight and only take sleeping medication that has been specifically prescribed for you by your physician.  Call office [phone] if you have:  Temperature greater than 100.4  Persistent nausea and vomiting  Severe uncontrolled pain  Redness, tenderness, or signs of infection (pain, swelling, redness, odor or green/yellow discharge around the site)  Difficulty breathing, headache or visual disturbances  Hives  Persistent dizziness or light-headedness  Extreme fatigue  Any other questions or concerns you may have after discharge    In an emergency, call 911 or go to an Emergency Department at a nearby hospital    It is important to bring a complete, current list of your medications to any medical appointments or hospitalizations.    REMINDER:   Carry a list of your medications and allergies with you at all times  Call your pharmacy at least 1 week in advance to refill prescriptions    Diet: Resume your usual diet. Good nutrition promotes healing. Increase fluid intake.     Activity: Rest for 24 hrs then resume normal activity.    HOME MEDICATIONS:      If on blood thinning products such as; Aspirin, NSAIDS, Plavix, Coumadin, Xarelto, Fish Oil, Multi-Vitamins or Herbal Supplements restart in 24 hours      Restart Metformin in 48 hours if you had procedure with dye.      Restart Metformin in 24 hours if no dye used during procedure.        Education Materials Received: {yes/no:438450}  Belongings Returned:

## 2024-01-03 ENCOUNTER — ANESTHESIA EVENT (OUTPATIENT)
Dept: OPERATING ROOM | Age: 54
End: 2024-01-03
Payer: COMMERCIAL

## 2024-01-03 ENCOUNTER — APPOINTMENT (OUTPATIENT)
Dept: GENERAL RADIOLOGY | Age: 54
End: 2024-01-03
Attending: PAIN MEDICINE
Payer: COMMERCIAL

## 2024-01-03 ENCOUNTER — ANESTHESIA (OUTPATIENT)
Dept: OPERATING ROOM | Age: 54
End: 2024-01-03
Payer: COMMERCIAL

## 2024-01-03 ENCOUNTER — HOSPITAL ENCOUNTER (OUTPATIENT)
Age: 54
Setting detail: OUTPATIENT SURGERY
Discharge: HOME | End: 2024-01-03
Attending: PAIN MEDICINE | Admitting: PAIN MEDICINE
Payer: COMMERCIAL

## 2024-01-03 VITALS
TEMPERATURE: 98.5 F | SYSTOLIC BLOOD PRESSURE: 106 MMHG | HEART RATE: 103 BPM | HEIGHT: 69 IN | WEIGHT: 214 LBS | OXYGEN SATURATION: 90 % | BODY MASS INDEX: 31.7 KG/M2 | RESPIRATION RATE: 16 BRPM | DIASTOLIC BLOOD PRESSURE: 69 MMHG

## 2024-01-03 LAB — GLUCOSE BLD STRIP.AUTO-MCNC: 204 MG/DL (ref 70–108)

## 2024-01-03 PROCEDURE — 64636 DESTROY L/S FACET JNT ADDL: CPT | Performed by: PAIN MEDICINE

## 2024-01-03 PROCEDURE — 6360000002 HC RX W HCPCS: Performed by: PAIN MEDICINE

## 2024-01-03 PROCEDURE — 2500000003 HC RX 250 WO HCPCS: Performed by: PAIN MEDICINE

## 2024-01-03 PROCEDURE — 7100000010 HC PHASE II RECOVERY - FIRST 15 MIN: Performed by: PAIN MEDICINE

## 2024-01-03 PROCEDURE — 3700000000 HC ANESTHESIA ATTENDED CARE: Performed by: PAIN MEDICINE

## 2024-01-03 PROCEDURE — 3600000057 HC PAIN LEVEL 4 ADDL 15 MIN: Performed by: PAIN MEDICINE

## 2024-01-03 PROCEDURE — 3700000001 HC ADD 15 MINUTES (ANESTHESIA): Performed by: PAIN MEDICINE

## 2024-01-03 PROCEDURE — 6360000002 HC RX W HCPCS

## 2024-01-03 PROCEDURE — 3600000056 HC PAIN LEVEL 4 BASE: Performed by: PAIN MEDICINE

## 2024-01-03 PROCEDURE — 7100000011 HC PHASE II RECOVERY - ADDTL 15 MIN: Performed by: PAIN MEDICINE

## 2024-01-03 PROCEDURE — 82948 REAGENT STRIP/BLOOD GLUCOSE: CPT

## 2024-01-03 PROCEDURE — 2709999900 HC NON-CHARGEABLE SUPPLY: Performed by: PAIN MEDICINE

## 2024-01-03 PROCEDURE — 64635 DESTROY LUMB/SAC FACET JNT: CPT | Performed by: PAIN MEDICINE

## 2024-01-03 RX ORDER — FENTANYL CITRATE 50 UG/ML
INJECTION, SOLUTION INTRAMUSCULAR; INTRAVENOUS PRN
Status: DISCONTINUED | OUTPATIENT
Start: 2024-01-03 | End: 2024-01-03 | Stop reason: SDUPTHER

## 2024-01-03 RX ORDER — BUPIVACAINE HYDROCHLORIDE 5 MG/ML
INJECTION, SOLUTION PERINEURAL PRN
Status: DISCONTINUED | OUTPATIENT
Start: 2024-01-03 | End: 2024-01-03 | Stop reason: ALTCHOICE

## 2024-01-03 RX ORDER — LIDOCAINE HYDROCHLORIDE 10 MG/ML
INJECTION, SOLUTION EPIDURAL; INFILTRATION; INTRACAUDAL; PERINEURAL PRN
Status: DISCONTINUED | OUTPATIENT
Start: 2024-01-03 | End: 2024-01-03 | Stop reason: ALTCHOICE

## 2024-01-03 RX ORDER — PROPOFOL 10 MG/ML
INJECTION, EMULSION INTRAVENOUS PRN
Status: DISCONTINUED | OUTPATIENT
Start: 2024-01-03 | End: 2024-01-03 | Stop reason: SDUPTHER

## 2024-01-03 RX ADMIN — PROPOFOL 30 MG: 10 INJECTION, EMULSION INTRAVENOUS at 12:32

## 2024-01-03 RX ADMIN — PROPOFOL 20 MG: 10 INJECTION, EMULSION INTRAVENOUS at 12:33

## 2024-01-03 RX ADMIN — PROPOFOL 20 MG: 10 INJECTION, EMULSION INTRAVENOUS at 12:34

## 2024-01-03 RX ADMIN — FENTANYL CITRATE 100 MCG: 50 INJECTION, SOLUTION INTRAMUSCULAR; INTRAVENOUS at 12:20

## 2024-01-03 ASSESSMENT — PAIN - FUNCTIONAL ASSESSMENT
PAIN_FUNCTIONAL_ASSESSMENT: 0-10
PAIN_FUNCTIONAL_ASSESSMENT: 0-10

## 2024-01-03 NOTE — PROGRESS NOTES
1239 Patient arrived to phase II via cart.  Spontaneous respiraitons even and unlabored.  Placed on monitor--VSS.  Report received from OR RN    1240 Assessment completed.  Patient is alert and oriented x4.  IV capped off-- no complications.  Patient denies pain--will monitor.  Injection sites clean and dry.      1242 Snack and drink provided. Pt. Denies all other needs at this time. Side rails up X2. Call light handed to pt. Bed locked and in low position.    1244 SPO2 89-90% on Ra with coughing and deep breathing. O2 applied at 2 LPM via NC.    1248 SPO2 94%. Pt. Placed on RA.    1251 RN at bedside. Pt states readiness for discharge.    1252 Pt. Standing at bedside. With stand by assist of RN. Pt. Denies weakness or dizziness.    1253 INT removed no Complications noted.    1255 Pt. Getting self dressed. Family notified of discharge. Family states they are \"clear across town\" and will arrive in  \"10-15 min\".    1319 Pt. Ambulated to private vehicle in stable condition with stand by assist of RN.

## 2024-01-03 NOTE — H&P
H&P     Patient reports having  continued pain in low back- sharp stabbing pain all axial. Denies any radicular pain. States that pain is increased the more activity he does.   Pain increases with bending, lifting, twisting , walking, standing, getting up and down, and housework or working at job.        Norco prn and flexeril remains effective in decreasing pain. Here to discuss changes of medications d/t history of elevated liver enzymes in past. Last lab work from earlier this month is normal.         Prior Injections:  bilateral L-facet RFA from 11/15/2022 80% relief         Medications reviewed. Patient denies side effects with medications. Patient states he is taking medications as prescribed. Hedenies receiving pain medications from other sources. He denies any ER visits since last visit.     Pain scale with out pain medications or at its worst is 7-9/10.  Pain scale with pain medications or at its best is 3/10.  Last dose of [place] was last night  Drug screen reviewed from 9/6/2023 and was appropriate  Pill count completed  today and WNL: Yes        The patientis allergic to latex, codeine, nickel, and oxybutynin chloride [oxybutynin chloride].        Subjective:      Review of Systems   Constitutional: Negative.    HENT: Negative.     Eyes: Negative.    Respiratory: Negative.     Cardiovascular: Negative.  Negative for chest pain and leg swelling.   Gastrointestinal: Negative.  Negative for abdominal distention, abdominal pain and constipation.   Musculoskeletal:  Positive for arthralgias, back pain and myalgias. Negative for gait problem, joint swelling and neck pain.        Ambulating without assist devices    Skin: Negative.    Neurological:  Negative for weakness and numbness.   Psychiatric/Behavioral: Negative.  Negative for decreased concentration and sleep disturbance. The patient is not nervous/anxious.          Objective:      Vitals       Vitals:     1/3/24  1155   BP: 125/78   Weight: 231 lb

## 2024-01-03 NOTE — OP NOTE
Pre-Procedure Note    Patient Name: Po Victor   YOB: 1970  Medical Record Number: [id number]  Date: 1/3/24    Indication:  Lower back pain    Consent: On file.    Vital Signs:   Vitals:    01/03/24 1155   BP: 125/78   Pulse: 94   Resp: 16   Temp: 97.5 °F (36.4 °C)   SpO2: 94%       Past Medical History:   has a past medical history of Chronic back pain, GERD (gastroesophageal reflux disease), History of kidney stones, Hx of blood clots, Kidney stone, and Lumbar spondylosis.    Past Surgical History:   has a past surgical history that includes Ureter stent placement (2006); Lithotripsy (2006); Cystocopy (6/14/2013); Cystocopy (07/29/2013); Cystoscopy (12/23/13); other surgical history (Bilateral, 03/26/2018); pr njx dx/ther agt pvrt facet jt lmbr/sac 2nd level (Bilateral, 3/26/2018); pr njx dx/ther agt pvrt facet jt lmbr/sac 2nd level (Bilateral, 5/21/2018); Colonoscopy; eye surgery (2007); hernia repair (2007); pr dstrj neurolytic agent other peripheral nerve (Left, 9/28/2018); pr dstrj neurolytic agent other peripheral nerve (Right, 11/30/2018); lumbar nerve block (N/A, 4/5/2019); Injection Procedure For Sacroiliac Joint (Left, 5/17/2019); Injection Procedure For Sacroiliac Joint (Left, 6/27/2019); Lumbar spine surgery (Left, 8/15/2019); Nerve Surgery (Right, 10/31/2019); Pain management procedure (Left, 1/16/2020); Pain management procedure (Right, 3/5/2020); Pain management procedure (Left, 7/2/2020); Pain management procedure (Left, 8/4/2020); Cervical spine surgery (Right, 12/15/2020); Pain management procedure (Left, 1/21/2021); Pain management procedure (Left, 3/15/2021); Pain management procedure (Right, 4/29/2021); Pain management procedure (Bilateral, 2/7/2022); and Pain management procedure (Bilateral, 11/15/2022).    Pre-Sedation Documentation and Exam:   Vital signs have been reviewed (see flow sheet for vitals).     Sedation/ Anesthesia Plan:   MAC    Patient is an appropriate

## 2024-01-03 NOTE — ANESTHESIA PRE PROCEDURE
Department of Anesthesiology  Preprocedure Note       Name:  Po Victor   Age:  53 y.o.  :  1970                                          MRN:  [id number]         Date:  1/3/2024      Surgeon: Surgeon(s):  Salvador Rodas MD    Procedure: Procedure(s):  bilateral Lumbar 4-5, 5-sacral 1 facet radiofrequency ablation    Medications prior to admission:   Prior to Admission medications    Medication Sig Start Date End Date Taking? Authorizing Provider   cyclobenzaprine (FLEXERIL) 10 MG tablet Take 1 tablet by mouth 3 times daily as needed for Muscle spasms 23  Boy Duran APRN - CNP   traMADol (ULTRAM) 50 MG tablet Take 1 tablet by mouth every 8 hours as needed for Pain for up to 30 days. Take lowest dose possible to manage pain Max Daily Amount: 150 mg 23  Boy Duran APRN - CNP   JARDIANCE 25 MG tablet Take 1 tablet by mouth every morning 23   ProviderWil MD   venlafaxine (EFFEXOR XR) 75 MG extended release capsule TAKE 1 CAPSULE (75 MG) BY ORAL ROUTE EVERY OTHER DAY X1 WEEK THEN ONCE DAILY WITH FOOD 23   ProviderWil MD   vibegron (GEMTESA) 75 MG TABS tablet Take 1 tablet by mouth daily 23   Diana Boyd APRN - CNP   apixaban (ELIQUIS) 5 MG TABS tablet Take 1 tablet by mouth 2 times daily    ProviderWil MD   tamsulosin (FLOMAX) 0.4 MG capsule Take 1 capsule by mouth daily 23   Diana Boyd APRN - CNP   potassium citrate (UROCIT-K) 10 MEQ (1080 MG) extended release tablet Take 1 tablet by mouth daily (with breakfast) 23   Diana Boyd APRN - CNP   JANUVIA 50 MG tablet Take 1 tablet by mouth daily 6/3/23   Wil Choi MD   allopurinol (ZYLOPRIM) 300 MG tablet TAKE 1 TABLET BY MOUTH EVERY DAY 23   Chris Brunson PA-C   glimepiride (AMARYL) 2 MG tablet TAKE 1 TABLET BY MOUTH IN THE MORNING 22   Provider, MD Wil   metFORMIN

## 2024-01-03 NOTE — ANESTHESIA POSTPROCEDURE EVALUATION
Department of Anesthesiology  Postprocedure Note    Patient: Po Victor  MRN: [id number]  YOB: 1970  Date of evaluation: 1/3/2024    Procedure Summary       Date: 01/03/24 Room / Location: [address]    Anesthesia Start: 1217 Anesthesia Stop: 1238    Procedure: bilateral Lumbar 4-5, 5-sacral 1 facet radiofrequency ablation (Bilateral) Diagnosis:       Spondylosis of lumbar region without myelopathy or radiculopathy      (Spondylosis of lumbar region without myelopathy or radiculopathy [M47.816])    Surgeons: Salavdor Rodas MD Responsible Provider: Jules Chen DO    Anesthesia Type: MAC ASA Status: 3            Anesthesia Type: No value filed.    Angelina Phase I:      Angelina Phase II: Angelina Score: 10    Anesthesia Post Evaluation    Patient location during evaluation: bedside  Patient participation: complete - patient participated  Level of consciousness: awake and alert  Pain score: 0  Airway patency: patent  Nausea & Vomiting: no nausea and no vomiting  Cardiovascular status: hemodynamically stable and blood pressure returned to baseline  Respiratory status: spontaneous ventilation, room air and acceptable  Hydration status: stable  Pain management: adequate and satisfactory to patient    No notable events documented.

## 2024-01-16 ENCOUNTER — OFFICE VISIT (OUTPATIENT)
Dept: PHYSICAL MEDICINE AND REHAB | Age: 54
End: 2024-01-16
Payer: COMMERCIAL

## 2024-01-16 VITALS
DIASTOLIC BLOOD PRESSURE: 64 MMHG | WEIGHT: 214.07 LBS | BODY MASS INDEX: 31.71 KG/M2 | HEIGHT: 69 IN | SYSTOLIC BLOOD PRESSURE: 118 MMHG

## 2024-01-16 DIAGNOSIS — G89.4 CHRONIC PAIN SYNDROME: ICD-10-CM

## 2024-01-16 DIAGNOSIS — M51.36 BULGE OF LUMBAR DISC WITHOUT MYELOPATHY: ICD-10-CM

## 2024-01-16 DIAGNOSIS — G89.29 CHRONIC BILATERAL LOW BACK PAIN WITHOUT SCIATICA: ICD-10-CM

## 2024-01-16 DIAGNOSIS — M54.50 CHRONIC BILATERAL LOW BACK PAIN WITHOUT SCIATICA: ICD-10-CM

## 2024-01-16 DIAGNOSIS — M47.814 SPONDYLOSIS OF THORACIC REGION WITHOUT MYELOPATHY OR RADICULOPATHY: ICD-10-CM

## 2024-01-16 DIAGNOSIS — M47.816 SPONDYLOSIS OF LUMBAR REGION WITHOUT MYELOPATHY OR RADICULOPATHY: Primary | ICD-10-CM

## 2024-01-16 DIAGNOSIS — F11.90 CHRONIC, CONTINUOUS USE OF OPIOIDS: ICD-10-CM

## 2024-01-16 DIAGNOSIS — M54.9 MID BACK PAIN: ICD-10-CM

## 2024-01-16 PROCEDURE — 99214 OFFICE O/P EST MOD 30 MIN: CPT | Performed by: NURSE PRACTITIONER

## 2024-01-16 RX ORDER — TRAMADOL HYDROCHLORIDE 50 MG/1
50 TABLET ORAL EVERY 8 HOURS PRN
Qty: 90 TABLET | Refills: 0 | Status: SHIPPED | OUTPATIENT
Start: 2024-01-16 | End: 2024-02-15

## 2024-01-16 ASSESSMENT — ENCOUNTER SYMPTOMS
GASTROINTESTINAL NEGATIVE: 1
BACK PAIN: 1
ABDOMINAL PAIN: 0
ABDOMINAL DISTENTION: 0
RESPIRATORY NEGATIVE: 1
EYES NEGATIVE: 1
CONSTIPATION: 0

## 2024-01-16 NOTE — PROGRESS NOTES
[facility]  [address] [place] [address] [address]  [place] [id number]  Dept: [phone]  Dept Fax: [phone]  Loc: [phone]    Visit Date: 1/16/2024    Functionality Assessment/Goals Worksheet     On a scale of 0 (Does not Interfere) to 10 (Completely Interferes)     1.  Which number describes how during the past week pain has interfered with       the following:  A.  General Activity:  4  B.  Mood: 5  C.  Walking Ability:  4  D.  Normal Work (Includes both work outside the home and housework):  4  E.  Relations with Other People:   6  F.  Sleep:   3  G.  Enjoyment of Life:   7    2.  Patient Prefers to Take their Pain Medications:     []  On a regular basis   [x]  Only when necessary    []  Does not take pain medications    3.  What are the Patient's Goals/Expectations for Visiting Pain Management?     []  Learn about my pain    [x]  Receive Medication   []  Physical Therapy     []  Treat Depression   [x]  Receive Injections    []  Treat Sleep   []  Deal with Anxiety and Stress   []  Treat Opoid Dependence/Addiction   []  Other:      HPI:   Po Victor is a 53 y.o. male is here today for    Chief Complaint: Low back pain     HPI   FU from bilateral L-facet RFA @ L4-5 and L5-S1 completed by Dr. La on 1/3/2023. Patient reports that he is receiving about 80% relief of low back pain from this procedure. Still states he is having some soreness from this procedure but again overall low back pain is better. Pain in low back is aching pain and sometimes stabbing and pressure. Pain in is all axial and denies any radicular pain, no numbness or tingling.     States he has been able to do a lot more walking, house chores with less pain. \"I can do a lot more with a lot less pain\".   Continues Ultram prn which is helping with procedure decrease his pain to a tolerable level. Flexeril prn helps spasms.   He feels that he is doing

## 2024-01-23 RX ORDER — CYCLOBENZAPRINE HCL 10 MG
10 TABLET ORAL 3 TIMES DAILY PRN
Qty: 90 TABLET | Refills: 0 | Status: SHIPPED | OUTPATIENT
Start: 2024-01-23 | End: 2024-02-22

## 2024-01-23 NOTE — TELEPHONE ENCOUNTER
Po Victor called requesting a refill on the following medications:  Requested Prescriptions     Pending Prescriptions Disp Refills    cyclobenzaprine (FLEXERIL) 10 MG tablet 90 tablet 0     Sig: Take 1 tablet by mouth 3 times daily as needed for Muscle spasms     Pharmacy verified:[facility]   .pv      Date of last visit: 01/16/24   Date of next visit (if applicable): 3/18/2024

## 2024-01-23 NOTE — TELEPHONE ENCOUNTER
OARRS reviewed. UDS: + for  lexapro, buspirone, cyclobenzaprine, hydrocodone.   Last seen: 1/16/2024. Follow-up: 3/18/2024

## 2024-02-05 RX ORDER — CYCLOBENZAPRINE HCL 10 MG
TABLET ORAL
Qty: 90 TABLET | Refills: 0 | OUTPATIENT
Start: 2024-02-05

## 2024-02-13 DIAGNOSIS — G89.4 CHRONIC PAIN SYNDROME: ICD-10-CM

## 2024-02-13 DIAGNOSIS — M51.36 BULGE OF LUMBAR DISC WITHOUT MYELOPATHY: ICD-10-CM

## 2024-02-13 DIAGNOSIS — M47.816 SPONDYLOSIS OF LUMBAR REGION WITHOUT MYELOPATHY OR RADICULOPATHY: ICD-10-CM

## 2024-02-13 DIAGNOSIS — G89.29 CHRONIC BILATERAL LOW BACK PAIN WITHOUT SCIATICA: ICD-10-CM

## 2024-02-13 DIAGNOSIS — M54.50 CHRONIC BILATERAL LOW BACK PAIN WITHOUT SCIATICA: ICD-10-CM

## 2024-02-13 RX ORDER — TRAMADOL HYDROCHLORIDE 50 MG/1
50 TABLET ORAL EVERY 8 HOURS PRN
Qty: 90 TABLET | Refills: 0 | Status: SHIPPED | OUTPATIENT
Start: 2024-02-15 | End: 2024-03-16

## 2024-02-13 NOTE — TELEPHONE ENCOUNTER
Po Victor called requesting a refill on the following medications:  Requested Prescriptions     Pending Prescriptions Disp Refills    traMADol (ULTRAM) 50 MG tablet 90 tablet 0     Sig: Take 1 tablet by mouth every 8 hours as needed for Pain for up to 30 days. Take lowest dose possible to manage pain Max Daily Amount: 150 mg     Pharmacy verified:  [facility]      Date of last visit: 01-16-24  Date of next visit (if applicable): 3/18/2024

## 2024-02-13 NOTE — TELEPHONE ENCOUNTER
OARRS reviewed. UDS: + for  lexapro, buspirone, venlafaxine, cyclobenzaprine.  Tramadol is present. Hydrocodone is non-complaint.  Last seen: 1/16/2024. Follow-up: 3/18/24

## 2024-02-20 RX ORDER — CYCLOBENZAPRINE HCL 10 MG
10 TABLET ORAL 3 TIMES DAILY PRN
Qty: 90 TABLET | Refills: 0 | Status: SHIPPED | OUTPATIENT
Start: 2024-02-22 | End: 2024-03-23

## 2024-02-20 NOTE — TELEPHONE ENCOUNTER
OARRS reviewed. UDS: + for  Citalopram/Escitalopram, Buspirone, Venlafaxine, Cyclobenzaprine, Tramadol.   Last seen: 1/16/2024. Follow-up: 3/18/2024

## 2024-02-20 NOTE — TELEPHONE ENCOUNTER
Po Victor called requesting a refill on the following medications:  Requested Prescriptions     Pending Prescriptions Disp Refills    cyclobenzaprine (FLEXERIL) 10 MG tablet 90 tablet 0     Sig: Take 1 tablet by mouth 3 times daily as needed for Muscle spasms     [facility]   [facility]      Date of last visit: 1/18/24  Date of next visit (if applicable): 3/18/2024

## 2024-03-12 DIAGNOSIS — M54.50 CHRONIC BILATERAL LOW BACK PAIN WITHOUT SCIATICA: ICD-10-CM

## 2024-03-12 DIAGNOSIS — M47.816 SPONDYLOSIS OF LUMBAR REGION WITHOUT MYELOPATHY OR RADICULOPATHY: ICD-10-CM

## 2024-03-12 DIAGNOSIS — G89.29 CHRONIC BILATERAL LOW BACK PAIN WITHOUT SCIATICA: ICD-10-CM

## 2024-03-12 DIAGNOSIS — G89.4 CHRONIC PAIN SYNDROME: ICD-10-CM

## 2024-03-12 DIAGNOSIS — M51.36 BULGE OF LUMBAR DISC WITHOUT MYELOPATHY: ICD-10-CM

## 2024-03-12 RX ORDER — TRAMADOL HYDROCHLORIDE 50 MG/1
50 TABLET ORAL EVERY 8 HOURS PRN
Qty: 90 TABLET | Refills: 0 | Status: SHIPPED | OUTPATIENT
Start: 2024-03-17 | End: 2024-04-16

## 2024-03-12 NOTE — TELEPHONE ENCOUNTER
Po Victor called requesting a refill on the following medications:  Requested Prescriptions     Pending Prescriptions Disp Refills    traMADol (ULTRAM) 50 MG tablet 90 tablet 0     Sig: Take 1 tablet by mouth every 8 hours as needed for Pain for up to 30 days. Take lowest dose possible to manage pain Max Daily Amount: 150 mg     Pharmacy verified:[facility]   .pv      Date of last visit: 1/16/24   Date of next visit (if applicable): 3/18/2024

## 2024-03-18 ENCOUNTER — OFFICE VISIT (OUTPATIENT)
Dept: PHYSICAL MEDICINE AND REHAB | Age: 54
End: 2024-03-18
Payer: COMMERCIAL

## 2024-03-18 VITALS
WEIGHT: 214 LBS | HEIGHT: 69 IN | SYSTOLIC BLOOD PRESSURE: 120 MMHG | BODY MASS INDEX: 31.7 KG/M2 | DIASTOLIC BLOOD PRESSURE: 70 MMHG

## 2024-03-18 DIAGNOSIS — F11.90 CHRONIC, CONTINUOUS USE OF OPIOIDS: ICD-10-CM

## 2024-03-18 DIAGNOSIS — M47.814 SPONDYLOSIS OF THORACIC REGION WITHOUT MYELOPATHY OR RADICULOPATHY: ICD-10-CM

## 2024-03-18 DIAGNOSIS — M47.816 SPONDYLOSIS OF LUMBAR REGION WITHOUT MYELOPATHY OR RADICULOPATHY: Primary | ICD-10-CM

## 2024-03-18 DIAGNOSIS — M54.50 CHRONIC BILATERAL LOW BACK PAIN WITHOUT SCIATICA: ICD-10-CM

## 2024-03-18 DIAGNOSIS — M51.36 BULGE OF LUMBAR DISC WITHOUT MYELOPATHY: ICD-10-CM

## 2024-03-18 DIAGNOSIS — M54.9 MID BACK PAIN: ICD-10-CM

## 2024-03-18 DIAGNOSIS — G89.29 CHRONIC BILATERAL LOW BACK PAIN WITHOUT SCIATICA: ICD-10-CM

## 2024-03-18 DIAGNOSIS — G89.4 CHRONIC PAIN SYNDROME: ICD-10-CM

## 2024-03-18 PROCEDURE — 99214 OFFICE O/P EST MOD 30 MIN: CPT | Performed by: NURSE PRACTITIONER

## 2024-03-18 RX ORDER — CYCLOBENZAPRINE HCL 10 MG
10 TABLET ORAL 3 TIMES DAILY PRN
Qty: 90 TABLET | Refills: 0 | Status: SHIPPED | OUTPATIENT
Start: 2024-03-20 | End: 2024-04-19

## 2024-03-18 ASSESSMENT — ENCOUNTER SYMPTOMS
ABDOMINAL DISTENTION: 0
ABDOMINAL PAIN: 0
CONSTIPATION: 0
RESPIRATORY NEGATIVE: 1
GASTROINTESTINAL NEGATIVE: 1
BACK PAIN: 1

## 2024-03-18 NOTE — PROGRESS NOTES
the left side.       Bicep reflexes are 2+ on the right side and 2+ on the left side.       Brachioradialis reflexes are 2+ on the right side and 2+ on the left side.       Patellar reflexes are 2+ on the right side and 2+ on the left side.       Achilles reflexes are 2+ on the right side and 2+ on the left side.     Comments: 5/5 strength in all extremities    Psychiatric:         Attention and Perception: Attention normal. He is attentive.         Mood and Affect: Mood normal. Mood is not anxious or depressed. Affect is not labile, blunt, angry or inappropriate.         Speech: Speech normal. He is communicative. Speech is not rapid and pressured, delayed, slurred or tangential.         Behavior: Behavior normal. Behavior is not agitated, slowed, aggressive, withdrawn, hyperactive or combative.         Thought Content: Thought content normal. Thought content is not paranoid or delusional. Thought content does not include homicidal or suicidal ideation. Thought content does not include homicidal or suicidal plan.         Cognition and Memory: Cognition normal. Memory is not impaired. He does not exhibit impaired recent memory or impaired remote memory.         Judgment: Judgment normal. Judgment is not impulsive or inappropriate.       CORETTA  Patricks test  negative  Yeoman's  or Gaenslen's negative       Assessment:     1. Spondylosis of lumbar region without myelopathy or radiculopathy    2. Chronic bilateral low back pain without sciatica    3. Bulge of lumbar disc without myelopathy    4. Spondylosis of thoracic region without myelopathy or radiculopathy    5. Mid back pain    6. Chronic pain syndrome    7. Chronic, continuous use of opioids            Plan:      OARRS reviewed. Current MED: 15.00  Patient was offered naloxone for home.   Discussed long term side effects of medications, tolerance, dependency and addiction.  Previous UDS reviewed  UDS preformed today for compliance.  Patient told can not receive

## 2024-04-10 DIAGNOSIS — M47.816 SPONDYLOSIS OF LUMBAR REGION WITHOUT MYELOPATHY OR RADICULOPATHY: ICD-10-CM

## 2024-04-10 DIAGNOSIS — M51.36 BULGE OF LUMBAR DISC WITHOUT MYELOPATHY: ICD-10-CM

## 2024-04-10 DIAGNOSIS — M54.50 CHRONIC BILATERAL LOW BACK PAIN WITHOUT SCIATICA: ICD-10-CM

## 2024-04-10 DIAGNOSIS — G89.4 CHRONIC PAIN SYNDROME: ICD-10-CM

## 2024-04-10 DIAGNOSIS — G89.29 CHRONIC BILATERAL LOW BACK PAIN WITHOUT SCIATICA: ICD-10-CM

## 2024-04-10 NOTE — TELEPHONE ENCOUNTER
Po Victor called requesting a refill on the following medications:  Requested Prescriptions     Pending Prescriptions Disp Refills    traMADol (ULTRAM) 50 MG tablet 90 tablet 0     Sig: Take 1 tablet by mouth every 8 hours as needed for Pain for up to 30 days. Take lowest dose possible to manage pain Max Daily Amount: 150 mg     [facility]      Date of last visit: [id number]  Date of next visit (if applicable): 5/20/2024

## 2024-04-11 RX ORDER — TRAMADOL HYDROCHLORIDE 50 MG/1
50 TABLET ORAL EVERY 8 HOURS PRN
Qty: 90 TABLET | Refills: 0 | Status: SHIPPED | OUTPATIENT
Start: 2024-04-17 | End: 2024-05-17

## 2024-04-11 NOTE — TELEPHONE ENCOUNTER
OARRS reviewed. UDS: + for Celexa, Buspar, Effexor, Flexeril, Tramadol  Last seen: 3/18/2024. Follow-up:   Future Appointments   Date Time Provider Department Center   5/20/2024  8:00 AM Boy Duran, [facility] - CNP [facility] [place]   6/27/2024  1:00 PM Vikash Boothe MD ENDO [facility] [place]   9/17/2024  8:30 AM Diana Boyd, W[facility] - CNP N [place] [facility] [place]

## 2024-04-16 RX ORDER — CYCLOBENZAPRINE HCL 10 MG
10 TABLET ORAL 3 TIMES DAILY PRN
Qty: 90 TABLET | Refills: 0 | Status: SHIPPED | OUTPATIENT
Start: 2024-04-16 | End: 2024-05-16

## 2024-04-16 NOTE — TELEPHONE ENCOUNTER
Po Victor called requesting a refill on the following medications:  Requested Prescriptions     Pending Prescriptions Disp Refills    cyclobenzaprine (FLEXERIL) 10 MG tablet 90 tablet 0     Sig: Take 1 tablet by mouth 3 times daily as needed for Muscle spasms     [facility]  .pv      Date of last visit: 3-18-24  Date of next visit (if applicable): 5/20/2024

## 2024-04-16 NOTE — TELEPHONE ENCOUNTER
OARRS reviewed. UDS: + for  lexapro, buspirone, venlafaxine, cyclobenzaprine. Hydrocodone is non-compliant. Tramadol is present.  Last seen: 3/18/2024. Follow-up: 5/20/2024

## 2024-05-09 ENCOUNTER — HOSPITAL ENCOUNTER (OUTPATIENT)
Dept: ULTRASOUND IMAGING | Age: 54
Discharge: HOME | End: 2024-05-09
Payer: COMMERCIAL

## 2024-05-09 DIAGNOSIS — R74.8 ELEVATED LIVER ENZYMES: ICD-10-CM

## 2024-05-09 PROCEDURE — 76705 ECHO EXAM OF ABDOMEN: CPT

## 2024-05-13 DIAGNOSIS — M51.36 BULGE OF LUMBAR DISC WITHOUT MYELOPATHY: ICD-10-CM

## 2024-05-13 DIAGNOSIS — G89.4 CHRONIC PAIN SYNDROME: ICD-10-CM

## 2024-05-13 DIAGNOSIS — M47.816 SPONDYLOSIS OF LUMBAR REGION WITHOUT MYELOPATHY OR RADICULOPATHY: ICD-10-CM

## 2024-05-13 DIAGNOSIS — M54.50 CHRONIC BILATERAL LOW BACK PAIN WITHOUT SCIATICA: ICD-10-CM

## 2024-05-13 DIAGNOSIS — G89.29 CHRONIC BILATERAL LOW BACK PAIN WITHOUT SCIATICA: ICD-10-CM

## 2024-05-13 RX ORDER — CYCLOBENZAPRINE HCL 10 MG
10 TABLET ORAL 3 TIMES DAILY PRN
Qty: 90 TABLET | Refills: 0 | Status: SHIPPED | OUTPATIENT
Start: 2024-05-16 | End: 2024-06-15

## 2024-05-13 RX ORDER — TRAMADOL HYDROCHLORIDE 50 MG/1
50 TABLET ORAL EVERY 8 HOURS PRN
Qty: 90 TABLET | Refills: 0 | Status: SHIPPED | OUTPATIENT
Start: 2024-05-15 | End: 2024-06-14

## 2024-05-13 NOTE — TELEPHONE ENCOUNTER
OARRS reviewed. UDS: + for  Citalopram/Escitalopram, Buspirone, Venlafaxine, Cyclobenzaprine, Tramadol.   Last seen: 3/18/2024. Follow-up: 5/20/2024

## 2024-05-13 NOTE — TELEPHONE ENCOUNTER
Po Victor called requesting a refill on the following medications:  Requested Prescriptions     Pending Prescriptions Disp Refills    traMADol (ULTRAM) 50 MG tablet 90 tablet 0     Sig: Take 1 tablet by mouth every 8 hours as needed for Pain for up to 30 days. Take lowest dose possible to manage pain Max Daily Amount: 150 mg    cyclobenzaprine (FLEXERIL) 10 MG tablet 90 tablet 0     Sig: Take 1 tablet by mouth 3 times daily as needed for Muscle spasms     [facility]  [place]      Date of last visit: 3/18/2024  Date of next visit (if applicable): 5/20/2024

## 2024-05-20 ENCOUNTER — OFFICE VISIT (OUTPATIENT)
Dept: PHYSICAL MEDICINE AND REHAB | Age: 54
End: 2024-05-20
Payer: COMMERCIAL

## 2024-05-20 VITALS
WEIGHT: 214 LBS | SYSTOLIC BLOOD PRESSURE: 116 MMHG | BODY MASS INDEX: 31.7 KG/M2 | HEIGHT: 69 IN | DIASTOLIC BLOOD PRESSURE: 72 MMHG

## 2024-05-20 DIAGNOSIS — M47.814 SPONDYLOSIS OF THORACIC REGION WITHOUT MYELOPATHY OR RADICULOPATHY: ICD-10-CM

## 2024-05-20 DIAGNOSIS — M54.9 MID BACK PAIN: ICD-10-CM

## 2024-05-20 DIAGNOSIS — G89.4 CHRONIC PAIN SYNDROME: ICD-10-CM

## 2024-05-20 DIAGNOSIS — G89.29 CHRONIC BILATERAL LOW BACK PAIN WITHOUT SCIATICA: ICD-10-CM

## 2024-05-20 DIAGNOSIS — M51.36 BULGE OF LUMBAR DISC WITHOUT MYELOPATHY: ICD-10-CM

## 2024-05-20 DIAGNOSIS — F11.90 CHRONIC, CONTINUOUS USE OF OPIOIDS: ICD-10-CM

## 2024-05-20 DIAGNOSIS — M47.816 SPONDYLOSIS OF LUMBAR REGION WITHOUT MYELOPATHY OR RADICULOPATHY: Primary | ICD-10-CM

## 2024-05-20 DIAGNOSIS — M54.50 CHRONIC BILATERAL LOW BACK PAIN WITHOUT SCIATICA: ICD-10-CM

## 2024-05-20 PROCEDURE — 99214 OFFICE O/P EST MOD 30 MIN: CPT | Performed by: NURSE PRACTITIONER

## 2024-05-20 ASSESSMENT — ENCOUNTER SYMPTOMS
ABDOMINAL PAIN: 0
RESPIRATORY NEGATIVE: 1
BACK PAIN: 1
EYES NEGATIVE: 1
GASTROINTESTINAL NEGATIVE: 1
ABDOMINAL DISTENTION: 0
CONSTIPATION: 0

## 2024-05-20 NOTE — PROGRESS NOTES
Functionality Assessment/Goals Worksheet     On a scale of 0 (Does not Interfere) to 10 (Completely Interferes)     1.  Which number describes how during the past week pain has interfered with           the following:  A.  General Activity:  5  B.  Mood: 2  C.  Walking Ability:  6  D.  Normal Work (Includes both work outside the home and housework):  3  E.  Relations with Other People:   2  F.  Sleep:   2  G.  Enjoyment of Life:   3    2.  Patient Prefers to Take their Pain Medications:     []  On a regular basis   [x]  Only when necessary    []  Does not take pain medications    3.  What are the Patient's Goals/Expectations for Visiting Pain Management?     [x]  Learn about my pain    []  Receive Medication   []  Physical Therapy     []  Treat Depression   []  Receive Injections    []  Treat Sleep   []  Deal with Anxiety and Stress   []  Treat Opoid Dependence/Addiction   []  Other:       HPI:   Po Victor is a 53 y.o. male is here today for    Chief Complaint: Low back pain     HPI   2 month FU. Continues to have pain in low back mainly left side aching and sometimes stabbing and sharp. Has ups and downs but feels that pain remains controlled as he continues to receive good relief from bilateral L-facet RFA and medications. Continues Ultram prn and Flexeril prn which he feels is effective.   Denies any radicular pain, no numbness or tingling.   Pain increases with bending, lifting, twisting , and housework or working at job.        Prior Injections:  bilateral L-facet RFA from 11/15/2022 80% relief       bilateral L-facet RFA @ L4-5 and L5-S1 completed by Dr. La on 1/3/2023 80% relief         Medications reviewed. Patient denies side effects with medications. Patient states he is taking medications as prescribed. Hedenies receiving pain medications from other sources. He denies any ER visits since last visit.    Pain scale with out pain medications or at its worst is 7-8/10.  Pain scale with pain

## 2024-06-10 DIAGNOSIS — M54.50 CHRONIC BILATERAL LOW BACK PAIN WITHOUT SCIATICA: ICD-10-CM

## 2024-06-10 DIAGNOSIS — G89.4 CHRONIC PAIN SYNDROME: ICD-10-CM

## 2024-06-10 DIAGNOSIS — M47.816 SPONDYLOSIS OF LUMBAR REGION WITHOUT MYELOPATHY OR RADICULOPATHY: ICD-10-CM

## 2024-06-10 DIAGNOSIS — M51.36 BULGE OF LUMBAR DISC WITHOUT MYELOPATHY: ICD-10-CM

## 2024-06-10 DIAGNOSIS — G89.29 CHRONIC BILATERAL LOW BACK PAIN WITHOUT SCIATICA: ICD-10-CM

## 2024-06-10 RX ORDER — CYCLOBENZAPRINE HCL 10 MG
10 TABLET ORAL 3 TIMES DAILY PRN
Qty: 90 TABLET | Refills: 0 | Status: SHIPPED | OUTPATIENT
Start: 2024-06-15 | End: 2024-07-15

## 2024-06-10 RX ORDER — TRAMADOL HYDROCHLORIDE 50 MG/1
50 TABLET ORAL EVERY 8 HOURS PRN
Qty: 90 TABLET | Refills: 0 | Status: SHIPPED | OUTPATIENT
Start: 2024-06-13 | End: 2024-07-13

## 2024-06-10 NOTE — TELEPHONE ENCOUNTER
Po is requesting a refill of their   Requested Prescriptions     Pending Prescriptions Disp Refills    traMADol (ULTRAM) 50 MG tablet 90 tablet 0     Sig: Take 1 tablet by mouth every 8 hours as needed for Pain for up to 30 days. Take lowest dose possible to manage pain Max Daily Amount: 150 mg    cyclobenzaprine (FLEXERIL) 10 MG tablet 90 tablet 0     Sig: Take 1 tablet by mouth 3 times daily as needed for Muscle spasms   . Please advise.      Last Appt:  Visit date not found  Next Appt:   Visit date not found  Preferred pharmacy: [facility]/pharmacy #[id number] - [place], [place] - [address] - P [phone] - F [phone]

## 2024-06-10 NOTE — TELEPHONE ENCOUNTER
OARRS reviewed. UDS: + for Celexa, Buspar, Effexor, Flexeril,Tramadol  Last seen: 5/20/2024. Follow-up: 7-22-24

## 2024-06-18 ENCOUNTER — HOSPITAL ENCOUNTER (OUTPATIENT)
Dept: ULTRASOUND IMAGING | Age: 54
Discharge: HOME | End: 2024-06-18
Payer: COMMERCIAL

## 2024-06-18 DIAGNOSIS — R78.89 FINDING OF OTHER SPECIFIED SUBSTANCES, NOT NORMALLY FOUND IN BLOOD: ICD-10-CM

## 2024-06-18 PROCEDURE — 76705 ECHO EXAM OF ABDOMEN: CPT

## 2024-06-18 PROCEDURE — 76981 USE PARENCHYMA: CPT

## 2024-06-24 RX ORDER — ALLOPURINOL 300 MG/1
TABLET ORAL
Qty: 90 TABLET | Refills: 3 | Status: SHIPPED | OUTPATIENT
Start: 2024-06-24

## 2024-06-24 NOTE — TELEPHONE ENCOUNTER
Po Victor called requesting a refill on the following medications:  Requested Prescriptions     Pending Prescriptions Disp Refills    allopurinol (ZYLOPRIM) 300 MG tablet [Pharmacy Med Name: ALLOPURINOL 300 MG TABLET] 90 tablet 3     Sig: TAKE 1 TABLET BY MOUTH EVERY DAY     Pharmacy verified:  [email or web address]      Date of last visit: 12/13/2023  Date of next visit (if applicable): 9/17/2024

## 2024-06-27 ENCOUNTER — OFFICE VISIT (OUTPATIENT)
Age: 54
End: 2024-06-27
Payer: COMMERCIAL

## 2024-06-27 VITALS
SYSTOLIC BLOOD PRESSURE: 114 MMHG | DIASTOLIC BLOOD PRESSURE: 68 MMHG | WEIGHT: 208.6 LBS | HEIGHT: 69 IN | RESPIRATION RATE: 16 BRPM | HEART RATE: 83 BPM | BODY MASS INDEX: 30.9 KG/M2

## 2024-06-27 DIAGNOSIS — E11.65 TYPE 2 DIABETES MELLITUS WITH HYPERGLYCEMIA, WITHOUT LONG-TERM CURRENT USE OF INSULIN (HCC): Primary | ICD-10-CM

## 2024-06-27 DIAGNOSIS — R21 SKIN RASH: ICD-10-CM

## 2024-06-27 PROCEDURE — 99204 OFFICE O/P NEW MOD 45 MIN: CPT | Performed by: INTERNAL MEDICINE

## 2024-06-27 RX ORDER — CLOTRIMAZOLE 1 %
CREAM (GRAM) TOPICAL
Qty: 12 G | Refills: 1 | Status: SHIPPED | OUTPATIENT
Start: 2024-06-27 | End: 2024-07-04

## 2024-06-27 NOTE — PROGRESS NOTES
ONCE DAILY WITH FOOD      vibegron (GEMTESA) 75 MG TABS tablet Take 1 tablet by mouth daily 90 tablet 3    apixaban (ELIQUIS) 5 MG TABS tablet Take 1 tablet by mouth 2 times daily      potassium citrate (UROCIT-K) 10 MEQ (1080 MG) extended release tablet Take 1 tablet by mouth daily (with breakfast) 90 tablet 3    JANUVIA 50 MG tablet Take 1 tablet by mouth daily      glimepiride (AMARYL) 2 MG tablet TAKE 1 TABLET BY MOUTH IN THE MORNING      metFORMIN (GLUCOPHAGE) 500 MG tablet TAKE 1 TABLET BY MOUTH TWICE A DAY WITH MORNING AND EVENING MEALS      naloxone 4 MG/0.1ML LIQD nasal spray 1 spray by Nasal route as needed for Opioid Reversal 1 each 0    citalopram (CELEXA) 40 MG tablet Take 1 tablet by mouth daily      busPIRone (BUSPAR) 10 MG tablet Take 3 tablets by mouth 2 times daily      omeprazole (PRILOSEC) 20 MG capsule Take 1 capsule by mouth daily      tamsulosin (FLOMAX) 0.4 MG capsule Take 1 capsule by mouth daily (Patient not taking: Reported on 6/27/2024) 90 capsule 3    traZODone (DESYREL) 50 MG tablet Take 1 tablet by mouth nightly (Patient not taking: Reported on 6/27/2024)      FLOVENT  MCG/ACT inhaler  (Patient not taking: Reported on 6/27/2024)       No current facility-administered medications for this visit.     Allergies   Allergen Reactions    Latex Rash    Codeine Hives     TYLENOL WITH CODEINE    Nickel Rash    Oxybutynin Chloride [Oxybutynin Chloride] Other (See Comments)     Warm feeling and extreme dry mouth     Health Maintenance   Topic Date Due    Hepatitis B vaccine (1 of 3 - 3-dose series) Never done    Depression Screen  Never done    HIV screen  Never done    Hepatitis C screen  Never done    Colorectal Cancer Screen  Never done    Shingles vaccine (2 of 2) 08/31/2022    COVID-19 Vaccine (3 - 2023-24 season) 09/01/2023    Lipids  10/19/2023    A1C test (Diabetic or Prediabetic)  05/05/2024    DTaP/Tdap/Td vaccine (2 - Td or Tdap) 07/06/2032    Flu vaccine  Completed    Hepatitis

## 2024-07-09 DIAGNOSIS — G89.29 CHRONIC BILATERAL LOW BACK PAIN WITHOUT SCIATICA: ICD-10-CM

## 2024-07-09 DIAGNOSIS — M47.816 SPONDYLOSIS OF LUMBAR REGION WITHOUT MYELOPATHY OR RADICULOPATHY: ICD-10-CM

## 2024-07-09 DIAGNOSIS — M54.50 CHRONIC BILATERAL LOW BACK PAIN WITHOUT SCIATICA: ICD-10-CM

## 2024-07-09 DIAGNOSIS — G89.4 CHRONIC PAIN SYNDROME: ICD-10-CM

## 2024-07-09 DIAGNOSIS — M51.36 BULGE OF LUMBAR DISC WITHOUT MYELOPATHY: ICD-10-CM

## 2024-07-09 RX ORDER — CYCLOBENZAPRINE HCL 10 MG
10 TABLET ORAL 3 TIMES DAILY PRN
Qty: 90 TABLET | Refills: 0 | Status: SHIPPED | OUTPATIENT
Start: 2024-07-15 | End: 2024-08-14

## 2024-07-09 RX ORDER — TRAMADOL HYDROCHLORIDE 50 MG/1
50 TABLET ORAL EVERY 8 HOURS PRN
Qty: 90 TABLET | Refills: 0 | Status: SHIPPED | OUTPATIENT
Start: 2024-07-12 | End: 2024-08-11

## 2024-07-09 NOTE — TELEPHONE ENCOUNTER
OARRS reviewed. UDS: + for  Citalopram/Escitalopram, Buspirone, Venlafaxine, Cyclobenzaprine, Tramadol.   Last seen: 5/20/2024. Follow-up: 7/22/2024

## 2024-07-09 NOTE — TELEPHONE ENCOUNTER
Po Victor called requesting a refill on the following medications:  Requested Prescriptions     Pending Prescriptions Disp Refills    cyclobenzaprine (FLEXERIL) 10 MG tablet 90 tablet 0     Sig: Take 1 tablet by mouth 3 times daily as needed for Muscle spasms    traMADol (ULTRAM) 50 MG tablet 90 tablet 0     Sig: Take 1 tablet by mouth every 8 hours as needed for Pain for up to 30 days. Take lowest dose possible to manage pain Max Daily Amount: 150 mg     Pharmacy verified: [facility] in [facility]      Date of last visit: 5/20/2024  Date of next visit (if applicable): Visit date not found

## 2024-07-30 ENCOUNTER — NURSE ONLY (OUTPATIENT)
Dept: INTERNAL MEDICINE CLINIC | Age: 54
End: 2024-07-30
Payer: COMMERCIAL

## 2024-07-30 DIAGNOSIS — E11.69 TYPE 2 DIABETES MELLITUS WITH OTHER SPECIFIED COMPLICATION, WITHOUT LONG-TERM CURRENT USE OF INSULIN (HCC): Primary | ICD-10-CM

## 2024-07-30 PROCEDURE — NBSRV NON-BILLABLE SERVICE: Performed by: REGISTERED NURSE

## 2024-07-30 PROCEDURE — G0109 DIAB MANAGE TRN IND/GROUP: HCPCS | Performed by: REGISTERED NURSE

## 2024-07-30 NOTE — PROGRESS NOTES
Patient presented for the [facility]  education class. The content was presented via PowerPoint, lecture and case-based format covering the following concepts: 60mins education.    What is Diabetes?  Define glycosolation  Interpretation of the A1C and goal.  Pancreatic function  Target organs and macro and microvascular complications  Goals for SGM BP goals  Meal clearance   S&S hyper/hypoglycemia and tx   Insulin physiology-basal/bolus  Navigating sick days stress  Relationship between diet, exercise, meds and stress   Utilizing kamari care system at appropriate time  Assuming responsibility in self management  Troubleshooting patterns    Post test score 13 out of 16

## 2024-07-31 RX ORDER — CYCLOBENZAPRINE HCL 10 MG
TABLET ORAL
Qty: 270 TABLET | Refills: 1 | OUTPATIENT
Start: 2024-07-31

## 2024-08-01 ENCOUNTER — HOSPITAL ENCOUNTER (OUTPATIENT)
Age: 54
Discharge: HOME | End: 2024-08-01
Payer: COMMERCIAL

## 2024-08-01 ENCOUNTER — HOSPITAL ENCOUNTER (OUTPATIENT)
Dept: GENERAL RADIOLOGY | Age: 54
Discharge: HOME | End: 2024-08-01
Payer: COMMERCIAL

## 2024-08-01 ENCOUNTER — OFFICE VISIT (OUTPATIENT)
Dept: PHYSICAL MEDICINE AND REHAB | Age: 54
End: 2024-08-01
Payer: COMMERCIAL

## 2024-08-01 VITALS
WEIGHT: 208 LBS | HEIGHT: 69 IN | DIASTOLIC BLOOD PRESSURE: 64 MMHG | BODY MASS INDEX: 30.81 KG/M2 | SYSTOLIC BLOOD PRESSURE: 112 MMHG

## 2024-08-01 DIAGNOSIS — M47.816 SPONDYLOSIS OF LUMBAR REGION WITHOUT MYELOPATHY OR RADICULOPATHY: ICD-10-CM

## 2024-08-01 DIAGNOSIS — M54.50 CHRONIC BILATERAL LOW BACK PAIN WITHOUT SCIATICA: ICD-10-CM

## 2024-08-01 DIAGNOSIS — G89.29 CHRONIC BILATERAL LOW BACK PAIN WITHOUT SCIATICA: ICD-10-CM

## 2024-08-01 DIAGNOSIS — M47.816 LUMBAR FACET ARTHROPATHY: ICD-10-CM

## 2024-08-01 DIAGNOSIS — F11.90 CHRONIC, CONTINUOUS USE OF OPIOIDS: ICD-10-CM

## 2024-08-01 DIAGNOSIS — G89.4 CHRONIC PAIN SYNDROME: ICD-10-CM

## 2024-08-01 DIAGNOSIS — M47.816 SPONDYLOSIS OF LUMBAR REGION WITHOUT MYELOPATHY OR RADICULOPATHY: Primary | ICD-10-CM

## 2024-08-01 DIAGNOSIS — M51.36 BULGE OF LUMBAR DISC WITHOUT MYELOPATHY: ICD-10-CM

## 2024-08-01 PROCEDURE — 72100 X-RAY EXAM L-S SPINE 2/3 VWS: CPT

## 2024-08-01 PROCEDURE — 99214 OFFICE O/P EST MOD 30 MIN: CPT | Performed by: NURSE PRACTITIONER

## 2024-08-01 ASSESSMENT — ENCOUNTER SYMPTOMS
BACK PAIN: 1
RESPIRATORY NEGATIVE: 1
ABDOMINAL PAIN: 0
EYES NEGATIVE: 1
ABDOMINAL DISTENTION: 0
CONSTIPATION: 0
GASTROINTESTINAL NEGATIVE: 1

## 2024-08-01 NOTE — PROGRESS NOTES
Functionality Assessment/Goals Worksheet     On a scale of 0 (Does not Interfere) to 10 (Completely Interferes)     1.  Which number describes how during the past week pain has interfered with           the following:  A.  General Activity:  5  B.  Mood: 7  C.  Walking Ability:  6  D.  Normal Work (Includes both work outside the home and housework):  6  E.  Relations with Other People:   4  F.  Sleep:   8  G.  Enjoyment of Life:   6    2.  Patient Prefers to Take their Pain Medications:     []  On a regular basis   [x]  Only when necessary    []  Does not take pain medications    3.  What are the Patient's Goals/Expectations for Visiting Pain Management?     []  Learn about my pain    [x]  Receive Medication   []  Physical Therapy     []  Treat Depression   [x]  Receive Injections    []  Treat Sleep   []  Deal with Anxiety and Stress   []  Treat Opoid Dependence/Addiction   [x]  Other:       HPI:   Po Victor is a 53 y.o. male is here today for    Chief Complaint: Low back pain     HPI   2 month follow up. Garrick reports that pain has gradually been increasing in his low back over the past 2 weeks and he feels the relief he was receiving from his L-facet RFA is wearing off. Has pain in low back starts in center and radiates all across axially worse left side \"very sharp and twisting pain with pressure and always aching\". States that activities have been more painful noticing more pain while working. Gets muscle spasms in low back.     Denies any radicular pain.   Pain increases with bending, lifting, twisting , getting up and down, and housework or working at job.    Ultram prn continues to help along with flexeril prn decrease pain     Prior Injections:  bilateral L-facet RFA from 11/15/2022 80% relief       bilateral L-facet RFA @ L4-5 and L5-S1 completed by Dr. La on 1/3/2023 80% relief for over 6.5 months     Radiology:  Lumbar xray:   FINDINGS:     There is a dextroscoliotic curvature of the

## 2024-08-06 DIAGNOSIS — G89.4 CHRONIC PAIN SYNDROME: ICD-10-CM

## 2024-08-06 DIAGNOSIS — M51.369 BULGE OF LUMBAR DISC WITHOUT MYELOPATHY: ICD-10-CM

## 2024-08-06 DIAGNOSIS — M54.50 CHRONIC BILATERAL LOW BACK PAIN WITHOUT SCIATICA: ICD-10-CM

## 2024-08-06 DIAGNOSIS — G89.29 CHRONIC BILATERAL LOW BACK PAIN WITHOUT SCIATICA: ICD-10-CM

## 2024-08-06 DIAGNOSIS — M47.816 SPONDYLOSIS OF LUMBAR REGION WITHOUT MYELOPATHY OR RADICULOPATHY: ICD-10-CM

## 2024-08-06 RX ORDER — TRAMADOL HYDROCHLORIDE 50 MG/1
50 TABLET ORAL EVERY 8 HOURS PRN
Qty: 90 TABLET | Refills: 0 | Status: SHIPPED | OUTPATIENT
Start: 2024-08-11 | End: 2024-09-05 | Stop reason: SDUPTHER

## 2024-08-06 RX ORDER — CYCLOBENZAPRINE HCL 10 MG
10 TABLET ORAL 3 TIMES DAILY PRN
Qty: 90 TABLET | Refills: 0 | Status: SHIPPED | OUTPATIENT
Start: 2024-08-14 | End: 2024-09-03 | Stop reason: SDUPTHER

## 2024-08-06 NOTE — TELEPHONE ENCOUNTER
Po Victor called requesting a refill on the following medications:  Requested Prescriptions     Pending Prescriptions Disp Refills    cyclobenzaprine (FLEXERIL) 10 MG tablet 90 tablet 0     Sig: Take 1 tablet by mouth 3 times daily as needed for Muscle spasms    traMADol (ULTRAM) 50 MG tablet 90 tablet 0     Sig: Take 1 tablet by mouth every 8 hours as needed for Pain for up to 30 days. Take lowest dose possible to manage pain Max Daily Amount: 150 mg     [facility]      Date of last visit: 8/1/24  Date of next visit (if applicable): Visit date not found        Patient is asking for 90 day supply for these medications

## 2024-08-06 NOTE — TELEPHONE ENCOUNTER
OARRS reviewed. UDS: + for  lexapro, buspirone, venlafaxine, cyclobenzaprine, tramadol.   Last seen: 8/1/2024. Follow-up: none

## 2024-08-13 ENCOUNTER — TELEPHONE (OUTPATIENT)
Dept: INTERNAL MEDICINE CLINIC | Age: 54
End: 2024-08-13

## 2024-08-13 ENCOUNTER — OFFICE VISIT (OUTPATIENT)
Dept: INTERNAL MEDICINE CLINIC | Age: 54
End: 2024-08-13
Payer: COMMERCIAL

## 2024-08-13 VITALS
TEMPERATURE: 97.8 F | DIASTOLIC BLOOD PRESSURE: 66 MMHG | BODY MASS INDEX: 30.07 KG/M2 | HEIGHT: 69 IN | WEIGHT: 203 LBS | HEART RATE: 69 BPM | SYSTOLIC BLOOD PRESSURE: 102 MMHG

## 2024-08-13 DIAGNOSIS — E11.69 TYPE 2 DIABETES MELLITUS WITH OTHER SPECIFIED COMPLICATION, WITHOUT LONG-TERM CURRENT USE OF INSULIN (HCC): Primary | ICD-10-CM

## 2024-08-13 LAB — HBA1C MFR BLD: 13.8 % (ref 4.3–5.7)

## 2024-08-13 PROCEDURE — 83036 HEMOGLOBIN GLYCOSYLATED A1C: CPT | Performed by: REGISTERED NURSE

## 2024-08-13 PROCEDURE — 99999 PR OFFICE/OUTPT VISIT,PROCEDURE ONLY: CPT | Performed by: REGISTERED NURSE

## 2024-08-13 PROCEDURE — G0108 DIAB MANAGE TRN  PER INDIV: HCPCS | Performed by: REGISTERED NURSE

## 2024-08-13 RX ORDER — GLIMEPIRIDE 4 MG/1
4 TABLET ORAL 2 TIMES DAILY
COMMUNITY
Start: 2024-06-16

## 2024-08-13 RX ORDER — VENLAFAXINE HYDROCHLORIDE 150 MG/1
150 CAPSULE, EXTENDED RELEASE ORAL DAILY
COMMUNITY
Start: 2024-06-28

## 2024-08-13 RX ORDER — BUSPIRONE HYDROCHLORIDE 30 MG/1
30 TABLET ORAL 2 TIMES DAILY
COMMUNITY
Start: 2024-08-08

## 2024-08-13 RX ORDER — SITAGLIPTIN 100 MG/1
100 TABLET, FILM COATED ORAL DAILY
COMMUNITY
Start: 2024-06-07

## 2024-08-13 ASSESSMENT — PATIENT HEALTH QUESTIONNAIRE - PHQ9
1. LITTLE INTEREST OR PLEASURE IN DOING THINGS: SEVERAL DAYS
SUM OF ALL RESPONSES TO PHQ QUESTIONS 1-9: 1
SUM OF ALL RESPONSES TO PHQ9 QUESTIONS 1 & 2: 1
2. FEELING DOWN, DEPRESSED OR HOPELESS: NOT AT ALL
SUM OF ALL RESPONSES TO PHQ QUESTIONS 1-9: 1

## 2024-08-13 NOTE — PROGRESS NOTES
[facility]  [address] [place] [address]  [phone] (phone)  [phone] (fax)    Patient ID: Po Victor [id number]  Referring Provider: Dr. Boothe   cc: MYRON Ontiveros CNP    Diabetes Mellitus Type 2, Initial Visit: Patient here for an initial evaluation of Type 2 diabetes mellitus. Last c-peptide was none.  Current symptoms/problems include none.    The patient was initially diagnosed with Type 2 diabetes since 2023.    Known diabetic complications: none  Cardiovascular risk factors: diabetes mellitus, dyslipidemia, family history of premature cardiovascular disease, male gender, and smoking/ tobacco exposure  Family history of diabetes:  Mom's3 aunts; grandma  Weight trend: has lost down about 42 pounds in the past year  Smoking/tobacco use: quit 2007  Alcohol use: none    Current Diabetes Pharmacotherapy:  Metformin 500mg BID --was out for 1 week; resumed today  Gliempiride 4mg 2 times daily  Januvai 100mg daily  Jardiance 25mg daily     Glucose Trends:   Glucose at 2 hrs PPD today resulted at 357mg/dl  Current monitoring regimen: Fingerstick blood tests - 1 times daily  Home blood sugar trends:    -Fasting AM:  \"130-200\"   -Before lunch:    -Before dinner:    -Bedtime:  Any episodes of hypoglycemia? no   -Treats with na    Lifestyle Factors:   Previous visit with dietician: no  Current diet: B: 5:20am poptart (1)            L: 10:15am hotpocket; diet pepsi                       D: 5pm pizza (3) thin crust; 0 vika flavored water                                   Meat/ vegetable                       Snacks: rice (2) or rice chips (3-4) -afternoon                                    Denies evening snacking                       Beverages: diet pop (1-2 per day) or flavored water or water  Current exercise: works 6a-2:30pm                  Walks 1 mile 5 out of 7 days      Lives with 2 adult sons.    Health Maintenance:  Eye exam current (within one year): no Date: 2007, needs appt with

## 2024-08-13 NOTE — PATIENT INSTRUCTIONS
Get the labs completed that Dr. Boothe ordered           --hold off on the urine test until blood sugars are better  Do make appointment to get your eyes checked  Goal for carbohydrates for meals: 3-4 servings or 45-60 grams            At each meal.            A snack between meals :  1 serving carbohydrate or                                                                      15/20 grams  Keep walking every day--good job!!  We will ask Dr. Boothe about once daily insulin to help get your blood                 Sugars closer to goal  Blood sugar goal  before eating and under 160 2 hours                  After finishing eating  We will ask Dr. Boothe about the Lissette 3 continuous glucose monitor

## 2024-08-22 RX ORDER — INSULIN GLARGINE 100 [IU]/ML
15 INJECTION, SOLUTION SUBCUTANEOUS NIGHTLY
Qty: 5 ADJUSTABLE DOSE PRE-FILLED PEN SYRINGE | Refills: 3 | Status: SHIPPED | OUTPATIENT
Start: 2024-08-22

## 2024-08-22 RX ORDER — BLOOD-GLUCOSE SENSOR
1 EACH MISCELLANEOUS
Qty: 6 EACH | Refills: 3 | Status: SHIPPED | OUTPATIENT
Start: 2024-08-22

## 2024-08-27 ENCOUNTER — TELEPHONE (OUTPATIENT)
Dept: INTERNAL MEDICINE CLINIC | Age: 54
End: 2024-08-27

## 2024-08-27 NOTE — TELEPHONE ENCOUNTER
Call back from Garrick. He has the insulin Lantus. He has not started taking it yet. BS's remain over 160.   Review of pen use/ administration. Also reviewed s/s and treatment of  Low BS.   Garrick has also obtained the to-BBB 3 sensors. Reviewed initiation of sensor over the phone, but encouraged to-BBB website or youtube for assistance videos; or to come to the office for assistance if he has any questions.  Garrick voiced understanding of above instructions via teach back.

## 2024-09-03 RX ORDER — CYCLOBENZAPRINE HCL 10 MG
10 TABLET ORAL 3 TIMES DAILY PRN
Qty: 90 TABLET | Refills: 0 | Status: SHIPPED | OUTPATIENT
Start: 2024-09-05 | End: 2024-10-05

## 2024-09-03 NOTE — TELEPHONE ENCOUNTER
Po Victor called requesting a refill on the following medications:  Requested Prescriptions     Pending Prescriptions Disp Refills    cyclobenzaprine (FLEXERIL) 10 MG tablet 90 tablet 0     Sig: Take 1 tablet by mouth 3 times daily as needed for Muscle spasms     Pharmacy verified:    [facility]/pharmacy #[id number] - [place], [place] - [address] - P [phone] - F [phone]     Date of last visit: 08/01/2024  Date of next visit (if applicable): 10/7/2024    Pt would like to know if they could get a 90 day supply with refills?

## 2024-09-03 NOTE — TELEPHONE ENCOUNTER
OARRS reviewed. UDS: + for Celexa, Buspar, Venlafaxine, Flexeril, Tramadol   Last seen: 8/1/2024. Follow-up: 10-7-24  Future Appointments   Date Time Provider Department Center   9/5/2024 11:30 AM Mitra Campbell, RN SRPX Physic [facility] DEP   9/17/2024  8:30 AM Diana Boyd, APRN - CNP [facility] [place] [facility]   10/1/2024  3:00 PM Vikash Boothe MD ENDO P - [place]   10/7/2024  9:15 AM Boy Duran APRN - CNP N [facility] Pain [facility] [place]

## 2024-09-04 ENCOUNTER — TELEPHONE (OUTPATIENT)
Dept: PHYSICAL MEDICINE AND REHAB | Age: 54
End: 2024-09-04

## 2024-09-04 NOTE — TELEPHONE ENCOUNTER
Pt is requesting 90 days worth of cyclobenzaprine. Is it okay if we can re-send script for 90 day quantity for pt?

## 2024-09-05 ENCOUNTER — OFFICE VISIT (OUTPATIENT)
Dept: INTERNAL MEDICINE CLINIC | Age: 54
End: 2024-09-05

## 2024-09-05 DIAGNOSIS — M54.50 CHRONIC BILATERAL LOW BACK PAIN WITHOUT SCIATICA: ICD-10-CM

## 2024-09-05 DIAGNOSIS — M47.816 SPONDYLOSIS OF LUMBAR REGION WITHOUT MYELOPATHY OR RADICULOPATHY: ICD-10-CM

## 2024-09-05 DIAGNOSIS — G89.4 CHRONIC PAIN SYNDROME: ICD-10-CM

## 2024-09-05 DIAGNOSIS — E11.9 TYPE 2 DIABETES MELLITUS WITHOUT COMPLICATION, WITH LONG-TERM CURRENT USE OF INSULIN (HCC): Primary | ICD-10-CM

## 2024-09-05 DIAGNOSIS — M51.36 BULGE OF LUMBAR DISC WITHOUT MYELOPATHY: ICD-10-CM

## 2024-09-05 DIAGNOSIS — G89.29 CHRONIC BILATERAL LOW BACK PAIN WITHOUT SCIATICA: ICD-10-CM

## 2024-09-05 DIAGNOSIS — Z79.4 TYPE 2 DIABETES MELLITUS WITHOUT COMPLICATION, WITH LONG-TERM CURRENT USE OF INSULIN (HCC): Primary | ICD-10-CM

## 2024-09-05 RX ORDER — TRAMADOL HYDROCHLORIDE 50 MG/1
50 TABLET ORAL EVERY 8 HOURS PRN
Qty: 90 TABLET | Refills: 0 | Status: SHIPPED | OUTPATIENT
Start: 2024-09-09 | End: 2024-10-09

## 2024-09-05 NOTE — PROGRESS NOTES
[facility]  [address] [place] [address]  [phone] (phone)  [phone] (fax)    Patient ID: Po Victor [id number]  Referring Provider: Dr. Boothe     Patient's name and  were verified.    Subjective:    He presents for a follow-up diabetic visit. He has type 2 diabetes mellitus. He is compliant a majority of the time.  Assessment:     Lab Results   Component Value Date/Time    LABA1C 13.8 2024 10:23 AM    LABA1C 9.5 2023 01:07 PM    BUN 9 2024 08:15 AM    CREATININE 0.71 2024 08:15 AM     There were no vitals filed for this visit.  Wt Readings from Last 3 Encounters:   24 92.1 kg (203 lb)   24 94.3 kg (208 lb)   24 94.6 kg (208 lb 9.6 oz)     Ht Readings from Last 3 Encounters:   24 1.753 m (5' 9\")   24 1.753 m (5' 9.02\")   24 1.753 m (5' 9\")     Est, Glom Filt Rate   Date Value Ref Range Status   2023 >60 >60 ml/min/1.73m2 Final       Diabetes Pharmacotherapy:  Glimepiride 4mg BID  Lantus 15 units QHS - started around   Januvia 100mg daily  Jardiance 25mg daily  Metformin 500mg - 2 tab BID      Glucose Trends:   Current monitoring regimen: Freestyle Lissette 3 CGM - checks 4+ times daily  Home blood sugar trends:    -V. High: 64%, High: 13%, Target: 23%, Low: 0%, V. Low: 0%   -Average Glucose: 288mg/dL; GMI: 10.2%;  %time CGM active 62%              -Good overnight control; significant post-prandial elevation  Any episodes of hypoglycemia? no   -Denies s/sx lows    Lifestyle Factors:   Previous visit with dietician: no- scheduled for next week  Current diet: B: Poptart (1), late mornin regular Pepsi            L: 10-11a: hot pocket, 1:15p: uncrustable (PB & J)                       D: meat/veggie +/- potato                       Snacks: rice cakes                       Beverages: water, 1-2 sodas (some regular, some diet)  Current exercise:  active with work    Health Maintenance:  Diabetes Management

## 2024-09-05 NOTE — TELEPHONE ENCOUNTER
Po Victor called requesting a refill on the following medications:  Requested Prescriptions     Pending Prescriptions Disp Refills    traMADol (ULTRAM) 50 MG tablet 90 tablet 0     Sig: Take 1 tablet by mouth every 8 hours as needed for Pain for up to 30 days. Take lowest dose possible to manage pain Max Daily Amount: 150 mg     Pharmacy verified:[facility]   .pv      Date of last visit: 8/1/24   Date of next visit (if applicable): 10/7/2024

## 2024-09-05 NOTE — PATIENT INSTRUCTIONS
We will talk to Dr. Boothe about stopping Januvia and starting Mounjaro once weekly shot  -Please call if this requires prior authorization or is expensive [phone]  -Don't start the medicine until after your procedure on 9/16    2. Time to schedule your eye exam    3. Switch to taking your Lantus 15 units in the morning   -Skip dose tonight and start tomorrow morning    4. Try to add more protein to breakfast (hard boiled egg, cheese stick, nuts)

## 2024-09-05 NOTE — TELEPHONE ENCOUNTER
OARRS reviewed. UDS: + for Celexa, Buspar, Venlafaxine, Flexeril, Tramadol  Last seen: 8/1/2024. Follow-up: 10-7-24

## 2024-09-06 DIAGNOSIS — Z79.4 TYPE 2 DIABETES MELLITUS WITHOUT COMPLICATION, WITH LONG-TERM CURRENT USE OF INSULIN (HCC): Primary | ICD-10-CM

## 2024-09-06 DIAGNOSIS — E11.9 TYPE 2 DIABETES MELLITUS WITHOUT COMPLICATION, WITH LONG-TERM CURRENT USE OF INSULIN (HCC): Primary | ICD-10-CM

## 2024-09-06 LAB
ANION GAP SERPL CALCULATED.3IONS-SCNC: 13 MEQ/L (ref 7–16)
BUN BLDV-MCNC: 9 MG/DL (ref 6–20)
CALCIUM SERPL-MCNC: 9.7 MG/DL (ref 8.5–10.5)
CHLORIDE BLD-SCNC: 104 MEQ/L (ref 95–107)
CO2: 24 MEQ/L (ref 19–31)
CREAT SERPL-MCNC: 0.71 MG/DL (ref 0.8–1.4)
EGFR IF NONAFRICAN AMERICAN: 109 ML/MIN/1.73
GLUCOSE: 166 MG/DL (ref 70–99)
POTASSIUM SERPL-SCNC: 4.1 MEQ/L (ref 3.5–5.4)
PSA, ULTRASENSITIVE: 0.58 NG/ML
SODIUM BLD-SCNC: 141 MEQ/L (ref 133–146)

## 2024-09-06 RX ORDER — SITAGLIPTIN 100 MG/1
100 TABLET, FILM COATED ORAL DAILY
Qty: 30 TABLET | Refills: 0 | Status: SHIPPED | OUTPATIENT
Start: 2024-09-06

## 2024-09-06 RX ORDER — EMPAGLIFLOZIN 25 MG/1
25 TABLET, FILM COATED ORAL EVERY MORNING
Qty: 90 TABLET | Refills: 1 | Status: SHIPPED | OUTPATIENT
Start: 2024-09-06

## 2024-09-06 NOTE — TELEPHONE ENCOUNTER
Pt already picked up script. Pt will call back after this script is over. Pt would like next script to be 90 day.

## 2024-09-06 NOTE — TELEPHONE ENCOUNTER
Garrick Harris is being seen by the [facility] for T2DM      Diabetes Pharmacotherapy:  Glimepiride 4mg BID  Lantus 15 units QHS - started around 8/30  Januvia 100mg daily  Jardiance 25mg daily  Metformin 500mg - 2 tab BID        Glucose Trends:   Current monitoring regimen: Freestyle Lissette 3 CGM - checks 4+ times daily  Home blood sugar trends:               -V. High: 64%, High: 13%, Target: 23%, Low: 0%, V. Low: 0%              -Average Glucose: 288mg/dL; GMI: 10.2%;  %time CGM active 62%              -Good overnight control; significant post-prandial elevation  Any episodes of hypoglycemia? no              -Denies s/sx lows     Last A1C: 13.8% (8/13/24).     Recommendations:   -Consider initiation of Mounjaro 2.5mg weekly and discontinuation of Januvia   -Patient needs better meal clearance and we would like to avoid mealtime insulin if possible  -Confirmed absence of pancreatitis, MEN 2, gastroparesis, thyroid cancer   -Reviewed dosing, mechanism, ADRs    Please message back and/or review pended order(s) in response to the above.    Thank you for allowing me to participate in the care of your patient,     Debora Nelson, PharmD, BCPS, BC-[facility]  Internal Medicine Clinical Pharmacist  [phone]

## 2024-09-10 ENCOUNTER — OFFICE VISIT (OUTPATIENT)
Dept: INTERNAL MEDICINE CLINIC | Age: 54
End: 2024-09-10
Payer: COMMERCIAL

## 2024-09-10 VITALS — HEIGHT: 69 IN | BODY MASS INDEX: 30.18 KG/M2 | WEIGHT: 203.8 LBS

## 2024-09-10 DIAGNOSIS — E11.65 TYPE 2 DIABETES MELLITUS WITH HYPERGLYCEMIA, UNSPECIFIED WHETHER LONG TERM INSULIN USE (HCC): Primary | ICD-10-CM

## 2024-09-10 PROCEDURE — NBSRV NON-BILLABLE SERVICE: Performed by: DIETITIAN, REGISTERED

## 2024-09-10 PROCEDURE — 97802 MEDICAL NUTRITION INDIV IN: CPT | Performed by: DIETITIAN, REGISTERED

## 2024-09-16 ENCOUNTER — APPOINTMENT (OUTPATIENT)
Dept: GENERAL RADIOLOGY | Age: 54
End: 2024-09-16
Attending: PAIN MEDICINE
Payer: COMMERCIAL

## 2024-09-16 ENCOUNTER — HOSPITAL ENCOUNTER (OUTPATIENT)
Age: 54
Setting detail: OUTPATIENT SURGERY
Discharge: HOME | End: 2024-09-16
Attending: PAIN MEDICINE | Admitting: PAIN MEDICINE
Payer: COMMERCIAL

## 2024-09-16 VITALS
TEMPERATURE: 97.9 F | WEIGHT: 198 LBS | BODY MASS INDEX: 29.33 KG/M2 | HEIGHT: 69 IN | OXYGEN SATURATION: 92 % | SYSTOLIC BLOOD PRESSURE: 119 MMHG | DIASTOLIC BLOOD PRESSURE: 78 MMHG | RESPIRATION RATE: 16 BRPM | HEART RATE: 88 BPM

## 2024-09-16 LAB — GLUCOSE BLD STRIP.AUTO-MCNC: 148 MG/DL (ref 70–108)

## 2024-09-16 PROCEDURE — 7100000010 HC PHASE II RECOVERY - FIRST 15 MIN: Performed by: PAIN MEDICINE

## 2024-09-16 PROCEDURE — 2500000003 HC RX 250 WO HCPCS: Performed by: PAIN MEDICINE

## 2024-09-16 PROCEDURE — 82948 REAGENT STRIP/BLOOD GLUCOSE: CPT

## 2024-09-16 PROCEDURE — 3600000057 HC PAIN LEVEL 4 ADDL 15 MIN: Performed by: PAIN MEDICINE

## 2024-09-16 PROCEDURE — 7100000011 HC PHASE II RECOVERY - ADDTL 15 MIN: Performed by: PAIN MEDICINE

## 2024-09-16 PROCEDURE — 99152 MOD SED SAME PHYS/QHP 5/>YRS: CPT | Performed by: PAIN MEDICINE

## 2024-09-16 PROCEDURE — 3600000056 HC PAIN LEVEL 4 BASE: Performed by: PAIN MEDICINE

## 2024-09-16 PROCEDURE — 64636 DESTROY L/S FACET JNT ADDL: CPT | Performed by: PAIN MEDICINE

## 2024-09-16 PROCEDURE — 6360000002 HC RX W HCPCS: Performed by: PAIN MEDICINE

## 2024-09-16 PROCEDURE — 64635 DESTROY LUMB/SAC FACET JNT: CPT | Performed by: PAIN MEDICINE

## 2024-09-16 PROCEDURE — 2709999900 HC NON-CHARGEABLE SUPPLY: Performed by: PAIN MEDICINE

## 2024-09-16 RX ORDER — LIDOCAINE HYDROCHLORIDE 20 MG/ML
INJECTION, SOLUTION INFILTRATION; PERINEURAL PRN
Status: DISCONTINUED | OUTPATIENT
Start: 2024-09-16 | End: 2024-09-16 | Stop reason: ALTCHOICE

## 2024-09-16 RX ORDER — MIDAZOLAM HYDROCHLORIDE 1 MG/ML
INJECTION INTRAMUSCULAR; INTRAVENOUS PRN
Status: DISCONTINUED | OUTPATIENT
Start: 2024-09-16 | End: 2024-09-16 | Stop reason: ALTCHOICE

## 2024-09-16 RX ORDER — BUPIVACAINE HYDROCHLORIDE 5 MG/ML
INJECTION, SOLUTION PERINEURAL PRN
Status: DISCONTINUED | OUTPATIENT
Start: 2024-09-16 | End: 2024-09-16 | Stop reason: ALTCHOICE

## 2024-09-16 RX ORDER — FENTANYL CITRATE 50 UG/ML
INJECTION, SOLUTION INTRAMUSCULAR; INTRAVENOUS PRN
Status: DISCONTINUED | OUTPATIENT
Start: 2024-09-16 | End: 2024-09-16 | Stop reason: ALTCHOICE

## 2024-09-16 ASSESSMENT — PAIN DESCRIPTION - DESCRIPTORS: DESCRIPTORS: ACHING

## 2024-09-16 ASSESSMENT — PAIN - FUNCTIONAL ASSESSMENT
PAIN_FUNCTIONAL_ASSESSMENT: 0-10
PAIN_FUNCTIONAL_ASSESSMENT: NONE - DENIES PAIN

## 2024-09-17 ENCOUNTER — OFFICE VISIT (OUTPATIENT)
Dept: UROLOGY | Age: 54
End: 2024-09-17
Payer: COMMERCIAL

## 2024-09-17 VITALS — RESPIRATION RATE: 18 BRPM | WEIGHT: 199 LBS | BODY MASS INDEX: 29.47 KG/M2 | HEIGHT: 69 IN

## 2024-09-17 DIAGNOSIS — R39.12 BENIGN PROSTATIC HYPERPLASIA WITH WEAK URINARY STREAM: Primary | ICD-10-CM

## 2024-09-17 DIAGNOSIS — N20.0 KIDNEY STONES: ICD-10-CM

## 2024-09-17 DIAGNOSIS — N40.1 BENIGN PROSTATIC HYPERPLASIA WITH WEAK URINARY STREAM: Primary | ICD-10-CM

## 2024-09-17 LAB
BILIRUBIN, URINE: NEGATIVE
BLOOD URINE, POC: NEGATIVE
CHARACTER, URINE: CLEAR
COLOR, UA: YELLOW
GLUCOSE URINE: 500 MG/DL
KETONES, URINE: NEGATIVE
LEUKOCYTE CLUMPS, URINE: NEGATIVE
NITRITE, URINE: NEGATIVE
PH, URINE: 6 (ref 5–9)
POST VOID RESIDUAL (PVR): 184 ML
PROTEIN, URINE: NEGATIVE MG/DL
SPECIFIC GRAVITY UA: <= 1.005 (ref 1–1.03)
UROBILINOGEN, URINE: 0.2 EU/DL (ref 0–1)

## 2024-09-17 PROCEDURE — 51798 US URINE CAPACITY MEASURE: CPT | Performed by: NURSE PRACTITIONER

## 2024-09-17 PROCEDURE — 99214 OFFICE O/P EST MOD 30 MIN: CPT | Performed by: NURSE PRACTITIONER

## 2024-09-17 PROCEDURE — 81003 URINALYSIS AUTO W/O SCOPE: CPT | Performed by: NURSE PRACTITIONER

## 2024-09-17 RX ORDER — TAMSULOSIN HYDROCHLORIDE 0.4 MG/1
0.4 CAPSULE ORAL DAILY
Qty: 90 CAPSULE | Refills: 3 | Status: SHIPPED | OUTPATIENT
Start: 2024-09-17

## 2024-09-17 RX ORDER — POTASSIUM CITRATE 10 MEQ/1
10 TABLET, EXTENDED RELEASE ORAL
Qty: 90 TABLET | Refills: 3 | Status: SHIPPED | OUTPATIENT
Start: 2024-09-17

## 2024-09-17 ASSESSMENT — ENCOUNTER SYMPTOMS
NAUSEA: 0
ABDOMINAL PAIN: 0
BACK PAIN: 0
VOMITING: 0

## 2024-10-01 RX ORDER — CYCLOBENZAPRINE HCL 10 MG
10 TABLET ORAL 3 TIMES DAILY PRN
Qty: 90 TABLET | Refills: 0 | Status: SHIPPED | OUTPATIENT
Start: 2024-10-05 | End: 2024-11-04

## 2024-10-01 NOTE — TELEPHONE ENCOUNTER
Po Victor called requesting a refill on the following medications:  Requested Prescriptions     Pending Prescriptions Disp Refills    cyclobenzaprine (FLEXERIL) 10 MG tablet 90 tablet 0     Sig: Take 1 tablet by mouth 3 times daily as needed for Muscle spasms     [facility]      Date of last visit: 5/20/24   Date of next visit (if applicable): 10/7/2024          The Pt is requesting a 3 month Supply of this medication

## 2024-10-01 NOTE — TELEPHONE ENCOUNTER
OARRS reviewed.   Last seen: 8/1/2024. Follow-up:   Future Appointments   Date Time Provider Department Center   10/7/2024  9:15 AM Boy Duran, WALT - CNP N [facility]PX Pain [facility] [place]   10/24/2024  2:00 PM Mitra Campbell, RN SRPX Physic [facility] DEP   11/12/2024  9:00 AM Lesly Mcginnis RD, LD SRPX Phys[facility] DEP   12/19/2024  1:45 PM Vikash Boothe MD ENDO P - [place]   9/18/2025  8:30 AM Diana Boyd, APRN - CNP [facility] [place] [facility] [place]

## 2024-10-02 DIAGNOSIS — G89.29 CHRONIC BILATERAL LOW BACK PAIN WITHOUT SCIATICA: ICD-10-CM

## 2024-10-02 DIAGNOSIS — M51.369 BULGE OF LUMBAR DISC WITHOUT MYELOPATHY: ICD-10-CM

## 2024-10-02 DIAGNOSIS — M47.816 SPONDYLOSIS OF LUMBAR REGION WITHOUT MYELOPATHY OR RADICULOPATHY: ICD-10-CM

## 2024-10-02 DIAGNOSIS — G89.4 CHRONIC PAIN SYNDROME: ICD-10-CM

## 2024-10-02 DIAGNOSIS — M54.50 CHRONIC BILATERAL LOW BACK PAIN WITHOUT SCIATICA: ICD-10-CM

## 2024-10-02 RX ORDER — TRAMADOL HYDROCHLORIDE 50 MG/1
50 TABLET ORAL EVERY 8 HOURS PRN
Qty: 90 TABLET | Refills: 0 | Status: SHIPPED | OUTPATIENT
Start: 2024-10-08 | End: 2024-11-07

## 2024-10-02 NOTE — TELEPHONE ENCOUNTER
OARRS reviewed. UDS: + for  Citalopram/Escitalopram, Buspirone, Venlafaxine, Cyclobenzaprine, Tramadol.   Last seen: 8/1/2024. Follow-up: 10/7/2024

## 2024-10-02 NOTE — TELEPHONE ENCOUNTER
Po Victor called requesting a refill on the following medications:  Requested Prescriptions     Pending Prescriptions Disp Refills    traMADol (ULTRAM) 50 MG tablet 90 tablet 0     Sig: Take 1 tablet by mouth every 8 hours as needed for Pain for up to 30 days. Take lowest dose possible to manage pain Max Daily Amount: 150 mg     [facility]      Date of last visit: 8/1/2024  Date of next visit (if applicable): 10/7/2024

## 2024-10-07 ENCOUNTER — OFFICE VISIT (OUTPATIENT)
Dept: PHYSICAL MEDICINE AND REHAB | Age: 54
End: 2024-10-07
Payer: COMMERCIAL

## 2024-10-07 VITALS
DIASTOLIC BLOOD PRESSURE: 68 MMHG | SYSTOLIC BLOOD PRESSURE: 114 MMHG | HEIGHT: 69 IN | BODY MASS INDEX: 29.49 KG/M2 | WEIGHT: 199.08 LBS

## 2024-10-07 DIAGNOSIS — M54.50 CHRONIC BILATERAL LOW BACK PAIN WITHOUT SCIATICA: ICD-10-CM

## 2024-10-07 DIAGNOSIS — M47.816 LUMBAR FACET ARTHROPATHY: ICD-10-CM

## 2024-10-07 DIAGNOSIS — M47.816 SPONDYLOSIS OF LUMBAR REGION WITHOUT MYELOPATHY OR RADICULOPATHY: Primary | ICD-10-CM

## 2024-10-07 DIAGNOSIS — G89.4 CHRONIC PAIN SYNDROME: ICD-10-CM

## 2024-10-07 DIAGNOSIS — M51.369 BULGE OF LUMBAR DISC WITHOUT MYELOPATHY: ICD-10-CM

## 2024-10-07 DIAGNOSIS — G89.29 CHRONIC BILATERAL LOW BACK PAIN WITHOUT SCIATICA: ICD-10-CM

## 2024-10-07 DIAGNOSIS — F11.90 CHRONIC, CONTINUOUS USE OF OPIOIDS: ICD-10-CM

## 2024-10-07 PROCEDURE — 99214 OFFICE O/P EST MOD 30 MIN: CPT | Performed by: NURSE PRACTITIONER

## 2024-10-07 ASSESSMENT — ENCOUNTER SYMPTOMS
CONSTIPATION: 0
ABDOMINAL DISTENTION: 0
ABDOMINAL PAIN: 0
RESPIRATORY NEGATIVE: 1
BACK PAIN: 1
GASTROINTESTINAL NEGATIVE: 1
EYES NEGATIVE: 1

## 2024-10-07 NOTE — PROGRESS NOTES
Constitutional: Negative.    HENT: Negative.     Eyes: Negative.    Respiratory: Negative.     Cardiovascular: Negative.  Negative for chest pain and leg swelling.   Gastrointestinal: Negative.  Negative for abdominal distention, abdominal pain and constipation.   Musculoskeletal:  Positive for arthralgias, back pain and myalgias. Negative for gait problem, joint swelling and neck pain.        Ambulating without assist devices    Skin: Negative.    Neurological:  Negative for weakness and numbness.   Psychiatric/Behavioral:  Negative for decreased concentration and sleep disturbance. The patient is not nervous/anxious.        Objective:     Vitals:    10/07/24 0912   BP: 114/68   Weight: 90.3 kg (199 lb 1.2 oz)   Height: 1.753 m (5' 9.02\")       Physical Exam  Vitals and nursing note reviewed.   Constitutional:       General: He is not in acute distress.     Appearance: Normal appearance. He is well-developed. He is not diaphoretic.   HENT:      Head: Normocephalic and atraumatic.      Right Ear: External ear normal.      Left Ear: External ear normal.      Nose: Nose normal.      Mouth/Throat:      Mouth: Mucous membranes are moist.      Pharynx: Oropharynx is clear. No oropharyngeal exudate.   Eyes:      General: No scleral icterus.        Right eye: No discharge.         Left eye: No discharge.      Conjunctiva/sclera: Conjunctivae normal.      Pupils: Pupils are equal, round, and reactive to light.   Neck:      Thyroid: No thyromegaly.   Cardiovascular:      Rate and Rhythm: Normal rate and regular rhythm.      Pulses: Normal pulses.      Heart sounds: Normal heart sounds. No murmur heard.     No friction rub. No gallop.   Pulmonary:      Effort: Pulmonary effort is normal. No respiratory distress.      Breath sounds: Normal breath sounds. No wheezing or rales.   Chest:      Chest wall: No tenderness.   Abdominal:      General: Abdomen is flat. Bowel sounds are normal. There is no distension.      Palpations:

## 2024-10-15 NOTE — TELEPHONE ENCOUNTER
Patient called in stating they are having an issue getting the maynor 3. Patient is wondering if you're wanting to send in the freestyle maynor 3 plus.

## 2024-10-18 RX ORDER — SITAGLIPTIN 100 MG/1
100 TABLET, FILM COATED ORAL DAILY
Qty: 30 TABLET | Refills: 3 | Status: SHIPPED | OUTPATIENT
Start: 2024-10-18

## 2024-10-24 ENCOUNTER — OFFICE VISIT (OUTPATIENT)
Dept: INTERNAL MEDICINE CLINIC | Age: 54
End: 2024-10-24
Payer: COMMERCIAL

## 2024-10-24 VITALS
WEIGHT: 206 LBS | TEMPERATURE: 98.1 F | DIASTOLIC BLOOD PRESSURE: 76 MMHG | HEIGHT: 69 IN | HEART RATE: 94 BPM | SYSTOLIC BLOOD PRESSURE: 118 MMHG | BODY MASS INDEX: 30.51 KG/M2

## 2024-10-24 DIAGNOSIS — Z79.4 TYPE 2 DIABETES MELLITUS WITH HYPERGLYCEMIA, WITH LONG-TERM CURRENT USE OF INSULIN (HCC): Primary | ICD-10-CM

## 2024-10-24 DIAGNOSIS — E11.65 TYPE 2 DIABETES MELLITUS WITH HYPERGLYCEMIA, WITH LONG-TERM CURRENT USE OF INSULIN (HCC): Primary | ICD-10-CM

## 2024-10-24 PROCEDURE — NBSRV NON-BILLABLE SERVICE: Performed by: REGISTERED NURSE

## 2024-10-24 PROCEDURE — G0108 DIAB MANAGE TRN  PER INDIV: HCPCS | Performed by: REGISTERED NURSE

## 2024-10-24 RX ORDER — VENLAFAXINE HYDROCHLORIDE 225 MG/1
225 TABLET, EXTENDED RELEASE ORAL
COMMUNITY
Start: 2024-09-27

## 2024-10-24 NOTE — PROGRESS NOTES
[facility]  [address] [place] [address]  [phone] (phone)  [phone] (fax)    Patient ID: Po Victor [id number]  Referring Provider: Dr. Boothe     Patient's name and  were verified.    Subjective:    He presents for a follow-up diabetic visit. He has type 2 diabetes mellitus. He is compliant some of the time.  Assessment:     Lab Results   Component Value Date/Time    LABA1C 13.8 2024 10:23 AM    LABA1C 9.5 2023 01:07 PM    BUN 9 2024 08:15 AM    CREATININE 0.71 2024 08:15 AM     Vitals:    10/24/24 1440   BP: 118/76   Site: Left Upper Arm   Position: Sitting   Pulse: 94   Temp: 98.1 °F (36.7 °C)   Weight: 93.4 kg (206 lb)   Height: 1.753 m (5' 9\")     Wt Readings from Last 3 Encounters:   24 94.8 kg (209 lb)   10/24/24 93.4 kg (206 lb)   10/07/24 90.3 kg (199 lb 1.2 oz)     Ht Readings from Last 3 Encounters:   24 1.753 m (5' 9\")   10/24/24 1.753 m (5' 9\")   10/07/24 1.753 m (5' 9.02\")     Est, Glom Filt Rate   Date Value Ref Range Status   2023 >60 >60 ml/min/1.73m2 Final         Diabetes Pharmacotherapy:  Lantus 15 units in the morning  Glimepiride 4mg BID  Januvia 100mg dialy  Jardainc 25mg daily  Metformin 500mg 2 tabs BID    Glucose Trends:   Current monitoring regimen: Freestyle Lissette 3 CGM - checks 4+ times daily  Home blood sugar trends:    -V. High: 70%, High: 19%, Target: 11%, Low: 0%, V. Low: 0%   -Average Glucose: 290mg/dL; GMI: --%;  %time CGM active 18%              -glucose levels trend down overnight; glucose excursions through the day starting with bkfst. Overall, BS's above goal  Any episodes of hypoglycemia? no   -Treats with mints    Lifestyle Factors:   Previous visit with dietician: yes - 9/10/2024  Current diet: B: 4:30a                            5:30am poptart single                       8:30am crustable            L: 10;15am hot pocket/ pretzels                       1pm  pb crackers (6)

## 2024-10-29 RX ORDER — CYCLOBENZAPRINE HCL 10 MG
10 TABLET ORAL 3 TIMES DAILY PRN
Qty: 270 TABLET | Refills: 0 | Status: SHIPPED | OUTPATIENT
Start: 2024-10-29 | End: 2025-01-27

## 2024-10-29 NOTE — TELEPHONE ENCOUNTER
Po Victor called requesting a refill on the following medications:  Requested Prescriptions     Pending Prescriptions Disp Refills    cyclobenzaprine (FLEXERIL) 10 MG tablet 90 tablet 0     Sig: Take 1 tablet by mouth 3 times daily as needed for Muscle spasms     [facility] [place]  [facility]      Date of last visit: 10/07/2024  Date of next visit (if applicable): Visit date not found

## 2024-10-29 NOTE — TELEPHONE ENCOUNTER
Last refill request  on 9/6/24 pt requested 90 days worth after he picked up 30. Script is set up for 90 days worth.       OARRS reviewed. UDS: + for citalopram,buspirone,venlafaxine, cyclobenzaprine,tramadol.   Last seen: 10/7/2024. Follow-up: 12/09/2024

## 2024-11-04 DIAGNOSIS — M47.816 SPONDYLOSIS OF LUMBAR REGION WITHOUT MYELOPATHY OR RADICULOPATHY: ICD-10-CM

## 2024-11-04 DIAGNOSIS — M54.50 CHRONIC BILATERAL LOW BACK PAIN WITHOUT SCIATICA: ICD-10-CM

## 2024-11-04 DIAGNOSIS — G89.29 CHRONIC BILATERAL LOW BACK PAIN WITHOUT SCIATICA: ICD-10-CM

## 2024-11-04 DIAGNOSIS — G89.4 CHRONIC PAIN SYNDROME: ICD-10-CM

## 2024-11-04 DIAGNOSIS — M51.369 BULGE OF LUMBAR DISC WITHOUT MYELOPATHY: ICD-10-CM

## 2024-11-04 RX ORDER — TRAMADOL HYDROCHLORIDE 50 MG/1
50 TABLET ORAL EVERY 8 HOURS PRN
Qty: 90 TABLET | Refills: 0 | Status: SHIPPED | OUTPATIENT
Start: 2024-11-06 | End: 2024-12-06

## 2024-11-04 NOTE — TELEPHONE ENCOUNTER
OARRS reviewed. UDS: + for  Citalopram/Escitalopram, Buspirone, Venlafaxine, Cyclobenzaprine, Tramadol.   Last seen: 10/7/2024. Follow-up: 12/9/2024

## 2024-11-04 NOTE — TELEPHONE ENCOUNTER
Po Victor called requesting a refill on the following medications:  Requested Prescriptions     Pending Prescriptions Disp Refills    traMADol (ULTRAM) 50 MG tablet 90 tablet 0     Sig: Take 1 tablet by mouth every 8 hours as needed for Pain for up to 30 days. Take lowest dose possible to manage pain Max Daily Amount: 150 mg     [facility]      Date of last visit: 10/7/24   Date of next visit (if applicable): 12/9/2024

## 2024-11-12 ENCOUNTER — OFFICE VISIT (OUTPATIENT)
Dept: INTERNAL MEDICINE CLINIC | Age: 54
End: 2024-11-12
Payer: COMMERCIAL

## 2024-11-12 VITALS — WEIGHT: 209 LBS | HEIGHT: 69 IN | BODY MASS INDEX: 30.96 KG/M2

## 2024-11-12 DIAGNOSIS — E11.65 TYPE 2 DIABETES MELLITUS WITH HYPERGLYCEMIA, UNSPECIFIED WHETHER LONG TERM INSULIN USE (HCC): Primary | ICD-10-CM

## 2024-11-12 PROCEDURE — NBSRV NON-BILLABLE SERVICE: Performed by: DIETITIAN, REGISTERED

## 2024-11-12 PROCEDURE — 97803 MED NUTRITION INDIV SUBSEQ: CPT | Performed by: DIETITIAN, REGISTERED

## 2024-11-12 NOTE — TELEPHONE ENCOUNTER
Rx approved per CPA/Dr. Boothe.  Confirmed via dispense report that patient has been prescribed Metformin 500mg 2 tabs BID since 2022 by PCP.     For Pharmacy Admin Tracking Only    Program: [facility]  CPA in place:  Yes  Time Spent (min): 5

## 2024-11-12 NOTE — PATIENT INSTRUCTIONS
Try Healthy Choice frozen meals for a quick lunch at work or other similar brand.    Make sure Added sugars, on the nutrition facts label, are less than 8 gms added sugars/serving.    When going into a meal or snack, Check BS.  If  or higher, make that next meal lower in carbs and higher in protein and non-starchy veggies.     Make your own Pbutter sandwich using a high fiber bread. At least 3 gms fiber/slice.    Add fresh fruit as a healthy carb serving - What 1 serving = 15 gms.  - 1 cup cut up fresh fruit OR 1 small piece of fruit OR 10-15 grapes  - Do not double up on fruit at one time.    Light yogurt is a healthy carb choice.    Make your own pbutter and crackers   - Ex. 5-6 whole wheat ritz + 1-2 TB pbutter.

## 2024-11-12 NOTE — PROGRESS NOTES
[facility]  [facility]  [address] [place] [address]  [phone] (phone)  [phone] (fax)    Patient Name: Po Victor. Date of Birth: [birth year]. MRN: [id number]      Assessment: Patient is a 54 y.o. male seen for follow-up MNT visit for Type 2 DB.     -Nutritionally relevant labs:   Lab Results   Component Value Date/Time    LABA1C 13.8 (H) 08/13/2024 10:23 AM    LABA1C 9.5 (H) 05/05/2023 01:07 PM    LABA1C 9.4 (H) 02/03/2023 08:45 AM    LABA1C 8.5 (H) 10/19/2022 09:30 AM    GLUCOSE 166 (H) 09/06/2024 08:15 AM    GLUCOSE 403 (H) 09/01/2023 10:24 AM    GLUCOSE 123 (H) 10/19/2022 09:30 AM    CHOL 237 (H) 10/19/2022 09:30 AM    CHOL 258 07/07/2018 12:00 AM    HDL 31 (L) 10/19/2022 09:30 AM    TRIG 214 (H) 10/19/2022 09:30 AM     DB meds:  Lantus 17 units nightly  Januvia 100 mg daily  Jardiance 25 mg daily  Metformin 500 mg - 2 tablets BID  Glimepiride 4 mg BID - Brkf & with Lunch.    -Blood sugar trends: Lissette CGM with phone connection. Downloaded and reviewed.  TIR: 10%, High: 14%, Very High: 76%  Ave Gluc 318  Pt with no c/o high BS symptoms.    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 He lives only 7 min from work.  Day hours - 8 hr, Mon - Friday.  HH - includes 2 sons - 27 & 24 yr old. They both work and pt sometimes cooks for them too.    -Food recall/food log:   Breakfast: 530 am - Biscuit Sausage Egg [place].    Snack: 830 am or 1030/11 on weekends. Uncrustable OR full size pkg of pbutter crackers 6/pkg.  Lunch: 1015 am (1 pm at home) - Pepperoni Hot pocket OR special K cereal.    Snack: 1 pm (3 pm on weekend) yogurt chewy snack bar OR 7 pbutter pretzels OR 2 chocolate rice cakes  Dinner: 430/5 pm - Spaghetti & Salad OR pork chop, green beans, salad OR 2 sl pizza and salad OR chicken and noodles and mashed potatoes OR chicken breast and corn.   Evening Snack: uncrustable OR chewy yogurt bar OR 2 rice cakes    -Main

## 2024-11-20 ENCOUNTER — TELEPHONE (OUTPATIENT)
Dept: INTERNAL MEDICINE CLINIC | Age: 54
End: 2024-11-20

## 2024-11-20 DIAGNOSIS — E11.65 TYPE 2 DIABETES MELLITUS WITH HYPERGLYCEMIA, UNSPECIFIED WHETHER LONG TERM INSULIN USE (HCC): Primary | ICD-10-CM

## 2024-11-20 NOTE — TELEPHONE ENCOUNTER
Lissette download from 11/12/2024  Time in Range:  Very High 76%, High 14% In Target Range 10%, Low 0%, Very Low 0%  Average glucose 318mg/dL. GMI 10.9%.  Sensor usage  98%  Glucose levels trend down after 12am, but significant glucose excursions starting with breakfast meal and through the day.    Next appt: 12/19/2024 Dr. Boothe    Diabetes Pharmacotherapy:  Lantus 17 units in the morning  Glimepiride 4mg BID--bkfst and lunch  Januvia 100mg dialy  Jardainc 25mg daily  Metformin 500mg 2 tabs BID    Dr. Boothe,     Please authorize the Diabetes Clinic at [facility] to teach/ assist Garrick to increase his morning Lantus to 19 units.   Also. Please consider GLP1 therapy for this gentleman for meal clearance and facilitate healthy weight. He is receptive to this approach.  Lastly, if Mounjaro approved, please authorize stopping the Januvia therapy when his current supply is exhausted.     If you agree, please note medication change and consider pended script for Mounjaro 2.5mg weekly. Thank you.

## 2024-11-22 RX ORDER — TIRZEPATIDE 2.5 MG/.5ML
2.5 INJECTION, SOLUTION SUBCUTANEOUS WEEKLY
Qty: 2 ML | Refills: 1 | Status: SHIPPED | OUTPATIENT
Start: 2024-11-22

## 2024-11-27 ENCOUNTER — TELEPHONE (OUTPATIENT)
Dept: INTERNAL MEDICINE CLINIC | Age: 54
End: 2024-11-27

## 2024-11-27 DIAGNOSIS — E11.65 TYPE 2 DIABETES MELLITUS WITH HYPERGLYCEMIA, WITH LONG-TERM CURRENT USE OF INSULIN (HCC): Primary | ICD-10-CM

## 2024-11-27 DIAGNOSIS — Z79.4 TYPE 2 DIABETES MELLITUS WITH HYPERGLYCEMIA, WITH LONG-TERM CURRENT USE OF INSULIN (HCC): Primary | ICD-10-CM

## 2024-11-27 RX ORDER — ACYCLOVIR 400 MG/1
TABLET ORAL
Qty: 1 EACH | Refills: 0 | Status: SHIPPED | OUTPATIENT
Start: 2024-11-27

## 2024-11-27 RX ORDER — ACYCLOVIR 400 MG/1
TABLET ORAL
Qty: 3 EACH | Refills: 3 | Status: SHIPPED | OUTPATIENT
Start: 2024-11-27

## 2024-11-27 NOTE — TELEPHONE ENCOUNTER
Garrick instructed to begin taking Mounjaro 2.5mg weekly on the day of his choice. Reviewed administration of the Mounajro. Continue taking the remaining Januvia tablets that you already have, but do not get any more refills. This medication is being stopped with the start of Mounjaro.  Also, his Lissette 3 plus sensors are on back order. He agrees to call around to other pharmacies to see if there is availability to transfer script. If none available, will reach out the the DM clinic on Monday for other options.   Garrick voiced understanding of above instructions via teach back

## 2024-11-27 NOTE — TELEPHONE ENCOUNTER
Prescriptions sent to pharmacy.    For Pharmacy Admin Tracking Only    Program: Medical Group  CPA in place:  Yes  Recommendation Provided To: Other: 2  Intervention Detail: New Rx: 2, reason: Cost/Formulary Change  Intervention Accepted By: Other: 2  Gap Closed?: Yes   Time Spent (min): 5

## 2024-11-27 NOTE — TELEPHONE ENCOUNTER
Lissette 3 and 3+ sensors are back ordered at several pharmacies.  Requesting script for Dexcom G7 sensors and .    EDISON/ Dr. Boothe

## 2024-12-02 DIAGNOSIS — M51.369 BULGE OF LUMBAR DISC WITHOUT MYELOPATHY: ICD-10-CM

## 2024-12-02 DIAGNOSIS — G89.4 CHRONIC PAIN SYNDROME: ICD-10-CM

## 2024-12-02 DIAGNOSIS — M54.50 CHRONIC BILATERAL LOW BACK PAIN WITHOUT SCIATICA: ICD-10-CM

## 2024-12-02 DIAGNOSIS — M47.816 SPONDYLOSIS OF LUMBAR REGION WITHOUT MYELOPATHY OR RADICULOPATHY: ICD-10-CM

## 2024-12-02 DIAGNOSIS — G89.29 CHRONIC BILATERAL LOW BACK PAIN WITHOUT SCIATICA: ICD-10-CM

## 2024-12-02 NOTE — TELEPHONE ENCOUNTER
Po Victor called requesting a refill on the following medications:  Requested Prescriptions     Pending Prescriptions Disp Refills    traMADol (ULTRAM) 50 MG tablet 90 tablet 0     Sig: Take 1 tablet by mouth every 8 hours as needed for Pain for up to 30 days. Take lowest dose possible to manage pain Max Daily Amount: 150 mg     Pharmacy verified:[facility]   .pv      Date of last visit: 10/7/24   Date of next visit (if applicable): 12/9/2024

## 2024-12-03 RX ORDER — TRAMADOL HYDROCHLORIDE 50 MG/1
50 TABLET ORAL EVERY 8 HOURS PRN
Qty: 90 TABLET | Refills: 0 | Status: SHIPPED | OUTPATIENT
Start: 2024-12-05 | End: 2025-01-04

## 2024-12-03 NOTE — TELEPHONE ENCOUNTER
OARRS reviewed. UDS: + for  citalopram,buspirone,venlafaxine,cyclobenzaprine,tramadol.   Last seen: 10/7/2024. Follow-up: 12/9/2024

## 2024-12-09 ENCOUNTER — OFFICE VISIT (OUTPATIENT)
Dept: PHYSICAL MEDICINE AND REHAB | Age: 54
End: 2024-12-09
Payer: COMMERCIAL

## 2024-12-09 VITALS
BODY MASS INDEX: 30.96 KG/M2 | SYSTOLIC BLOOD PRESSURE: 120 MMHG | DIASTOLIC BLOOD PRESSURE: 80 MMHG | HEIGHT: 69 IN | WEIGHT: 209 LBS

## 2024-12-09 DIAGNOSIS — M47.816 SPONDYLOSIS OF LUMBAR REGION WITHOUT MYELOPATHY OR RADICULOPATHY: Primary | ICD-10-CM

## 2024-12-09 DIAGNOSIS — M54.50 CHRONIC BILATERAL LOW BACK PAIN WITHOUT SCIATICA: ICD-10-CM

## 2024-12-09 DIAGNOSIS — M51.369 BULGE OF LUMBAR DISC WITHOUT MYELOPATHY: ICD-10-CM

## 2024-12-09 DIAGNOSIS — G89.4 CHRONIC PAIN SYNDROME: ICD-10-CM

## 2024-12-09 DIAGNOSIS — M53.3 SI (SACROILIAC) PAIN: ICD-10-CM

## 2024-12-09 DIAGNOSIS — G89.29 CHRONIC BILATERAL LOW BACK PAIN WITHOUT SCIATICA: ICD-10-CM

## 2024-12-09 DIAGNOSIS — F11.90 CHRONIC, CONTINUOUS USE OF OPIOIDS: ICD-10-CM

## 2024-12-09 PROCEDURE — 99214 OFFICE O/P EST MOD 30 MIN: CPT | Performed by: NURSE PRACTITIONER

## 2024-12-09 ASSESSMENT — ENCOUNTER SYMPTOMS
EYES NEGATIVE: 1
BACK PAIN: 1
ABDOMINAL DISTENTION: 0
ABDOMINAL PAIN: 0
RESPIRATORY NEGATIVE: 1
GASTROINTESTINAL NEGATIVE: 1
CONSTIPATION: 0

## 2024-12-09 NOTE — PROGRESS NOTES
[facility] [place] [facility]  [facility]  [address]  [place] [address]  Dept: [phone]  Dept Fax: [phone]  Loc: [phone]    Visit Date: 12/9/2024    Functionality Assessment/Goals Worksheet     On a scale of 0 (Does not Interfere) to 10 (Completely Interferes)     1.  Which number describes how during the past week pain has interfered with       the following:  A.  General Activity:  5  B.  Mood: 6  C.  Walking Ability:  6  D.  Normal Work (Includes both work outside the home and housework):  4  E.  Relations with Other People:   7  F.  Sleep:   6  G.  Enjoyment of Life:   5    2.  Patient Prefers to Take their Pain Medications:     []  On a regular basis   []  Only when necessary    []  Does not take pain medications    3.  What are the Patient's Goals/Expectations for Visiting Pain Management?     [x]  Learn about my pain    [x]  Receive Medication   [x]  Physical Therapy     []  Treat Depression   [x]  Receive Injections    []  Treat Sleep   []  Deal with Anxiety and Stress   []  Treat Opoid Dependence/Addiction   []  Other:        HPI:   Po Victor is a 54 y.o. male is here today for    Chief Complaint: Low back pain, SI pain     HPI   2 month follow up. Garrick continues to have pain in his low back mainly left side into left SI area- aching pain for the most part and sometimes sharp and burning depending on his activity.     He states he continues to receive good relief from his bilateral L-facet RFA.   Denies any radicular pain. Does gets some spasms in left lower extremity at night. States flexeril prn helps this.   Continues Ultram prn which continues to help.   Feels that pain remains controlled with medications and procedures. States he is elisabeth to remain active.     Pain increases with bending, lifting, twisting , laying, and housework or working at job, cold weather       Prior Injections:  bilateral

## 2024-12-11 ENCOUNTER — TELEPHONE (OUTPATIENT)
Dept: INTERNAL MEDICINE CLINIC | Age: 54
End: 2024-12-11

## 2024-12-12 RX ORDER — GLIMEPIRIDE 4 MG/1
4 TABLET ORAL 2 TIMES DAILY
Qty: 60 TABLET | Refills: 5 | Status: SHIPPED | OUTPATIENT
Start: 2024-12-12

## 2024-12-19 ENCOUNTER — OFFICE VISIT (OUTPATIENT)
Age: 54
End: 2024-12-19

## 2024-12-19 VITALS
DIASTOLIC BLOOD PRESSURE: 74 MMHG | RESPIRATION RATE: 16 BRPM | SYSTOLIC BLOOD PRESSURE: 120 MMHG | WEIGHT: 209 LBS | HEIGHT: 69 IN | BODY MASS INDEX: 30.96 KG/M2 | HEART RATE: 96 BPM

## 2024-12-19 DIAGNOSIS — E11.65 TYPE 2 DIABETES MELLITUS WITH HYPERGLYCEMIA, WITHOUT LONG-TERM CURRENT USE OF INSULIN (HCC): Primary | ICD-10-CM

## 2024-12-19 DIAGNOSIS — E11.65 TYPE 2 DIABETES MELLITUS WITH HYPERGLYCEMIA, WITHOUT LONG-TERM CURRENT USE OF INSULIN (HCC): ICD-10-CM

## 2024-12-19 RX ORDER — INSULIN ASPART 100 [IU]/ML
INJECTION, SOLUTION INTRAVENOUS; SUBCUTANEOUS
Refills: 0 | OUTPATIENT
Start: 2024-12-19

## 2024-12-19 RX ORDER — INSULIN LISPRO 100 [IU]/ML
INJECTION, SOLUTION INTRAVENOUS; SUBCUTANEOUS
Qty: 5 ADJUSTABLE DOSE PRE-FILLED PEN SYRINGE | Refills: 3 | Status: SHIPPED | OUTPATIENT
Start: 2024-12-19

## 2024-12-19 NOTE — PROGRESS NOTES
CGMSINTERPRETATION    CGMS interpretation:  The patient is checking blood sugars 4 times a day using Dexcom continuous glucose monitoring system.  His download includes data from 12/6/2024 through 12/19/2024 and is sufficient for interpretation.  The CGM was active 95.2% of the time.  Average blood glucose is 310 mg/dL with a coefficient of variation of 26.7% and glucose management indicator of 10.7%  The patient was within the target range 10 percent of the time.  Hypoglycemia:  Blood glucose range between 54 mg/dL and 70mg/dL: 0%  Blood glucose is below 54 mg/dL: 0%  Hyperglycemia:  Blood glucoses between 181 and 250 mg/dL: 17%  Blood glucoses above 250 mg/dL: 73%  In summary, this patient is having hyperglycemia all day, with postprandial exacerbation  Recommendations: Please refer to the assessment and plan section

## 2024-12-19 NOTE — PROGRESS NOTES
Take LANTUS 20 units daily    Take mealtime HUMALOG as follows:  Breakfast: 6 units  Lunch: 6 units  Dinner: 6 units    Plus sliding scale HUMALOG as follows:  Below 70, treat for hypoglycemia  , no additional insulin  151-200, 1 units  201-250, 2 units  251-300, 3 units  301-350, 4 units  351-400, 5 units  Above 400, call MD

## 2024-12-19 NOTE — PROGRESS NOTES
[facility] [place] [facility]  [facility]  [address]  [place] [address]  Dept: [phone]  Loc: [phone]     Visit Date: 12/19/2024  The patient (or guardian, if applicable) and other individuals in attendance with the patient were advised that Artificial Intelligence will be utilized during this visit to record, process the conversation to generate a clinical note, and support improvement of the AI technology. The patient (or guardian, if applicable) and other individuals in attendance at the appointment consented to the use of AI, including the recording.      Po Victor is a 54 y.o. male who presents today for:  Chief Complaint   Patient presents with    Follow-up     T2DM            Subjective:      HPI   History of Present Illness  The patient presents for evaluation of diabetes.    She has been on Mounjaro 2.5 mg for approximately 4 weeks, with no reported gastrointestinal disturbances. He reports experiencing urinary frequency and blurry vision. He consulted an ophthalmologist in 11/2024, who prescribed bifocals. There were no retinal abnormalities detected. He reports no foot-related issues. He has experienced weight loss since the initiation of her treatment regimen, including Mounjaro, and reports a decreased appetite. Current medication regimen includes metformin 500 mg twice daily, glimepiride 2 mg twice daily, Jardiance 25 mg daily, Mounjaro 2.5 mg, and Lantus 19 units. Hhe has made dietary modifications, limiting her carbohydrate, and is mindful of portion sizes. He has consulted with a dietitian and engages in daily physical activity, specifically walking for 30 minutes      Past Medical History:   Diagnosis Date    Chronic back pain     GERD (gastroesophageal reflux disease)     History of kidney stones     Hx of blood clots early 2000's & 2013    MUNA LUNGS    Kidney stone     Lumbar spondylosis       Past Surgical History:

## 2024-12-28 ENCOUNTER — CLINICAL DOCUMENTATION (OUTPATIENT)
Age: 54
End: 2024-12-28

## 2024-12-31 DIAGNOSIS — M54.50 CHRONIC BILATERAL LOW BACK PAIN WITHOUT SCIATICA: ICD-10-CM

## 2024-12-31 DIAGNOSIS — G89.29 CHRONIC BILATERAL LOW BACK PAIN WITHOUT SCIATICA: ICD-10-CM

## 2024-12-31 DIAGNOSIS — M47.816 SPONDYLOSIS OF LUMBAR REGION WITHOUT MYELOPATHY OR RADICULOPATHY: ICD-10-CM

## 2024-12-31 DIAGNOSIS — M51.369 BULGE OF LUMBAR DISC WITHOUT MYELOPATHY: ICD-10-CM

## 2024-12-31 DIAGNOSIS — G89.4 CHRONIC PAIN SYNDROME: ICD-10-CM

## 2024-12-31 RX ORDER — TRAMADOL HYDROCHLORIDE 50 MG/1
50 TABLET ORAL EVERY 8 HOURS PRN
Qty: 90 TABLET | Refills: 0 | Status: SHIPPED | OUTPATIENT
Start: 2025-01-03 | End: 2025-01-29 | Stop reason: SDUPTHER

## 2024-12-31 NOTE — TELEPHONE ENCOUNTER
Po Victor called requesting a refill on the following medications:  Requested Prescriptions     Pending Prescriptions Disp Refills    traMADol (ULTRAM) 50 MG tablet 90 tablet 0     Sig: Take 1 tablet by mouth every 8 hours as needed for Pain for up to 30 days. Take lowest dose possible to manage pain Max Daily Amount: 150 mg     Pharmacy verified:[facility], [phone]  .pv      Date of last visit: 12.09.2024  Date of next visit (if applicable): 02.10.2025

## 2025-01-03 ENCOUNTER — CLINICAL DOCUMENTATION (OUTPATIENT)
Age: 55
End: 2025-01-03

## 2025-01-03 ENCOUNTER — TELEPHONE (OUTPATIENT)
Age: 55
End: 2025-01-03

## 2025-01-03 DIAGNOSIS — E11.65 TYPE 2 DIABETES MELLITUS WITH HYPERGLYCEMIA, WITHOUT LONG-TERM CURRENT USE OF INSULIN (HCC): Primary | ICD-10-CM

## 2025-01-03 RX ORDER — ATORVASTATIN CALCIUM 20 MG/1
20 TABLET, FILM COATED ORAL DAILY
Qty: 30 TABLET | Refills: 3 | Status: SHIPPED | OUTPATIENT
Start: 2025-01-03

## 2025-01-03 NOTE — TELEPHONE ENCOUNTER
----- Message from Dr. Vikash Boothe MD sent at 12/28/2024  6:42 PM EST -----  Cholesterol level is abnormal.  Get this patient for me to discuss treatment.   Pt is awake, alert, lying in bed in NAD. Still with fevers. To F/U Cultures. ID consulted.     INTERVAL HPI/OVERNIGHT EVENTS:    VITAL SIGNS:  T(F): 99.6 (20 @ 10:38)  HR: 94 (20 @ 06:02)  BP: 127/64 (20 @ 06:02)  RR: 16 (20 @ 06:02)  SpO2: 97% (20 @ 06:02)  Wt(kg): --  I&O's Detail    REVIEW OF SYSTEMS:    CONSTITUTIONAL:  No fevers, chills, sweats    HEENT:  Eyes:  No diplopia or blurred vision. ENT:  No earache, sore throat or runny nose.    CARDIOVASCULAR:  No pressure, squeezing, tightness, or heaviness about the chest; no palpitations.    RESPIRATORY:  Per HPI    GASTROINTESTINAL:  No abdominal pain, nausea, vomiting or diarrhea.    GENITOURINARY:  No dysuria, frequency or urgency.    NEUROLOGIC:  No paresthesias, fasciculations, seizures or weakness.    PSYCHIATRIC:  No disorder of thought or mood.      PHYSICAL EXAM:    Constitutional: Well developed and nourished  Eyes:Perrla  ENMT: normal  Neck:supple  Respiratory: good air entry  Cardiovascular: S1 S2 regular  Gastrointestinal: Soft, Non tender; Confused.   Extremities: No edema  Vascular:normal  Neurological:Awake, alert   Musculoskeletal:Normal      MEDICATIONS  (STANDING):  ascorbic acid 500 milliGRAM(s) Oral two times a day  chlorhexidine 2% Cloths 1 Application(s) Topical daily  collagenase Ointment 1 Application(s) Topical daily  dextrose 5% + sodium chloride 0.9%. 1000 milliLiter(s) (50 mL/Hr) IV Continuous <Continuous>  dextrose 5% + sodium chloride 0.9%. 1000 milliLiter(s) (50 mL/Hr) IV Continuous <Continuous>  dextrose 5%. 1000 milliLiter(s) (50 mL/Hr) IV Continuous <Continuous>  dextrose 50% Injectable 12.5 Gram(s) IV Push once  dextrose 50% Injectable 25 Gram(s) IV Push once  dextrose 50% Injectable 25 Gram(s) IV Push once  ergocalciferol 35313 Unit(s) Oral <User Schedule>  heparin   Injectable 5000 Unit(s) SubCutaneous every 12 hours  insulin glargine Injectable (LANTUS) 18 Unit(s) SubCutaneous at bedtime  insulin lispro (HumaLOG) corrective regimen sliding scale   SubCutaneous every 6 hours  insulin lispro Injectable (HumaLOG) 14 Unit(s) SubCutaneous every 6 hours  mirtazapine 7.5 milliGRAM(s) Oral at bedtime  multivitamin/minerals 1 Tablet(s) Oral daily  OLANZapine 2.5 milliGRAM(s) Oral at bedtime  pantoprazole   Suspension 40 milliGRAM(s) Enteral Tube daily  QUEtiapine 25 milliGRAM(s) Oral at bedtime  zinc sulfate 220 milliGRAM(s) Oral daily    MEDICATIONS  (PRN):  acetaminophen    Suspension .. 650 milliGRAM(s) Enteral Tube every 6 hours PRN Temp greater or equal to 38C (100.4F)  ALBUTerol    90 MICROgram(s) HFA Inhaler 2 Puff(s) Inhalation every 6 hours PRN Shortness of Breath and/or Wheezing  dextrose 40% Gel 15 Gram(s) Oral once PRN Blood Glucose LESS THAN 70 milliGRAM(s)/deciliter  glucagon  Injectable 1 milliGRAM(s) IntraMuscular once PRN Glucose LESS THAN 70 milligrams/deciliter  guaiFENesin   Syrup  (Sugar-Free) 100 milliGRAM(s) Oral every 6 hours PRN Cough      Allergies    No Known Allergies    Intolerances        LABS:                        11.2   10.24 )-----------( 298      ( 2020 06:58 )             33.0     06-12    135  |  98  |  37<H>  ----------------------------<  231<H>  4.0   |  26  |  0.83    Ca    9.1      2020 06:58        Urinalysis Basic - ( 2020 21:34 )    Color: Yellow / Appearance: Clear / S.020 / pH: x  Gluc: x / Ketone: Negative  / Bili: Negative / Urobili: Negative   Blood: x / Protein: 30 mg/dL / Nitrite: Negative   Leuk Esterase: Negative / RBC: 0-2 /HPF / WBC 3-5 /HPF   Sq Epi: x / Non Sq Epi: Occasional /HPF / Bacteria: Trace /HPF            CAPILLARY BLOOD GLUCOSE      POCT Blood Glucose.: 253 mg/dL (2020 05:54)  POCT Blood Glucose.: 291 mg/dL (2020 00:00)  POCT Blood Glucose.: 234 mg/dL (2020 17:10)  POCT Blood Glucose.: 168 mg/dL (2020 13:16)  POCT Blood Glucose.: 161 mg/dL (2020 11:29)        RADIOLOGY & ADDITIONAL TESTS:    CXR:  < from: Xray Chest 1 View- PORTABLE-Routine (20 @ 17:02) >  IMPRESSION: Opacification of the left lower lung field suggestive of atelectasis, effusion, and/or pneumonia      < end of copied text >    Ct scan chest:    ekg;    echo:

## 2025-01-08 ENCOUNTER — OFFICE VISIT (OUTPATIENT)
Dept: INTERNAL MEDICINE CLINIC | Age: 55
End: 2025-01-08
Payer: COMMERCIAL

## 2025-01-08 VITALS — WEIGHT: 207.6 LBS | BODY MASS INDEX: 30.75 KG/M2 | HEIGHT: 69 IN

## 2025-01-08 DIAGNOSIS — E11.65 TYPE 2 DIABETES MELLITUS WITH HYPERGLYCEMIA, UNSPECIFIED WHETHER LONG TERM INSULIN USE (HCC): Primary | ICD-10-CM

## 2025-01-08 PROCEDURE — NBSRV NON-BILLABLE SERVICE: Performed by: DIETITIAN, REGISTERED

## 2025-01-08 PROCEDURE — 97803 MED NUTRITION INDIV SUBSEQ: CPT | Performed by: DIETITIAN, REGISTERED

## 2025-01-08 RX ORDER — TIRZEPATIDE 5 MG/.5ML
5 INJECTION, SOLUTION SUBCUTANEOUS WEEKLY
Qty: 2 ML | Refills: 1 | Status: SHIPPED | OUTPATIENT
Start: 2025-01-08

## 2025-01-08 NOTE — PATIENT INSTRUCTIONS
Try lower fat healthier frozen meals to alternate with your Brkf sandwich - Healthy choice, Lean Cuisine, [employer] brand, [employer].  - <600 mg sodium/meal.    Try natural pbutter - stirring the oil in your pbutter. And peanuts and sometimes salt are the only ingredients.    Your meal plan:  - 45 gms carbs/meal + add protein + add non-starchy veggies.  - Snacks: 15-20 gms carbs + 1 protein.    Lantus 24 units Every Morning.  Mounjaro will increase to 5 mg/weekly.  Wait on the humalog for now.  Ph# [phone] - Debora PharmD    Good job drinking water.   - Drink extra water marzena with BS's 250 or higher.  Also do some light activity - walk etc.    Consider going to the gym 2x/week.  - get in other spontaneous walks each day.

## 2025-01-08 NOTE — PROGRESS NOTES
[facility] Professional Services  [facility]  [address] [place] [address]  [phone] (phone)  [phone] (fax)    Patient Name: Po Victor. Date of Birth: [birth year]. MRN: [id number]      Assessment: Patient is a 54 y.o. male seen for follow-up MNT visit for Type 2 DB.     -Nutritionally relevant labs:   Lab Results   Component Value Date/Time    LABA1C 13.8 (H) 08/13/2024 10:23 AM    LABA1C 9.5 (H) 05/05/2023 01:07 PM    LABA1C 9.4 (H) 02/03/2023 08:45 AM    LABA1C 8.5 (H) 10/19/2022 09:30 AM    GLUCOSE 166 (H) 09/06/2024 08:15 AM    GLUCOSE 403 (H) 09/01/2023 10:24 AM    GLUCOSE 123 (H) 10/19/2022 09:30 AM    CHOL 237 (H) 10/19/2022 09:30 AM    CHOL 258 07/07/2018 12:00 AM    HDL 31 (L) 10/19/2022 09:30 AM    TRIG 214 (H) 10/19/2022 09:30 AM          Latest Ref Rng & Units 9/6/2024     8:15 AM 9/1/2023    10:24 AM 10/19/2022     9:30 AM 6/24/2022     9:39 AM 9/8/2021     8:38 AM   Labs Renal   BUN 6 - 20 mg/dL 9  7  14  8  12    Cr 0.80 - 1.40 mg/dL 0.71  0.6  0.75  0.6  1.11    K 3.5 - 5.4 meq/L 4.1  4.2  5.0  3.9  4.1    Na 133 - 146 meq/L 141  136  143  134  145      11/20/24- RN - CDE Phone Enc.  Garrick instructed to begin taking Mounjaro 2.5mg weekly on the day of his choice. Reviewed administration of the Mounajro. Continue taking the remaining Januvia tablets that you already have, but do not get any more refills. This medication is being stopped with the start of Mounjaro.  Also, his Lissette 3 plus sensors are on back order. He agrees to call around to other pharmacies to see if there is availability to transfer script. If none available, will reach out the the DM clinic on Monday for other options.   Garrick voiced understanding of above instructions via teach back    11/12/24 - RD f/u visit:  Try Healthy Choice frozen meals for a quick lunch at work or other similar brand.     Make sure Added sugars, on the nutrition facts label, are less than 8 gms

## 2025-01-08 NOTE — PROGRESS NOTES
Reviewed download.     Current Regimen: Lantus 21 units QAM          Mounjaro 2.5mg weekly          Metformin 500mg BID            Glimepiride 2mg BID          Jardiance 25mg daily          Humalog prescribed at last visit, but not started due to formulary issues      Plan:   -Increase Lantus to 24 units  -Increase Mounjaro to 5mg weekly *if patient unable to fill right away, he agrees to call so that we can further increase insulin  -Follow-up in 2 weeks     For Pharmacy Admin Tracking Only    Program: Medical Group  CPA in place:  Yes  Recommendation Provided To: Patient/Caregiver: 2 via In person  Intervention Detail: Dose Adjustment: 2, reason: Therapy Optimization  Intervention Accepted By: Patient/Caregiver: 2  Gap Closed?: No   Time Spent (min): 20        Debora Nelson, PharmD, BCPS, [facility]  Internal Medicine Clinical Pharmacist  [phone]

## 2025-01-16 NOTE — TELEPHONE ENCOUNTER
Po Victor called requesting a refill on the following medications:  Requested Prescriptions     Pending Prescriptions Disp Refills    cyclobenzaprine (FLEXERIL) 10 MG tablet 270 tablet 0     Sig: Take 1 tablet by mouth 3 times daily as needed for Muscle spasms     Pharmacy verified:[facility]  .pv      Date of last visit:12/9/24   Date of next visit (if applicable): 2/10/2025

## 2025-01-17 RX ORDER — CYCLOBENZAPRINE HCL 10 MG
10 TABLET ORAL 3 TIMES DAILY PRN
Qty: 270 TABLET | Refills: 0 | OUTPATIENT
Start: 2025-01-17 | End: 2025-04-17

## 2025-01-17 NOTE — TELEPHONE ENCOUNTER
OARRS reviewed. UDS: + for  Citalopram/Escitalopram, Buspirone, Venlafaxine, Cyclobenzaprine, Tramadol.   Last seen: 12/9/2024. Follow-up: 2/10/2025

## 2025-01-20 ENCOUNTER — TELEPHONE (OUTPATIENT)
Dept: INTERNAL MEDICINE CLINIC | Age: 55
End: 2025-01-20

## 2025-01-20 DIAGNOSIS — E11.65 TYPE 2 DIABETES MELLITUS WITH HYPERGLYCEMIA, WITH LONG-TERM CURRENT USE OF INSULIN (HCC): Primary | ICD-10-CM

## 2025-01-20 DIAGNOSIS — Z79.4 TYPE 2 DIABETES MELLITUS WITH HYPERGLYCEMIA, WITH LONG-TERM CURRENT USE OF INSULIN (HCC): Primary | ICD-10-CM

## 2025-01-20 RX ORDER — INSULIN GLARGINE 100 [IU]/ML
24 INJECTION, SOLUTION SUBCUTANEOUS NIGHTLY
Qty: 15 ML | Refills: 3 | Status: SHIPPED | OUTPATIENT
Start: 2025-01-20 | End: 2025-01-23 | Stop reason: SDUPTHER

## 2025-01-20 NOTE — TELEPHONE ENCOUNTER
Refill per CPA.     For Pharmacy Admin Tracking Only    Program: Medical Group  CPA in place:  Yes  Recommendation Provided To: Patient/Caregiver: 1 via Telephone  Intervention Detail: Refill(s) Provided  Intervention Accepted By: Patient/Caregiver: 1  Gap Closed?: No   Time Spent (min): 10        Debora Nelson PharmD, BCPS, [facility]  Internal Medicine Clinical Pharmacist  [phone]

## 2025-01-20 NOTE — TELEPHONE ENCOUNTER
Patient called in and stated that he is out of his Lantus and is unable to get it filled until 1/31/2025.  He stated that he did not realize that he was going to run out early but it dose was increased.  Please send script for patient's Lantus.  Thank you.

## 2025-01-23 ENCOUNTER — OFFICE VISIT (OUTPATIENT)
Dept: INTERNAL MEDICINE CLINIC | Age: 55
End: 2025-01-23

## 2025-01-23 VITALS
BODY MASS INDEX: 29.71 KG/M2 | TEMPERATURE: 98.6 F | HEART RATE: 99 BPM | DIASTOLIC BLOOD PRESSURE: 81 MMHG | SYSTOLIC BLOOD PRESSURE: 137 MMHG | WEIGHT: 201.2 LBS

## 2025-01-23 DIAGNOSIS — Z79.4 TYPE 2 DIABETES MELLITUS WITH HYPERGLYCEMIA, WITH LONG-TERM CURRENT USE OF INSULIN (HCC): ICD-10-CM

## 2025-01-23 DIAGNOSIS — E11.65 TYPE 2 DIABETES MELLITUS WITH HYPERGLYCEMIA, WITH LONG-TERM CURRENT USE OF INSULIN (HCC): ICD-10-CM

## 2025-01-23 RX ORDER — INSULIN GLARGINE 100 [IU]/ML
24 INJECTION, SOLUTION SUBCUTANEOUS NIGHTLY
Qty: 15 ML | Refills: 3 | Status: SHIPPED | OUTPATIENT
Start: 2025-01-23

## 2025-01-23 NOTE — PATIENT INSTRUCTIONS
Increase Mounjaro to 7.5mg weekly with your next fill        When you increase Mounjaro, you can decrease your Lantus to 22 units           -If you notice more than 2-3 lows per week, you can decrease Lantus further to 20 units    -Call with questions or if you start to notice more side effects    2. Get your urine protein screen done with your labs next month    3. Keep up the great work with your diet changes - this has made a big difference in your blood sugars - your A1c is on track for a 7.7%!

## 2025-01-23 NOTE — PROGRESS NOTES
questions or if you start to notice more side effects    2. Get your urine protein screen done with your labs next month    3. Keep up the great work with your diet changes - this has made a big difference in your blood sugars - your A1c is on track for a 7.7%!         Goals Addressed                   This Visit's Progress     3 meals per day; 45-60 grams carb per meal; 15-20 grams carb for snack   On track     Blood Pressure < 130/80   137/81             Meter download, medications, PMH and nursing assessment reviewed.  Po VITALE Kayleigh states He is willing to participate in this plan of care and verbalized understanding of all instructions provided. Teach back used to verify comprehension.      Follow-up: 1 month  DM Clinic     Total time involved in direct patient education: 30 minutes.   Individual Education appointment justified due to: Class Availability    For Pharmacy Admin Tracking Only    Program: Medical Group  CPA in place:  Yes  Recommendation Provided To: Patient/Caregiver: 3 via In person  Intervention Detail: Dose Adjustment: 2, reason: Therapy Optimization and Lab(s) Ordered  Intervention Accepted By: Patient/Caregiver: 3  Gap Closed?: No   Time Spent (min): 45        Debora Nelson, PharmD, BCPS, [facility]  Internal Medicine Clinical Pharmacist  [phone]

## 2025-01-27 RX ORDER — CYCLOBENZAPRINE HCL 10 MG
10 TABLET ORAL 3 TIMES DAILY PRN
Qty: 270 TABLET | Refills: 0 | Status: SHIPPED | OUTPATIENT
Start: 2025-01-27 | End: 2025-04-27

## 2025-01-27 NOTE — TELEPHONE ENCOUNTER
Po Victor called requesting a refill on the following medications:  Requested Prescriptions     Pending Prescriptions Disp Refills    cyclobenzaprine (FLEXERIL) 10 MG tablet 270 tablet 0     Sig: Take 1 tablet by mouth 3 times daily as needed for Muscle spasms     [facility]  .      Date of last visit: 12/9/2024  Date of next visit (if applicable): 2/10/2025

## 2025-01-29 DIAGNOSIS — G89.29 CHRONIC BILATERAL LOW BACK PAIN WITHOUT SCIATICA: ICD-10-CM

## 2025-01-29 DIAGNOSIS — M47.816 SPONDYLOSIS OF LUMBAR REGION WITHOUT MYELOPATHY OR RADICULOPATHY: ICD-10-CM

## 2025-01-29 DIAGNOSIS — M51.369 BULGE OF LUMBAR DISC WITHOUT MYELOPATHY: ICD-10-CM

## 2025-01-29 DIAGNOSIS — G89.4 CHRONIC PAIN SYNDROME: ICD-10-CM

## 2025-01-29 DIAGNOSIS — M54.50 CHRONIC BILATERAL LOW BACK PAIN WITHOUT SCIATICA: ICD-10-CM

## 2025-01-29 RX ORDER — TRAMADOL HYDROCHLORIDE 50 MG/1
50 TABLET ORAL EVERY 8 HOURS PRN
Qty: 90 TABLET | Refills: 0 | Status: SHIPPED | OUTPATIENT
Start: 2025-02-01 | End: 2025-03-03

## 2025-01-29 NOTE — TELEPHONE ENCOUNTER
OARRS reviewed. UDS: + for  citalopram,buspirone,venlafaxine,cyclobenzaprine,tramadol.   Last seen: 12/9/2024. Follow-up: 2/10/2025

## 2025-01-29 NOTE — TELEPHONE ENCOUNTER
Po Victor called requesting a refill on the following medications:  Requested Prescriptions     Pending Prescriptions Disp Refills    traMADol (ULTRAM) 50 MG tablet 90 tablet 0     Sig: Take 1 tablet by mouth every 8 hours as needed for Pain for up to 30 days. Take lowest dose possible to manage pain Max Daily Amount: 150 mg     Pharmacy verified:  .[email or web address]  [facility] #[id number] - [place], [place] - [address] - P [phone] - F [phone]     Date of last visit: 12/09/2024  Date of next visit (if applicable): 2/10/2025

## 2025-02-10 ENCOUNTER — OFFICE VISIT (OUTPATIENT)
Dept: PHYSICAL MEDICINE AND REHAB | Age: 55
End: 2025-02-10
Payer: COMMERCIAL

## 2025-02-10 VITALS
DIASTOLIC BLOOD PRESSURE: 64 MMHG | SYSTOLIC BLOOD PRESSURE: 120 MMHG | WEIGHT: 201.28 LBS | BODY MASS INDEX: 29.81 KG/M2 | HEIGHT: 69 IN

## 2025-02-10 DIAGNOSIS — M47.816 SPONDYLOSIS OF LUMBAR REGION WITHOUT MYELOPATHY OR RADICULOPATHY: Primary | ICD-10-CM

## 2025-02-10 DIAGNOSIS — F11.90 CHRONIC, CONTINUOUS USE OF OPIOIDS: ICD-10-CM

## 2025-02-10 DIAGNOSIS — G89.29 CHRONIC BILATERAL LOW BACK PAIN WITHOUT SCIATICA: ICD-10-CM

## 2025-02-10 DIAGNOSIS — M51.369 BULGE OF LUMBAR DISC WITHOUT MYELOPATHY: ICD-10-CM

## 2025-02-10 DIAGNOSIS — G89.4 CHRONIC PAIN SYNDROME: ICD-10-CM

## 2025-02-10 DIAGNOSIS — M53.3 SI (SACROILIAC) PAIN: ICD-10-CM

## 2025-02-10 DIAGNOSIS — M54.50 CHRONIC BILATERAL LOW BACK PAIN WITHOUT SCIATICA: ICD-10-CM

## 2025-02-10 PROCEDURE — 99214 OFFICE O/P EST MOD 30 MIN: CPT | Performed by: NURSE PRACTITIONER

## 2025-02-10 ASSESSMENT — ENCOUNTER SYMPTOMS
GASTROINTESTINAL NEGATIVE: 1
ABDOMINAL DISTENTION: 0
RESPIRATORY NEGATIVE: 1
BACK PAIN: 1
ABDOMINAL PAIN: 0
CONSTIPATION: 0
EYES NEGATIVE: 1

## 2025-02-10 NOTE — PROGRESS NOTES
Continues to receive over 80% relief of low back pain from bilateral L-facet RFA. Discussed repeating this when needing to   Pain is tolerable with medications and procedures.   Discussed left therapeutic SI injection if needed in future   Continue Ultram 50 mg TID prn-  filled 2/1/2025  Continue flexeril 10 mg TID prn for muscle spasms- filled 1/27/2025   Is compliant.     Meds. Prescribed:   No orders of the defined types were placed in this encounter.      Return in about 2 months (around 4/10/2025), or if symptoms worsen or fail to improve, for follow up  for medications.               Electronically signed by WALT Knight CNP on2/10/2025 at 9:53 AM

## 2025-02-25 RX ORDER — EMPAGLIFLOZIN 25 MG/1
25 TABLET, FILM COATED ORAL EVERY MORNING
Qty: 90 TABLET | Refills: 1 | Status: SHIPPED | OUTPATIENT
Start: 2025-02-25

## 2025-02-28 DIAGNOSIS — E11.65 TYPE 2 DIABETES MELLITUS WITH HYPERGLYCEMIA, WITH LONG-TERM CURRENT USE OF INSULIN (HCC): ICD-10-CM

## 2025-02-28 DIAGNOSIS — Z79.4 TYPE 2 DIABETES MELLITUS WITH HYPERGLYCEMIA, WITH LONG-TERM CURRENT USE OF INSULIN (HCC): ICD-10-CM

## 2025-03-01 ENCOUNTER — CLINICAL DOCUMENTATION (OUTPATIENT)
Age: 55
End: 2025-03-01

## 2025-03-01 ENCOUNTER — HOSPITAL ENCOUNTER (OUTPATIENT)
Age: 55
Discharge: HOME | End: 2025-03-01
Payer: COMMERCIAL

## 2025-03-01 DIAGNOSIS — Z79.4 TYPE 2 DIABETES MELLITUS WITH HYPERGLYCEMIA, WITH LONG-TERM CURRENT USE OF INSULIN (HCC): ICD-10-CM

## 2025-03-01 DIAGNOSIS — E11.65 TYPE 2 DIABETES MELLITUS WITH HYPERGLYCEMIA, WITH LONG-TERM CURRENT USE OF INSULIN (HCC): ICD-10-CM

## 2025-03-01 DIAGNOSIS — R74.8 ELEVATED ALKALINE PHOSPHATASE LEVEL: Primary | ICD-10-CM

## 2025-03-01 DIAGNOSIS — E11.65 TYPE 2 DIABETES MELLITUS WITH HYPERGLYCEMIA, WITHOUT LONG-TERM CURRENT USE OF INSULIN (HCC): ICD-10-CM

## 2025-03-01 LAB
ALBUMIN SERPL BCG-MCNC: 4.4 G/DL (ref 3.4–4.9)
ALP SERPL-CCNC: 195 U/L (ref 40–129)
ALT SERPL W/O P-5'-P-CCNC: 41 U/L (ref 10–50)
AST SERPL-CCNC: 48 U/L (ref 10–50)
BILIRUB CONJ SERPL-MCNC: 0.2 MG/DL (ref 0–0.2)
BILIRUB SERPL-MCNC: 0.5 MG/DL (ref 0.3–1.2)
CHOLEST SERPL-MCNC: 123 MG/DL (ref 100–199)
CREAT UR-MCNC: 70.5 MG/DL
HDLC SERPL-MCNC: 34 MG/DL
LDLC SERPL CALC-MCNC: 70 MG/DL
MICROALBUMIN UR-MCNC: < 2 MG/DL
MICROALBUMIN/CREAT RATIO PNL UR: 28 MG/G (ref 0–30)
PROT SERPL-MCNC: 6.8 G/DL (ref 6.4–8.3)
TRIGL SERPL-MCNC: 96 MG/DL (ref 0–199)

## 2025-03-01 PROCEDURE — 80076 HEPATIC FUNCTION PANEL: CPT

## 2025-03-01 PROCEDURE — 82043 UR ALBUMIN QUANTITATIVE: CPT

## 2025-03-01 PROCEDURE — 80061 LIPID PANEL: CPT

## 2025-03-01 PROCEDURE — 36415 COLL VENOUS BLD VENIPUNCTURE: CPT

## 2025-03-01 NOTE — RESULT ENCOUNTER NOTE
Po Victor  lab test(s) were abnormal.  Obtain alkaline phosphatase isoenzyme level  Please contact patient and document response.

## 2025-03-03 DIAGNOSIS — G89.4 CHRONIC PAIN SYNDROME: ICD-10-CM

## 2025-03-03 DIAGNOSIS — M47.816 SPONDYLOSIS OF LUMBAR REGION WITHOUT MYELOPATHY OR RADICULOPATHY: ICD-10-CM

## 2025-03-03 DIAGNOSIS — M51.369 BULGE OF LUMBAR DISC WITHOUT MYELOPATHY: ICD-10-CM

## 2025-03-03 DIAGNOSIS — G89.29 CHRONIC BILATERAL LOW BACK PAIN WITHOUT SCIATICA: ICD-10-CM

## 2025-03-03 DIAGNOSIS — M54.50 CHRONIC BILATERAL LOW BACK PAIN WITHOUT SCIATICA: ICD-10-CM

## 2025-03-03 RX ORDER — TRAMADOL HYDROCHLORIDE 50 MG/1
50 TABLET ORAL EVERY 8 HOURS PRN
Qty: 90 TABLET | Refills: 0 | Status: SHIPPED | OUTPATIENT
Start: 2025-03-03 | End: 2025-04-02

## 2025-03-03 NOTE — TELEPHONE ENCOUNTER
Po is requesting a refill of their   Requested Prescriptions     Pending Prescriptions Disp Refills    traMADol (ULTRAM) 50 MG tablet 90 tablet 0     Sig: Take 1 tablet by mouth every 8 hours as needed for Pain for up to 30 days. Take lowest dose possible to manage pain Max Daily Amount: 150 mg   . Please advise.      Last Appt:  Visit date not found  Next Appt:   Visit date not found  Preferred pharmacy:     [facility]/pharmacy #[id number] - [place], [place] - [address] [phone] - F [phone]

## 2025-03-03 NOTE — TELEPHONE ENCOUNTER
OARRS reviewed. UDS: + for  citalopram,buspirone,venlafaxine,cyclobenzaprine,tramadol.   Last seen: 2/10/2025. Follow-up: 4/10/2025

## 2025-03-04 RX ORDER — TIRZEPATIDE 7.5 MG/.5ML
INJECTION, SOLUTION SUBCUTANEOUS
Qty: 4 ML | Refills: 2 | Status: SHIPPED | OUTPATIENT
Start: 2025-03-04 | End: 2025-03-05 | Stop reason: SDUPTHER

## 2025-03-05 ENCOUNTER — OFFICE VISIT (OUTPATIENT)
Dept: INTERNAL MEDICINE CLINIC | Age: 55
End: 2025-03-05
Payer: COMMERCIAL

## 2025-03-05 ENCOUNTER — TELEPHONE (OUTPATIENT)
Age: 55
End: 2025-03-05

## 2025-03-05 VITALS
WEIGHT: 207.2 LBS | TEMPERATURE: 98.8 F | SYSTOLIC BLOOD PRESSURE: 116 MMHG | BODY MASS INDEX: 30.58 KG/M2 | HEART RATE: 86 BPM | DIASTOLIC BLOOD PRESSURE: 67 MMHG

## 2025-03-05 DIAGNOSIS — E11.65 TYPE 2 DIABETES MELLITUS WITH HYPERGLYCEMIA, UNSPECIFIED WHETHER LONG TERM INSULIN USE (HCC): Primary | ICD-10-CM

## 2025-03-05 PROCEDURE — NBSRV NON-BILLABLE SERVICE: Performed by: PHARMACIST

## 2025-03-05 PROCEDURE — G0108 DIAB MANAGE TRN  PER INDIV: HCPCS | Performed by: PHARMACIST

## 2025-03-05 RX ORDER — ACYCLOVIR 400 MG/1
TABLET ORAL
Qty: 9 EACH | Refills: 3 | Status: SHIPPED | OUTPATIENT
Start: 2025-03-05

## 2025-03-05 NOTE — PATIENT INSTRUCTIONS
Call to get a replacement Dexcom sensor 5 [phone]     2. Increase Mounjaro 10mg weekly with your next fill      When you increase your Mounjaro, decrease your Lantus to 22 units every morning       Call if you start to notice lows    3. Calibrate your Dexcom (when your sugars are stable) with a fingerstick BG   -History -> Event -> Blood Glucose -> Use as calibration    4. Try to aim for 30-50 grams of carb + protein with every meal  -Consider protein drink (Ensure Protein/Premier Protein/Fairlife protein/Glucerna protein)/protein powder  -Consider Atkins snacks/bars  -Consider low fat cheese stick, light/Greek yogurt  -Nuts/peanut butter  -Hard boiled eggs, lunchmeat

## 2025-03-05 NOTE — PROGRESS NOTES
protein/Glucerna protein)/protein powder  -Consider Atkins snacks/bars  -Consider low fat cheese stick, light/Greek yogurt  -Nuts/peanut butter  -Hard boiled eggs, lunchmeat        Goals Addressed                   This Visit's Progress     3 meals per day; 45-60 grams carb per meal; 15-20 grams carb for snack   On track     Exercise -walking 1 mile 5 days per week   On track             Meter download, medications, PMH and nursing assessment reviewed.  Po Victor states He is willing to participate in this plan of care and verbalized understanding of all instructions provided. Teach back used to verify comprehension.      Follow-up: 1 month Dr. Boothe, 1.5 month dietician, 3 month [facility]     Total time involved in direct patient education: 30 minutes.   Individual Education appointment justified due to: Class Availability    For Pharmacy Admin Tracking Only    Program: Medical Group  CPA in place:  Yes  Recommendation Provided To: Patient/Caregiver: 2 via In person  Intervention Detail: Dose Adjustment: 2, reason: Therapy Optimization  Intervention Accepted By: Patient/Caregiver: 2  Gap Closed?: No   Time Spent (min): 45        Debora Nelson, PharmD, BCPS, BC-ADM  Internal Medicine Clinical Pharmacist  [phone]

## 2025-03-05 NOTE — TELEPHONE ENCOUNTER
----- Message from Dr. Vikash Boothe MD sent at 3/1/2025  1:36 PM EST -----  Po Victor  lab test(s) were abnormal.  Obtain alkaline phosphatase isoenzyme level  Please contact patient and document response.

## 2025-03-15 ENCOUNTER — CLINICAL DOCUMENTATION (OUTPATIENT)
Age: 55
End: 2025-03-15

## 2025-03-15 ENCOUNTER — RESULTS FOLLOW-UP (OUTPATIENT)
Age: 55
End: 2025-03-15

## 2025-03-15 DIAGNOSIS — R74.8 ABNORMAL ALKALINE PHOSPHATASE TEST: Primary | ICD-10-CM

## 2025-03-15 NOTE — RESULT ENCOUNTER NOTE
Po Victor  lab test(s) were abnormal.  Elevated liver alkaline phosphatase test.  I have given him GI referral.  Please coordinate.  Please contact patient and document response.

## 2025-03-17 NOTE — TELEPHONE ENCOUNTER
Left VM  ----- Message from Dr. Vikash Boothe MD sent at 3/15/2025  5:18 PM EDT -----  Po Victor  lab test(s) were abnormal.  Elevated liver alkaline phosphatase test.  I have given him GI referral.  Please coordinate.  Please contact patient and document response.

## 2025-03-17 NOTE — TELEPHONE ENCOUNTER
----- Message from Dr. Vikash Boothe MD sent at 3/15/2025  5:18 PM EDT -----  Po Victor  lab test(s) were abnormal.  Elevated liver alkaline phosphatase test.  I have given him GI referral.  Please coordinate.  Please contact patient and document response.

## 2025-03-31 DIAGNOSIS — G89.4 CHRONIC PAIN SYNDROME: ICD-10-CM

## 2025-03-31 DIAGNOSIS — G89.29 CHRONIC BILATERAL LOW BACK PAIN WITHOUT SCIATICA: ICD-10-CM

## 2025-03-31 DIAGNOSIS — M51.369 BULGE OF LUMBAR DISC WITHOUT MYELOPATHY: ICD-10-CM

## 2025-03-31 DIAGNOSIS — M54.50 CHRONIC BILATERAL LOW BACK PAIN WITHOUT SCIATICA: ICD-10-CM

## 2025-03-31 DIAGNOSIS — M47.816 SPONDYLOSIS OF LUMBAR REGION WITHOUT MYELOPATHY OR RADICULOPATHY: ICD-10-CM

## 2025-03-31 RX ORDER — TRAMADOL HYDROCHLORIDE 50 MG/1
50 TABLET ORAL EVERY 8 HOURS PRN
Qty: 90 TABLET | Refills: 0 | Status: SHIPPED | OUTPATIENT
Start: 2025-04-02 | End: 2025-05-02

## 2025-03-31 NOTE — TELEPHONE ENCOUNTER
OARRS reviewed. UDS: + for  Citalopram/Escitalopram, Buspirone, Venlafaxine, Cyclobenzaprine, Tramadol.   Last seen: 2/10/2025. Follow-up: 4/10/2025

## 2025-03-31 NOTE — TELEPHONE ENCOUNTER
Po Victor called requesting a refill on the following medications:  Requested Prescriptions     Pending Prescriptions Disp Refills    traMADol (ULTRAM) 50 MG tablet 90 tablet 0     Sig: Take 1 tablet by mouth every 8 hours as needed for Pain for up to 30 days. Take lowest dose possible to manage pain Max Daily Amount: 150 mg     [facility] [place], ph. [phone]  .pv      Date of last visit: 02.10.2025  Date of next visit (if applicable):04.10.2025

## 2025-04-02 ENCOUNTER — TELEPHONE (OUTPATIENT)
Age: 55
End: 2025-04-02

## 2025-04-02 DIAGNOSIS — E11.65 TYPE 2 DIABETES MELLITUS WITH HYPERGLYCEMIA, WITHOUT LONG-TERM CURRENT USE OF INSULIN (HCC): Primary | ICD-10-CM

## 2025-04-02 NOTE — TELEPHONE ENCOUNTER
----- Message from WALT Peterson CNP sent at 4/2/2025  1:18 PM EDT -----  Regarding: Repeat lab prior to upcoming appointment  Please contact patient and ask him to have his A1c repeated before his follow up appointment with me on 4/7/25. Thank you

## 2025-04-05 ENCOUNTER — HOSPITAL ENCOUNTER (OUTPATIENT)
Age: 55
Discharge: HOME | End: 2025-04-05
Payer: COMMERCIAL

## 2025-04-05 DIAGNOSIS — Z79.4 TYPE 2 DIABETES MELLITUS WITH HYPERGLYCEMIA, WITH LONG-TERM CURRENT USE OF INSULIN (HCC): ICD-10-CM

## 2025-04-05 DIAGNOSIS — R74.8 ELEVATED ALKALINE PHOSPHATASE LEVEL: ICD-10-CM

## 2025-04-05 DIAGNOSIS — Z79.899 ON STATIN THERAPY: ICD-10-CM

## 2025-04-05 DIAGNOSIS — E11.65 TYPE 2 DIABETES MELLITUS WITH HYPERGLYCEMIA, WITH LONG-TERM CURRENT USE OF INSULIN (HCC): ICD-10-CM

## 2025-04-05 DIAGNOSIS — E66.811 CLASS 1 OBESITY WITH SERIOUS COMORBIDITY AND BODY MASS INDEX (BMI) OF 30.0 TO 30.9 IN ADULT, UNSPECIFIED OBESITY TYPE: ICD-10-CM

## 2025-04-05 LAB
A1AT SERPL-MCNC: 174 MG/DL (ref 90–200)
ALBUMIN SERPL BCG-MCNC: 4.6 G/DL (ref 3.4–4.9)
ALP SERPL-CCNC: 221 U/L (ref 40–129)
ALT SERPL W/O P-5'-P-CCNC: 44 U/L (ref 10–50)
ANION GAP SERPL CALC-SCNC: 12 MEQ/L (ref 8–16)
AST SERPL-CCNC: 40 U/L (ref 10–50)
BILIRUB SERPL-MCNC: 0.7 MG/DL (ref 0.3–1.2)
BUN SERPL-MCNC: 11 MG/DL (ref 8–23)
CALCIUM SERPL-MCNC: 9.4 MG/DL (ref 8.6–10)
CHLORIDE SERPL-SCNC: 103 MEQ/L (ref 98–111)
CO2 SERPL-SCNC: 25 MEQ/L (ref 22–29)
CREAT SERPL-MCNC: 0.8 MG/DL (ref 0.7–1.2)
DEPRECATED RDW RBC AUTO: 46.4 FL (ref 35–45)
ERYTHROCYTE [DISTWIDTH] IN BLOOD BY AUTOMATED COUNT: 15.5 % (ref 11.5–14.5)
FERRITIN SERPL IA-MCNC: 24 NG/ML (ref 30–400)
GFR SERPL CREATININE-BSD FRML MDRD: > 90 ML/MIN/1.73M2
GGT SERPL-CCNC: 242 U/L (ref 8–69)
GLUCOSE SERPL-MCNC: 86 MG/DL (ref 74–109)
HAV AB SER-ACNC: REACTIVE
HBV CORE IGG+IGM SERPL QL IA: NONREACTIVE
HBV SURFACE AB TITR SER: < 3.5 MIU/ML
HBV SURFACE AG SERPL QL IA: NONREACTIVE
HCT VFR BLD AUTO: 44 % (ref 42–52)
HCV IGG SERPL QL IA: NONREACTIVE
HGB BLD-MCNC: 13.2 GM/DL (ref 14–18)
INR PPP: 1.07 (ref 0.85–1.13)
MCH RBC QN AUTO: 25.2 PG (ref 26–33)
MCHC RBC AUTO-ENTMCNC: 30 GM/DL (ref 32.2–35.5)
MCV RBC AUTO: 84 FL (ref 80–94)
PLATELET # BLD AUTO: 82 THOU/MM3 (ref 130–400)
PMV BLD AUTO: 9.8 FL (ref 9.4–12.4)
POTASSIUM SERPL-SCNC: 4 MEQ/L (ref 3.5–5.2)
PROT SERPL-MCNC: 7.4 G/DL (ref 6.4–8.3)
RBC # BLD AUTO: 5.24 MILL/MM3 (ref 4.7–6.1)
SCAN OF BLOOD SMEAR: NORMAL
SODIUM SERPL-SCNC: 140 MEQ/L (ref 135–145)
WBC # BLD AUTO: 5.3 THOU/MM3 (ref 4.8–10.8)

## 2025-04-05 PROCEDURE — 85027 COMPLETE CBC AUTOMATED: CPT

## 2025-04-05 PROCEDURE — 86708 HEPATITIS A ANTIBODY: CPT

## 2025-04-05 PROCEDURE — 82728 ASSAY OF FERRITIN: CPT

## 2025-04-05 PROCEDURE — 86709 HEPATITIS A IGM ANTIBODY: CPT

## 2025-04-05 PROCEDURE — 83516 IMMUNOASSAY NONANTIBODY: CPT

## 2025-04-05 PROCEDURE — 84075 ASSAY ALKALINE PHOSPHATASE: CPT

## 2025-04-05 PROCEDURE — 86038 ANTINUCLEAR ANTIBODIES: CPT

## 2025-04-05 PROCEDURE — 82103 ALPHA-1-ANTITRYPSIN TOTAL: CPT

## 2025-04-05 PROCEDURE — 86706 HEP B SURFACE ANTIBODY: CPT

## 2025-04-05 PROCEDURE — 36415 COLL VENOUS BLD VENIPUNCTURE: CPT

## 2025-04-05 PROCEDURE — 82977 ASSAY OF GGT: CPT

## 2025-04-05 PROCEDURE — 82390 ASSAY OF CERULOPLASMIN: CPT

## 2025-04-05 PROCEDURE — 86704 HEP B CORE ANTIBODY TOTAL: CPT

## 2025-04-05 PROCEDURE — 84080 ASSAY ALKALINE PHOSPHATASES: CPT

## 2025-04-05 PROCEDURE — 87340 HEPATITIS B SURFACE AG IA: CPT

## 2025-04-05 PROCEDURE — 85610 PROTHROMBIN TIME: CPT

## 2025-04-05 PROCEDURE — 86803 HEPATITIS C AB TEST: CPT

## 2025-04-05 PROCEDURE — 80053 COMPREHEN METABOLIC PANEL: CPT

## 2025-04-06 LAB — HAV IGM SER QL: NONREACTIVE

## 2025-04-07 ENCOUNTER — OFFICE VISIT (OUTPATIENT)
Dept: INTERNAL MEDICINE CLINIC | Age: 55
End: 2025-04-07
Payer: COMMERCIAL

## 2025-04-07 VITALS — WEIGHT: 205.4 LBS | HEIGHT: 69 IN | BODY MASS INDEX: 30.42 KG/M2

## 2025-04-07 DIAGNOSIS — E11.65 TYPE 2 DIABETES MELLITUS WITH HYPERGLYCEMIA, UNSPECIFIED WHETHER LONG TERM INSULIN USE (HCC): Primary | ICD-10-CM

## 2025-04-07 LAB
CERULOPLASMIN SERPL-MCNC: 25 MG/DL (ref 15–30)
HBA1C MFR BLD: 7.1 % (ref 4.3–5.7)
MITOCHONDRIAL M2 AB, IGG: 0.8 U/ML (ref 0–4)
NUCLEAR IGG SER QL IA: DETECTED
SMA IGG SER-ACNC: 6 UNITS (ref 0–19)

## 2025-04-07 PROCEDURE — 97803 MED NUTRITION INDIV SUBSEQ: CPT | Performed by: DIETITIAN, REGISTERED

## 2025-04-07 PROCEDURE — NBSRV NON-BILLABLE SERVICE: Performed by: DIETITIAN, REGISTERED

## 2025-04-07 PROCEDURE — 83036 HEMOGLOBIN GLYCOSYLATED A1C: CPT | Performed by: DIETITIAN, REGISTERED

## 2025-04-07 NOTE — PROGRESS NOTES
adding protein with this, think about an exercise plan  -Expected compliance: fair.    Thank you for your referral of this patient.     Total time involved in direct patient education: 45 minutes for follow-up MNT visit.

## 2025-04-07 NOTE — PATIENT INSTRUCTIONS
Treat a low BS 1st with 15 gms quick sugar then once out of your low BS - above 70 then follow up with pbutter.  Or other protein along with your carbohydrate food choice.    Your meal Plan:  - 30 - 45 gms carbs + add protein @ brkf.  Break -time snacks:  15-20 gms  + add protein.  Lunch and Dinner: 60 gms carbs + protein + non-starchy veggies.    Try to aim for 30-50 grams of carb + protein with every meal  -Consider protein drink (Ensure Protein/Premier Protein/Fairlife protein/Glucerna protein)/protein powder  -Consider Atkins snacks/bars  -Consider low fat cheese stick, light/Greek yogurt  -Nuts/peanut butter  -Hard boiled eggs, lunchmeat    Good job drinking your water.    - Also when BS's above 250 get in light activity too.    Limit Special K Strawberry cereal to 1 cup and then add 1 cup plain cereal with no added sugars and add unsweetened almond milk.  - Add protein to this type of breakfast.    Bring a food log to next dietitian appt.  Thanks

## 2025-04-08 LAB — ALKALINE PHOSPHATASE ISOENZYMES: NORMAL

## 2025-04-09 LAB
ANA PAT SER IF-IMP: NORMAL
NUCLEAR IGG SER QL IF: NORMAL

## 2025-04-10 ENCOUNTER — HOSPITAL ENCOUNTER (OUTPATIENT)
Dept: ULTRASOUND IMAGING | Age: 55
Discharge: HOME | End: 2025-04-10
Payer: COMMERCIAL

## 2025-04-10 ENCOUNTER — OFFICE VISIT (OUTPATIENT)
Dept: PHYSICAL MEDICINE AND REHAB | Age: 55
End: 2025-04-10
Payer: COMMERCIAL

## 2025-04-10 ENCOUNTER — TELEPHONE (OUTPATIENT)
Dept: PHYSICAL MEDICINE AND REHAB | Age: 55
End: 2025-04-10

## 2025-04-10 VITALS
SYSTOLIC BLOOD PRESSURE: 112 MMHG | BODY MASS INDEX: 30.43 KG/M2 | DIASTOLIC BLOOD PRESSURE: 74 MMHG | HEIGHT: 69 IN | WEIGHT: 205.47 LBS

## 2025-04-10 DIAGNOSIS — Z79.4 TYPE 2 DIABETES MELLITUS WITH HYPERGLYCEMIA, WITH LONG-TERM CURRENT USE OF INSULIN (HCC): ICD-10-CM

## 2025-04-10 DIAGNOSIS — E66.811 CLASS 1 OBESITY WITH SERIOUS COMORBIDITY AND BODY MASS INDEX (BMI) OF 30.0 TO 30.9 IN ADULT, UNSPECIFIED OBESITY TYPE: ICD-10-CM

## 2025-04-10 DIAGNOSIS — G89.4 CHRONIC PAIN SYNDROME: ICD-10-CM

## 2025-04-10 DIAGNOSIS — M54.50 CHRONIC BILATERAL LOW BACK PAIN WITHOUT SCIATICA: ICD-10-CM

## 2025-04-10 DIAGNOSIS — Z79.899 ON STATIN THERAPY: ICD-10-CM

## 2025-04-10 DIAGNOSIS — R74.8 ELEVATED ALKALINE PHOSPHATASE LEVEL: ICD-10-CM

## 2025-04-10 DIAGNOSIS — G89.29 CHRONIC BILATERAL LOW BACK PAIN WITHOUT SCIATICA: ICD-10-CM

## 2025-04-10 DIAGNOSIS — E11.65 TYPE 2 DIABETES MELLITUS WITH HYPERGLYCEMIA, WITH LONG-TERM CURRENT USE OF INSULIN (HCC): ICD-10-CM

## 2025-04-10 DIAGNOSIS — M47.816 SPONDYLOSIS OF LUMBAR REGION WITHOUT MYELOPATHY OR RADICULOPATHY: Primary | ICD-10-CM

## 2025-04-10 DIAGNOSIS — M53.3 SI (SACROILIAC) PAIN: ICD-10-CM

## 2025-04-10 DIAGNOSIS — M47.816 LUMBAR FACET ARTHROPATHY: ICD-10-CM

## 2025-04-10 PROCEDURE — 76981 USE PARENCHYMA: CPT

## 2025-04-10 PROCEDURE — 99214 OFFICE O/P EST MOD 30 MIN: CPT | Performed by: NURSE PRACTITIONER

## 2025-04-10 ASSESSMENT — ENCOUNTER SYMPTOMS
GASTROINTESTINAL NEGATIVE: 1
EYES NEGATIVE: 1
CONSTIPATION: 0
RESPIRATORY NEGATIVE: 1
BACK PAIN: 1
ABDOMINAL PAIN: 0
ABDOMINAL DISTENTION: 0

## 2025-04-10 NOTE — PROGRESS NOTES
[facility] [place] [facility]  [facility]  [address]  [place] [address]  Dept: [phone]  Dept Fax: [phone]  Loc: [phone]    Visit Date: 4/10/2025    Functionality Assessment/Goals Worksheet     On a scale of 0 (Does not Interfere) to 10 (Completely Interferes)     1.  Which number describes how during the past week pain has interfered with       the following:  A.  General Activity:  3  B.  Mood: 4  C.  Walking Ability:  6  D.  Normal Work (Includes both work outside the home and housework):  4  E.  Relations with Other People:   3  F.  Sleep:   6  G.  Enjoyment of Life:   5    2.  Patient Prefers to Take their Pain Medications:     []  On a regular basis   [x]  Only when necessary    []  Does not take pain medications    3.  What are the Patient's Goals/Expectations for Visiting Pain Management?     [x]  Learn about my pain    [x]  Receive Medication   [x]  Physical Therapy     []  Treat Depression   [x]  Receive Injections    []  Treat Sleep   []  Deal with Anxiety and Stress   []  Treat Opoid Dependence/Addiction   []  Other:        HPI:   Po Victor is a 54 y.o. male is here today for    Chief Complaint: Low back pain    HPI   2 month follow up. Garrick is here with complaints of low back pain states that it has slowly increased over \"the last couple weeks\" states pain starts in his low back center and is across his low back all axially- sharp, pressure, aching and jabbing pain. Pain has been more constant and more limiting with activity. States the last couple weeks it has been interrupting his sleep. He feels relief from his previous bilateral L-facet RFA has been wearing off.     He denies any radicular pain, no numbness, no tingling, no changes in bowel or bladder function.     States that current pain medications continues to help decrease pain with Ultram prn and flexeril prn   Pain increases with

## 2025-04-15 NOTE — PROGRESS NOTES
[facility] [place] [facility]  [facility] ENDOCRINOLOGY  [address]  [address]  [place] [address]  Dept: [phone]  Loc: [phone]     Visit Date: 4/23/2025    The patient (or guardian, if applicable) and other individuals in attendance with the patient were advised that Artificial Intelligence will be utilized during this visit to record, process the conversation to generate a clinical note, and support improvement of the AI technology. The patient (or guardian, if applicable) and other individuals in attendance at the appointment consented to the use of AI, including the recording.      Po Victor is a 54 y.o. male who presents today for:Follow up  Chief Complaint   Patient presents with    Follow-up    Diabetes     PCP: Silvia Correa ACNP    History of Present Illness  The patient presents for evaluation of type 2 diabetes.    He has been managing type 2 diabetes for over a year, currently on a regimen of glimepiride 4 mg twice daily, Lantus 22 units once, Jardiance, metformin 2 tablets twice daily, Mounjaro 10 mg Hypoglycemic episodes occur at night but are asymptomatic. These readings are not verified with a fingerstick test. The morning routine includes administering Lantus around 4:30 AM, and he does not engage in snacking before bedtime or skipping meals. The daily meal schedule consists of breakfast at 5:00 AM, lunch at 10:15 AM, and dinner between 3:00 PM and 5:00 PM, with dinner being the most substantial meal.  CGMS data with an average glucose of 138 GMI 6.6 time in range 78%.  He has been on Mounjaro 10 mg for approximately 2 months and reports no adverse effects such as nausea, vomiting, or abdominal pain. Monitoring of diet and engagement in physical activities such as yard work and walking have improved overall well-being. Lifesavers are used to manage hypoglycemic episodes at home, and additional sensors are not required. He has been attending the

## 2025-04-22 RX ORDER — CYCLOBENZAPRINE HCL 10 MG
10 TABLET ORAL 3 TIMES DAILY PRN
Qty: 270 TABLET | Refills: 0 | Status: SHIPPED | OUTPATIENT
Start: 2025-04-27 | End: 2025-07-26

## 2025-04-22 NOTE — TELEPHONE ENCOUNTER
Po Victor called requesting a refill on the following medications:  Requested Prescriptions     Pending Prescriptions Disp Refills    cyclobenzaprine (FLEXERIL) 10 MG tablet 270 tablet 0     Sig: Take 1 tablet by mouth 3 times daily as needed for Muscle spasms     [facility]      Date of last visit: 4/10/25   Date of next visit (if applicable): Visit date not found

## 2025-04-22 NOTE — TELEPHONE ENCOUNTER
OARRS reviewed. UDS: + for  Citalopram, Buspirone, Venlafaxine, Cyclobenzaprine, Tramadol.   Last seen: 4/10/2025. Follow-up: none on file

## 2025-04-23 ENCOUNTER — OFFICE VISIT (OUTPATIENT)
Age: 55
End: 2025-04-23
Payer: COMMERCIAL

## 2025-04-23 VITALS
DIASTOLIC BLOOD PRESSURE: 68 MMHG | WEIGHT: 202 LBS | HEART RATE: 88 BPM | SYSTOLIC BLOOD PRESSURE: 110 MMHG | BODY MASS INDEX: 29.92 KG/M2 | RESPIRATION RATE: 18 BRPM | HEIGHT: 69 IN

## 2025-04-23 DIAGNOSIS — Z79.4 TYPE 2 DIABETES MELLITUS WITH HYPERGLYCEMIA, WITH LONG-TERM CURRENT USE OF INSULIN (HCC): Primary | ICD-10-CM

## 2025-04-23 DIAGNOSIS — E11.65 TYPE 2 DIABETES MELLITUS WITH HYPERGLYCEMIA, WITH LONG-TERM CURRENT USE OF INSULIN (HCC): Primary | ICD-10-CM

## 2025-04-23 DIAGNOSIS — E78.2 MIXED HYPERLIPIDEMIA: ICD-10-CM

## 2025-04-23 DIAGNOSIS — R74.8 ELEVATED ALKALINE PHOSPHATASE LEVEL: ICD-10-CM

## 2025-04-23 PROCEDURE — 3051F HG A1C>EQUAL 7.0%<8.0%: CPT

## 2025-04-23 PROCEDURE — 99214 OFFICE O/P EST MOD 30 MIN: CPT

## 2025-04-23 RX ORDER — EMPAGLIFLOZIN 25 MG/1
25 TABLET, FILM COATED ORAL EVERY MORNING
Qty: 90 TABLET | Refills: 2 | Status: SHIPPED | OUTPATIENT
Start: 2025-04-23

## 2025-04-23 RX ORDER — ACYCLOVIR 400 MG/1
TABLET ORAL
Qty: 9 EACH | Refills: 3 | Status: SHIPPED | OUTPATIENT
Start: 2025-04-23

## 2025-04-23 RX ORDER — INSULIN GLARGINE 100 [IU]/ML
22 INJECTION, SOLUTION SUBCUTANEOUS NIGHTLY
Qty: 15 ML | Refills: 3 | Status: SHIPPED | OUTPATIENT
Start: 2025-04-23

## 2025-04-23 NOTE — PROGRESS NOTES
CGMSINTERPRETATION    CGMS interpretation:  The patient is checking blood sugars 4 times a day using Dexcom G7 continuous glucose monitoring system.  His download includes data from 4/10/2025 through 4/23/2025 and is sufficient for interpretation.  The CGM was active 93% of the time.  Average blood glucose is 138 mg/dL with a coefficient of variation of 32.1% and glucose management indicator of 6.6%  The patient was within the target range 78 percent of the time.  Hypoglycemia:  Blood glucose range between 54 mg/dL and 70mg/dL: 4%  Blood glucose is below 54 mg/dL: 1%  Hyperglycemia:  Blood glucoses between 181 and 250 mg/dL: 16%  Blood glucoses above 250 mg/dL: 1%  In summary, patient is experiencing good glycemic control with hypoglycemic episodes mostly occurring at night  Recommendations: I have discontinued glimepiride in the setting of these hypoglycemic events however patient is asymptomatic during events but is not confirming readings with fingerstick glucometer.  Encouraged patient to begin snacking before bed and additional attempt to alleviate nighttime hypoglycemia.  See assessment and plan section of note.

## 2025-04-23 NOTE — PATIENT INSTRUCTIONS
Stop glimiperide due to low blood sugar readings    Call the office in 2 weeks so we can download your blood sugar log    If blood sugar reads less than 70 chew 3 glucose tablets, wait 15 minutes and recheck, if your blood sugar is still less than 70 take another 3 glucose tablets, wait 15 minutes and recheck if still low please go to ER.

## 2025-04-23 NOTE — PROGRESS NOTES
Diabetes Checklist    Date: 4/23/25   Patient Name: Po Victor   YOB: 1970     When were you Diagnosed with Diabetes?   Current Treatment? Mounjaro & Lantus & metformin  Prior Treatment?   Diet Plan? Carb Couting  Exercise Plan? Walking at night  Diabetes Complications? Neuropathy (Nerve Damage)    Do you experience any of the following symptoms?   Symptoms Yes No   Tingling or Numbness in Toes  [x]   []    Burning in Feet  []   [x]    Frequent  Urination  [x]   []    Blurry Vision  []   [x]    Open Wounds on Feet  []   [x]      Do you have any other concerns today? none

## 2025-04-29 DIAGNOSIS — G89.4 CHRONIC PAIN SYNDROME: ICD-10-CM

## 2025-04-29 DIAGNOSIS — G89.29 CHRONIC BILATERAL LOW BACK PAIN WITHOUT SCIATICA: ICD-10-CM

## 2025-04-29 DIAGNOSIS — M54.50 CHRONIC BILATERAL LOW BACK PAIN WITHOUT SCIATICA: ICD-10-CM

## 2025-04-29 DIAGNOSIS — M51.369 BULGE OF LUMBAR DISC WITHOUT MYELOPATHY: ICD-10-CM

## 2025-04-29 DIAGNOSIS — M47.816 SPONDYLOSIS OF LUMBAR REGION WITHOUT MYELOPATHY OR RADICULOPATHY: ICD-10-CM

## 2025-04-29 RX ORDER — TRAMADOL HYDROCHLORIDE 50 MG/1
50 TABLET ORAL EVERY 8 HOURS PRN
Qty: 90 TABLET | Refills: 0 | Status: SHIPPED | OUTPATIENT
Start: 2025-05-02 | End: 2025-05-27 | Stop reason: SDUPTHER

## 2025-04-29 NOTE — TELEPHONE ENCOUNTER
OARRS reviewed. UDS: + for  Citalopram/Escitalopram, Buspirone, Venlafaxine, Cyclobenzaprine, Tramadol.     Last seen: 4/10/2025. Follow-up:   Future Appointments   Date Time Provider Department Center   5/22/2025  8:30 AM Maine Francois, WALT - CNP [facility]   6/10/2025  4:00 PM Debora Nelson RPH [facility] E[facility] DEP   7/1/2025  9:00 AM Lesly Mcginnis RD, LD [facility] DEP   9/2/2025 10:00 AM Kiley Charles APRN - CNP ENDO [facility] [place]   9/18/2025  8:30 AM Diana Paz, WALT - CNP [facility] [place] [facility] [place]

## 2025-04-29 NOTE — TELEPHONE ENCOUNTER
Po Victor called requesting a refill on the following medications:  Requested Prescriptions     Pending Prescriptions Disp Refills    traMADol (ULTRAM) 50 MG tablet 90 tablet 0     Sig: Take 1 tablet by mouth every 8 hours as needed for Pain for up to 30 days. Take lowest dose possible to manage pain Max Daily Amount: 150 mg     [facility]      Date of last visit: 4/10/25   Date of next visit (if applicable): Visit date not found

## 2025-05-21 DIAGNOSIS — E11.65 TYPE 2 DIABETES MELLITUS WITH HYPERGLYCEMIA, WITHOUT LONG-TERM CURRENT USE OF INSULIN (HCC): ICD-10-CM

## 2025-05-22 ENCOUNTER — HOSPITAL ENCOUNTER (OUTPATIENT)
Dept: MRI IMAGING | Age: 55
Discharge: HOME | End: 2025-05-22
Payer: COMMERCIAL

## 2025-05-22 DIAGNOSIS — R74.8 ELEVATED ALKALINE PHOSPHATASE LEVEL: ICD-10-CM

## 2025-05-22 DIAGNOSIS — R76.8 POSITIVE ANA (ANTINUCLEAR ANTIBODY): ICD-10-CM

## 2025-05-22 DIAGNOSIS — K74.00 FIBROSIS OF LIVER: ICD-10-CM

## 2025-05-22 DIAGNOSIS — R74.8 ELEVATED SERUM GGT LEVEL: ICD-10-CM

## 2025-05-22 PROCEDURE — A9579 GAD-BASE MR CONTRAST NOS,1ML: HCPCS | Performed by: NURSE PRACTITIONER

## 2025-05-22 PROCEDURE — 6360000004 HC RX CONTRAST MEDICATION: Performed by: NURSE PRACTITIONER

## 2025-05-22 PROCEDURE — 74183 MRI ABD W/O CNTR FLWD CNTR: CPT

## 2025-05-22 RX ORDER — ATORVASTATIN CALCIUM 20 MG/1
20 TABLET, FILM COATED ORAL DAILY
Qty: 90 TABLET | Refills: 1 | Status: SHIPPED | OUTPATIENT
Start: 2025-05-22

## 2025-05-22 RX ADMIN — GADOTERIDOL 20 ML: 279.3 INJECTION, SOLUTION INTRAVENOUS at 07:30

## 2025-05-27 DIAGNOSIS — M54.50 CHRONIC BILATERAL LOW BACK PAIN WITHOUT SCIATICA: ICD-10-CM

## 2025-05-27 DIAGNOSIS — M47.816 SPONDYLOSIS OF LUMBAR REGION WITHOUT MYELOPATHY OR RADICULOPATHY: ICD-10-CM

## 2025-05-27 DIAGNOSIS — G89.4 CHRONIC PAIN SYNDROME: ICD-10-CM

## 2025-05-27 DIAGNOSIS — M51.369 BULGE OF LUMBAR DISC WITHOUT MYELOPATHY: ICD-10-CM

## 2025-05-27 DIAGNOSIS — G89.29 CHRONIC BILATERAL LOW BACK PAIN WITHOUT SCIATICA: ICD-10-CM

## 2025-05-27 RX ORDER — TRAMADOL HYDROCHLORIDE 50 MG/1
50 TABLET ORAL EVERY 8 HOURS PRN
Qty: 90 TABLET | Refills: 0 | Status: SHIPPED | OUTPATIENT
Start: 2025-05-29 | End: 2025-06-28

## 2025-05-27 NOTE — TELEPHONE ENCOUNTER
Po Victor called requesting a refill on the following medications:  Requested Prescriptions     Pending Prescriptions Disp Refills    traMADol (ULTRAM) 50 MG tablet 90 tablet 0     Sig: Take 1 tablet by mouth every 8 hours as needed for Pain for up to 30 days. Take lowest dose possible to manage pain Max Daily Amount: 150 mg     [facility] [place], [facility] [phone]  .pv      Date of last visit: 04.10.2025  Date of next visit (if applicable): 07.21.2025

## 2025-05-27 NOTE — TELEPHONE ENCOUNTER
Please schedule this patient to be seen in office second week of June for medication management as his procedure has been pushed back. He needs to be seen to continue medications. Can't go 3.5 months between follow up which is sent now. His procedure is not until 6/30/2025 so please set up follow up for medications.

## 2025-05-27 NOTE — TELEPHONE ENCOUNTER
Called pt and LVM of need for a 2 month f/u to maint refills and to call back and get scheduled for 2nd week of Rosemary

## 2025-05-27 NOTE — TELEPHONE ENCOUNTER
OARRS reviewed. UDS: + for  Citalopram/Escitalopram, Buspirone, Venlafaxine, Cyclobenzaprine, Tramadol.   Last seen: 4/10/2025. Follow-up: 7/21/2025

## 2025-06-01 DIAGNOSIS — Z79.4 TYPE 2 DIABETES MELLITUS WITH HYPERGLYCEMIA, WITH LONG-TERM CURRENT USE OF INSULIN (HCC): ICD-10-CM

## 2025-06-01 DIAGNOSIS — E11.65 TYPE 2 DIABETES MELLITUS WITH HYPERGLYCEMIA, WITH LONG-TERM CURRENT USE OF INSULIN (HCC): ICD-10-CM

## 2025-06-04 ENCOUNTER — TELEPHONE (OUTPATIENT)
Dept: INTERNAL MEDICINE CLINIC | Age: 55
End: 2025-06-04

## 2025-06-04 RX ORDER — ACYCLOVIR 400 MG/1
TABLET ORAL
Qty: 9 EACH | Refills: 1 | Status: SHIPPED | OUTPATIENT
Start: 2025-06-04

## 2025-06-04 NOTE — TELEPHONE ENCOUNTER
We now have an electronic referral.  Please fill out the renewal referral at all the hard stops, sign and send back to me. Thank you

## 2025-06-06 ENCOUNTER — CLINICAL DOCUMENTATION (OUTPATIENT)
Age: 55
End: 2025-06-06

## 2025-06-06 DIAGNOSIS — E11.65 TYPE 2 DIABETES MELLITUS WITH HYPERGLYCEMIA, WITH LONG-TERM CURRENT USE OF INSULIN (HCC): Primary | ICD-10-CM

## 2025-06-06 DIAGNOSIS — Z79.4 TYPE 2 DIABETES MELLITUS WITH HYPERGLYCEMIA, WITH LONG-TERM CURRENT USE OF INSULIN (HCC): Primary | ICD-10-CM

## 2025-06-10 ENCOUNTER — OFFICE VISIT (OUTPATIENT)
Dept: PHYSICAL MEDICINE AND REHAB | Age: 55
End: 2025-06-10
Payer: COMMERCIAL

## 2025-06-10 ENCOUNTER — OFFICE VISIT (OUTPATIENT)
Dept: INTERNAL MEDICINE CLINIC | Age: 55
End: 2025-06-10
Payer: COMMERCIAL

## 2025-06-10 VITALS
TEMPERATURE: 98.3 F | HEART RATE: 98 BPM | BODY MASS INDEX: 29.52 KG/M2 | DIASTOLIC BLOOD PRESSURE: 61 MMHG | WEIGHT: 200 LBS | SYSTOLIC BLOOD PRESSURE: 104 MMHG

## 2025-06-10 VITALS
BODY MASS INDEX: 30.04 KG/M2 | DIASTOLIC BLOOD PRESSURE: 78 MMHG | WEIGHT: 202.82 LBS | SYSTOLIC BLOOD PRESSURE: 126 MMHG | HEIGHT: 69 IN

## 2025-06-10 DIAGNOSIS — M47.816 LUMBAR FACET ARTHROPATHY: ICD-10-CM

## 2025-06-10 DIAGNOSIS — G89.29 CHRONIC BILATERAL LOW BACK PAIN WITHOUT SCIATICA: ICD-10-CM

## 2025-06-10 DIAGNOSIS — M53.3 SI (SACROILIAC) PAIN: ICD-10-CM

## 2025-06-10 DIAGNOSIS — M54.50 CHRONIC BILATERAL LOW BACK PAIN WITHOUT SCIATICA: ICD-10-CM

## 2025-06-10 DIAGNOSIS — E11.65 TYPE 2 DIABETES MELLITUS WITH HYPERGLYCEMIA, UNSPECIFIED WHETHER LONG TERM INSULIN USE (HCC): Primary | ICD-10-CM

## 2025-06-10 DIAGNOSIS — G89.4 CHRONIC PAIN SYNDROME: ICD-10-CM

## 2025-06-10 DIAGNOSIS — M47.816 SPONDYLOSIS OF LUMBAR REGION WITHOUT MYELOPATHY OR RADICULOPATHY: Primary | ICD-10-CM

## 2025-06-10 PROCEDURE — 99214 OFFICE O/P EST MOD 30 MIN: CPT | Performed by: NURSE PRACTITIONER

## 2025-06-10 PROCEDURE — G0108 DIAB MANAGE TRN  PER INDIV: HCPCS | Performed by: PHARMACIST

## 2025-06-10 ASSESSMENT — ENCOUNTER SYMPTOMS
ABDOMINAL DISTENTION: 0
EYES NEGATIVE: 1
RESPIRATORY NEGATIVE: 1
ABDOMINAL PAIN: 0
CONSTIPATION: 0
GASTROINTESTINAL NEGATIVE: 1
BACK PAIN: 1

## 2025-06-10 NOTE — PROGRESS NOTES
that may not be pain free  Discussed with patient about safe storage of medications at home  Discussed possible weaning of medication dosing dependent on treatment/procedure results.   Procedure notes reviewed   Is scheduled for bilateral L-facet RFA @ L4-5 and L5-S1 under fluoroscopy for longer term pain relief on 6/30/2025 and has follow up 7/21/2025 with me.   Received over 80% to 85% from previous L-facet RFA for over 6.5 months with improvement of mobility and activity   Needs to hold  Eliquis for 5 days prior to procedure . He did get clearance from his pcp   Current pain medications continue to help. Continue Ultram 50 mg TID prn-  filled 5/29/2025, flexeril 10 mg TID prn for muscle spasms-filled 4/24/2025 for 90 days   Is compliant    Meds. Prescribed:   No orders of the defined types were placed in this encounter.      Return for scheduled for bilateral L-facet RFA @ L4-5 and L5-S1 on 6/30/2025, Has follwo up 7/21/2025.               Electronically signed by WALT Knight CNP on6/10/2025 at 8:54 AM

## 2025-06-10 NOTE — PROGRESS NOTES
[facility]  [address] [place] [address]  [phone] (phone)  [phone] (fax)    Patient ID: Po Victor [id number]  Referring Provider: Dr. Boothe     Patient's name and  were verified.    Subjective:    He presents for a follow-up diabetic visit. He has type 2 diabetes mellitus. He is compliant some of the time.  Assessment:     Lab Results   Component Value Date/Time    LABA1C 7.1 2025 03:26 PM    LABA1C 9.5 2023 01:07 PM    BUN 11 2025 09:12 AM    CREATININE 0.8 2025 09:12 AM     Vitals:    06/10/25 1552   BP: 104/61   Pulse: 98   Temp: 98.3 °F (36.8 °C)   Weight: 90.7 kg (200 lb)     Wt Readings from Last 3 Encounters:   06/10/25 90.7 kg (200 lb)   06/10/25 92 kg (202 lb 13.2 oz)   25 92 kg (202 lb 12.8 oz)     Ht Readings from Last 3 Encounters:   06/10/25 1.753 m (5' 9.02\")   25 1.753 m (5' 9\")   25 1.753 m (5' 9\")     Est, Glom Filt Rate   Date Value Ref Range Status   2025 > 90 >60 ml/min/1.73m2 Final     Diabetes Pharmacotherapy:  Lantus 22 units QAM  Glimepiride 4mg BID - stopped 1.5 months ago  Jardiance 25mg daily  Met XR 1000mg BID  Mounjaro 10mg weekly    Glucose Trends:   Current monitoring regimen: Dexcom CGM - checks 4+ times daily  Home blood sugar trends:    -V. High: 8%, High: 32%, Target: 60%, Low: 0%, V. Low: 0%   -Average Glucose: 176mg/dL; GMI: 7.5%;  %time CGM active 95%  Any episodes of hypoglycemia? None since last appt    Lifestyle Factors:   Previous visit with dietician: yes - 2025  Current diet: B: anna mariza sausage egg croissant            L: breakfast bowl                       D: bowl of special K OR spaghetti                       Snacks: infrequent; periodic granola bar OR yogurt                       Beverages: water  Current exercise: active at work; frequent steps; walks a couple times weekly    Health Maintenance:  Diabetes Management   Topic Date Due    Diabetic foot exam  2025

## 2025-06-10 NOTE — PATIENT INSTRUCTIONS
Increase Mounjaro to 12.5mg weekly once you use up your current supply  -While you are finishing up your Mounjaro 10mg, temporarily take Glimepiride 4mg in the morning only

## 2025-06-19 ENCOUNTER — HOSPITAL ENCOUNTER (OUTPATIENT)
Dept: INFUSION THERAPY | Age: 55
Discharge: HOME | End: 2025-06-19
Payer: COMMERCIAL

## 2025-06-19 ENCOUNTER — OFFICE VISIT (OUTPATIENT)
Dept: ONCOLOGY | Age: 55
End: 2025-06-19
Payer: COMMERCIAL

## 2025-06-19 VITALS
SYSTOLIC BLOOD PRESSURE: 132 MMHG | OXYGEN SATURATION: 98 % | HEIGHT: 69 IN | WEIGHT: 199 LBS | BODY MASS INDEX: 29.47 KG/M2 | TEMPERATURE: 97.6 F | DIASTOLIC BLOOD PRESSURE: 80 MMHG | HEART RATE: 84 BPM | RESPIRATION RATE: 20 BRPM

## 2025-06-19 VITALS
HEART RATE: 84 BPM | OXYGEN SATURATION: 98 % | TEMPERATURE: 97.6 F | RESPIRATION RATE: 20 BRPM | DIASTOLIC BLOOD PRESSURE: 80 MMHG | SYSTOLIC BLOOD PRESSURE: 132 MMHG

## 2025-06-19 DIAGNOSIS — D69.6 THROMBOCYTOPENIA: Primary | ICD-10-CM

## 2025-06-19 DIAGNOSIS — D69.6 THROMBOCYTOPENIA: ICD-10-CM

## 2025-06-19 LAB
ALBUMIN SERPL BCG-MCNC: 4.5 G/DL (ref 3.4–4.9)
ALP SERPL-CCNC: 161 U/L (ref 40–129)
ALT SERPL W/O P-5'-P-CCNC: 38 U/L (ref 10–50)
ANION GAP SERPL CALC-SCNC: 12 MEQ/L (ref 8–16)
APTT PPP: 30.4 SECONDS (ref 22–38)
AST SERPL-CCNC: 32 U/L (ref 10–50)
BASOPHILS ABSOLUTE: 0 THOU/MM3 (ref 0–0.1)
BASOPHILS NFR BLD AUTO: 0.8 %
BILIRUB CONJ SERPL-MCNC: 0.2 MG/DL (ref 0–0.2)
BILIRUB SERPL-MCNC: 0.4 MG/DL (ref 0.3–1.2)
BUN SERPL-MCNC: 8 MG/DL (ref 8–23)
CALCIUM SERPL-MCNC: 9.8 MG/DL (ref 8.6–10)
CHLORIDE SERPL-SCNC: 106 MEQ/L (ref 98–111)
CO2 SERPL-SCNC: 24 MEQ/L (ref 22–29)
CREAT SERPL-MCNC: 0.8 MG/DL (ref 0.7–1.2)
DEPRECATED RDW RBC AUTO: 45.9 FL (ref 35–45)
EOSINOPHIL NFR BLD AUTO: 3.8 %
EOSINOPHILS ABSOLUTE: 0.2 THOU/MM3 (ref 0–0.4)
ERYTHROCYTE [DISTWIDTH] IN BLOOD BY AUTOMATED COUNT: 14.9 % (ref 11.5–14.5)
FERRITIN SERPL IA-MCNC: 15 NG/ML (ref 30–400)
FIBRINOGEN PPP-MCNC: 273 MG/100ML (ref 155–475)
FOLATE SERPL-MCNC: 16.7 NG/ML (ref 4.6–34.8)
GFR SERPL CREATININE-BSD FRML MDRD: > 90 ML/MIN/1.73M2
GLUCOSE SERPL-MCNC: 150 MG/DL (ref 74–109)
HCT VFR BLD AUTO: 45.2 % (ref 42–52)
HGB BLD-MCNC: 13.4 GM/DL (ref 14–18)
HGB RETIC QN AUTO: 25.3 PG (ref 28.2–35.7)
IMM GRANULOCYTES # BLD AUTO: 0.01 THOU/MM3 (ref 0–0.07)
IMM GRANULOCYTES NFR BLD AUTO: 0.2 %
IMM RETICS NFR: 17.9 % (ref 2.3–13.4)
INR PPP: 1.23 (ref 0.85–1.13)
IRON SATN MFR SERPL: 9 % (ref 20–50)
IRON SERPL-MCNC: 37 UG/DL (ref 61–157)
LDH SERPL L TO P-CCNC: 160 U/L (ref 135–225)
LYMPHOCYTES ABSOLUTE: 1.1 THOU/MM3 (ref 1–4.8)
LYMPHOCYTES NFR BLD AUTO: 20.3 %
MCH RBC QN AUTO: 25.1 PG (ref 26–33)
MCHC RBC AUTO-ENTMCNC: 29.6 GM/DL (ref 32.2–35.5)
MCV RBC AUTO: 84.6 FL (ref 80–94)
MONOCYTES ABSOLUTE: 0.5 THOU/MM3 (ref 0.4–1.3)
MONOCYTES NFR BLD AUTO: 8.8 %
NEUTROPHILS ABSOLUTE: 3.5 THOU/MM3 (ref 1.8–7.7)
NEUTROPHILS NFR BLD AUTO: 66.1 %
NRBC BLD AUTO-RTO: 0 /100 WBC
PLATELET # BLD AUTO: 95 THOU/MM3 (ref 130–400)
PLATELET BLD QL SMEAR: ABNORMAL
PMV BLD AUTO: 10.1 FL (ref 9.4–12.4)
POLYCHROMASIA BLD QL SMEAR: ABNORMAL
POTASSIUM SERPL-SCNC: 4.2 MEQ/L (ref 3.5–5.2)
PROT SERPL-MCNC: 7.1 G/DL (ref 6.4–8.3)
PROTHROMBIN TIME: 14 SECONDS (ref 10–13.5)
RBC # BLD AUTO: 5.34 MILL/MM3 (ref 4.7–6.1)
RETICS # AUTO: 65 THOU/MM3 (ref 20–115)
RETICS/RBC NFR AUTO: 1.2 % (ref 0.5–2)
SCAN OF BLOOD SMEAR: NORMAL
SODIUM SERPL-SCNC: 142 MEQ/L (ref 135–145)
TIBC SERPL-MCNC: 426 UG/DL (ref 171–450)
TSH SERPL DL<=0.05 MIU/L-ACNC: 2.18 UIU/ML (ref 0.27–4.2)
VIT B12 SERPL-MCNC: 487 PG/ML (ref 232–1245)
WBC # BLD AUTO: 5.3 THOU/MM3 (ref 4.8–10.8)

## 2025-06-19 PROCEDURE — 82746 ASSAY OF FOLIC ACID SERUM: CPT

## 2025-06-19 PROCEDURE — 82607 VITAMIN B-12: CPT

## 2025-06-19 PROCEDURE — 82784 ASSAY IGA/IGD/IGG/IGM EACH: CPT

## 2025-06-19 PROCEDURE — 99214 OFFICE O/P EST MOD 30 MIN: CPT | Performed by: INTERNAL MEDICINE

## 2025-06-19 PROCEDURE — 88185 FLOWCYTOMETRY/TC ADD-ON: CPT

## 2025-06-19 PROCEDURE — 83615 LACTATE (LD) (LDH) ENZYME: CPT

## 2025-06-19 PROCEDURE — 83550 IRON BINDING TEST: CPT

## 2025-06-19 PROCEDURE — 88184 FLOWCYTOMETRY/ TC 1 MARKER: CPT

## 2025-06-19 PROCEDURE — 36415 COLL VENOUS BLD VENIPUNCTURE: CPT

## 2025-06-19 PROCEDURE — 99211 OFF/OP EST MAY X REQ PHY/QHP: CPT

## 2025-06-19 PROCEDURE — 85610 PROTHROMBIN TIME: CPT

## 2025-06-19 PROCEDURE — 84155 ASSAY OF PROTEIN SERUM: CPT

## 2025-06-19 PROCEDURE — 85384 FIBRINOGEN ACTIVITY: CPT

## 2025-06-19 PROCEDURE — 82248 BILIRUBIN DIRECT: CPT

## 2025-06-19 PROCEDURE — 83540 ASSAY OF IRON: CPT

## 2025-06-19 PROCEDURE — 84630 ASSAY OF ZINC: CPT

## 2025-06-19 PROCEDURE — 84165 PROTEIN E-PHORESIS SERUM: CPT

## 2025-06-19 PROCEDURE — 80053 COMPREHEN METABOLIC PANEL: CPT

## 2025-06-19 PROCEDURE — 86334 IMMUNOFIX E-PHORESIS SERUM: CPT

## 2025-06-19 PROCEDURE — 84443 ASSAY THYROID STIM HORMONE: CPT

## 2025-06-19 PROCEDURE — 82525 ASSAY OF COPPER: CPT

## 2025-06-19 PROCEDURE — 85046 RETICYTE/HGB CONCENTRATE: CPT

## 2025-06-19 PROCEDURE — 85025 COMPLETE CBC W/AUTO DIFF WBC: CPT

## 2025-06-19 PROCEDURE — 82728 ASSAY OF FERRITIN: CPT

## 2025-06-19 PROCEDURE — 85730 THROMBOPLASTIN TIME PARTIAL: CPT

## 2025-06-19 PROCEDURE — 86200 CCP ANTIBODY: CPT

## 2025-06-19 ASSESSMENT — ENCOUNTER SYMPTOMS
VOMITING: 0
CONSTIPATION: 0
NAUSEA: 0
SHORTNESS OF BREATH: 0
BLOOD IN STOOL: 0
BACK PAIN: 1
ABDOMINAL DISTENTION: 0
ABDOMINAL PAIN: 0
CHEST TIGHTNESS: 0
VOICE CHANGE: 0
SORE THROAT: 0
DIARRHEA: 0
COUGH: 0

## 2025-06-19 NOTE — PROGRESS NOTES
09:12 AM    CO2 25 04/05/2025 09:12 AM    BUN 11 04/05/2025 09:12 AM    CREATININE 0.8 04/05/2025 09:12 AM    CALCIUM 9.4 04/05/2025 09:12 AM    LABGLOM > 90 04/05/2025 09:12 AM    LABGLOM >60 09/01/2023 10:24 AM    GLUCOSE 86 04/05/2025 09:12 AM    GLUCOSE 166 09/06/2024 08:15 AM       Magnesium:  No results found for: \"MG\"  PT/INR:    Lab Results   Component Value Date/Time    PROTIME 12.5 08/02/2021 03:34 PM    INR 1.07 04/05/2025 09:12 AM     TSH:  No results found for: \"TSH\"  VITAMIN B12: No components found for: \"B12\"  FOLATE:  No results found for: \"FOLATE\"  IRON:  No results found for: \"IRON\"  Iron Saturation:  No components found for: \"PERCENTFE\"  TIBC:  No results found for: \"TIBC\"  FERRITIN:    Lab Results   Component Value Date/Time    FERRITIN 24 04/05/2025 09:12 AM     PSA:   Lab Results   Component Value Date/Time    PSA 0.55 09/01/2023 10:24 AM            IMAGING:    MRI ABDOMEN W WO CONTRAST  Result Date: 5/22/2025  1. Hepatic steatosis. 2. Moderate splenomegaly. 3. Tiny right renal cyst. No imaging follow up is required based on ACR consensus guidelines. Any further follow up should be based on patient's risk of malignancy. **This report has been created using voice recognition software.  It may contain minor errors which are inherent in voice recognition technology.** Electronically signed by Dr. Carolyn Head       ASSESSMENT/PLAN:    Diagnosis: Moderate thrombocytopenia.  I do not have an old records to compare to all CBCs.  With MRI showing splenomegaly and liver steatosis/fibrosis.  Most likely liver disease and hypersplenism is the cause for thrombocytopenia.  Recommend patient to follow-up with hepatologist.    He has no bleeding.    I will order further laboratory workup for thrombocytopenia exclude other causes.    Bleeding precautions discussed.    History of gastroesophageal disease will need to exclude H. pylori.    Endoscopy by her GI and hematology.    Return back       Medications

## 2025-06-22 LAB
COPPER SERPL-MCNC: 97.2 UG/DL (ref 70–140)
LEUK/LYMPH PHENOTYPING WB: NORMAL
ZINC SERPL-MCNC: 75 UG/DL (ref 60–120)

## 2025-06-23 ENCOUNTER — RESULTS FOLLOW-UP (OUTPATIENT)
Dept: ONCOLOGY | Age: 55
End: 2025-06-23

## 2025-06-23 LAB — CYCLIC CITRULLINATED PEPTIDE ANTIBODY IGG: 1 U/ML (ref 0–7)

## 2025-06-23 RX ORDER — FERROUS SULFATE 325(65) MG
325 TABLET ORAL
Qty: 36 TABLET | Refills: 1 | Status: SHIPPED | OUTPATIENT
Start: 2025-06-23

## 2025-06-24 DIAGNOSIS — M51.369 BULGE OF LUMBAR DISC WITHOUT MYELOPATHY: ICD-10-CM

## 2025-06-24 DIAGNOSIS — M47.816 SPONDYLOSIS OF LUMBAR REGION WITHOUT MYELOPATHY OR RADICULOPATHY: ICD-10-CM

## 2025-06-24 DIAGNOSIS — G89.4 CHRONIC PAIN SYNDROME: ICD-10-CM

## 2025-06-24 DIAGNOSIS — M54.50 CHRONIC BILATERAL LOW BACK PAIN WITHOUT SCIATICA: ICD-10-CM

## 2025-06-24 DIAGNOSIS — G89.29 CHRONIC BILATERAL LOW BACK PAIN WITHOUT SCIATICA: ICD-10-CM

## 2025-06-24 RX ORDER — TRAMADOL HYDROCHLORIDE 50 MG/1
50 TABLET ORAL EVERY 8 HOURS PRN
Qty: 90 TABLET | Refills: 0 | Status: SHIPPED | OUTPATIENT
Start: 2025-06-28 | End: 2025-07-28

## 2025-06-24 NOTE — TELEPHONE ENCOUNTER
Po Victor called requesting a refill on the following medications:  Requested Prescriptions     Pending Prescriptions Disp Refills    traMADol (ULTRAM) 50 MG tablet 90 tablet 0     Sig: Take 1 tablet by mouth every 8 hours as needed for Pain for up to 30 days. Take lowest dose possible to manage pain Max Daily Amount: 150 mg     Pharmacy verified:    [facility]/pharmacy #[id number] - [place], [place] - [address] - P [phone] - F [phone]     Date of last visit: 06/10/2025  Date of next visit (if applicable): 7/21/2025

## 2025-06-24 NOTE — TELEPHONE ENCOUNTER
OARRS reviewed. UDS: + for  Buspirone, Citalopram/Escitalopram, Cyclobenzaprine, Tramadol, Venlafaxine.   Last seen: 6/10/2025. Follow-up: 7/21/2025

## 2025-06-25 NOTE — DISCHARGE INSTRUCTIONS
ANESTHESIA INSTRUCTIONS FOLLOWING SURGERY        Since you may experience some intermittent light-headedness for the next several hours, we suggest you plan on bed rest or quiet relaxation this evening. You must have a friend or relative stay with you tonight.    Because of the sedation you have received, it is recommended that you do not drive a motor vehicle, operate any kind of machinery, or sign any contractual agreement for 24 hours following the procedure.    You should not take alcoholic beverages tonight and only take sleeping medication that has been specifically prescribed for you by your physician.  Call office [phone] if you have:  Temperature greater than 100.4  Persistent nausea and vomiting  Severe uncontrolled pain  Redness, tenderness, or signs of infection (pain, swelling, redness, odor or green/yellow discharge around the site)  Difficulty breathing, headache or visual disturbances  Hives  Persistent dizziness or light-headedness  Extreme fatigue  Any other questions or concerns you may have after discharge    In an emergency, call 911 or go to an Emergency Department at a nearby hospital    It is important to bring a complete, current list of your medications to any medical appointments or hospitalizations.    REMINDER:   Carry a list of your medications and allergies with you at all times  Call your pharmacy at least 1 week in advance to refill prescriptions    Diet: Resume your usual diet. Good nutrition promotes healing. Increase fluid intake.     Activity: Rest for 24 hrs then resume normal activity.    HOME MEDICATIONS:      If on blood thinning products such as; Aspirin, NSAIDS, Plavix, Coumadin, Xarelto, Fish Oil, Multi-Vitamins or Herbal Supplements restart in 24 hours      Restart Metformin in 48 hours if you had procedure with dye.      Restart Metformin in 24 hours if no dye used during procedure.        Education Materials Received: {yes/no:420837}  Belongings Returned:

## 2025-06-26 LAB
ALBUMIN SERPL ELPH-MCNC: 4.47 G/DL (ref 3.75–5.01)
ALPHA1 GLOB SERPL ELPH-MCNC: 0.27 G/DL (ref 0.19–0.46)
ALPHA2 GLOB SERPL ELPH-MCNC: 0.65 G/DL (ref 0.48–1.05)
B-GLOBULIN SERPL ELPH-MCNC: 0.85 G/DL (ref 0.48–1.1)
GAMMA GLOB SERPL ELPH-MCNC: 0.76 G/DL (ref 0.62–1.51)
IGA SERPL-MCNC: 115 MG/DL (ref 68–408)
IGG SERPL-MCNC: 616 MG/DL (ref 768–1632)
IGM SERPL-MCNC: 169 MG/DL (ref 35–263)
INTERPRETATION SERPL IFE-IMP: NORMAL
M PROTEIN SERPL ELPH-MCNC: NORMAL G/DL
MONOCLON BAND OBS SERPL: NORMAL
PATHOLOGY STUDY: NORMAL
PROT SERPL-MCNC: 7 G/DL (ref 6.3–8.2)

## 2025-06-27 DIAGNOSIS — G89.29 CHRONIC BILATERAL LOW BACK PAIN WITHOUT SCIATICA: ICD-10-CM

## 2025-06-27 DIAGNOSIS — M51.369 BULGE OF LUMBAR DISC WITHOUT MYELOPATHY: ICD-10-CM

## 2025-06-27 DIAGNOSIS — G89.4 CHRONIC PAIN SYNDROME: ICD-10-CM

## 2025-06-27 DIAGNOSIS — M54.50 CHRONIC BILATERAL LOW BACK PAIN WITHOUT SCIATICA: ICD-10-CM

## 2025-06-27 DIAGNOSIS — M47.816 SPONDYLOSIS OF LUMBAR REGION WITHOUT MYELOPATHY OR RADICULOPATHY: ICD-10-CM

## 2025-06-27 RX ORDER — TRAMADOL HYDROCHLORIDE 50 MG/1
50 TABLET ORAL EVERY 8 HOURS PRN
Qty: 90 TABLET | Refills: 0 | OUTPATIENT
Start: 2025-06-27 | End: 2025-07-27

## 2025-06-30 ENCOUNTER — HOSPITAL ENCOUNTER (OUTPATIENT)
Age: 55
Setting detail: OUTPATIENT SURGERY
Discharge: HOME | End: 2025-06-30
Attending: PAIN MEDICINE | Admitting: PAIN MEDICINE
Payer: COMMERCIAL

## 2025-06-30 ENCOUNTER — APPOINTMENT (OUTPATIENT)
Dept: GENERAL RADIOLOGY | Age: 55
End: 2025-06-30
Attending: PAIN MEDICINE
Payer: COMMERCIAL

## 2025-06-30 VITALS
WEIGHT: 196.8 LBS | TEMPERATURE: 97.9 F | OXYGEN SATURATION: 95 % | HEIGHT: 69 IN | HEART RATE: 86 BPM | BODY MASS INDEX: 29.15 KG/M2 | RESPIRATION RATE: 16 BRPM | SYSTOLIC BLOOD PRESSURE: 115 MMHG | DIASTOLIC BLOOD PRESSURE: 78 MMHG

## 2025-06-30 LAB — GLUCOSE BLD STRIP.AUTO-MCNC: 124 MG/DL (ref 70–108)

## 2025-06-30 PROCEDURE — 99152 MOD SED SAME PHYS/QHP 5/>YRS: CPT | Performed by: PAIN MEDICINE

## 2025-06-30 PROCEDURE — 82948 REAGENT STRIP/BLOOD GLUCOSE: CPT

## 2025-06-30 PROCEDURE — 64636 DESTROY L/S FACET JNT ADDL: CPT | Performed by: PAIN MEDICINE

## 2025-06-30 PROCEDURE — 6360000002 HC RX W HCPCS: Performed by: PAIN MEDICINE

## 2025-06-30 PROCEDURE — 7100000011 HC PHASE II RECOVERY - ADDTL 15 MIN: Performed by: PAIN MEDICINE

## 2025-06-30 PROCEDURE — 3600000057 HC PAIN LEVEL 4 ADDL 15 MIN: Performed by: PAIN MEDICINE

## 2025-06-30 PROCEDURE — 3600000056 HC PAIN LEVEL 4 BASE: Performed by: PAIN MEDICINE

## 2025-06-30 PROCEDURE — 99153 MOD SED SAME PHYS/QHP EA: CPT | Performed by: PAIN MEDICINE

## 2025-06-30 PROCEDURE — 7100000010 HC PHASE II RECOVERY - FIRST 15 MIN: Performed by: PAIN MEDICINE

## 2025-06-30 PROCEDURE — 64635 DESTROY LUMB/SAC FACET JNT: CPT | Performed by: PAIN MEDICINE

## 2025-06-30 PROCEDURE — 2709999900 HC NON-CHARGEABLE SUPPLY: Performed by: PAIN MEDICINE

## 2025-06-30 RX ORDER — FENTANYL CITRATE 50 UG/ML
INJECTION, SOLUTION INTRAMUSCULAR; INTRAVENOUS PRN
Status: DISCONTINUED | OUTPATIENT
Start: 2025-06-30 | End: 2025-06-30 | Stop reason: ALTCHOICE

## 2025-06-30 RX ORDER — MIDAZOLAM HYDROCHLORIDE 1 MG/ML
INJECTION, SOLUTION INTRAMUSCULAR; INTRAVENOUS PRN
Status: DISCONTINUED | OUTPATIENT
Start: 2025-06-30 | End: 2025-06-30 | Stop reason: ALTCHOICE

## 2025-06-30 RX ORDER — LIDOCAINE HYDROCHLORIDE 20 MG/ML
INJECTION, SOLUTION INFILTRATION; PERINEURAL PRN
Status: DISCONTINUED | OUTPATIENT
Start: 2025-06-30 | End: 2025-06-30 | Stop reason: ALTCHOICE

## 2025-06-30 RX ORDER — BUPIVACAINE HYDROCHLORIDE 5 MG/ML
INJECTION, SOLUTION PERINEURAL PRN
Status: DISCONTINUED | OUTPATIENT
Start: 2025-06-30 | End: 2025-06-30 | Stop reason: ALTCHOICE

## 2025-06-30 ASSESSMENT — PAIN - FUNCTIONAL ASSESSMENT
PAIN_FUNCTIONAL_ASSESSMENT: 0-10
PAIN_FUNCTIONAL_ASSESSMENT: 0-10

## 2025-06-30 ASSESSMENT — PAIN DESCRIPTION - DESCRIPTORS: DESCRIPTORS: ACHING

## 2025-06-30 ASSESSMENT — PAIN SCALES - GENERAL: PAINLEVEL_OUTOF10: 6

## 2025-06-30 NOTE — POST SEDATION
[facility]  Sedation/Analgesia Post Sedation Record    Pt Name: Po Victor  MRN: [id number]  YOB: 1970  Procedure Performed By: Salvador Rodas MD   Primary Care Physician: Silvia Correa ACNP    POST-PROCEDURE    Physicians/Assistants: Salvador Rodas MD  Procedure Performed: :Radiofrequency ablation of median branches at the levels of L4-5 and L5-S1 bilateral under fluoroscopic guidance       Sedation/Anesthesia: Versed and Fentanyl (See procedure note for amount and duration)   Estimated Blood Loss:     0  ml  Specimens Removed:  None        Complications: None Immediately appreciated     Post-procedure Diagnosis/Findings:           1. Spondylosis of lumbar region without myelopathy or radiculopathy    2. Lumbar facet arthropathy        Recommendations:    F/U office           Electronically signed by Salvador Rodas MD on 6/30/2025 at 10:28 AM

## 2025-06-30 NOTE — PROGRESS NOTES
1032 - Patient arrived to Phase II via bed, report from Leora MESA. Patient awake and alert without co                           mplaints. VSS, site WNL. Denies any numbness or tingling. Drink and snack provided. Call light in reach.    1047 - Patient sat edge of bed, tolerated well. IV removed. Patient dressed.    1053 - Patient discharged ambulatory to private vehicle with family.

## 2025-06-30 NOTE — PROCEDURES
(see flow sheet for vitals).     Sedation/ Anesthesia Plan:   IV sedation    Patient is an appropriate candidate for plan of sedation: yes    Preoperative Diagnosis:   1. Spondylosis of lumbar region without myelopathy or radiculopathy    2. Lumbar facet arthropathy          Post op DX   1. Spondylosis of lumbar region without myelopathy or radiculopathy    2. Lumbar facet arthropathy        Procedure Performed:  :Radiofrequency ablation of median branches at the levels of L4-5 and L5-S1 bilateral under fluoroscopic guidance     Indication for the Procedure:  The patient has ahistory of chronic low back pain that is not responding well to the conservative treatment.  Patient's pain is mostly axial in nature.  Pain is interfering with the activities of daily living.  Physical examination revealed facet tenderness and facet loading is positive.  Patient had undergone lumbar facet joint injections with pain relief that lasted for only a short period of time and had greater than 70% pain relief with confirmatory median branch blocks.  Hence we decided to do radiofrequency abalation of median branches for long term pain releif.   The procedure and risks  were discussed with the patient and an informed consent was obtained.    Procedure:     A meaningful communication was kept up with the patient throughout the procedure.  The patient is placed in prone position and skin over the back was prepped and draped in sterile manner.  Then using fluoroscopy the junction of the transverse process of the vertebra with the superior process of the facet joint was observed and the view was optimized.  The skin and deep tissues posterior were infiltrated with 10 ml of  2% xylocaine The RF canula with the 10 mm active tip was introduced through the skin wheal under fluoroscopy guidance such that the tip of the needle lies in the groove of the transverse process with the superior processes of the facet joint.  Then a lateral view of the

## 2025-06-30 NOTE — PRE SEDATION
[facility]  Pre-Sedation/Analgesia History & Physical    Pt Name: Po Victor  MRN: [id number]  YOB: 1970  Provider Performing Procedure: Salvador Rodas MD   Primary Care Physician: Silvia Correa ACNP    PRE-PROCEDURE   DNR-CCA/DNR-CC : No  Brief History/Pre-Procedure Diagnosis:    1. Spondylosis of lumbar region without myelopathy or radiculopathy    2. Lumbar facet arthropathy        MEDICAL HISTORY       has a past medical history of Chronic back pain, GERD (gastroesophageal reflux disease), History of kidney stones, Hx of blood clots, Kidney stone, and Lumbar spondylosis.  SURGICAL HISTORY   has a past surgical history that includes Ureter stent placement (2006); Lithotripsy (2006); Cystocopy (6/14/2013); Cystocopy (07/29/2013); Cystoscopy (12/23/13); other surgical history (Bilateral, 03/26/2018); pr njx dx/ther agt pvrt facet jt lmbr/sac 2nd level (Bilateral, 3/26/2018); pr njx dx/ther agt pvrt facet jt lmbr/sac 2nd level (Bilateral, 5/21/2018); Colonoscopy; eye surgery (2007); hernia repair (2007); pr dstrj neurolytic agent other peripheral nerve (Left, 9/28/2018); pr dstrj neurolytic agent other peripheral nerve (Right, 11/30/2018); lumbar nerve block (N/A, 4/5/2019); Injection Procedure For Sacroiliac Joint (Left, 5/17/2019); Injection Procedure For Sacroiliac Joint (Left, 6/27/2019); Lumbar spine surgery (Left, 8/15/2019); Nerve Surgery (Right, 10/31/2019); Pain management procedure (Left, 1/16/2020); Pain management procedure (Right, 3/5/2020); Pain management procedure (Left, 7/2/2020); Pain management procedure (Left, 8/4/2020); Cervical spine surgery (Right, 12/15/2020); Pain management procedure (Left, 1/21/2021); Pain management procedure (Left, 3/15/2021); Pain management procedure (Right, 4/29/2021); Pain management procedure (Bilateral, 2/7/2022); Pain management procedure (Bilateral, 11/15/2022); Pain management procedure (Bilateral, 1/3/2024); and

## 2025-06-30 NOTE — H&P
H&P    Garrick continues to have a main compliant of low back pain which starts in center and spreads across his low back- constant aching and sharp and stabbing pain. Pain is elevated with activity. He is scheduled for a bilateral L-facet RFA @ L4-5 and L5-S1 on 6/30/2025 as this procedure was pushed back for medical clearance to hold anticoagulation.      He denies any radicular pain, no numbness, no tingling, no changes in bowel or bladder function. Pain is all in his low back      Current pain medications remain effective in decreasing pain down to a tolerable level so he can tolerate activity and work.   Pain increases with bending, lifting, twisting , walking, standing, getting up and down, and housework or working at job           Prior Injections:  bilateral L-facet RFA from 11/15/2022 80% relief       bilateral L-facet RFA @ L4-5 and L5-S1 completed by Dr. La on 1/3/2023 80% relief for 7 months      bilateral L-facet RFA @ L4-5 and L5-S1 completed by Dr. La on 9/16/2024 85% releif for 6.5 months      Radiology:  Lumbar xray:   FINDINGS:     There is a dextroscoliotic curvature of the lumbar spine the expected lumbar lordosis is preserved. There is redemonstration of loss of height of the T12 vertebral body. This is unchanged from the prior exam. The remaining vertebral body heights and   alignment are maintained. No suspicious osseous lesion is identified. There is degenerative disc space narrowing at L4-5 and L5-S1 which is similar to slightly worse compared to the prior exam. Degenerative facet arthropathy is again noted at the lower   lumbar spine.     The visualized sacrum and sacroiliac joints are unremarkable. The paraspinal soft tissues are unremarkable. There is redemonstration of surgical anchors overlying the pelvis.     IMPRESSION:     1. There is redemonstration of degenerative changes within the lumbar spine which are most notable at L4-5 and L5-S1 with disc space narrowing and

## 2025-07-01 RX ORDER — TIRZEPATIDE 10 MG/.5ML
INJECTION, SOLUTION SUBCUTANEOUS
Qty: 4 ML | Refills: 0 | OUTPATIENT
Start: 2025-07-01

## 2025-07-01 RX ORDER — ALLOPURINOL 300 MG/1
300 TABLET ORAL DAILY
Qty: 90 TABLET | Refills: 3 | Status: SHIPPED | OUTPATIENT
Start: 2025-07-01

## 2025-07-01 NOTE — TELEPHONE ENCOUNTER
Po Victor called requesting a refill on the following medications:  Requested Prescriptions     Pending Prescriptions Disp Refills    allopurinol (ZYLOPRIM) 300 MG tablet [Pharmacy Med Name: ALLOPURINOL 300 MG TABLET] 90 tablet 3     Sig: TAKE 1 TABLET BY MOUTH EVERY DAY     Pharmacy verified:  [email or web address]      Date of last visit: 09/17/2024  Date of next visit (if applicable): 9/18/2025

## 2025-07-17 ENCOUNTER — HOSPITAL ENCOUNTER (OUTPATIENT)
Dept: INFUSION THERAPY | Age: 55
Discharge: HOME | End: 2025-07-17
Payer: COMMERCIAL

## 2025-07-17 ENCOUNTER — OFFICE VISIT (OUTPATIENT)
Dept: ONCOLOGY | Age: 55
End: 2025-07-17
Payer: COMMERCIAL

## 2025-07-17 VITALS
WEIGHT: 196 LBS | SYSTOLIC BLOOD PRESSURE: 137 MMHG | BODY MASS INDEX: 29.03 KG/M2 | OXYGEN SATURATION: 98 % | HEIGHT: 69 IN | DIASTOLIC BLOOD PRESSURE: 81 MMHG | HEART RATE: 80 BPM | RESPIRATION RATE: 18 BRPM | TEMPERATURE: 98.2 F

## 2025-07-17 VITALS
RESPIRATION RATE: 18 BRPM | SYSTOLIC BLOOD PRESSURE: 137 MMHG | OXYGEN SATURATION: 98 % | BODY MASS INDEX: 29.03 KG/M2 | HEART RATE: 80 BPM | TEMPERATURE: 98.2 F | DIASTOLIC BLOOD PRESSURE: 81 MMHG | HEIGHT: 69 IN | WEIGHT: 196 LBS

## 2025-07-17 DIAGNOSIS — D69.6 THROMBOCYTOPENIA: ICD-10-CM

## 2025-07-17 DIAGNOSIS — D50.9 IRON DEFICIENCY ANEMIA, UNSPECIFIED IRON DEFICIENCY ANEMIA TYPE: ICD-10-CM

## 2025-07-17 DIAGNOSIS — D69.6 THROMBOCYTOPENIA: Primary | ICD-10-CM

## 2025-07-17 LAB
BASOPHILS ABSOLUTE: 0.1 THOU/MM3 (ref 0–0.1)
BASOPHILS NFR BLD AUTO: 1 % (ref 0–3)
EOSINOPHIL NFR BLD AUTO: 3 % (ref 0–4)
EOSINOPHILS ABSOLUTE: 0.2 THOU/MM3 (ref 0–0.4)
ERYTHROCYTE [DISTWIDTH] IN BLOOD BY AUTOMATED COUNT: 15.1 % (ref 11.5–14.5)
HCT VFR BLD AUTO: 45.6 % (ref 42–52)
HGB BLD-MCNC: 13.7 GM/DL (ref 14–18)
IMMATURE GRANULOCYTES %: 0 %
IMMATURE GRANULOCYTES ABSOLUTE: 0.01 THOU/MM3 (ref 0–0.07)
LYMPHOCYTES ABSOLUTE: 1.1 THOU/MM3 (ref 1–4.8)
LYMPHOCYTES NFR BLD AUTO: 18 % (ref 15–47)
MCH RBC QN AUTO: 25.3 PG (ref 26–33)
MCHC RBC AUTO-ENTMCNC: 30 GM/DL (ref 32.2–35.5)
MCV RBC AUTO: 84 FL (ref 80–94)
MONOCYTES ABSOLUTE: 0.6 THOU/MM3 (ref 0.4–1.3)
MONOCYTES NFR BLD AUTO: 10 % (ref 0–12)
NEUTROPHILS ABSOLUTE: 4.2 THOU/MM3 (ref 1.8–7.7)
NEUTROPHILS NFR BLD AUTO: 68 % (ref 43–75)
PLATELET # BLD AUTO: 108 THOU/MM3 (ref 130–400)
PMV BLD AUTO: 9.3 FL (ref 9.4–12.4)
RBC # BLD AUTO: 5.42 MILL/MM3 (ref 4.7–6.1)
WBC # BLD AUTO: 6.3 THOU/MM3 (ref 4.8–10.8)

## 2025-07-17 PROCEDURE — 99211 OFF/OP EST MAY X REQ PHY/QHP: CPT

## 2025-07-17 PROCEDURE — 99214 OFFICE O/P EST MOD 30 MIN: CPT | Performed by: INTERNAL MEDICINE

## 2025-07-17 PROCEDURE — 85025 COMPLETE CBC W/AUTO DIFF WBC: CPT

## 2025-07-17 PROCEDURE — 36415 COLL VENOUS BLD VENIPUNCTURE: CPT

## 2025-07-17 RX ORDER — FERROUS GLUCONATE 324(38)MG
324 TABLET ORAL
Qty: 90 TABLET | Refills: 1 | Status: SHIPPED | OUTPATIENT
Start: 2025-07-17

## 2025-07-17 NOTE — PATIENT INSTRUCTIONS
-Prescribed ferrous gluconate (new oral iron pills).  -GI referral was placed.  -Proceed with blood work including CBC and iron panel on 9/22/2025.  -Return to clinic for follow-up on 9/25/2025 and to discuss results and recommendations.   Daily Note     Today's date: 3/8/2022  Patient name: Markos Hoffman  : 1965  MRN: [id number]  Referring provider: Roselyn Murphy MD  Dx:   Encounter Diagnosis     ICD-10-CM    1  Sciatica of left side  M54 32                   Subjective: Patient reports less tingling down leg  Reports some increased discomfort into hip  Objective: See treatment diary below      Assessment: Markos Hoffman appears to be having centralization of symptoms  He has improved tolerance to movement  Continued PT should decrease symptoms  Plan: Continue per plan of care        Precautions: 3/1     Daily Treatment Diary:      Initial Evaluation Date: 22  Compliance 3/1  3/8                   Visit Number 1 2                   Re-Eval  IE                 [facility]   Foto Captured Y                           3/1  3/8                   Manual                      Nerve gldies -  sj                   Piriformis stm -  sj                                         Ther-Ex                      Warm up -  [facility] 10'                   Nerve glide 20x  30x                   Ankle pumps -  x30                   Toe raises 20x  20x                   Heel raises -  2x10                   Piriformis stretch 4x30"  4x30"                                                                                     NMES dorsiflexon 10"/10" Cameroonian 10"/10" Cam[place]onian                   Neuro Re-Ed                                                                                                Ther-Act                                                               Modalities                      Cp prn -

## 2025-07-17 NOTE — PROGRESS NOTES
Differential:      Lab Results   Component Value Date/Time    WBC 5.3 06/19/2025 02:45 PM    RBC 5.34 06/19/2025 02:45 PM    RBC 5.13 10/19/2022 09:30 AM    HGB 13.4 06/19/2025 02:45 PM    HCT 45.2 06/19/2025 02:45 PM    PLT 95 06/19/2025 02:45 PM    MCV 84.6 06/19/2025 02:45 PM    MCH 25.1 06/19/2025 02:45 PM    MCHC 29.6 06/19/2025 02:45 PM    RDW 13.1 10/19/2022 09:30 AM    NRBC 0 06/19/2025 02:45 PM    LYMPHOPCT 15.0 10/19/2022 09:30 AM    MONOPCT 8.8 06/19/2025 02:45 PM    EOSPCT 5.1 10/19/2022 09:30 AM    BASOPCT 0.8 10/19/2022 09:30 AM    MONOSABS 0.5 06/19/2025 02:45 PM    LYMPHSABS 1.1 06/19/2025 02:45 PM    EOSABS 0.2 06/19/2025 02:45 PM    BASOSABS 0.0 06/19/2025 02:45 PM   No components found for: \"SEGSABS\"    CMP:    Lab Results   Component Value Date/Time     06/19/2025 02:45 PM    K 4.2 06/19/2025 02:45 PM    K 3.8 06/18/2020 07:50 PM     06/19/2025 02:45 PM    CO2 24 06/19/2025 02:45 PM    BUN 8 06/19/2025 02:45 PM    CREATININE 0.8 06/19/2025 02:45 PM    AGRATIO 2.1 07/27/2019 08:56 AM    LABGLOM > 90 06/19/2025 02:45 PM    LABGLOM >60 09/01/2023 10:24 AM    GLUCOSE 150 06/19/2025 02:45 PM    GLUCOSE 166 09/06/2024 08:15 AM    CALCIUM 9.8 06/19/2025 02:45 PM    BILITOT 0.4 06/19/2025 02:45 PM    ALKPHOS 161 06/19/2025 02:45 PM    AST 32 06/19/2025 02:45 PM    ALT 38 06/19/2025 02:45 PM       BMP:    Lab Results   Component Value Date/Time     06/19/2025 02:45 PM    K 4.2 06/19/2025 02:45 PM    K 3.8 06/18/2020 07:50 PM     06/19/2025 02:45 PM    CO2 24 06/19/2025 02:45 PM    BUN 8 06/19/2025 02:45 PM    CREATININE 0.8 06/19/2025 02:45 PM    CALCIUM 9.8 06/19/2025 02:45 PM    LABGLOM > 90 06/19/2025 02:45 PM    LABGLOM >60 09/01/2023 10:24 AM    GLUCOSE 150 06/19/2025 02:45 PM    GLUCOSE 166 09/06/2024 08:15 AM       Magnesium:  No results found for: \"MG\"  PT/INR:    Lab Results   Component Value Date/Time    PROTIME 14.0 06/19/2025 02:45 PM    INR 1.23 06/19/2025 02:45 PM

## 2025-07-21 ENCOUNTER — OFFICE VISIT (OUTPATIENT)
Dept: PHYSICAL MEDICINE AND REHAB | Age: 55
End: 2025-07-21
Payer: COMMERCIAL

## 2025-07-21 VITALS
HEART RATE: 87 BPM | DIASTOLIC BLOOD PRESSURE: 82 MMHG | SYSTOLIC BLOOD PRESSURE: 122 MMHG | WEIGHT: 196 LBS | BODY MASS INDEX: 29.03 KG/M2 | HEIGHT: 69 IN

## 2025-07-21 DIAGNOSIS — M51.369 BULGE OF LUMBAR DISC WITHOUT MYELOPATHY: ICD-10-CM

## 2025-07-21 DIAGNOSIS — G89.29 CHRONIC BILATERAL LOW BACK PAIN WITHOUT SCIATICA: ICD-10-CM

## 2025-07-21 DIAGNOSIS — M54.50 CHRONIC BILATERAL LOW BACK PAIN WITHOUT SCIATICA: ICD-10-CM

## 2025-07-21 DIAGNOSIS — M47.816 SPONDYLOSIS OF LUMBAR REGION WITHOUT MYELOPATHY OR RADICULOPATHY: Primary | ICD-10-CM

## 2025-07-21 DIAGNOSIS — M47.816 LUMBAR FACET ARTHROPATHY: ICD-10-CM

## 2025-07-21 DIAGNOSIS — G89.4 CHRONIC PAIN SYNDROME: ICD-10-CM

## 2025-07-21 LAB
ALBUMIN: 4.8 G/DL
ALP BLD-CCNC: 144 U/L
ALT SERPL-CCNC: 34 U/L
ANION GAP SERPL CALCULATED.3IONS-SCNC: 13 MMOL/L
AST SERPL-CCNC: 31 U/L
BILIRUB SERPL-MCNC: 0.7 MG/DL (ref 0.1–1.4)
BUN BLDV-MCNC: 11 MG/DL
CALCIUM SERPL-MCNC: 10 MG/DL
CHLORIDE BLD-SCNC: 104 MMOL/L
CHOLESTEROL, TOTAL: 149 MG/DL
CHOLESTEROL/HDL RATIO: 3.6
CO2: 25 MMOL/L
CREAT SERPL-MCNC: 0.73 MG/DL
CREATININE URINE: 79.3 MG/DL
ESTIMATED AVERAGE GLUCOSE: 154
GFR, ESTIMATED: 107
GLUCOSE BLD-MCNC: 78 MG/DL
HBA1C MFR BLD: 7 %
HDLC SERPL-MCNC: 41 MG/DL (ref 35–70)
LDL CHOLESTEROL: 85
MICROALBUMIN/CREAT 24H UR: 5.4 MG/DL
MICROALBUMIN/CREAT UR-RTO: 7 MG/G
NONHDLC SERPL-MCNC: NORMAL MG/DL
POTASSIUM SERPL-SCNC: 4.5 MMOL/L
SODIUM BLD-SCNC: 142 MMOL/L
TOTAL PROTEIN: 7.1 G/DL (ref 6.4–8.2)
TRIGL SERPL-MCNC: 116 MG/DL
VLDLC SERPL CALC-MCNC: 23 MG/DL

## 2025-07-21 PROCEDURE — 99214 OFFICE O/P EST MOD 30 MIN: CPT | Performed by: NURSE PRACTITIONER

## 2025-07-21 RX ORDER — CYCLOBENZAPRINE HCL 10 MG
10 TABLET ORAL 3 TIMES DAILY PRN
Qty: 270 TABLET | Refills: 0 | Status: SHIPPED | OUTPATIENT
Start: 2025-07-26 | End: 2025-10-24

## 2025-07-21 RX ORDER — TRAMADOL HYDROCHLORIDE 50 MG/1
50 TABLET ORAL EVERY 8 HOURS PRN
Qty: 90 TABLET | Refills: 0 | Status: SHIPPED | OUTPATIENT
Start: 2025-07-28 | End: 2025-08-27

## 2025-07-21 ASSESSMENT — ENCOUNTER SYMPTOMS
ABDOMINAL DISTENTION: 0
ABDOMINAL PAIN: 0
GASTROINTESTINAL NEGATIVE: 1
EYES NEGATIVE: 1
CONSTIPATION: 0
RESPIRATORY NEGATIVE: 1
BACK PAIN: 1

## 2025-07-21 NOTE — PROGRESS NOTES
chloride].      Subjective:      Review of Systems   Constitutional: Negative.    HENT: Negative.     Eyes: Negative.    Respiratory: Negative.     Cardiovascular: Negative.  Negative for chest pain and leg swelling.   Gastrointestinal: Negative.  Negative for abdominal distention, abdominal pain and constipation.   Musculoskeletal:  Positive for arthralgias and back pain. Negative for gait problem, joint swelling, myalgias and neck pain.        Not using any assist devices    Skin: Negative.    Neurological:  Negative for weakness and numbness.   Psychiatric/Behavioral:  Negative for decreased concentration and sleep disturbance. The patient is not nervous/anxious.        Objective:     Vitals:    07/21/25 0737   BP: 122/82   BP Site: Left Upper Arm   Patient Position: Sitting   BP Cuff Size: Medium Adult   Pulse: 87   Weight: 88.9 kg (196 lb)   Height: 1.753 m (5' 9.02\")       Physical Exam  Vitals and nursing note reviewed.   Constitutional:       General: He is not in acute distress.     Appearance: Normal appearance. He is well-developed. He is not diaphoretic.   HENT:      Head: Normocephalic and atraumatic.      Right Ear: External ear normal.      Left Ear: External ear normal.      Nose: Nose normal.      Mouth/Throat:      Mouth: Mucous membranes are moist.      Pharynx: Oropharynx is clear. No oropharyngeal exudate.   Eyes:      General: No scleral icterus.        Right eye: No discharge.         Left eye: No discharge.      Conjunctiva/sclera: Conjunctivae normal.      Pupils: Pupils are equal, round, and reactive to light.   Neck:      Thyroid: No thyromegaly.   Cardiovascular:      Rate and Rhythm: Normal rate and regular rhythm.      Pulses: Normal pulses.      Heart sounds: Normal heart sounds. No murmur heard.     No friction rub. No gallop.   Pulmonary:      Effort: Pulmonary effort is normal. No respiratory distress.      Breath sounds: Normal breath sounds. No wheezing or rales.   Chest:

## 2025-07-24 DIAGNOSIS — E11.65 TYPE 2 DIABETES MELLITUS WITH HYPERGLYCEMIA, UNSPECIFIED WHETHER LONG TERM INSULIN USE (HCC): ICD-10-CM

## 2025-07-28 DIAGNOSIS — E11.65 TYPE 2 DIABETES MELLITUS WITH HYPERGLYCEMIA, UNSPECIFIED WHETHER LONG TERM INSULIN USE (HCC): ICD-10-CM

## 2025-07-29 ENCOUNTER — RESULTS FOLLOW-UP (OUTPATIENT)
Age: 55
End: 2025-07-29

## 2025-07-29 DIAGNOSIS — E11.65 TYPE 2 DIABETES MELLITUS WITH HYPERGLYCEMIA, WITH LONG-TERM CURRENT USE OF INSULIN (HCC): Primary | ICD-10-CM

## 2025-07-29 DIAGNOSIS — Z79.4 TYPE 2 DIABETES MELLITUS WITH HYPERGLYCEMIA, WITH LONG-TERM CURRENT USE OF INSULIN (HCC): Primary | ICD-10-CM

## 2025-07-29 DIAGNOSIS — E78.2 MIXED HYPERLIPIDEMIA: ICD-10-CM

## 2025-07-30 ENCOUNTER — SCHEDULED TELEPHONE ENCOUNTER (OUTPATIENT)
Dept: INTERNAL MEDICINE CLINIC | Age: 55
End: 2025-07-30

## 2025-07-30 DIAGNOSIS — E11.65 TYPE 2 DIABETES MELLITUS WITH HYPERGLYCEMIA, WITH LONG-TERM CURRENT USE OF INSULIN (HCC): ICD-10-CM

## 2025-07-30 DIAGNOSIS — Z79.4 TYPE 2 DIABETES MELLITUS WITH HYPERGLYCEMIA, WITH LONG-TERM CURRENT USE OF INSULIN (HCC): ICD-10-CM

## 2025-07-30 NOTE — PROGRESS NOTES
**CGM Interpretation**    Patient ID: Po Victor 1970 [id number]  Referring Provider: Kiley Charles CNP    Patient is referred to the [facility] for Type 2 diabetes.     Review of patient's CGM data revealed the following:   -Monitor type: Dexcom CGM   -Date Range: 25-25   Time CGM Active: 92%   -Trends: V. High: 6%, High: 19%, Target: 73%, Low:2%, V. Low: <1%   -Average Bmg/dL   -GMI: 6.9%   -Glucose variability: 38.3%    Interpretation:    TIR is at goal at 73%  Some overnight lows/borderline lows noted    Interpretation overseen by Dr. Earline Borrero MD.   -----------------------------------------------------------------  Current Diabetes Pharmacotherapy:  Lantus 22 units QAM  Jardiance 25mg daily  Met XR 1000mg BID  Mounjaro 12.5 mg weekly    Assessment:   Garrick is tolerating Mounjaro well; he is interested in increasing his dose with the next fill and further decreasing the Lantus.    Recommendations/Plan:   Increase Mounjaro to 15mg with the next fill  Decrease Lantus to 20 units QAM    F/u 2 week with dietician, 1.5 month with [facility]    For Pharmacy Admin Tracking Only    Program: Medical Group  CPA in place:  Yes  Recommendation Provided To: Patient/Caregiver: 2 via Telephone  Intervention Detail: Dose Adjustment: 2, reason: Therapy Optimization  Intervention Accepted By: Patient/Caregiver: 2  Gap Closed?: No   Time Spent (min): 30    **Phone call completed by Aly Astudillo, GildardoD, PGY-1 Resident, overseen by Debora Nelson.**    Debora Nelson, PharmD, BCPS, [facility]  Internal Medicine Clinical Pharmacist  [phone]

## 2025-07-31 RX ORDER — INSULIN GLARGINE 100 [IU]/ML
20 INJECTION, SOLUTION SUBCUTANEOUS NIGHTLY
Qty: 15 ML | Refills: 3 | Status: SHIPPED | OUTPATIENT
Start: 2025-07-31

## 2025-07-31 NOTE — TELEPHONE ENCOUNTER
Left a message on patients VM. Encouraged to continue following instructions given at last follow up. BSL readings from dexcom routed.

## 2025-08-12 ENCOUNTER — OFFICE VISIT (OUTPATIENT)
Dept: INTERNAL MEDICINE CLINIC | Age: 55
End: 2025-08-12
Payer: COMMERCIAL

## 2025-08-12 VITALS — HEIGHT: 69 IN | BODY MASS INDEX: 29.92 KG/M2 | WEIGHT: 202 LBS

## 2025-08-12 DIAGNOSIS — E11.65 TYPE 2 DIABETES MELLITUS WITH HYPERGLYCEMIA, WITH LONG-TERM CURRENT USE OF INSULIN (HCC): Primary | ICD-10-CM

## 2025-08-12 DIAGNOSIS — Z79.4 TYPE 2 DIABETES MELLITUS WITH HYPERGLYCEMIA, WITH LONG-TERM CURRENT USE OF INSULIN (HCC): Primary | ICD-10-CM

## 2025-08-12 PROCEDURE — NBSRV NON-BILLABLE SERVICE: Performed by: DIETITIAN, REGISTERED

## 2025-08-12 PROCEDURE — 97803 MED NUTRITION INDIV SUBSEQ: CPT | Performed by: DIETITIAN, REGISTERED

## 2025-08-16 DIAGNOSIS — Z79.4 TYPE 2 DIABETES MELLITUS WITH HYPERGLYCEMIA, WITH LONG-TERM CURRENT USE OF INSULIN (HCC): ICD-10-CM

## 2025-08-16 DIAGNOSIS — E11.65 TYPE 2 DIABETES MELLITUS WITH HYPERGLYCEMIA, WITH LONG-TERM CURRENT USE OF INSULIN (HCC): ICD-10-CM

## 2025-08-26 DIAGNOSIS — G89.4 CHRONIC PAIN SYNDROME: ICD-10-CM

## 2025-08-26 DIAGNOSIS — G89.29 CHRONIC BILATERAL LOW BACK PAIN WITHOUT SCIATICA: ICD-10-CM

## 2025-08-26 DIAGNOSIS — M54.50 CHRONIC BILATERAL LOW BACK PAIN WITHOUT SCIATICA: ICD-10-CM

## 2025-08-26 DIAGNOSIS — M51.369 BULGE OF LUMBAR DISC WITHOUT MYELOPATHY: ICD-10-CM

## 2025-08-26 DIAGNOSIS — M47.816 SPONDYLOSIS OF LUMBAR REGION WITHOUT MYELOPATHY OR RADICULOPATHY: ICD-10-CM

## 2025-08-26 RX ORDER — TRAMADOL HYDROCHLORIDE 50 MG/1
50 TABLET ORAL EVERY 8 HOURS PRN
Qty: 90 TABLET | Refills: 0 | Status: SHIPPED | OUTPATIENT
Start: 2025-08-27 | End: 2025-09-26

## 2025-09-02 ENCOUNTER — OFFICE VISIT (OUTPATIENT)
Age: 55
End: 2025-09-02
Payer: COMMERCIAL

## 2025-09-02 VITALS
BODY MASS INDEX: 30.16 KG/M2 | HEIGHT: 69 IN | DIASTOLIC BLOOD PRESSURE: 76 MMHG | HEART RATE: 98 BPM | WEIGHT: 203.6 LBS | SYSTOLIC BLOOD PRESSURE: 102 MMHG

## 2025-09-02 DIAGNOSIS — E11.65 TYPE 2 DIABETES MELLITUS WITH HYPERGLYCEMIA, WITH LONG-TERM CURRENT USE OF INSULIN (HCC): ICD-10-CM

## 2025-09-02 DIAGNOSIS — Z79.4 TYPE 2 DIABETES MELLITUS WITH HYPERGLYCEMIA, WITH LONG-TERM CURRENT USE OF INSULIN (HCC): ICD-10-CM

## 2025-09-02 PROCEDURE — 3051F HG A1C>EQUAL 7.0%<8.0%: CPT

## 2025-09-02 PROCEDURE — 99214 OFFICE O/P EST MOD 30 MIN: CPT

## 2025-09-02 RX ORDER — TAMSULOSIN HYDROCHLORIDE 0.4 MG/1
0.4 CAPSULE ORAL DAILY
COMMUNITY

## 2025-09-02 RX ORDER — PANTOPRAZOLE SODIUM 40 MG/1
40 TABLET, DELAYED RELEASE ORAL DAILY
COMMUNITY
Start: 2025-08-07

## 2025-09-02 RX ORDER — ACYCLOVIR 400 MG/1
TABLET ORAL
Qty: 9 EACH | Refills: 2 | Status: SHIPPED | OUTPATIENT
Start: 2025-09-02

## (undated) DEVICE — HYPODERMIC SAFETY NEEDLE: Brand: MAGELLAN

## (undated) DEVICE — TOWEL,OR,DSP,ST,BLUE,STD,4/PK,20PK/CS: Brand: MEDLINE

## (undated) DEVICE — SHEET,DRAPE,3/4,53X77,STERILE: Brand: MEDLINE

## (undated) DEVICE — GLOVE ORANGE PI 7 1/2   MSG9075

## (undated) DEVICE — NEEDLE SPNL 22 GAX5 IN HUB QNCKE FUNNEL SHP STYL BLK SPINCAN

## (undated) DEVICE — GAUZE SPONGES,USP TYPE VII GAUZE, 12 PLY: Brand: CURITY

## (undated) DEVICE — KIT RF 4MM ACT TIP 17GA 75MM MULT PRB PROC COMPONENTS MULT

## (undated) DEVICE — MARKER,SKIN,WI/RULER AND LABELS: Brand: MEDLINE

## (undated) DEVICE — SYRINGE MED 5ML STD CLR PLAS LUERLOCK TIP N CTRL DISP

## (undated) DEVICE — NEEDLE SYR 18GA L1.5IN RED PLAS HUB S STL BLNT FILL W/O

## (undated) DEVICE — NEEDLE SPNL 22GA L3.5IN BLK HUB S STL REG WALL FIT STYL W/

## (undated) DEVICE — GAUZE,SPONGE,4"X4",12PLY,STERILE,LF,2'S: Brand: MEDLINE

## (undated) DEVICE — SYRINGE MED 10ML LUERLOCK TIP W/O SFTY DISP

## (undated) DEVICE — GLOVE ORANGE PI 7   MSG9070

## (undated) DEVICE — BANDAGE ADH W1XL3IN NAT FAB WVN FLX DURABLE N ADH PD SEAL

## (undated) DEVICE — CHLORAPREP 26ML CLEAR

## (undated) DEVICE — NEEDLE SYRINGE 18GA L1.5IN RED PLAS HUB S STL BLNT FILL W/O

## (undated) DEVICE — GLOVE SURG SZ 65 THK91MIL LTX FREE SYN POLYISOPRENE

## (undated) DEVICE — 6 ML SYRINGE LUER-LOCK TIP: Brand: MONOJECT

## (undated) DEVICE — ZINACTIVE USE 2537982 PAD GRND FOR RF PAIN MGMT DISP

## (undated) DEVICE — CURVED SHARP RF CANNULA, RADIOPAQUE MARKER: Brand: RADIOPAQUE RADIOFREQUENCY CANNULA

## (undated) DEVICE — PAD GRND FOR RF PAIN MGMT DISP

## (undated) DEVICE — NEEDLE EPI 18GA TUOHY WNG W/ CLR HUB PERIFIX

## (undated) DEVICE — Z DISCONTINUED USE 2537982 ELECTRODE DISPER W/ CRD DISP

## (undated) DEVICE — LABEL MED DRUG CUST

## (undated) DEVICE — 3 ML SYRINGE LUER-LOCK TIP: Brand: MONOJECT

## (undated) DEVICE — IV START KIT: Brand: MEDLINE INDUSTRIES, INC.

## (undated) DEVICE — SET ADMIN 25ML L117IN PMP MOD CK VLV RLER CLMP 2 SMRTSITE

## (undated) DEVICE — Device

## (undated) DEVICE — TOWEL,OR,DSP,ST,BLUE,DLX,4/PK,20PK/CS: Brand: MEDLINE

## (undated) DEVICE — SOLUTION IV 1000ML 0.9% SOD CHL PH 5 INJ USP VIAFLX PLAS

## (undated) DEVICE — INTENDED FOR TISSUE SEPARATION, AND OTHER PROCEDURES THAT REQUIRE A SHARP SURGICAL BLADE TO PUNCTURE OR CUT.: Brand: BARD-PARKER ® CARBON RIB-BACK BLADES

## (undated) DEVICE — SHEET,DRAPE,53X77,STERILE: Brand: MEDLINE